# Patient Record
Sex: MALE | Race: BLACK OR AFRICAN AMERICAN | NOT HISPANIC OR LATINO | URBAN - METROPOLITAN AREA
[De-identification: names, ages, dates, MRNs, and addresses within clinical notes are randomized per-mention and may not be internally consistent; named-entity substitution may affect disease eponyms.]

---

## 2017-02-17 ENCOUNTER — OUTPATIENT (OUTPATIENT)
Dept: OUTPATIENT SERVICES | Facility: HOSPITAL | Age: 52
LOS: 1 days | End: 2017-02-17
Payer: MEDICAID

## 2017-02-17 ENCOUNTER — APPOINTMENT (OUTPATIENT)
Dept: MRI IMAGING | Facility: CLINIC | Age: 52
End: 2017-02-17

## 2017-02-17 DIAGNOSIS — Z00.8 ENCOUNTER FOR OTHER GENERAL EXAMINATION: ICD-10-CM

## 2017-02-17 PROCEDURE — A9585: CPT

## 2017-02-17 PROCEDURE — 72197 MRI PELVIS W/O & W/DYE: CPT

## 2017-08-11 ENCOUNTER — APPOINTMENT (OUTPATIENT)
Dept: INTERNAL MEDICINE | Facility: CLINIC | Age: 52
End: 2017-08-11
Payer: MEDICAID

## 2017-08-11 VITALS
OXYGEN SATURATION: 99 % | DIASTOLIC BLOOD PRESSURE: 72 MMHG | TEMPERATURE: 97.2 F | HEART RATE: 62 BPM | RESPIRATION RATE: 18 BRPM | HEIGHT: 68 IN | WEIGHT: 206 LBS | SYSTOLIC BLOOD PRESSURE: 104 MMHG | BODY MASS INDEX: 31.22 KG/M2

## 2017-08-11 DIAGNOSIS — K61.2 ANORECTAL ABSCESS: ICD-10-CM

## 2017-08-11 DIAGNOSIS — R94.31 ABNORMAL ELECTROCARDIOGRAM [ECG] [EKG]: ICD-10-CM

## 2017-08-11 PROCEDURE — 94060 EVALUATION OF WHEEZING: CPT

## 2017-08-11 PROCEDURE — 90471 IMMUNIZATION ADMIN: CPT

## 2017-08-11 PROCEDURE — 90715 TDAP VACCINE 7 YRS/> IM: CPT | Mod: GA

## 2017-08-11 PROCEDURE — 93000 ELECTROCARDIOGRAM COMPLETE: CPT

## 2017-08-11 PROCEDURE — 99205 OFFICE O/P NEW HI 60 MIN: CPT | Mod: 25

## 2017-09-18 ENCOUNTER — APPOINTMENT (OUTPATIENT)
Dept: DERMATOLOGY | Facility: CLINIC | Age: 52
End: 2017-09-18
Payer: MEDICAID

## 2017-09-18 VITALS — HEIGHT: 68 IN | BODY MASS INDEX: 31.83 KG/M2 | WEIGHT: 210 LBS

## 2017-09-18 DIAGNOSIS — Z86.79 PERSONAL HISTORY OF OTHER DISEASES OF THE CIRCULATORY SYSTEM: ICD-10-CM

## 2017-09-18 DIAGNOSIS — L71.9 ROSACEA, UNSPECIFIED: ICD-10-CM

## 2017-09-18 PROCEDURE — 99203 OFFICE O/P NEW LOW 30 MIN: CPT

## 2017-11-10 ENCOUNTER — APPOINTMENT (OUTPATIENT)
Dept: INTERNAL MEDICINE | Facility: CLINIC | Age: 52
End: 2017-11-10
Payer: MEDICAID

## 2017-11-10 VITALS
HEIGHT: 68 IN | TEMPERATURE: 98 F | WEIGHT: 209 LBS | BODY MASS INDEX: 31.67 KG/M2 | OXYGEN SATURATION: 96 % | RESPIRATION RATE: 16 BRPM | DIASTOLIC BLOOD PRESSURE: 80 MMHG | HEART RATE: 80 BPM | SYSTOLIC BLOOD PRESSURE: 126 MMHG

## 2017-11-10 DIAGNOSIS — Z23 ENCOUNTER FOR IMMUNIZATION: ICD-10-CM

## 2017-11-10 DIAGNOSIS — Z87.891 PERSONAL HISTORY OF NICOTINE DEPENDENCE: ICD-10-CM

## 2017-11-10 DIAGNOSIS — Z82.49 FAMILY HISTORY OF ISCHEMIC HEART DISEASE AND OTHER DISEASES OF THE CIRCULATORY SYSTEM: ICD-10-CM

## 2017-11-10 PROCEDURE — 99214 OFFICE O/P EST MOD 30 MIN: CPT | Mod: 25

## 2017-11-10 PROCEDURE — 90472 IMMUNIZATION ADMIN EACH ADD: CPT

## 2017-11-10 PROCEDURE — 90732 PPSV23 VACC 2 YRS+ SUBQ/IM: CPT | Mod: GA

## 2017-11-10 PROCEDURE — G0008: CPT

## 2017-11-10 PROCEDURE — 94010 BREATHING CAPACITY TEST: CPT

## 2017-11-10 PROCEDURE — 90686 IIV4 VACC NO PRSV 0.5 ML IM: CPT | Mod: GA

## 2017-11-10 PROCEDURE — 94729 DIFFUSING CAPACITY: CPT

## 2017-11-10 PROCEDURE — 94727 GAS DIL/WSHOT DETER LNG VOL: CPT

## 2017-11-10 RX ORDER — ASPIRIN 81 MG
TABLET, DELAYED RELEASE (ENTERIC COATED) ORAL
Refills: 0 | Status: ACTIVE | COMMUNITY

## 2017-11-10 RX ORDER — OXYCODONE 5 MG/1
5 TABLET ORAL
Qty: 20 | Refills: 0 | Status: DISCONTINUED | COMMUNITY
Start: 2017-10-31 | End: 2017-11-10

## 2017-11-10 RX ORDER — DOCUSATE SODIUM 100 MG/1
100 CAPSULE, LIQUID FILLED ORAL
Qty: 60 | Refills: 0 | Status: DISCONTINUED | COMMUNITY
Start: 2017-10-23 | End: 2017-11-10

## 2017-12-18 ENCOUNTER — APPOINTMENT (OUTPATIENT)
Dept: INTERNAL MEDICINE | Facility: CLINIC | Age: 52
End: 2017-12-18

## 2018-02-06 ENCOUNTER — EMERGENCY (EMERGENCY)
Facility: HOSPITAL | Age: 53
LOS: 0 days | Discharge: HOME | End: 2018-02-06
Attending: EMERGENCY MEDICINE | Admitting: EMERGENCY MEDICINE
Payer: MEDICAID

## 2018-02-06 VITALS — WEIGHT: 210.1 LBS | HEIGHT: 71 IN

## 2018-02-06 VITALS
DIASTOLIC BLOOD PRESSURE: 58 MMHG | RESPIRATION RATE: 18 BRPM | TEMPERATURE: 100 F | HEART RATE: 103 BPM | SYSTOLIC BLOOD PRESSURE: 102 MMHG | OXYGEN SATURATION: 100 %

## 2018-02-06 DIAGNOSIS — R07.9 CHEST PAIN, UNSPECIFIED: ICD-10-CM

## 2018-02-06 DIAGNOSIS — I10 ESSENTIAL (PRIMARY) HYPERTENSION: ICD-10-CM

## 2018-02-06 DIAGNOSIS — E78.5 HYPERLIPIDEMIA, UNSPECIFIED: ICD-10-CM

## 2018-02-06 DIAGNOSIS — I25.10 ATHEROSCLEROTIC HEART DISEASE OF NATIVE CORONARY ARTERY WITHOUT ANGINA PECTORIS: ICD-10-CM

## 2018-02-06 DIAGNOSIS — Z95.1 PRESENCE OF AORTOCORONARY BYPASS GRAFT: Chronic | ICD-10-CM

## 2018-02-06 DIAGNOSIS — Z95.1 PRESENCE OF AORTOCORONARY BYPASS GRAFT: ICD-10-CM

## 2018-02-06 LAB
ALBUMIN SERPL ELPH-MCNC: 4.5 G/DL — SIGNIFICANT CHANGE UP (ref 3.3–5)
ALP SERPL-CCNC: 61 U/L — SIGNIFICANT CHANGE UP (ref 40–120)
ALT FLD-CCNC: 40 U/L — SIGNIFICANT CHANGE UP (ref 12–78)
ANION GAP SERPL CALC-SCNC: 6 MMOL/L — SIGNIFICANT CHANGE UP (ref 5–17)
APPEARANCE UR: CLEAR — SIGNIFICANT CHANGE UP
APTT BLD: 34.2 SEC — SIGNIFICANT CHANGE UP (ref 27.5–37.4)
AST SERPL-CCNC: 40 U/L — HIGH (ref 15–37)
BACTERIA # UR AUTO: (no result)
BASOPHILS # BLD AUTO: 0.1 K/UL — SIGNIFICANT CHANGE UP (ref 0–0.2)
BASOPHILS NFR BLD AUTO: 2.3 % — HIGH (ref 0–2)
BILIRUB SERPL-MCNC: 0.7 MG/DL — SIGNIFICANT CHANGE UP (ref 0.2–1.2)
BILIRUB UR-MCNC: NEGATIVE — SIGNIFICANT CHANGE UP
BUN SERPL-MCNC: 11 MG/DL — SIGNIFICANT CHANGE UP (ref 7–23)
CALCIUM SERPL-MCNC: 9.1 MG/DL — SIGNIFICANT CHANGE UP (ref 8.5–10.1)
CHLORIDE SERPL-SCNC: 105 MMOL/L — SIGNIFICANT CHANGE UP (ref 96–108)
CK SERPL-CCNC: 455 U/L — HIGH (ref 26–308)
CO2 SERPL-SCNC: 28 MMOL/L — SIGNIFICANT CHANGE UP (ref 22–31)
COLOR SPEC: YELLOW — SIGNIFICANT CHANGE UP
COMMENT - URINE: SIGNIFICANT CHANGE UP
CREAT SERPL-MCNC: 1.02 MG/DL — SIGNIFICANT CHANGE UP (ref 0.5–1.3)
DIFF PNL FLD: NEGATIVE — SIGNIFICANT CHANGE UP
EOSINOPHIL # BLD AUTO: 0.1 K/UL — SIGNIFICANT CHANGE UP (ref 0–0.5)
EOSINOPHIL NFR BLD AUTO: 2 % — SIGNIFICANT CHANGE UP (ref 0–6)
EPI CELLS # UR: SIGNIFICANT CHANGE UP
ETHANOL SERPL-MCNC: <10 MG/DL — SIGNIFICANT CHANGE UP (ref 0–10)
GLUCOSE SERPL-MCNC: 94 MG/DL — SIGNIFICANT CHANGE UP (ref 70–99)
GLUCOSE UR QL: NEGATIVE MG/DL — SIGNIFICANT CHANGE UP
HCT VFR BLD CALC: 43.4 % — SIGNIFICANT CHANGE UP (ref 39–50)
HGB BLD-MCNC: 14.3 G/DL — SIGNIFICANT CHANGE UP (ref 13–17)
INR BLD: 1.05 RATIO — SIGNIFICANT CHANGE UP (ref 0.88–1.16)
KETONES UR-MCNC: NEGATIVE — SIGNIFICANT CHANGE UP
LEUKOCYTE ESTERASE UR-ACNC: NEGATIVE — SIGNIFICANT CHANGE UP
LYMPHOCYTES # BLD AUTO: 3 K/UL — SIGNIFICANT CHANGE UP (ref 1–3.3)
LYMPHOCYTES # BLD AUTO: 46.5 % — HIGH (ref 13–44)
MCHC RBC-ENTMCNC: 27.9 PG — SIGNIFICANT CHANGE UP (ref 27–34)
MCHC RBC-ENTMCNC: 33 GM/DL — SIGNIFICANT CHANGE UP (ref 32–36)
MCV RBC AUTO: 84.5 FL — SIGNIFICANT CHANGE UP (ref 80–100)
MONOCYTES # BLD AUTO: 0.3 K/UL — SIGNIFICANT CHANGE UP (ref 0–0.9)
MONOCYTES NFR BLD AUTO: 5.3 % — SIGNIFICANT CHANGE UP (ref 2–14)
NEUTROPHILS # BLD AUTO: 2.9 K/UL — SIGNIFICANT CHANGE UP (ref 1.8–7.4)
NEUTROPHILS NFR BLD AUTO: 44 % — SIGNIFICANT CHANGE UP (ref 43–77)
NITRITE UR-MCNC: NEGATIVE — SIGNIFICANT CHANGE UP
PH UR: 7 — SIGNIFICANT CHANGE UP (ref 5–8)
PLATELET # BLD AUTO: 242 K/UL — SIGNIFICANT CHANGE UP (ref 150–400)
POTASSIUM SERPL-MCNC: 3.6 MMOL/L — SIGNIFICANT CHANGE UP (ref 3.5–5.3)
POTASSIUM SERPL-SCNC: 3.6 MMOL/L — SIGNIFICANT CHANGE UP (ref 3.5–5.3)
PROT SERPL-MCNC: 8.6 GM/DL — HIGH (ref 6–8.3)
PROT UR-MCNC: 15 MG/DL
PROTHROM AB SERPL-ACNC: 11.3 SEC — SIGNIFICANT CHANGE UP (ref 9.8–12.7)
RBC # BLD: 5.14 M/UL — SIGNIFICANT CHANGE UP (ref 4.2–5.8)
RBC # FLD: 13.6 % — SIGNIFICANT CHANGE UP (ref 10.3–14.5)
RBC CASTS # UR COMP ASSIST: SIGNIFICANT CHANGE UP /HPF (ref 0–4)
SODIUM SERPL-SCNC: 139 MMOL/L — SIGNIFICANT CHANGE UP (ref 135–145)
SP GR SPEC: 1.01 — SIGNIFICANT CHANGE UP (ref 1.01–1.02)
TROPONIN I SERPL-MCNC: <0.015 NG/ML — SIGNIFICANT CHANGE UP (ref 0.01–0.04)
TROPONIN I SERPL-MCNC: <0.015 NG/ML — SIGNIFICANT CHANGE UP (ref 0.01–0.04)
UROBILINOGEN FLD QL: NEGATIVE MG/DL — SIGNIFICANT CHANGE UP
WBC # BLD: 6.5 K/UL — SIGNIFICANT CHANGE UP (ref 3.8–10.5)
WBC # FLD AUTO: 6.5 K/UL — SIGNIFICANT CHANGE UP (ref 3.8–10.5)
WBC UR QL: SIGNIFICANT CHANGE UP

## 2018-02-06 PROCEDURE — 71045 X-RAY EXAM CHEST 1 VIEW: CPT | Mod: 26

## 2018-02-06 PROCEDURE — 99285 EMERGENCY DEPT VISIT HI MDM: CPT

## 2018-02-06 PROCEDURE — 93010 ELECTROCARDIOGRAM REPORT: CPT

## 2018-02-06 RX ORDER — ASPIRIN/CALCIUM CARB/MAGNESIUM 324 MG
81 TABLET ORAL DAILY
Qty: 0 | Refills: 0 | Status: DISCONTINUED | OUTPATIENT
Start: 2018-02-06 | End: 2018-02-06

## 2018-02-06 RX ORDER — SIMVASTATIN 20 MG/1
40 TABLET, FILM COATED ORAL AT BEDTIME
Qty: 0 | Refills: 0 | Status: DISCONTINUED | OUTPATIENT
Start: 2018-02-06 | End: 2018-02-06

## 2018-02-06 RX ORDER — CARVEDILOL PHOSPHATE 80 MG/1
6.25 CAPSULE, EXTENDED RELEASE ORAL EVERY 12 HOURS
Qty: 0 | Refills: 0 | Status: DISCONTINUED | OUTPATIENT
Start: 2018-02-06 | End: 2018-02-06

## 2018-02-06 NOTE — ED PROVIDER NOTE - MEDICAL DECISION MAKING DETAILS
Pt with hx of CABG, HTN, HLD presenting with CP, described as tightness and new. Normal exam. Heart score of 4. Cardiac work up and admit.

## 2018-02-06 NOTE — ED PROVIDER NOTE - PROGRESS NOTE DETAILS
ED Maxx Reese: ED attending Dr. Bettencourt signed pt out to the hospitalist, hospitalist is aware of pt's drinking and will place CIWA protocol. AJM: Pt initially admnitted. hospitalist spoke with pts cardiologist dr lerma who notes he will see pt in Am. pt pain free. 2 trop neg. stable for dc home with cards f/u in Am. pt comofrtable with plan. will cancel admission

## 2018-02-06 NOTE — ED PROVIDER NOTE - OBJECTIVE STATEMENT
53 y/o male with PMHx of HTN, HLD, CAD and PSHx of CABG (performed at [facility]) presents to the ED c/o chest tightness, non-radiating, starting a few days ago. Denies similar sx in the past. Denies SOB, light-headedness, diaphoresis, or fainting. No weakness or numbness on one side of the body compared to the other. Currently taking Carvedilol but noncompliant, Simvastatin, ASA. Pt recently began drinking 3-4 beers and some wine daily. No drinking today. Pt confirms having drinking problems 4 years ago. Non-smoker, no drug use. No recent travel. 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 Cardiologist Dr. Abdalla. PMD Dr. Cullen.

## 2018-02-06 NOTE — ED STATDOCS - PROGRESS NOTE DETAILS
Anatoliy Alfaro DO (Attending): I, Anatoliy Alfaro DO, performed the initial face to face bedside interview with this patient regarding history of present illness and determined that the patient should be seen in the main ED.  The history, was documented by the scribe in my presence and I attest to the accuracy of the documentation.

## 2018-02-06 NOTE — ED PROVIDER NOTE - MUSCULOSKELETAL, MLM
Spine appears normal, range of motion is not limited, no muscle or joint tenderness. No chest wall tenderness, no rashes on the chest wall. No calf swelling or tenderness.

## 2018-02-06 NOTE — CONSULT NOTE ADULT - SUBJECTIVE AND OBJECTIVE BOX
PCP:  Subhash  Cardio:  Rm    CHIEF COMPLAINT:    chest pressure    HISTORY OF THE PRESENT ILLNESS:    52 M with Hx of HTN, Hyperlipidemia, CAD s/p 2V CABG in  at [facility] presents to the ED with few days of left sided chest pressure without any pain.  Patient reports that he works as an  and has to lift wheelchairs off the ramp.  He also reports that he has been under alot of financial stress lately and has been worried.  The chest pressure has occurred most of the day.  Not associated with chest pain, pleuritic pain, shortness of breath, fevers, chills, or radiation of the pressure to the left arm, left jaw, left shoulder, or back.  No associated dizziness, lightheadedness, diaphoresis, cough, abdominal pain, nausea or vomiting.  He admits to taking his Coreg only once a day (supposed to take twice day).  At this time in the ED, he has no pain and no pressure and reports feeling much better.  Denies any recent travel, pain in the legs, or shortness of breath at rest or exertion.  He reports having a stress test with Dr. Abdalla in the office about 8 months - 1 year ago.    In the ED, first set of cardiac enzymes negative and EKG shows no acute ischemic changes.  Noted to have low grade temp in the ED of 100.3    PAST MEDICAL HISTORY:  HTN  CAD  Hyperlipidemia    PAST SURGICAL HISTORY:  s/p 2V CABG  s/p rectal fistula repair/surgery    FAMILY HISTORY:   non-contributory to the patient's current presentation    SOCIAL HISTORY:  no smoking, no drugs, reports that he drinks occasionally and last drink was yesterday, however according to the ED record, he has been drinking recently 3-4 beers daily, he works as a     REVIEW OF SYSTEMS:   All 10 systems reviewed in detailed and found to be negative with the exception of what has already been described above    MEDICATIONS  (STANDING):  aspirin  chewable 81 milliGRAM(s) Oral daily  carvedilol 6.25 milliGRAM(s) Oral every 12 hours  simvastatin 40 milliGRAM(s) Oral at bedtime    MEDICATIONS  (PRN):    VITALS:  T(F): 100.3 (18 @ 16:40), Max: 100.3 (18 @ 16:40)  HR: 103 (18 @ 16:40) (74 - 103)  BP: 102/58 (18 @ 16:40) (102/58 - 158/107)  RR: 18 (18 @ 16:40) (18 - 18)  SpO2: 100% (18 @ 16:40) (99% - 100%)    PHYSICAL EXAM:    HEENT:  pupils equal and reactive, EOMI, no oropharyngeal lesions, erythema, exudates, oral thrush    NECK:   supple, no carotid bruits, no palpable lymph nodes, no thyromegaly    CV:  +S1, +S2, regular, no murmurs or rubs, mild reproducible chest pressure with palpation but without pain    RESP:   lungs clear to auscultation bilaterally, no wheezing, rales, rhonchi, good air entry bilaterally    BREAST:  not examined    GI:  abdomen soft, non-tender, non-distended, normal BS, no bruits, no abdominal masses, no palpable masses    RECTAL:  not examined    :  not examined    MSK:   normal muscle tone, no atrophy, no rigidity, no contractions, no increased chest pressure with movement of the left arm in all directions    EXT:   no clubbing, no cyanosis, no edema, no calf pain, swelling or erythema    VASCULAR:  pulses equal and symmetric in the upper and lower extremities    NEURO:  AAOX3, no focal neurological deficits, follows all commands, able to move extremities spontaneously    SKIN:  no ulcers, lesions or rashes      LABS:               14.3   6.5   )-----------( 242      ( 2018 14:49 )             43.4     02-06    139  |  105  |  11  ----------------------------<  94  3.6   |  28  |  1.02    Ca    9.1      2018 14:49    TPro  8.6<H>  /  Alb  4.5  /  TBili  0.7  /  DBili  x   /  AST  40<H>  /  ALT  40  /  AlkPhos  61  02-06    CARDIAC MARKERS ( 2018 14:49 )  <0.015 ng/mL / x     / 455 U/L / x     / x        LIVER FUNCTIONS - ( 2018 14:49 )  Alb: 4.5 g/dL / Pro: 8.6 gm/dL / ALK PHOS: 61 U/L / ALT: 40 U/L / AST: 40 U/L / GGT: x           PT/INR - ( 2018 14:49 )   PT: 11.3 sec;   INR: 1.05 ratio       PTT - ( 2018 14:49 )  PTT:34.2 sec  Urinalysis Basic - ( 2018 16:50 )    Color: Yellow / Appearance: Clear / S.010 / pH: x  Gluc: x / Ketone: Negative  / Bili: Negative / Urobili: Negative mg/dL   Blood: x / Protein: 15 mg/dL / Nitrite: Negative   Leuk Esterase: Negative / RBC: 0-2 /HPF / WBC 0-2   Sq Epi: x / Non Sq Epi: Occasional / Bacteria: Few    Blood, Urine: Negative ( @ 16:50)    EKG:  NSR at 72 bpm, LAD, no ST elevations, no ST depressions, TW inversions I, AvL, V2-V6 (no change compared to EKG from )    RADIOLOGY STUDIES:    CXR:  < from: Xray Chest 1 View AP/PA. (18 @ 14:46) >  Impression:    No acute disease    No significant interval change as compared to  2012    < end of copied text >      IMPRESSION:    ATYPICAL CHEST PRESSURE IN PATIENT WITH HX OF CAD AND S/P CABG.  SYMPTOMS ARE ATYPICAL FOR ACUTE CORONARY SYNDROME.  THERE IS NO OBJECTIVE EVIDENCE FOR CARDIAC ISCHEMIA AT THIS TIME AND PATIENT IS NOW ASYMPTOMATIC.  HIS EKG IS UNCHANGED AND CARDIAC ENZYMES X 1 IS NEGATIVE.  LOW SUSPICION FOR PERICARDITIS OR PULMONARY EMBOLISM.  I SUSPECT SYMPTOMS ARE RELATED TO STRESS AND POSSIBLY MUSCKULOSKELETAL.    HYPERTENSION    LOW GRADE TEMPERATURE .3 WITHOUT ANY CLINICAL EVIDENCE FOR INFECTION AT THIS TIME      RECOMMENDATIONS:  -  continue ASA, statin, Coreg  -  would obtain second set of cardiac enzymes, if negative, would d/c home with outpatient follow up with Dr. Abdalla tomorrow afternoon  -  if second set has a positive troponin, then would admit for continued JO-ANN  -  I spoke with Dr. Abdalla and if discharged, he will see the patient in the office tomorrow after 2:30pm.  I have explained this to the patient and he is aware as well as to ED MD, Dr. Jackson.    All above discussed with ED MD, Dr. Jackson and with Dr. Abdalla

## 2018-02-06 NOTE — ED STATDOCS - NS_ ATTENDINGSCRIBEDETAILS _ED_A_ED_FT
I, Anatoliy Alfaro DO, performed the initial face to face bedside interview with this patient regarding history of present illness and determined that the patient should be seen in the main ED.  The history, was documented by the scribe in my presence and I attest to the accuracy of the documentation.

## 2018-02-06 NOTE — ED STATDOCS - OBJECTIVE STATEMENT
53 yo male PMH HTN, HLD, s/p CABG 11/2012, on aspirin, presents c/o chest tightness and pain today.  Reports drinking three 40-oz beers daily for the past month.  Cardiologist Dr. Abdalla.  PCP Dr. Cullen.  Will send to main ED for further evaluation.

## 2018-02-06 NOTE — ED ADULT NURSE NOTE - NS ED NURSE LEVEL OF CONSCIOUSNESS AFFECT
Calm
Normal cranial shape; fontanelle(s) of normal shape, size and tension; scalp inspection and palpation free of abrasions, defects, masses, and swelling; hair pattern normal.

## 2018-02-12 ENCOUNTER — APPOINTMENT (OUTPATIENT)
Dept: INTERNAL MEDICINE | Facility: CLINIC | Age: 53
End: 2018-02-12

## 2018-02-12 PROBLEM — G47.33 OBSTRUCTIVE SLEEP APNEA: Status: ACTIVE | Noted: 2017-08-11

## 2018-02-12 PROBLEM — Z72.0 NICOTINE ABUSE: Status: ACTIVE | Noted: 2017-08-11

## 2018-02-12 PROBLEM — M25.50 POLYARTHRALGIA: Status: ACTIVE | Noted: 2018-02-12

## 2018-02-12 PROBLEM — Z23 NEED FOR VACCINATION AGAINST STREPTOCOCCUS PNEUMONIAE USING PNEUMOCOCCAL CONJUGATE VACCINE 7: Status: RESOLVED | Noted: 2017-11-10 | Resolved: 2018-02-12

## 2018-02-12 PROBLEM — R06.09 DYSPNEA ON EXERTION: Status: ACTIVE | Noted: 2017-08-11

## 2018-02-16 ENCOUNTER — APPOINTMENT (OUTPATIENT)
Dept: INTERNAL MEDICINE | Facility: CLINIC | Age: 53
End: 2018-02-16

## 2018-02-16 DIAGNOSIS — Z72.0 TOBACCO USE: ICD-10-CM

## 2018-02-16 DIAGNOSIS — M25.50 PAIN IN UNSPECIFIED JOINT: ICD-10-CM

## 2018-02-16 DIAGNOSIS — R06.09 OTHER FORMS OF DYSPNEA: ICD-10-CM

## 2018-02-16 DIAGNOSIS — G47.33 OBSTRUCTIVE SLEEP APNEA (ADULT) (PEDIATRIC): ICD-10-CM

## 2018-02-16 DIAGNOSIS — Z23 ENCOUNTER FOR IMMUNIZATION: ICD-10-CM

## 2018-03-12 ENCOUNTER — APPOINTMENT (OUTPATIENT)
Dept: DERMATOLOGY | Facility: CLINIC | Age: 53
End: 2018-03-12

## 2018-09-27 ENCOUNTER — EMERGENCY (EMERGENCY)
Facility: HOSPITAL | Age: 53
LOS: 0 days | Discharge: HOME | End: 2018-09-27
Attending: EMERGENCY MEDICINE | Admitting: EMERGENCY MEDICINE
Payer: SELF-PAY

## 2018-09-27 VITALS
OXYGEN SATURATION: 99 % | HEART RATE: 71 BPM | RESPIRATION RATE: 18 BRPM | DIASTOLIC BLOOD PRESSURE: 89 MMHG | TEMPERATURE: 98 F | SYSTOLIC BLOOD PRESSURE: 131 MMHG

## 2018-09-27 VITALS
WEIGHT: 210.1 LBS | HEIGHT: 68 IN | HEART RATE: 73 BPM | RESPIRATION RATE: 18 BRPM | TEMPERATURE: 98 F | DIASTOLIC BLOOD PRESSURE: 84 MMHG | OXYGEN SATURATION: 100 % | SYSTOLIC BLOOD PRESSURE: 142 MMHG

## 2018-09-27 DIAGNOSIS — E78.5 HYPERLIPIDEMIA, UNSPECIFIED: ICD-10-CM

## 2018-09-27 DIAGNOSIS — R06.00 DYSPNEA, UNSPECIFIED: ICD-10-CM

## 2018-09-27 DIAGNOSIS — F41.9 ANXIETY DISORDER, UNSPECIFIED: ICD-10-CM

## 2018-09-27 DIAGNOSIS — Z95.1 PRESENCE OF AORTOCORONARY BYPASS GRAFT: Chronic | ICD-10-CM

## 2018-09-27 DIAGNOSIS — R00.2 PALPITATIONS: ICD-10-CM

## 2018-09-27 DIAGNOSIS — I10 ESSENTIAL (PRIMARY) HYPERTENSION: ICD-10-CM

## 2018-09-27 DIAGNOSIS — I25.10 ATHEROSCLEROTIC HEART DISEASE OF NATIVE CORONARY ARTERY WITHOUT ANGINA PECTORIS: ICD-10-CM

## 2018-09-27 DIAGNOSIS — Z95.1 PRESENCE OF AORTOCORONARY BYPASS GRAFT: ICD-10-CM

## 2018-09-27 LAB
ALBUMIN SERPL ELPH-MCNC: 4 G/DL — SIGNIFICANT CHANGE UP (ref 3.3–5)
ALP SERPL-CCNC: 77 U/L — SIGNIFICANT CHANGE UP (ref 40–120)
ALT FLD-CCNC: 55 U/L — SIGNIFICANT CHANGE UP (ref 12–78)
ANION GAP SERPL CALC-SCNC: 13 MMOL/L — SIGNIFICANT CHANGE UP (ref 5–17)
AST SERPL-CCNC: 59 U/L — HIGH (ref 15–37)
BASOPHILS # BLD AUTO: 0.07 K/UL — SIGNIFICANT CHANGE UP (ref 0–0.2)
BASOPHILS NFR BLD AUTO: 1.3 % — SIGNIFICANT CHANGE UP (ref 0–2)
BILIRUB SERPL-MCNC: 0.4 MG/DL — SIGNIFICANT CHANGE UP (ref 0.2–1.2)
BUN SERPL-MCNC: 4 MG/DL — LOW (ref 7–23)
CALCIUM SERPL-MCNC: 8.6 MG/DL — SIGNIFICANT CHANGE UP (ref 8.5–10.1)
CHLORIDE SERPL-SCNC: 101 MMOL/L — SIGNIFICANT CHANGE UP (ref 96–108)
CO2 SERPL-SCNC: 25 MMOL/L — SIGNIFICANT CHANGE UP (ref 22–31)
CREAT SERPL-MCNC: 0.95 MG/DL — SIGNIFICANT CHANGE UP (ref 0.5–1.3)
EOSINOPHIL # BLD AUTO: 0.13 K/UL — SIGNIFICANT CHANGE UP (ref 0–0.5)
EOSINOPHIL NFR BLD AUTO: 2.3 % — SIGNIFICANT CHANGE UP (ref 0–6)
GLUCOSE SERPL-MCNC: 134 MG/DL — HIGH (ref 70–99)
HCT VFR BLD CALC: 43 % — SIGNIFICANT CHANGE UP (ref 39–50)
HGB BLD-MCNC: 14.4 G/DL — SIGNIFICANT CHANGE UP (ref 13–17)
IMM GRANULOCYTES NFR BLD AUTO: 0.4 % — SIGNIFICANT CHANGE UP (ref 0–1.5)
LYMPHOCYTES # BLD AUTO: 3.08 K/UL — SIGNIFICANT CHANGE UP (ref 1–3.3)
LYMPHOCYTES # BLD AUTO: 55.5 % — HIGH (ref 13–44)
MCHC RBC-ENTMCNC: 29.3 PG — SIGNIFICANT CHANGE UP (ref 27–34)
MCHC RBC-ENTMCNC: 33.5 GM/DL — SIGNIFICANT CHANGE UP (ref 32–36)
MCV RBC AUTO: 87.6 FL — SIGNIFICANT CHANGE UP (ref 80–100)
MONOCYTES # BLD AUTO: 0.59 K/UL — SIGNIFICANT CHANGE UP (ref 0–0.9)
MONOCYTES NFR BLD AUTO: 10.6 % — SIGNIFICANT CHANGE UP (ref 2–14)
NEUTROPHILS # BLD AUTO: 1.66 K/UL — LOW (ref 1.8–7.4)
NEUTROPHILS NFR BLD AUTO: 29.9 % — LOW (ref 43–77)
NRBC # BLD: 0 /100 WBCS — SIGNIFICANT CHANGE UP (ref 0–0)
PLATELET # BLD AUTO: 206 K/UL — SIGNIFICANT CHANGE UP (ref 150–400)
POTASSIUM SERPL-MCNC: 3.3 MMOL/L — LOW (ref 3.5–5.3)
POTASSIUM SERPL-SCNC: 3.3 MMOL/L — LOW (ref 3.5–5.3)
PROT SERPL-MCNC: 8.1 GM/DL — SIGNIFICANT CHANGE UP (ref 6–8.3)
RBC # BLD: 4.91 M/UL — SIGNIFICANT CHANGE UP (ref 4.2–5.8)
RBC # FLD: 14.3 % — SIGNIFICANT CHANGE UP (ref 10.3–14.5)
SODIUM SERPL-SCNC: 139 MMOL/L — SIGNIFICANT CHANGE UP (ref 135–145)
TROPONIN I SERPL-MCNC: <0.015 NG/ML — SIGNIFICANT CHANGE UP (ref 0.01–0.04)
TROPONIN I SERPL-MCNC: <0.015 NG/ML — SIGNIFICANT CHANGE UP (ref 0.01–0.04)
WBC # BLD: 5.55 K/UL — SIGNIFICANT CHANGE UP (ref 3.8–10.5)
WBC # FLD AUTO: 5.55 K/UL — SIGNIFICANT CHANGE UP (ref 3.8–10.5)

## 2018-09-27 PROCEDURE — 99053 MED SERV 10PM-8AM 24 HR FAC: CPT

## 2018-09-27 PROCEDURE — 99284 EMERGENCY DEPT VISIT MOD MDM: CPT | Mod: 25

## 2018-09-27 PROCEDURE — 71046 X-RAY EXAM CHEST 2 VIEWS: CPT | Mod: 26

## 2018-09-27 PROCEDURE — 93010 ELECTROCARDIOGRAM REPORT: CPT

## 2018-09-27 RX ORDER — CARVEDILOL PHOSPHATE 80 MG/1
3.12 CAPSULE, EXTENDED RELEASE ORAL ONCE
Qty: 0 | Refills: 0 | Status: COMPLETED | OUTPATIENT
Start: 2018-09-27 | End: 2018-09-27

## 2018-09-27 RX ORDER — SIMVASTATIN 20 MG/1
20 TABLET, FILM COATED ORAL ONCE
Qty: 0 | Refills: 0 | Status: COMPLETED | OUTPATIENT
Start: 2018-09-27 | End: 2018-09-27

## 2018-09-27 RX ORDER — ASPIRIN/CALCIUM CARB/MAGNESIUM 324 MG
1 TABLET ORAL
Qty: 15 | Refills: 0
Start: 2018-09-27 | End: 2018-10-11

## 2018-09-27 RX ORDER — ASPIRIN/CALCIUM CARB/MAGNESIUM 324 MG
325 TABLET ORAL ONCE
Qty: 0 | Refills: 0 | Status: COMPLETED | OUTPATIENT
Start: 2018-09-27 | End: 2018-09-27

## 2018-09-27 RX ORDER — CARVEDILOL PHOSPHATE 80 MG/1
1 CAPSULE, EXTENDED RELEASE ORAL
Qty: 30 | Refills: 0
Start: 2018-09-27 | End: 2018-10-11

## 2018-09-27 RX ORDER — SIMVASTATIN 20 MG/1
1 TABLET, FILM COATED ORAL
Qty: 15 | Refills: 0
Start: 2018-09-27 | End: 2018-10-11

## 2018-09-27 RX ORDER — CLONAZEPAM 1 MG
0.5 TABLET ORAL ONCE
Qty: 0 | Refills: 0 | Status: DISCONTINUED | OUTPATIENT
Start: 2018-09-27 | End: 2018-09-27

## 2018-09-27 RX ADMIN — CARVEDILOL PHOSPHATE 3.12 MILLIGRAM(S): 80 CAPSULE, EXTENDED RELEASE ORAL at 06:45

## 2018-09-27 RX ADMIN — SIMVASTATIN 20 MILLIGRAM(S): 20 TABLET, FILM COATED ORAL at 06:45

## 2018-09-27 RX ADMIN — Medication 0.5 MILLIGRAM(S): at 06:45

## 2018-09-27 RX ADMIN — Medication 325 MILLIGRAM(S): at 06:45

## 2018-09-27 NOTE — ED PROVIDER NOTE - MEDICAL DECISION MAKING DETAILS
pt presenting s/p sob and palpitations when feeling upset. now resolved. r/o acs, give home meds, anxiety meds. pt used to take benzos prn for anxiety.

## 2018-09-27 NOTE — ED ADULT NURSE NOTE - CHPI ED NUR SYMPTOMS NEG
no chills/no dizziness/no back pain/no diaphoresis/no fever/no nausea/no vomiting/no congestion/no chest pain/no syncope

## 2018-09-27 NOTE — ED ADULT NURSE NOTE - NSIMPLEMENTINTERV_GEN_ALL_ED
Implemented All Universal Safety Interventions:  [place] to call system. Call bell, personal items and telephone within reach. Instruct patient to call for assistance. Room bathroom lighting operational. Non-slip footwear when patient is off stretcher. Physically safe environment: no spills, clutter or unnecessary equipment. Stretcher in lowest position, wheels locked, appropriate side rails in place.

## 2018-09-27 NOTE — ED PROVIDER NOTE - PROGRESS NOTE DETAILS
AJM: pt feeling imporved. workup neg. home meds refilled. encouraged to follow up with [facility] center and cards in 1 week. All results discussed with the patient, and a copy of results has been provided. Patient is comfortable with dc plan for home. Opportunity for questions was provided and all questions have been adressed.

## 2018-09-27 NOTE — ED PROVIDER NOTE - OBJECTIVE STATEMENT
Patient is a 53 year old male with history of HTN, CABG in 2012 presenting s/p palpitations and sob. No actual chest pain despite triage note. Pt notes he was awake early this morning because is is worried and stressed about finances. As he became more worried, symptoms began. Currently asymptomatic. Tearful during exam. + MJ but no other drugs or smoking. + etoh use daily. No active intox. No fevers, cough, travel, LE pain or swelling. Pt stopped meds several weeks ago 2/2 insurance issues. No abd pain, focal weakness or numbness, AMS, changes in vision. No LH or syncope. Cards: Dr Abdalla Patient is a 53 year old male with history of HTN, CABG in 2012 presenting s/p palpitations and sob. No actual chest pain despite triage note. Pt notes he was awake early this morning because is is worried and stressed about finances. As he became more worried, symptoms began. Currently asymptomatic. Tearful during exam. Denies history of depression. No active SI/HI. + MJ but no other drugs or smoking. + etoh use daily. No active intox. No fevers, cough, travel, LE pain or swelling. Pt stopped meds several weeks ago 2/2 insurance issues. No abd pain, focal weakness or numbness, AMS, changes in vision. No LH or syncope. Cards: Dr Abdalla

## 2018-09-27 NOTE — ED ADULT NURSE NOTE - CAS EDN DISCHARGE ASSESSMENT
Alert and oriented to person, place and time/Awake/Symptoms improved/Patient baseline mental status/Dressing clean and dry

## 2018-09-27 NOTE — ED ADULT NURSE NOTE - OBJECTIVE STATEMENT
pt complaining of sob that started about 45 minutes ago. denies chest pain, dizziness. pt 100% RA denies sob right now. pt had open heart surgery in 2012. states he has not been taking his normal meds for about 2 weeks now ( Carvedilol, statin, aspirin)

## 2019-05-17 ENCOUNTER — EMERGENCY (EMERGENCY)
Facility: HOSPITAL | Age: 54
LOS: 0 days | Discharge: HOME | End: 2019-05-18
Attending: EMERGENCY MEDICINE | Admitting: EMERGENCY MEDICINE
Payer: MEDICAID

## 2019-05-17 VITALS
WEIGHT: 197.98 LBS | HEIGHT: 68 IN | SYSTOLIC BLOOD PRESSURE: 109 MMHG | HEART RATE: 98 BPM | TEMPERATURE: 98 F | OXYGEN SATURATION: 96 % | RESPIRATION RATE: 18 BRPM | DIASTOLIC BLOOD PRESSURE: 74 MMHG

## 2019-05-17 DIAGNOSIS — Z23 ENCOUNTER FOR IMMUNIZATION: ICD-10-CM
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
## 2019-05-17 DIAGNOSIS — E78.5 HYPERLIPIDEMIA, UNSPECIFIED: ICD-10-CM
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
## 2019-05-17 DIAGNOSIS — Z95.1 PRESENCE OF AORTOCORONARY BYPASS GRAFT: Chronic | ICD-10-CM

## 2019-05-17 DIAGNOSIS — S31.114A LACERATION WITHOUT FOREIGN BODY OF ABDOMINAL WALL, LEFT LOWER QUADRANT WITHOUT PENETRATION INTO PERITONEAL CAVITY, INITIAL ENCOUNTER: ICD-10-CM
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
## 2019-05-17 DIAGNOSIS — I10 ESSENTIAL (PRIMARY) HYPERTENSION: ICD-10-CM

## 2019-05-17 DIAGNOSIS — Z79.82 LONG TERM (CURRENT) USE OF ASPIRIN: ICD-10-CM

## 2019-05-17 DIAGNOSIS — I25.810 ATHEROSCLEROSIS OF CORONARY ARTERY BYPASS GRAFT(S) WITHOUT ANGINA PECTORIS: ICD-10-CM

## 2019-05-17 PROCEDURE — 99053 MED SERV 10PM-8AM 24 HR FAC: CPT

## 2019-05-17 PROCEDURE — 99284 EMERGENCY DEPT VISIT MOD MDM: CPT | Mod: 25

## 2019-05-17 PROCEDURE — 12032 INTMD RPR S/A/T/EXT 2.6-7.5: CPT
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
## 2019-05-18 VITALS
TEMPERATURE: 98 F | SYSTOLIC BLOOD PRESSURE: 120 MMHG | OXYGEN SATURATION: 97 % | RESPIRATION RATE: 18 BRPM | DIASTOLIC BLOOD PRESSURE: 71 MMHG | HEART RATE: 92 BPM

## 2019-05-18 RX ORDER — CEPHALEXIN 500 MG
1 CAPSULE ORAL
Qty: 15 | Refills: 0
Start: 2019-05-18 | End: 2019-05-22

## 2019-05-18 RX ORDER — TETANUS TOXOID, REDUCED DIPHTHERIA TOXOID AND ACELLULAR PERTUSSIS VACCINE, ADSORBED 5; 2.5; 8; 8; 2.5 [IU]/.5ML; [IU]/.5ML; UG/.5ML; UG/.5ML; UG/.5ML
0.5 SUSPENSION INTRAMUSCULAR ONCE
Refills: 0 | Status: COMPLETED | OUTPATIENT
Start: 2019-05-18 | End: 2019-05-18

## 2019-05-18 RX ADMIN — TETANUS TOXOID, REDUCED DIPHTHERIA TOXOID AND ACELLULAR PERTUSSIS VACCINE, ADSORBED 0.5 MILLILITER(S): 5; 2.5; 8; 8; 2.5 SUSPENSION INTRAMUSCULAR at 01:01

## 2019-05-18 NOTE — ED ADULT NURSE NOTE - NSIMPLEMENTINTERV_GEN_ALL_ED
Implemented All Universal Safety Interventions:  [place] to call system. Call bell, personal items and telephone within reach. Instruct patient to call for assistance. Room bathroom lighting operational. Non-slip footwear when patient is off stretcher. Physically safe environment: no spills, clutter or unnecessary equipment. Stretcher in lowest position, wheels locked, appropriate side rails in place.
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
## 2019-05-18 NOTE — ED PROVIDER NOTE - GASTROINTESTINAL, MLM
2cm linear full thickness laceration into Subq fat to LLQ. Otherwise soft NTTp. approx 2cm linear full thickness laceration into Subq fat to LLQ. Otherwise soft NTTp.  fascia intact

## 2019-05-18 NOTE — ED PROVIDER NOTE - NSFOLLOWUPINSTRUCTIONS_ED_ALL_ED_FT
ice to area x 24 hours; dressing to remain in place x 24 hours; then wound is to be cleaned with soap and water, dried well and rebandaged, after 48 hours patient can get wound wet in the shower, but sutures can never be drenched, they need to be completely dried after getting them wet; keep wound dry and clean, during daily activities try and be mindful that sutures are in place so that you do not reopen wound, wound should be checked in 2 days by your primary care doctor or return to the ED and suture removal should be done in 10-12 days by your regular doctor or you may return to ED for suture removal; if any significant redness, swelling, purulent drainage, or any severe increase in pain or if fever occurs, please return to ED immediately. if there are any other changes or worsening of symptoms patient is to return to ED
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
## 2019-05-18 NOTE — ED PROVIDER NOTE - PROGRESS NOTE DETAILS
Wound irrigated with copious NS, laceration repaired, will dc with prophylactic dose of keflex, recommend ice to area x 24 hours; dressing to remain in place x 24 hours; then wound is to be cleaned with soap and water, dried well and rebandaged, after 48 hours patient can get wound wet in the shower, but sutures can never be drenched, they need to be completely dried after getting them wet; keep wound dry and clean, during daily activities try and be mindful that sutures are in place so that you do not reopen wound, wound should be checked in 2 days by your primary care doctor or return to the clinic and suture removal should be done in 8-12 days by your regular doctor or you may return to ED for suture removal; if any significant redness, swelling, purulent drainage, or any severe increase in pain or if fever occurs, please return to ED immediately. if there are any other changes or worsening of symptoms patient is to return to ED -Ian Zuniga PA-C

## 2019-07-26 NOTE — ED ADULT TRIAGE NOTE - CHIEF COMPLAINT QUOTE
Chest pain that started yesterday . patient has a history of open heart surgery at [place] in Nov 2012 initiate hand expression routine/initiate skin to skin/Proper alignment of ear shoulder and hip demonstrated by patient, sandwich of breast to allow latch and encouraged observation of hunger cues for ad allyson feeds. Reassurance provided.

## 2019-08-24 ENCOUNTER — INPATIENT (INPATIENT)
Facility: HOSPITAL | Age: 54
LOS: 1 days | Discharge: LEFT BEFORE DISCHARGE | DRG: 770 | End: 2019-08-26
Attending: INTERNAL MEDICINE | Admitting: FAMILY MEDICINE
Payer: MEDICAID

## 2019-08-24 VITALS — HEIGHT: 66 IN | WEIGHT: 190.04 LBS

## 2019-08-24 DIAGNOSIS — F10.239 ALCOHOL DEPENDENCE WITH WITHDRAWAL, UNSPECIFIED: ICD-10-CM

## 2019-08-24 DIAGNOSIS — Z95.1 PRESENCE OF AORTOCORONARY BYPASS GRAFT: Chronic | ICD-10-CM

## 2019-08-24 LAB
ALBUMIN SERPL ELPH-MCNC: 4 G/DL — SIGNIFICANT CHANGE UP (ref 3.3–5)
ALBUMIN SERPL ELPH-MCNC: 4.8 G/DL — SIGNIFICANT CHANGE UP (ref 3.3–5)
ALP SERPL-CCNC: 53 U/L — SIGNIFICANT CHANGE UP (ref 40–120)
ALP SERPL-CCNC: 62 U/L — SIGNIFICANT CHANGE UP (ref 40–120)
ALT FLD-CCNC: 166 U/L — HIGH (ref 12–78)
ALT FLD-CCNC: 210 U/L — HIGH (ref 12–78)
ANION GAP SERPL CALC-SCNC: 13 MMOL/L — SIGNIFICANT CHANGE UP (ref 5–17)
ANION GAP SERPL CALC-SCNC: 8 MMOL/L — SIGNIFICANT CHANGE UP (ref 5–17)
APTT BLD: 30.9 SEC — SIGNIFICANT CHANGE UP (ref 27.5–36.3)
AST SERPL-CCNC: 196 U/L — HIGH (ref 15–37)
AST SERPL-CCNC: 281 U/L — HIGH (ref 15–37)
BILIRUB DIRECT SERPL-MCNC: 0.2 MG/DL — SIGNIFICANT CHANGE UP (ref 0–0.2)
BILIRUB INDIRECT FLD-MCNC: 0.6 MG/DL — SIGNIFICANT CHANGE UP (ref 0.2–1)
BILIRUB SERPL-MCNC: 0.7 MG/DL — SIGNIFICANT CHANGE UP (ref 0.2–1.2)
BILIRUB SERPL-MCNC: 0.8 MG/DL — SIGNIFICANT CHANGE UP (ref 0.2–1.2)
BUN SERPL-MCNC: 7 MG/DL — SIGNIFICANT CHANGE UP (ref 7–23)
BUN SERPL-MCNC: 7 MG/DL — SIGNIFICANT CHANGE UP (ref 7–23)
CALCIUM SERPL-MCNC: 8.2 MG/DL — LOW (ref 8.5–10.1)
CALCIUM SERPL-MCNC: 9.4 MG/DL — SIGNIFICANT CHANGE UP (ref 8.5–10.1)
CHLORIDE SERPL-SCNC: 101 MMOL/L — SIGNIFICANT CHANGE UP (ref 96–108)
CHLORIDE SERPL-SCNC: 103 MMOL/L — SIGNIFICANT CHANGE UP (ref 96–108)
CO2 SERPL-SCNC: 24 MMOL/L — SIGNIFICANT CHANGE UP (ref 22–31)
CO2 SERPL-SCNC: 28 MMOL/L — SIGNIFICANT CHANGE UP (ref 22–31)
CREAT SERPL-MCNC: 0.86 MG/DL — SIGNIFICANT CHANGE UP (ref 0.5–1.3)
CREAT SERPL-MCNC: 0.94 MG/DL — SIGNIFICANT CHANGE UP (ref 0.5–1.3)
ETHANOL SERPL-MCNC: 53 MG/DL — HIGH (ref 0–10)
GLUCOSE SERPL-MCNC: 93 MG/DL — SIGNIFICANT CHANGE UP (ref 70–99)
GLUCOSE SERPL-MCNC: 96 MG/DL — SIGNIFICANT CHANGE UP (ref 70–99)
HCT VFR BLD CALC: 42.9 % — SIGNIFICANT CHANGE UP (ref 39–50)
HGB BLD-MCNC: 14.9 G/DL — SIGNIFICANT CHANGE UP (ref 13–17)
INR BLD: 1.04 RATIO — SIGNIFICANT CHANGE UP (ref 0.88–1.16)
MAGNESIUM SERPL-MCNC: 1.9 MG/DL — SIGNIFICANT CHANGE UP (ref 1.6–2.6)
MAGNESIUM SERPL-MCNC: 2.2 MG/DL — SIGNIFICANT CHANGE UP (ref 1.6–2.6)
MCHC RBC-ENTMCNC: 30.6 PG — SIGNIFICANT CHANGE UP (ref 27–34)
MCHC RBC-ENTMCNC: 34.7 GM/DL — SIGNIFICANT CHANGE UP (ref 32–36)
MCV RBC AUTO: 88.1 FL — SIGNIFICANT CHANGE UP (ref 80–100)
NT-PROBNP SERPL-SCNC: 22 PG/ML — SIGNIFICANT CHANGE UP (ref 0–125)
PCP SPEC-MCNC: SIGNIFICANT CHANGE UP
PHOSPHATE SERPL-MCNC: 2.3 MG/DL — LOW (ref 2.5–4.5)
PLATELET # BLD AUTO: 176 K/UL — SIGNIFICANT CHANGE UP (ref 150–400)
POTASSIUM SERPL-MCNC: 3.4 MMOL/L — LOW (ref 3.5–5.3)
POTASSIUM SERPL-MCNC: 3.5 MMOL/L — SIGNIFICANT CHANGE UP (ref 3.5–5.3)
POTASSIUM SERPL-SCNC: 3.4 MMOL/L — LOW (ref 3.5–5.3)
POTASSIUM SERPL-SCNC: 3.5 MMOL/L — SIGNIFICANT CHANGE UP (ref 3.5–5.3)
PROT SERPL-MCNC: 7.4 GM/DL — SIGNIFICANT CHANGE UP (ref 6–8.3)
PROT SERPL-MCNC: 8.7 GM/DL — HIGH (ref 6–8.3)
PROTHROM AB SERPL-ACNC: 11.6 SEC — SIGNIFICANT CHANGE UP (ref 10–12.9)
RBC # BLD: 4.87 M/UL — SIGNIFICANT CHANGE UP (ref 4.2–5.8)
RBC # FLD: 14.3 % — SIGNIFICANT CHANGE UP (ref 10.3–14.5)
SODIUM SERPL-SCNC: 138 MMOL/L — SIGNIFICANT CHANGE UP (ref 135–145)
SODIUM SERPL-SCNC: 139 MMOL/L — SIGNIFICANT CHANGE UP (ref 135–145)
TROPONIN I SERPL-MCNC: <0.015 NG/ML — SIGNIFICANT CHANGE UP (ref 0.01–0.04)
WBC # BLD: 5.27 K/UL — SIGNIFICANT CHANGE UP (ref 3.8–10.5)
WBC # FLD AUTO: 5.27 K/UL — SIGNIFICANT CHANGE UP (ref 3.8–10.5)

## 2019-08-24 PROCEDURE — 84100 ASSAY OF PHOSPHORUS: CPT

## 2019-08-24 PROCEDURE — 36415 COLL VENOUS BLD VENIPUNCTURE: CPT

## 2019-08-24 PROCEDURE — 86803 HEPATITIS C AB TEST: CPT

## 2019-08-24 PROCEDURE — 80076 HEPATIC FUNCTION PANEL: CPT

## 2019-08-24 PROCEDURE — 83735 ASSAY OF MAGNESIUM: CPT

## 2019-08-24 PROCEDURE — 93306 TTE W/DOPPLER COMPLETE: CPT

## 2019-08-24 PROCEDURE — 84484 ASSAY OF TROPONIN QUANT: CPT

## 2019-08-24 PROCEDURE — 80048 BASIC METABOLIC PNL TOTAL CA: CPT

## 2019-08-24 PROCEDURE — 93971 EXTREMITY STUDY: CPT | Mod: RT

## 2019-08-24 PROCEDURE — 93010 ELECTROCARDIOGRAM REPORT: CPT

## 2019-08-24 PROCEDURE — 71045 X-RAY EXAM CHEST 1 VIEW: CPT | Mod: 26

## 2019-08-24 RX ORDER — SIMVASTATIN 20 MG/1
1 TABLET, FILM COATED ORAL
Qty: 0 | Refills: 0 | DISCHARGE

## 2019-08-24 RX ORDER — SODIUM CHLORIDE 9 MG/ML
1000 INJECTION INTRAMUSCULAR; INTRAVENOUS; SUBCUTANEOUS ONCE
Refills: 0 | Status: COMPLETED | OUTPATIENT
Start: 2019-08-24 | End: 2019-08-24

## 2019-08-24 RX ORDER — SIMVASTATIN 20 MG/1
40 TABLET, FILM COATED ORAL ONCE
Refills: 0 | Status: COMPLETED | OUTPATIENT
Start: 2019-08-24 | End: 2019-08-24

## 2019-08-24 RX ORDER — ASPIRIN/CALCIUM CARB/MAGNESIUM 324 MG
324 TABLET ORAL ONCE
Refills: 0 | Status: COMPLETED | OUTPATIENT
Start: 2019-08-24 | End: 2019-08-24

## 2019-08-24 RX ORDER — FOLIC ACID 0.8 MG
1 TABLET ORAL DAILY
Refills: 0 | Status: DISCONTINUED | OUTPATIENT
Start: 2019-08-24 | End: 2019-08-25

## 2019-08-24 RX ORDER — CARVEDILOL PHOSPHATE 80 MG/1
6.25 CAPSULE, EXTENDED RELEASE ORAL ONCE
Refills: 0 | Status: COMPLETED | OUTPATIENT
Start: 2019-08-24 | End: 2019-08-24

## 2019-08-24 RX ORDER — SODIUM CHLORIDE 9 MG/ML
1000 INJECTION, SOLUTION INTRAVENOUS
Refills: 0 | Status: DISCONTINUED | OUTPATIENT
Start: 2019-08-24 | End: 2019-08-25

## 2019-08-24 RX ORDER — ASPIRIN/CALCIUM CARB/MAGNESIUM 324 MG
81 TABLET ORAL DAILY
Refills: 0 | Status: DISCONTINUED | OUTPATIENT
Start: 2019-08-24 | End: 2019-08-26

## 2019-08-24 RX ORDER — THIAMINE MONONITRATE (VIT B1) 100 MG
100 TABLET ORAL DAILY
Refills: 0 | Status: DISCONTINUED | OUTPATIENT
Start: 2019-08-24 | End: 2019-08-25

## 2019-08-24 RX ORDER — ASPIRIN/CALCIUM CARB/MAGNESIUM 324 MG
1 TABLET ORAL
Qty: 0 | Refills: 0 | DISCHARGE

## 2019-08-24 RX ORDER — CARVEDILOL PHOSPHATE 80 MG/1
3.12 CAPSULE, EXTENDED RELEASE ORAL EVERY 12 HOURS
Refills: 0 | Status: DISCONTINUED | OUTPATIENT
Start: 2019-08-24 | End: 2019-08-26

## 2019-08-24 RX ORDER — HEPARIN SODIUM 5000 [USP'U]/ML
5000 INJECTION INTRAVENOUS; SUBCUTANEOUS EVERY 12 HOURS
Refills: 0 | Status: DISCONTINUED | OUTPATIENT
Start: 2019-08-24 | End: 2019-08-26

## 2019-08-24 RX ORDER — CARVEDILOL PHOSPHATE 80 MG/1
1 CAPSULE, EXTENDED RELEASE ORAL
Qty: 0 | Refills: 0 | DISCHARGE

## 2019-08-24 RX ORDER — SIMVASTATIN 20 MG/1
40 TABLET, FILM COATED ORAL AT BEDTIME
Refills: 0 | Status: DISCONTINUED | OUTPATIENT
Start: 2019-08-24 | End: 2019-08-26

## 2019-08-24 RX ADMIN — Medication 100 MILLIGRAM(S): at 16:37

## 2019-08-24 RX ADMIN — Medication 1 TABLET(S): at 16:36

## 2019-08-24 RX ADMIN — Medication 2 MILLIGRAM(S): at 22:10

## 2019-08-24 RX ADMIN — CARVEDILOL PHOSPHATE 3.12 MILLIGRAM(S): 80 CAPSULE, EXTENDED RELEASE ORAL at 18:24

## 2019-08-24 RX ADMIN — Medication 2 MILLIGRAM(S): at 12:18

## 2019-08-24 RX ADMIN — Medication 324 MILLIGRAM(S): at 11:01

## 2019-08-24 RX ADMIN — SODIUM CHLORIDE 100 MILLILITER(S): 9 INJECTION, SOLUTION INTRAVENOUS at 16:38

## 2019-08-24 RX ADMIN — Medication 2 MILLIGRAM(S): at 16:36

## 2019-08-24 RX ADMIN — CARVEDILOL PHOSPHATE 6.25 MILLIGRAM(S): 80 CAPSULE, EXTENDED RELEASE ORAL at 11:41

## 2019-08-24 RX ADMIN — Medication 81 MILLIGRAM(S): at 16:37

## 2019-08-24 RX ADMIN — SODIUM CHLORIDE 1000 MILLILITER(S): 9 INJECTION INTRAMUSCULAR; INTRAVENOUS; SUBCUTANEOUS at 12:18

## 2019-08-24 RX ADMIN — SIMVASTATIN 40 MILLIGRAM(S): 20 TABLET, FILM COATED ORAL at 22:09

## 2019-08-24 RX ADMIN — Medication 2 MILLIGRAM(S): at 11:02

## 2019-08-24 RX ADMIN — SIMVASTATIN 40 MILLIGRAM(S): 20 TABLET, FILM COATED ORAL at 12:47

## 2019-08-24 RX ADMIN — HEPARIN SODIUM 5000 UNIT(S): 5000 INJECTION INTRAVENOUS; SUBCUTANEOUS at 18:24

## 2019-08-24 RX ADMIN — Medication 1 MILLIGRAM(S): at 16:36

## 2019-08-24 NOTE — ED ADULT TRIAGE NOTE - CHIEF COMPLAINT QUOTE
pt aaox4, pt presents to er for chest pain started last night, right side numbness and tingling sensation for few days.  pt also states "i'm having withdrawal symptoms; feeling shaky, and anxious."  EKG done upon arrival.

## 2019-08-24 NOTE — ED ADULT NURSE REASSESSMENT NOTE - NS ED NURSE REASSESS COMMENT FT1
pt sts he is feeling less anxious. tremors are decreasing. pt denies chest pain or SOB. denies any other pain or complaints.

## 2019-08-24 NOTE — H&P ADULT - HISTORY OF PRESENT ILLNESS
HPI: The patient is a 53 yo male with  a long  Hx. of alcohol abuse, dependence, drug use, CAD, HTN, not compliant with his medications who presented in the ED with c/o Chest pain, and alcohol withdrawal. His last drink was last night and he smoked 3 marijuana cigars. He woke up today shaking , craving and had substernal CP, not radiating. He is also c/o of numbness in hands and in the R thigh.       PMHx: CAD HTN, ETOH abuse,     PSHx: CABG x 2 2012, anal fistula surgery 2 years ago.     Family Hx: Father  of brain tumor, mother  of MI at age 43, she also had DM.     Social Hx.: quit smoking cigarettes in , smokes 3 marijuana cigars a day, drinks one pint of liquor a day     ROS: as in HPI,   Eyes: no changes in vision    ENT/Mouth: no changes    Cardiovascular: has chest thightness, level 1    Respiratory: no SOB    Gastrointestinal: no diarrhea, no nausea, no vomiting    Genitourinary: no dysuria    Breast: no pain    Musculoskeletal: no pain    Integumentary: no itching    Neurological: No Headache, no tremor,    Psychiatric: no suicidal ideations    Endocrine: no excessive thirst,     Hematologic/Lymphatic: no swollen glands    Allergic/Immunologic: no itching      Physical Exam: Vital Signs Last 24 Hrs  T(C): 37 (24 Aug 2019 14:19), Max: 37 (24 Aug 2019 14:19)  T(F): 98.6 (24 Aug 2019 14:19), Max: 98.6 (24 Aug 2019 14:19)  HR: 74 (24 Aug 2019 14:19) (74 - 84)  BP: 149/93 (24 Aug 2019 14:19) (149/93 - 166/111)  BP(mean): --  RR: 18 (24 Aug 2019 14:19) (18 - 24)  SpO2: 96% (24 Aug 2019 14:19) (96% - 100%)        HEENT: PRRL EOMI    MOUTH/TEETH/GUMS: Clear    NECK: no JVD    LUNGS: Clear    HEART: S1,S2 RR    ABDOMEN: soft nontender    EXTREMITIES:  no pedal edema    MUSCULOSKELETAL: no joint swelling     NEURO: has involuntary hand tremor, ho focal weakness.     SKIN: no rash    : CVA negative,     Lab:                      14.9   5.27  )-----------( 176      ( 24 Aug 2019 10:20 )             42.9       08-    138  |  101  |  7   ----------------------------<  96  3.5   |  24  |  0.94    Ca    9.4      24 Aug 2019 10:20  Mg     2.2     -    TPro  8.7<H>  /  Alb  4.8  /  TBili  0.7  /  DBili  x   /  AST  281<H>  /  ALT  210<H>  /  AlkPhos  62  08-24  EKG no acute ST changes, troponin <0.015  Urine tox: positive for THC, phencyclidine  CXRay neg.

## 2019-08-24 NOTE — ED ADULT NURSE NOTE - NSIMPLEMENTINTERV_GEN_ALL_ED
Implemented All Fall Risk Interventions:  [place] to call system. Call bell, personal items and telephone within reach. Instruct patient to call for assistance. Room bathroom lighting operational. Non-slip footwear when patient is off stretcher. Physically safe environment: no spills, clutter or unnecessary equipment. Stretcher in lowest position, wheels locked, appropriate side rails in place. Provide visual cue, wrist band, yellow gown, etc. Monitor gait and stability. Monitor for mental status changes and reorient to person, place, and time. Review medications for side effects contributing to fall risk. Reinforce activity limits and safety measures with patient and family.

## 2019-08-24 NOTE — PATIENT PROFILE ADULT - STATED REASON FOR ADMISSION
Few things, my chest was tight and hurting and I have had a heart attack before, and I have been drinking alcohol for many nights.

## 2019-08-24 NOTE — ED ADULT NURSE NOTE - OBJECTIVE STATEMENT
pt came to ED for evaluation of chest pain, anxiety, and shaking. pt sts he feels he is in withdrawal from alcohol. pt usual drinks 3 or more beers per day. had two beers last night as reported by pt. pt has also not been taking his prescribed carvedilol because he cannot afford the medication.

## 2019-08-24 NOTE — PHARMACOTHERAPY INTERVENTION NOTE - COMMENTS
Medication history completed, verified with patient and family at bedside, [facility] pharmacy, and doctor first.

## 2019-08-24 NOTE — ED PROVIDER NOTE - OBJECTIVE STATEMENT
53 y/o male with PMHx of CAD, HTN, HLD presents to the ED c/o chest tightness x few days. +R sided generalized numbness. Pt reports that he is going through EtOH withdrawals. Last EtOH intake 9pm at last night. Admits to drinking 2 shots of liquor, 4-5 large cans of beer daily. Noncompliant with medications x few months as he is no longer insured due to unemployment. 55 y/o male with PMHx of CAD, HTN, HLD presents to the ED c/o chest tightness x few days. . Pt reports that he is going through EtOH withdrawals. Last EtOH intake 9pm at last night. Admits to drinking 2 shots of liquor, 4-5 large cans of beer daily. Noncompliant with medications x few months as he is no longer insured due to unemployment.

## 2019-08-24 NOTE — ED ADULT NURSE NOTE - CHPI ED NUR SYMPTOMS NEG
no decreased eating/drinking/no dizziness/no vomiting/no weakness/no fever/no tingling/no chills/no nausea

## 2019-08-24 NOTE — ED PROVIDER NOTE - CARE PLAN
Principal Discharge DX:	Alcohol dependence with withdrawal with complication  Secondary Diagnosis:	Chest pain

## 2019-08-25 LAB
ALBUMIN SERPL ELPH-MCNC: 4.3 G/DL — SIGNIFICANT CHANGE UP (ref 3.3–5)
ALP SERPL-CCNC: 61 U/L — SIGNIFICANT CHANGE UP (ref 40–120)
ALT FLD-CCNC: 170 U/L — HIGH (ref 12–78)
ANION GAP SERPL CALC-SCNC: 5 MMOL/L — SIGNIFICANT CHANGE UP (ref 5–17)
ANION GAP SERPL CALC-SCNC: 8 MMOL/L — SIGNIFICANT CHANGE UP (ref 5–17)
AST SERPL-CCNC: 170 U/L — HIGH (ref 15–37)
BILIRUB DIRECT SERPL-MCNC: 0.2 MG/DL — SIGNIFICANT CHANGE UP (ref 0–0.2)
BILIRUB INDIRECT FLD-MCNC: 0.8 MG/DL — SIGNIFICANT CHANGE UP (ref 0.2–1)
BILIRUB SERPL-MCNC: 1 MG/DL — SIGNIFICANT CHANGE UP (ref 0.2–1.2)
BUN SERPL-MCNC: 5 MG/DL — LOW (ref 7–23)
BUN SERPL-MCNC: 5 MG/DL — LOW (ref 7–23)
CALCIUM SERPL-MCNC: 8.4 MG/DL — LOW (ref 8.5–10.1)
CALCIUM SERPL-MCNC: 9.3 MG/DL — SIGNIFICANT CHANGE UP (ref 8.5–10.1)
CHLORIDE SERPL-SCNC: 101 MMOL/L — SIGNIFICANT CHANGE UP (ref 96–108)
CHLORIDE SERPL-SCNC: 104 MMOL/L — SIGNIFICANT CHANGE UP (ref 96–108)
CO2 SERPL-SCNC: 27 MMOL/L — SIGNIFICANT CHANGE UP (ref 22–31)
CO2 SERPL-SCNC: 29 MMOL/L — SIGNIFICANT CHANGE UP (ref 22–31)
CREAT SERPL-MCNC: 0.76 MG/DL — SIGNIFICANT CHANGE UP (ref 0.5–1.3)
CREAT SERPL-MCNC: 0.92 MG/DL — SIGNIFICANT CHANGE UP (ref 0.5–1.3)
GLUCOSE SERPL-MCNC: 110 MG/DL — HIGH (ref 70–99)
GLUCOSE SERPL-MCNC: 98 MG/DL — SIGNIFICANT CHANGE UP (ref 70–99)
HCV AB S/CO SERPL IA: 0.06 S/CO — SIGNIFICANT CHANGE UP (ref 0–0.99)
HCV AB SERPL-IMP: SIGNIFICANT CHANGE UP
MAGNESIUM SERPL-MCNC: 2 MG/DL — SIGNIFICANT CHANGE UP (ref 1.6–2.6)
MAGNESIUM SERPL-MCNC: 2.2 MG/DL — SIGNIFICANT CHANGE UP (ref 1.6–2.6)
PHOSPHATE SERPL-MCNC: 2.1 MG/DL — LOW (ref 2.5–4.5)
PHOSPHATE SERPL-MCNC: 2.7 MG/DL — SIGNIFICANT CHANGE UP (ref 2.5–4.5)
POTASSIUM SERPL-MCNC: 3.3 MMOL/L — LOW (ref 3.5–5.3)
POTASSIUM SERPL-MCNC: 4.1 MMOL/L — SIGNIFICANT CHANGE UP (ref 3.5–5.3)
POTASSIUM SERPL-SCNC: 3.3 MMOL/L — LOW (ref 3.5–5.3)
POTASSIUM SERPL-SCNC: 4.1 MMOL/L — SIGNIFICANT CHANGE UP (ref 3.5–5.3)
PROT SERPL-MCNC: 8.4 GM/DL — HIGH (ref 6–8.3)
SODIUM SERPL-SCNC: 136 MMOL/L — SIGNIFICANT CHANGE UP (ref 135–145)
SODIUM SERPL-SCNC: 138 MMOL/L — SIGNIFICANT CHANGE UP (ref 135–145)
TROPONIN I SERPL-MCNC: <0.015 NG/ML — SIGNIFICANT CHANGE UP (ref 0.01–0.04)

## 2019-08-25 RX ORDER — LISINOPRIL 2.5 MG/1
20 TABLET ORAL DAILY
Refills: 0 | Status: DISCONTINUED | OUTPATIENT
Start: 2019-08-25 | End: 2019-08-26

## 2019-08-25 RX ORDER — THIAMINE MONONITRATE (VIT B1) 100 MG
100 TABLET ORAL DAILY
Refills: 0 | Status: DISCONTINUED | OUTPATIENT
Start: 2019-08-25 | End: 2019-08-26

## 2019-08-25 RX ORDER — LANOLIN ALCOHOL/MO/W.PET/CERES
3 CREAM (GRAM) TOPICAL AT BEDTIME
Refills: 0 | Status: DISCONTINUED | OUTPATIENT
Start: 2019-08-25 | End: 2019-08-26

## 2019-08-25 RX ORDER — ONDANSETRON 8 MG/1
8 TABLET, FILM COATED ORAL
Refills: 0 | Status: DISCONTINUED | OUTPATIENT
Start: 2019-08-25 | End: 2019-08-26

## 2019-08-25 RX ORDER — POTASSIUM CHLORIDE 20 MEQ
40 PACKET (EA) ORAL ONCE
Refills: 0 | Status: COMPLETED | OUTPATIENT
Start: 2019-08-25 | End: 2019-08-25

## 2019-08-25 RX ORDER — FOLIC ACID 0.8 MG
1 TABLET ORAL DAILY
Refills: 0 | Status: DISCONTINUED | OUTPATIENT
Start: 2019-08-25 | End: 2019-08-26

## 2019-08-25 RX ADMIN — Medication 2 MILLIGRAM(S): at 20:52

## 2019-08-25 RX ADMIN — SIMVASTATIN 40 MILLIGRAM(S): 20 TABLET, FILM COATED ORAL at 20:55

## 2019-08-25 RX ADMIN — Medication 1 TABLET(S): at 11:03

## 2019-08-25 RX ADMIN — Medication 81 MILLIGRAM(S): at 11:03

## 2019-08-25 RX ADMIN — LISINOPRIL 20 MILLIGRAM(S): 2.5 TABLET ORAL at 20:52

## 2019-08-25 RX ADMIN — CARVEDILOL PHOSPHATE 3.12 MILLIGRAM(S): 80 CAPSULE, EXTENDED RELEASE ORAL at 17:47

## 2019-08-25 RX ADMIN — Medication 2 MILLIGRAM(S): at 23:04

## 2019-08-25 RX ADMIN — Medication 2 MILLIGRAM(S): at 16:27

## 2019-08-25 RX ADMIN — Medication 1 MILLIGRAM(S): at 11:03

## 2019-08-25 RX ADMIN — Medication 40 MILLIEQUIVALENT(S): at 10:55

## 2019-08-25 RX ADMIN — Medication 3 MILLIGRAM(S): at 22:53

## 2019-08-25 RX ADMIN — Medication 2 MILLIGRAM(S): at 06:13

## 2019-08-25 RX ADMIN — Medication 100 MILLIGRAM(S): at 11:03

## 2019-08-25 RX ADMIN — Medication 2 MILLIGRAM(S): at 16:28

## 2019-08-25 RX ADMIN — HEPARIN SODIUM 5000 UNIT(S): 5000 INJECTION INTRAVENOUS; SUBCUTANEOUS at 17:48

## 2019-08-25 RX ADMIN — CARVEDILOL PHOSPHATE 3.12 MILLIGRAM(S): 80 CAPSULE, EXTENDED RELEASE ORAL at 06:11

## 2019-08-25 RX ADMIN — SODIUM CHLORIDE 100 MILLILITER(S): 9 INJECTION, SOLUTION INTRAVENOUS at 10:55

## 2019-08-25 RX ADMIN — HEPARIN SODIUM 5000 UNIT(S): 5000 INJECTION INTRAVENOUS; SUBCUTANEOUS at 06:11

## 2019-08-25 NOTE — CONSULT NOTE ADULT - ASSESSMENT
Hx of CAD  Eccessive drinking  R/O by troponins  Started on withdrawl protocol  2D echo in AM  Will follow

## 2019-08-25 NOTE — PROGRESS NOTE ADULT - SUBJECTIVE AND OBJECTIVE BOX
CC: Chest pain, alcohol abuse History of Present Illness: 	  HPI: The patient is a 53 yo male with CAD s/p CABG, long  Hx. of alcohol abuse, dependence, drug use, HTN, not compliant with his medications who presented in the ED with c/o Chest pain, and alcohol withdrawal. His last drink was last night and he smoked 3 marijuana cigars. He woke up today shaking , craving and had substernal CP, not radiating. He is also c/o of numbness in hands and in the R thigh.     8/25: Pt lying in bed, slight discomfort. States has had chest pressure off and on.  Denies fever, chills, n, v, + diaphoresis.   Right thigh pain/discomfort.    REVIEW OF SYSTEMS: All other review of systems is negative unless indicated above.    Vital Signs Last 24 Hrs  T(C): 36.9 (25 Aug 2019 11:36), Max: 37 (24 Aug 2019 14:19)  T(F): 98.4 (25 Aug 2019 11:36), Max: 98.6 (24 Aug 2019 14:19)  HR: 68 (25 Aug 2019 11:36) (68 - 83)  BP: 164/97 (25 Aug 2019 11:36) (137/91 - 164/97)  BP(mean): --  RR: 18 (25 Aug 2019 11:36) (17 - 18)  SpO2: 99% (25 Aug 2019 11:36) (96% - 100%)    PHYSICAL EXAM:    Constitutional: NAD, awake and alert, well-developed  HEENT: PERR, EOMI, Normal Hearing, MMM  Neck: Soft and supple  Respiratory: Breath sounds are clear bilaterally, No wheezing, rales or rhonchi  Cardiovascular: S1 and S2, regular rate and rhythm, no Murmurs, gallops or rubs  Gastrointestinal: Bowel Sounds present, soft, nontender, nondistended, no guarding, no rebound  Extremities: No peripheral edema  Neurological: A/O x 3, no focal deficits in my limited exam  Skin: No rashes    MEDICATIONS  (STANDING):  aspirin  chewable 81 milliGRAM(s) Oral daily  carvedilol 3.125 milliGRAM(s) Oral every 12 hours  folic acid 1 milliGRAM(s) Oral daily  heparin  Injectable 5000 Unit(s) SubCutaneous every 12 hours  lisinopril 20 milliGRAM(s) Oral daily  LORazepam   Injectable 2 milliGRAM(s) IV Push every 4 hours  LORazepam   Injectable   IV Push   multivitamin 1 Tablet(s) Oral daily  ondansetron Injectable 8 milliGRAM(s) IV Push two times a day  simvastatin 40 milliGRAM(s) Oral at bedtime  thiamine 100 milliGRAM(s) Oral daily    MEDICATIONS  (PRN):  LORazepam   Injectable 2 milliGRAM(s) IV Push every 1 hour PRN CIWA-Ar score 8 or greater    CARDIAC MARKERS ( 25 Aug 2019 10:45 )  <0.015 ng/mL / x     / x     / x     / x      CARDIAC MARKERS ( 24 Aug 2019 10:20 )  <0.015 ng/mL / x     / x     / x     / x                                14.9   5.27  )-----------( 176      ( 24 Aug 2019 10:20 )             42.9     08-25    136  |  101  |  5<L>  ----------------------------<  110<H>  3.3<L>   |  27  |  0.76    Ca    8.4<L>      25 Aug 2019 07:20  Phos  2.7     08-25  Mg     2.0     08-25    TPro  7.4  /  Alb  4.0  /  TBili  0.8  /  DBili  0.2  /  AST  196<H>  /  ALT  166<H>  /  AlkPhos  53  08-24    CAPILLARY BLOOD GLUCOSE        LIVER FUNCTIONS - ( 24 Aug 2019 17:00 )  Alb: 4.0 g/dL / Pro: 7.4 gm/dL / ALK PHOS: 53 U/L / ALT: 166 U/L / AST: 196 U/L / GGT: x           PT/INR - ( 24 Aug 2019 10:20 )   PT: 11.6 sec;   INR: 1.04 ratio         PTT - ( 24 Aug 2019 10:20 )  PTT:30.9 sec        Assessment and Plan:  53 yo male with  a long  Hx. of alcohol abuse, dependence, drug use, CAD, HTN, not compliant with his medications who presented in the ED with c/o Chest pain, and alcohol withdrawal. His last drink was last night and he smoked 3 marijuana cigars. He woke up today shaking , craving and had substernal CP, not radiating. He is also c/o of numbness in hands and in the R thigh.     Chest pain/angina in setting of CAD s/p CABG and heavy alcohol abuse:  -monitor on tele  -chest xray no acute pathology  -EKG NSR  -standing CIWA  -coreg  -aspirin  -hold on statin until transaminitis improves  -start ACEI  -cardio eval appreciated  -probable thiamine deficiency --> Start supplementation    HTN:  -coreg  -ACEI    VTEP:   -Heparin CC: Chest pain, alcohol abuse History of Present Illness: 	  HPI: The patient is a 55 yo male with CAD s/p CABG, long  Hx. of alcohol abuse, dependence, drug use, HTN, not compliant with his medications who presented in the ED with c/o Chest pain, and alcohol withdrawal. His last drink was last night and he smoked 3 marijuana cigars. He woke up today shaking , craving and had substernal CP, not radiating. He is also c/o of numbness in hands and in the R thigh.     8/25: Pt lying in bed, slight discomfort. States has had chest pressure off and on.  Denies fever, chills, n, v, + diaphoresis.   Right thigh pain/discomfort.    REVIEW OF SYSTEMS: All other review of systems is negative unless indicated above.    Vital Signs Last 24 Hrs  T(C): 36.9 (25 Aug 2019 11:36), Max: 37 (24 Aug 2019 14:19)  T(F): 98.4 (25 Aug 2019 11:36), Max: 98.6 (24 Aug 2019 14:19)  HR: 68 (25 Aug 2019 11:36) (68 - 83)  BP: 164/97 (25 Aug 2019 11:36) (137/91 - 164/97)  BP(mean): --  RR: 18 (25 Aug 2019 11:36) (17 - 18)  SpO2: 99% (25 Aug 2019 11:36) (96% - 100%)    PHYSICAL EXAM:    Constitutional: NAD, awake and alert, well-developed  HEENT: PERR, EOMI, Normal Hearing, MMM  Neck: Soft and supple  Respiratory: Breath sounds are clear bilaterally, No wheezing, rales or rhonchi  Cardiovascular: S1 and S2, regular rate and rhythm, no Murmurs, gallops or rubs  Gastrointestinal: Bowel Sounds present, soft, nontender, nondistended, no guarding, no rebound  Extremities: No peripheral edema  Neurological: A/O x 3, no focal deficits in my limited exam  Skin: No rashes    MEDICATIONS  (STANDING):  aspirin  chewable 81 milliGRAM(s) Oral daily  carvedilol 3.125 milliGRAM(s) Oral every 12 hours  folic acid 1 milliGRAM(s) Oral daily  heparin  Injectable 5000 Unit(s) SubCutaneous every 12 hours  lisinopril 20 milliGRAM(s) Oral daily  LORazepam   Injectable 2 milliGRAM(s) IV Push every 4 hours  LORazepam   Injectable   IV Push   multivitamin 1 Tablet(s) Oral daily  ondansetron Injectable 8 milliGRAM(s) IV Push two times a day  simvastatin 40 milliGRAM(s) Oral at bedtime  thiamine 100 milliGRAM(s) Oral daily    MEDICATIONS  (PRN):  LORazepam   Injectable 2 milliGRAM(s) IV Push every 1 hour PRN CIWA-Ar score 8 or greater    CARDIAC MARKERS ( 25 Aug 2019 10:45 )  <0.015 ng/mL / x     / x     / x     / x      CARDIAC MARKERS ( 24 Aug 2019 10:20 )  <0.015 ng/mL / x     / x     / x     / x                                14.9   5.27  )-----------( 176      ( 24 Aug 2019 10:20 )             42.9     08-25    136  |  101  |  5<L>  ----------------------------<  110<H>  3.3<L>   |  27  |  0.76    Ca    8.4<L>      25 Aug 2019 07:20  Phos  2.7     08-25  Mg     2.0     08-25    TPro  7.4  /  Alb  4.0  /  TBili  0.8  /  DBili  0.2  /  AST  196<H>  /  ALT  166<H>  /  AlkPhos  53  08-24    CAPILLARY BLOOD GLUCOSE        LIVER FUNCTIONS - ( 24 Aug 2019 17:00 )  Alb: 4.0 g/dL / Pro: 7.4 gm/dL / ALK PHOS: 53 U/L / ALT: 166 U/L / AST: 196 U/L / GGT: x           PT/INR - ( 24 Aug 2019 10:20 )   PT: 11.6 sec;   INR: 1.04 ratio         PTT - ( 24 Aug 2019 10:20 )  PTT:30.9 sec        Assessment and Plan:  55 yo male with  a long  Hx. of alcohol abuse, dependence, drug use, CAD, HTN, not compliant with his medications who presented in the ED with c/o Chest pain, and alcohol withdrawal. His last drink was last night and he smoked 3 marijuana cigars. He woke up today shaking , craving and had substernal CP, not radiating. He is also c/o of numbness in hands and in the R thigh.     Chest pain/angina in setting of CAD s/p CABG and heavy alcohol abuse:  -monitor on tele  -chest xray no acute pathology  -EKG NSR  -standing CIWA for alcohol withdrawal   -coreg  -aspirin  -hold on statin until transaminitis improves  -start ACEI  -cardio eval appreciated  -probable thiamine deficiency --> Start supplementation    HTN:  -coreg  -ACEI    VTEP:   -Heparin

## 2019-08-25 NOTE — CONSULT NOTE ADULT - SUBJECTIVE AND OBJECTIVE BOX
Patient is a 54y old  Male who presents with a chief complaint of Alcohl withdrawal , Chest pain , CAD, (24 Aug 2019 15:12)      HPI:  HPI: The patient is a 53 yo male with  a long  Hx. of alcohol abuse, dependence, drug use, CAD, HTN, not compliant with his medications who presented in the ED with c/o Chest pain, and alcohol withdrawal. His last drink was last night and he smoked 3 marijuana cigars. He woke up today shaking , craving and had substernal CP, not radiating. He is also c/o of numbness in hands and in the R thigh.   No CP at present, R/O for AMI  wants to stop drinking      PMHx: CAD HTN, ETOH abuse,     PSHx: CABG x 2 2012, anal fistula surgery 2 years ago.     Family Hx: Father  of brain tumor, mother  of MI at age 43, she also had DM.     Social Hx.: quit smoking cigarettes in , smokes 3 marijuana cigars a day, drinks one pint of liquor a day     ROS: as in HPI,   Eyes: no changes in vision    ENT/Mouth: no changes    Cardiovascular: has chest thightness, level 1    Respiratory: no SOB    Gastrointestinal: no diarrhea, no nausea, no vomiting    Genitourinary: no dysuria    Breast: no pain    Musculoskeletal: no pain    Integumentary: no itching    Neurological: No Headache, no tremor,    Psychiatric: no suicidal ideations    Endocrine: no excessive thirst,     Hematologic/Lymphatic: no swollen glands    Allergic/Immunologic: no itching      Physical Exam: Vital Signs Last 24 Hrs  T(C): 37 (24 Aug 2019 14:19), Max: 37 (24 Aug 2019 14:19)  T(F): 98.6 (24 Aug 2019 14:19), Max: 98.6 (24 Aug 2019 14:19)  HR: 74 (24 Aug 2019 14:19) (74 - 84)  BP: 149/93 (24 Aug 2019 14:19) (149/93 - 166/111)  BP(mean): --  RR: 18 (24 Aug 2019 14:19) (18 - 24)  SpO2: 96% (24 Aug 2019 14:19) (96% - 100%)        HEENT: PRRL EOMI    MOUTH/TEETH/GUMS: Clear    NECK: no JVD    LUNGS: Clear    HEART: S1,S2 RR    ABDOMEN: soft nontender    EXTREMITIES:  no pedal edema    MUSCULOSKELETAL: no joint swelling     NEURO: has involuntary hand tremor, ho focal weakness.     SKIN: no rash    : CVA negative,     Lab:                      14.9   5.27  )-----------( 176      ( 24 Aug 2019 10:20 )             42.9       -    138  |  101  |  7   ----------------------------<  96  3.5   |  24  |  0.94    Ca    9.4      24 Aug 2019 10:20  Mg     2.2         TPro  8.7<H>  /  Alb  4.8  /  TBili  0.7  /  DBili  x   /  AST  281<H>  /  ALT  210<H>  /  AlkPhos  62    EKG no acute ST changes, troponin <0.015  Urine tox: positive for THC, phencyclidine  CXRay neg. (24 Aug 2019 15:12)      PAST MEDICAL & SURGICAL HISTORY:  CAD (coronary artery disease)  HLD (hyperlipidemia)  HTN (hypertension)  S/P CABG (coronary artery bypass graft)      HPI:                PREVIOUS DIAGNOSTIC TESTING:      ECHO  FINDINGS:    STRESS  FINDINGS:    CATHETERIZATION  FINDINGS:    MEDICATIONS  (STANDING):  aspirin  chewable 81 milliGRAM(s) Oral daily  carvedilol 3.125 milliGRAM(s) Oral every 12 hours  heparin  Injectable 5000 Unit(s) SubCutaneous every 12 hours  simvastatin 40 milliGRAM(s) Oral at bedtime    MEDICATIONS  (PRN):      FAMILY HISTORY:      SOCIAL HISTORY:    CIGARETTES:    ALCOHOL:          Vital Signs Last 24 Hrs  T(C): 36.9 (25 Aug 2019 11:36), Max: 37 (24 Aug 2019 14:19)  T(F): 98.4 (25 Aug 2019 11:36), Max: 98.6 (24 Aug 2019 14:19)  HR: 68 (25 Aug 2019 11:36) (68 - 83)  BP: 164/97 (25 Aug 2019 11:36) (137/91 - 164/97)  BP(mean): --  RR: 18 (25 Aug 2019 11:36) (17 - 18)  SpO2: 99% (25 Aug 2019 11:36) (96% - 100%)    PHYSICAL EXAM-    Constitutional: The patient appears to be normal, well developed, well nourished and alert and oriented to time, place and person. The patient does not appear acutely ill. The patient is alert.     Head: Head is normocephalic and atraumatic.      Neck: The patient's neck is supple without enlargement, has no palpable thyromegaly nor thyroid nodules and has no jugular venous distention. No audible carotid bruits. There are strong carotid pulses bilaterally. No JVD.     Cardiovascular: Regular rate and rhythm without S3, S4. No murmurs or rubs are appreciated.      Respiratory: The patient has no rales and no rhonchi. The patient has no wheezes.     Abdomen: Soft, nontender, nondistended with positive bowel sounds.      Extremity: No tenderness. There is no pitting edema, skin discoloration, clubbing and cyanosis.         INTERPRETATION OF TELEMETRY:    ECG:    I&O's Detail      LABS:                        14.9   5.27  )-----------( 176      ( 24 Aug 2019 10:20 )             42.9     08-25    136  |  101  |  5<L>  ----------------------------<  110<H>  3.3<L>   |  27  |  0.76    Ca    8.4<L>      25 Aug 2019 07:20  Phos  2.7     08-25  Mg     2.0     08-25    TPro  7.4  /  Alb  4.0  /  TBili  0.8  /  DBili  0.2  /  AST  196<H>  /  ALT  166<H>  /  AlkPhos  53  08-24    CARDIAC MARKERS ( 25 Aug 2019 10:45 )  <0.015 ng/mL / x     / x     / x     / x      CARDIAC MARKERS ( 24 Aug 2019 10:20 )  <0.015 ng/mL / x     / x     / x     / x          PT/INR - ( 24 Aug 2019 10:20 )   PT: 11.6 sec;   INR: 1.04 ratio         PTT - ( 24 Aug 2019 10:20 )  PTT:30.9 sec    I&O's Summary    BNP  RADIOLOGY & ADDITIONAL STUDIES:

## 2019-08-26 ENCOUNTER — INPATIENT (INPATIENT)
Facility: HOSPITAL | Age: 54
LOS: 4 days | Discharge: HOME | End: 2019-08-31
Attending: INTERNAL MEDICINE | Admitting: INTERNAL MEDICINE
Payer: MEDICAID

## 2019-08-26 VITALS
DIASTOLIC BLOOD PRESSURE: 95 MMHG | RESPIRATION RATE: 18 BRPM | SYSTOLIC BLOOD PRESSURE: 121 MMHG | TEMPERATURE: 98 F | HEART RATE: 71 BPM | OXYGEN SATURATION: 98 %

## 2019-08-26 VITALS
HEART RATE: 74 BPM | DIASTOLIC BLOOD PRESSURE: 86 MMHG | RESPIRATION RATE: 16 BRPM | SYSTOLIC BLOOD PRESSURE: 117 MMHG | HEIGHT: 70 IN | TEMPERATURE: 97 F | WEIGHT: 179.9 LBS

## 2019-08-26 DIAGNOSIS — Z95.1 PRESENCE OF AORTOCORONARY BYPASS GRAFT: Chronic | ICD-10-CM

## 2019-08-26 LAB
ANION GAP SERPL CALC-SCNC: 9 MMOL/L — SIGNIFICANT CHANGE UP (ref 5–17)
BUN SERPL-MCNC: 8 MG/DL — SIGNIFICANT CHANGE UP (ref 7–23)
CALCIUM SERPL-MCNC: 9.5 MG/DL — SIGNIFICANT CHANGE UP (ref 8.5–10.1)
CHLORIDE SERPL-SCNC: 104 MMOL/L — SIGNIFICANT CHANGE UP (ref 96–108)
CO2 SERPL-SCNC: 25 MMOL/L — SIGNIFICANT CHANGE UP (ref 22–31)
CREAT SERPL-MCNC: 0.75 MG/DL — SIGNIFICANT CHANGE UP (ref 0.5–1.3)
GLUCOSE SERPL-MCNC: 115 MG/DL — HIGH (ref 70–99)
MAGNESIUM SERPL-MCNC: 2.2 MG/DL — SIGNIFICANT CHANGE UP (ref 1.6–2.6)
PHOSPHATE SERPL-MCNC: 3.3 MG/DL — SIGNIFICANT CHANGE UP (ref 2.5–4.5)
POTASSIUM SERPL-MCNC: 3.5 MMOL/L — SIGNIFICANT CHANGE UP (ref 3.5–5.3)
POTASSIUM SERPL-SCNC: 3.5 MMOL/L — SIGNIFICANT CHANGE UP (ref 3.5–5.3)
SODIUM SERPL-SCNC: 138 MMOL/L — SIGNIFICANT CHANGE UP (ref 135–145)

## 2019-08-26 PROCEDURE — 93306 TTE W/DOPPLER COMPLETE: CPT | Mod: 26

## 2019-08-26 PROCEDURE — 70450 CT HEAD/BRAIN W/O DYE: CPT | Mod: 26

## 2019-08-26 PROCEDURE — 93971 EXTREMITY STUDY: CPT | Mod: 26,RT

## 2019-08-26 RX ADMIN — Medication 2 MILLIGRAM(S): at 02:25

## 2019-08-26 RX ADMIN — LISINOPRIL 20 MILLIGRAM(S): 2.5 TABLET ORAL at 04:42

## 2019-08-26 RX ADMIN — CARVEDILOL PHOSPHATE 3.12 MILLIGRAM(S): 80 CAPSULE, EXTENDED RELEASE ORAL at 04:43

## 2019-08-26 RX ADMIN — Medication 2 MILLIGRAM(S): at 01:05

## 2019-08-26 RX ADMIN — HEPARIN SODIUM 5000 UNIT(S): 5000 INJECTION INTRAVENOUS; SUBCUTANEOUS at 04:42

## 2019-08-26 RX ADMIN — ONDANSETRON 8 MILLIGRAM(S): 8 TABLET, FILM COATED ORAL at 04:42

## 2019-08-26 RX ADMIN — Medication 2 MILLIGRAM(S): at 04:41

## 2019-08-26 RX ADMIN — Medication 2 MILLIGRAM(S): at 09:33

## 2019-08-26 RX ADMIN — Medication 2 MILLIGRAM(S): at 00:09

## 2019-08-26 NOTE — ED PROVIDER NOTE - PROGRESS NOTE DETAILS
Scribe Alexa Bromberg for attending Dr. Rodriguez: Pt awake at this moment denies pain states drank alcohol heavily and did not deny doing illicit drugs. upon discahrge with family members, they wanted him to stay and he said he would stay after signing ama today for conitnued alcohol detox. YUSEF Rea DO

## 2019-08-26 NOTE — CDI QUERY NOTE - NSCDIOTHERTXTBX2_GEN_ALL_CORE_FT
Documentation on chart of Phosphorus level of  2.3 - 2.7 - 2.1 - 3.3.    Pt. on MVI    Please clarify a diagnosis based on the above clinical findings:  A) Hypophosphatemia  B) No indications of Hypophosphatemia  C) Unknown  D) Other ( Please specify condition)

## 2019-08-26 NOTE — ED ADULT NURSE NOTE - CHIEF COMPLAINT QUOTE
Patient left  AMA at 6pm. Bystander found patient lying on ground. EMS states patient was unresponsive initially then responsive to pain only after IV placed. On arrival to ED able to sternal rub patient. Patient arousable and talking with nurse. Agitated. States he does not know why he was lying on ground. B/L dilated pupils noted. All VS stable at triage. Patient denies any complaints. No distress noted.

## 2019-08-26 NOTE — CDI QUERY NOTE - NSCDIOTHERTXTBX_GEN_ALL_CORE_HH
Documentation on chart of Calcium levels:      9.4 - 8.2 - 8.4 - 9.3 - 9.5    Pt. on MVI    Please clarify a diagnosis:  A) Hypocalcemia  B) No indications of Hypocalcemia  C) Unknown  D) Other ( Please specify condition)

## 2019-08-26 NOTE — ED ADULT TRIAGE NOTE - CHIEF COMPLAINT QUOTE
Bystander found patient lying on ground. EMS states patient was unresponsive initially then responsive to pain only after IV placed. On arrival to ED able to sternal rub patient. Patient arousable and talking with nurse. Agitated. States he does not know why he was lying on ground. B/L dilated pupils noted. All VS stable at triage. Patient denies any complaints. No distress noted. Patient left  AMA at 6pm. Bystander found patient lying on ground. EMS states patient was unresponsive initially then responsive to pain only after IV placed. On arrival to ED able to sternal rub patient. Patient arousable and talking with nurse. Agitated. States he does not know why he was lying on ground. B/L dilated pupils noted. All VS stable at triage. Patient denies any complaints. No distress noted.

## 2019-08-26 NOTE — ED PROVIDER NOTE - SKIN, MLM
Skin normal color for race, warm, dry and intact. No evidence of rash. no obvious deformities or lacerations.

## 2019-08-26 NOTE — PROGRESS NOTE ADULT - SUBJECTIVE AND OBJECTIVE BOX
Patient is a 54y old  Male who presents with a chief complaint of Alcohl withdrawal , Chest pain , CAD.      HPI:  HPI: The patient is a 53 yo male with  a long  Hx. of alcohol abuse, dependence, drug use, CAD, HTN, not compliant with his medications who presented in the ED with c/o Chest pain, and alcohol withdrawal.  He woke up shaking , craving and had substernal CP, not radiating.   Cardiology team consulted for evaluation of the chest pain,   this am he c/o dull pain in mid chest without any radiation.  Pt overnight was taking his cardiac monitor off and wanted to go home. This am I found him dressed up sitting in a chair.      PMHx: CAD HTN, ETOH abuse,     PSHx: CABG x 2 2012, anal fistula surgery 2 years ago.     Family Hx: Father  of brain tumor, mother  of MI at age 43, she also had DM.     Social Hx.: quit smoking cigarettes in , smokes 3 marijuana cigars a day, drinks one pint of liquor a day       PAST MEDICAL & SURGICAL HISTORY:  CAD (coronary artery disease)  HLD (hyperlipidemia)  HTN (hypertension)  S/P CABG (coronary artery bypass graft)      MEDICATIONS  (STANDING):  aspirin  chewable 81 milliGRAM(s) Oral daily  carvedilol 3.125 milliGRAM(s) Oral every 12 hours  folic acid 1 milliGRAM(s) Oral daily  heparin  Injectable 5000 Unit(s) SubCutaneous every 12 hours  lisinopril 20 milliGRAM(s) Oral daily  LORazepam   Injectable 2 milliGRAM(s) IV Push every 4 hours  LORazepam   Injectable 1.5 milliGRAM(s) IV Push every 4 hours  LORazepam   Injectable   IV Push   multivitamin 1 Tablet(s) Oral daily  ondansetron Injectable 8 milliGRAM(s) IV Push two times a day  simvastatin 40 milliGRAM(s) Oral at bedtime  thiamine 100 milliGRAM(s) Oral daily    MEDICATIONS  (PRN):  LORazepam   Injectable 2 milliGRAM(s) IV Push every 1 hour PRN CIWA-Ar score 8 or greater  melatonin 3 milliGRAM(s) Oral at bedtime PRN Sleep    REVIEW OF SYSTEMS:  CONSTITUTIONAL:  No fever or chills.  HEENT:  Eyes:  No visual changes.     ENT:  No epistaxis.  No sinus pain.    RESPIRATORY:  No cough.  No wheeze.  No hemoptysis.  No shortness of breath.  CARDIOVASCULAR:  c/o chest pain  GASTROINTESTINAL:  No abdominal pain.  No nausea or vomiting.    GENITOURINARY:    No hematuria.    MUSCULOSKELETAL:  No musculoskeletal pain.  No joint swelling.  No arthritis.  NEUROLOGICAL:  No tingling or numbness or weakness.  PSYCHIATRIC:  No confusion    Vital Signs Last 24 Hrs  T(C): 36.7 (26 Aug 2019 04:30), Max: 36.9 (25 Aug 2019 11:36)  T(F): 98.1 (26 Aug 2019 04:30), Max: 98.5 (26 Aug 2019 00:00)  HR: 78 (26 Aug 2019 04:30) (68 - 99)  BP: 156/104 (26 Aug 2019 04:30) (131/90 - 164/97)  BP(mean): --  RR: 18 (26 Aug 2019 04:30) (18 - 20)  SpO2: 97% (26 Aug 2019 04:30) (96% - 100%)    PHYSICAL EXAM-    Constitutional: no acute distress     Head: Head is normocephalic and atraumatic.      Neck:  No JVD.     Cardiovascular: Regular rate and rhythm without S3, S4. No murmurs or rubs are appreciated.    Palpable tenderness in the L costochondral joints     Respiratory: Breathsounds are normal. No rales. No wheezing.    Abdomen: Soft, nontender, nondistended with positive bowel sounds.      Extremity: No tenderness. No  pitting edema     Neurologic: The patient is alert and oriented.      Skin: No rash, no obvious lesions noted.      Psychiatric: The patient appears to be emotionally stable.      INTERPRETATION OF TELEMETRY: off and on being monitored as pt was noncompliant. SR and isolated PVCs.     ECG:     I&O's Detail      LABS:                        14.9   5.27  )-----------( 176      ( 24 Aug 2019 10:20 )             42.9     08-26    138  |  104  |  8   ----------------------------<  115<H>  3.5   |  25  |  0.75    Ca    9.5      26 Aug 2019 07:27  Phos  3.3     08-  Mg     2.2     08-    TPro  8.4<H>  /  Alb  4.3  /  TBili  1.0  /  DBili  0.2  /  AST  170<H>  /  ALT  170<H>  /  AlkPhos  61  08-25    CARDIAC MARKERS ( 25 Aug 2019 13:43 )  <0.015 ng/mL / x     / x     / x     / x      CARDIAC MARKERS ( 25 Aug 2019 10:45 )  <0.015 ng/mL / x     / x     / x     / x      CARDIAC MARKERS ( 24 Aug 2019 10:20 )  <0.015 ng/mL / x     / x     / x     / x          PT/INR - ( 24 Aug 2019 10:20 )   PT: 11.6 sec;   INR: 1.04 ratio         PTT - ( 24 Aug 2019 10:20 )  PTT:30.9 sec    I&O's Summary    BNP  RADIOLOGY & ADDITIONAL STUDIES:  < from: Xray Chest 1 View AP/PA. (19 @ 11:08) >    EXAM:  XR CHEST 1 VIEW                            PROCEDURE DATE:  2019          INTERPRETATION:  History: Chest pain.    Single view of the chest was performed.    Comparison is made to 2018.    Findings:    Patient is status post sternotomy. The inferior most sternotomy wire is   discontinuous. The lungs are clear. The heart size is normal.    Impression:    Clear lungs, grossly unchanged.                RENE GILES M.D., ATTENDING RADIOLOGIST  This document has been electronically signed. Aug 24 2019 11:26AM    < end of copied text >

## 2019-08-26 NOTE — ED ADULT NURSE REASSESSMENT NOTE - NS ED NURSE REASSESS COMMENT FT1
pt received in bed sleeping but easily arousable with verbal stimulation. Pt now alert. Safety maintained

## 2019-08-26 NOTE — PROGRESS NOTE ADULT - REASON FOR ADMISSION
Alcohl withdrawal , Chest pain , CAD,
Alcohol withdrawal , Chest pain , CAD,
Alcohol withdrawal , Chest pain , CAD,

## 2019-08-26 NOTE — ED PROVIDER NOTE - CLINICAL SUMMARY MEDICAL DECISION MAKING FREE TEXT BOX
Plan: 53 yo male with hx of HLD, HTN, CAD [place]IBA [place] Pt admits to drug and alcohol use. Will have pt rest and reassess.

## 2019-08-26 NOTE — ED PROVIDER NOTE - OBJECTIVE STATEMENT
Patient tolerated PO without difficulty.     53 yo male with a PMH of htn, hld, cad, s/p cabg was BIBA after found un[place] outside the train station. Pt left AMA after jaspreet admitted for alcohol withdrawal. Pt admits to drinking and taking drugs today (but did not disclose what drugs he took). Pt denies any currnet complaints.

## 2019-08-26 NOTE — PROGRESS NOTE ADULT - SUBJECTIVE AND OBJECTIVE BOX
CC: Chest pain, alcohol abuse History of Present Illness: 	  HPI: The patient is a 53 yo male with CAD s/p CABG, long  Hx. of alcohol abuse, dependence, drug use, HTN, not compliant with his medications who presented in the ED with c/o Chest pain, and alcohol withdrawal. His last drink was last night and he smoked 3 marijuana cigars. He woke up today shaking , craving and had substernal CP, not radiating. He is also c/o of numbness in hands and in the R thigh.     8/25: Pt lying in bed, slight discomfort. States has had chest pressure off and on.  Denies fever, chills, n, v, + diaphoresis.   Right thigh pain/discomfort.  8/26: Pt feeling well, denies signs of withdrawal, on standing IV ativan.  Pt eager to leave AMA.    REVIEW OF SYSTEMS: All other review of systems is negative unless indicated above.    Vital Signs Last 24 Hrs  T(C): 36.5 (26 Aug 2019 11:04), Max: 36.9 (26 Aug 2019 00:00)  T(F): 97.7 (26 Aug 2019 11:04), Max: 98.5 (26 Aug 2019 00:00)  HR: 71 (26 Aug 2019 11:04) (71 - 99)  BP: 121/95 (26 Aug 2019 11:04) (121/95 - 156/104)  BP(mean): --  RR: 18 (26 Aug 2019 11:04) (18 - 20)  SpO2: 98% (26 Aug 2019 11:04) (97% - 100%)    PHYSICAL EXAM:    Constitutional: NAD, awake and alert, well-developed  HEENT: PERR, EOMI, Normal Hearing, MMM  Neck: Soft and supple  Respiratory: Breath sounds are clear bilaterally, No wheezing, rales or rhonchi  Cardiovascular: S1 and S2, regular rate and rhythm, no Murmurs, gallops or rubs  Gastrointestinal: Bowel Sounds present, soft, nontender, nondistended, no guarding, no rebound  Extremities: No peripheral edema  Neurological: A/O x 3, no focal deficits in my limited exam  Skin: No rashes    MEDICATIONS  (STANDING):  aspirin  chewable 81 milliGRAM(s) Oral daily  carvedilol 3.125 milliGRAM(s) Oral every 12 hours  folic acid 1 milliGRAM(s) Oral daily  heparin  Injectable 5000 Unit(s) SubCutaneous every 12 hours  lisinopril 20 milliGRAM(s) Oral daily  LORazepam   Injectable 1.5 milliGRAM(s) IV Push every 4 hours  LORazepam   Injectable   IV Push   multivitamin 1 Tablet(s) Oral daily  ondansetron Injectable 8 milliGRAM(s) IV Push two times a day  simvastatin 40 milliGRAM(s) Oral at bedtime  thiamine 100 milliGRAM(s) Oral daily    MEDICATIONS  (PRN):  LORazepam   Injectable 2 milliGRAM(s) IV Push every 1 hour PRN CIWA-Ar score 8 or greater  melatonin 3 milliGRAM(s) Oral at bedtime PRN Sleep    CARDIAC MARKERS ( 25 Aug 2019 13:43 )  <0.015 ng/mL / x     / x     / x     / x      CARDIAC MARKERS ( 25 Aug 2019 10:45 )  <0.015 ng/mL / x     / x     / x     / x            08-26    138  |  104  |  8   ----------------------------<  115<H>  3.5   |  25  |  0.75    Ca    9.5      26 Aug 2019 07:27  Phos  3.3     08-26  Mg     2.2     08-26    TPro  8.4<H>  /  Alb  4.3  /  TBili  1.0  /  DBili  0.2  /  AST  170<H>  /  ALT  170<H>  /  AlkPhos  61  08-25    CAPILLARY BLOOD GLUCOSE        LIVER FUNCTIONS - ( 25 Aug 2019 13:43 )  Alb: 4.3 g/dL / Pro: 8.4 gm/dL / ALK PHOS: 61 U/L / ALT: 170 U/L / AST: 170 U/L / GGT: x               Assessment and Plan:  53 yo male with  a long  Hx. of alcohol abuse, dependence, drug use, CAD, HTN, not compliant with his medications who presented in the ED with c/o Chest pain, and alcohol withdrawal. His last drink was last night and he smoked 3 marijuana cigars. He woke up today shaking , craving and had substernal CP, not radiating. He is also c/o of numbness in hands and in the R thigh.     Chest pain/angina in setting of CAD s/p CABG and heavy alcohol abuse:  -chest pain resolved  -chest xray no acute pathology  -EKG NSR  -standing CIWA for alcohol withdrawal   -coreg  -aspirin  -hold on statin until transaminitis improves  -started ACEI  -cardio eval appreciated  -probable thiamine deficiency --> Start supplementation    HTN:  -coreg  -ACEI    VTEP:   -Heparin     Dispo: Pt left AMA despite encouraging him to stay and complete detox. CC: Chest pain, alcohol abuse History of Present Illness: 	  HPI: The patient is a 55 yo male with CAD s/p CABG, long  Hx. of alcohol abuse, dependence, drug use, HTN, not compliant with his medications who presented in the ED with c/o Chest pain, and alcohol withdrawal. His last drink was last night and he smoked 3 marijuana cigars. He woke up today shaking , craving and had substernal CP, not radiating. He is also c/o of numbness in hands and in the R thigh.     8/25: Pt lying in bed, slight discomfort. States has had chest pressure off and on.  Denies fever, chills, n, v, + diaphoresis.   Right thigh pain/discomfort.  8/26: Pt feeling well, denies signs of withdrawal, on standing IV ativan.  Pt eager to leave AMA.    REVIEW OF SYSTEMS: All other review of systems is negative unless indicated above.    Vital Signs Last 24 Hrs  T(C): 36.5 (26 Aug 2019 11:04), Max: 36.9 (26 Aug 2019 00:00)  T(F): 97.7 (26 Aug 2019 11:04), Max: 98.5 (26 Aug 2019 00:00)  HR: 71 (26 Aug 2019 11:04) (71 - 99)  BP: 121/95 (26 Aug 2019 11:04) (121/95 - 156/104)  BP(mean): --  RR: 18 (26 Aug 2019 11:04) (18 - 20)  SpO2: 98% (26 Aug 2019 11:04) (97% - 100%)    PHYSICAL EXAM:    Constitutional: NAD, awake and alert, well-developed  HEENT: PERR, EOMI, Normal Hearing, MMM  Neck: Soft and supple  Respiratory: Breath sounds are clear bilaterally, No wheezing, rales or rhonchi  Cardiovascular: S1 and S2, regular rate and rhythm, no Murmurs, gallops or rubs  Gastrointestinal: Bowel Sounds present, soft, nontender, nondistended, no guarding, no rebound  Extremities: No peripheral edema  Neurological: A/O x 3, no focal deficits in my limited exam  Skin: No rashes    MEDICATIONS  (STANDING):  aspirin  chewable 81 milliGRAM(s) Oral daily  carvedilol 3.125 milliGRAM(s) Oral every 12 hours  folic acid 1 milliGRAM(s) Oral daily  heparin  Injectable 5000 Unit(s) SubCutaneous every 12 hours  lisinopril 20 milliGRAM(s) Oral daily  LORazepam   Injectable 1.5 milliGRAM(s) IV Push every 4 hours  LORazepam   Injectable   IV Push   multivitamin 1 Tablet(s) Oral daily  ondansetron Injectable 8 milliGRAM(s) IV Push two times a day  simvastatin 40 milliGRAM(s) Oral at bedtime  thiamine 100 milliGRAM(s) Oral daily    MEDICATIONS  (PRN):  LORazepam   Injectable 2 milliGRAM(s) IV Push every 1 hour PRN CIWA-Ar score 8 or greater  melatonin 3 milliGRAM(s) Oral at bedtime PRN Sleep    CARDIAC MARKERS ( 25 Aug 2019 13:43 )  <0.015 ng/mL / x     / x     / x     / x      CARDIAC MARKERS ( 25 Aug 2019 10:45 )  <0.015 ng/mL / x     / x     / x     / x            08-26    138  |  104  |  8   ----------------------------<  115<H>  3.5   |  25  |  0.75    Ca    9.5      26 Aug 2019 07:27  Phos  3.3     08-26  Mg     2.2     08-26    TPro  8.4<H>  /  Alb  4.3  /  TBili  1.0  /  DBili  0.2  /  AST  170<H>  /  ALT  170<H>  /  AlkPhos  61  08-25    CAPILLARY BLOOD GLUCOSE        LIVER FUNCTIONS - ( 25 Aug 2019 13:43 )  Alb: 4.3 g/dL / Pro: 8.4 gm/dL / ALK PHOS: 61 U/L / ALT: 170 U/L / AST: 170 U/L / GGT: x               Assessment and Plan:  55 yo male with  a long  Hx. of alcohol abuse, dependence, drug use, CAD, HTN, not compliant with his medications who presented in the ED with c/o Chest pain, and alcohol withdrawal. His last drink was last night and he smoked 3 marijuana cigars. He woke up today shaking , craving and had substernal CP, not radiating. He is also c/o of numbness in hands and in the R thigh.     Chest pain/angina in setting of CAD s/p CABG and heavy alcohol abuse:  -chest pain resolved  -chest xray no acute pathology  -EKG NSR  -standing CIWA for alcohol withdrawal   -coreg  -aspirin  -hold on statin until transaminitis improves  -started ACEI  -cardio eval appreciated  -probable thiamine deficiency --> Start supplementation    Pt on multivitamin for Hypophosphatemia, no indications for hypocalcemia.    HTN:  -coreg  -ACEI    VTEP:   -Heparin     Dispo: Pt left AMA despite encouraging him to stay and complete detox.

## 2019-08-26 NOTE — PROGRESS NOTE ADULT - ASSESSMENT
Chest pain- Chest pain is atypical in nature. Likely costochondritis.  Pain control. So far cardiac enzymes and EKG did not reveal any ischemia.   He had history of CAD and CABG- in light of his non reliable history with his alcoholism, recommend check 2 D echo.  IF chest pain changes in nature will get nuclear stress test.  f/u with cardiologist as outpt.     CAD s/p CABG- continue ASA, statin, beta blockers.    HTN- BP elevated this am before AM meds.  Recheck now after am meds and reasses.    Hyperlipidemia- continue statin.    Alcoholism- advised the risks of alcohol abuse at length and advised cessation.  he expressed full understanding of this.     Other medical issues- Management per primary team.   Thank you for allowing me to participate in the care of this patient. Please feel free to contact me with any questions.

## 2019-08-27 DIAGNOSIS — I25.10 ATHEROSCLEROTIC HEART DISEASE OF NATIVE CORONARY ARTERY WITHOUT ANGINA PECTORIS: ICD-10-CM

## 2019-08-27 DIAGNOSIS — Z71.89 OTHER SPECIFIED COUNSELING: ICD-10-CM

## 2019-08-27 DIAGNOSIS — Z29.9 ENCOUNTER FOR PROPHYLACTIC MEASURES, UNSPECIFIED: ICD-10-CM

## 2019-08-27 DIAGNOSIS — F10.239 ALCOHOL DEPENDENCE WITH WITHDRAWAL, UNSPECIFIED: ICD-10-CM

## 2019-08-27 DIAGNOSIS — G93.41 METABOLIC ENCEPHALOPATHY: ICD-10-CM

## 2019-08-27 DIAGNOSIS — R74.0 NONSPECIFIC ELEVATION OF LEVELS OF TRANSAMINASE AND LACTIC ACID DEHYDROGENASE [LDH]: ICD-10-CM

## 2019-08-27 DIAGNOSIS — F10.10 ALCOHOL ABUSE, UNCOMPLICATED: ICD-10-CM

## 2019-08-27 DIAGNOSIS — R07.89 OTHER CHEST PAIN: ICD-10-CM

## 2019-08-27 LAB
ADD ON TEST-SPECIMEN IN LAB: SIGNIFICANT CHANGE UP
ADD ON TEST-SPECIMEN IN LAB: SIGNIFICANT CHANGE UP
ALBUMIN SERPL ELPH-MCNC: 3.8 G/DL — SIGNIFICANT CHANGE UP (ref 3.3–5)
ALBUMIN SERPL ELPH-MCNC: 4.4 G/DL — SIGNIFICANT CHANGE UP (ref 3.3–5)
ALP SERPL-CCNC: 58 U/L — SIGNIFICANT CHANGE UP (ref 40–120)
ALP SERPL-CCNC: 65 U/L — SIGNIFICANT CHANGE UP (ref 40–120)
ALT FLD-CCNC: 209 U/L — HIGH (ref 12–78)
ALT FLD-CCNC: 228 U/L — HIGH (ref 12–78)
ANION GAP SERPL CALC-SCNC: 5 MMOL/L — SIGNIFICANT CHANGE UP (ref 5–17)
ANION GAP SERPL CALC-SCNC: 8 MMOL/L — SIGNIFICANT CHANGE UP (ref 5–17)
APTT BLD: 29.5 SEC — SIGNIFICANT CHANGE UP (ref 27.5–36.3)
AST SERPL-CCNC: 218 U/L — HIGH (ref 15–37)
AST SERPL-CCNC: 225 U/L — HIGH (ref 15–37)
BASOPHILS # BLD AUTO: 0.05 K/UL — SIGNIFICANT CHANGE UP (ref 0–0.2)
BASOPHILS NFR BLD AUTO: 1.1 % — SIGNIFICANT CHANGE UP (ref 0–2)
BILIRUB SERPL-MCNC: 0.5 MG/DL — SIGNIFICANT CHANGE UP (ref 0.2–1.2)
BILIRUB SERPL-MCNC: 0.5 MG/DL — SIGNIFICANT CHANGE UP (ref 0.2–1.2)
BUN SERPL-MCNC: 4 MG/DL — LOW (ref 7–23)
BUN SERPL-MCNC: 4 MG/DL — LOW (ref 7–23)
CALCIUM SERPL-MCNC: 8.7 MG/DL — SIGNIFICANT CHANGE UP (ref 8.5–10.1)
CALCIUM SERPL-MCNC: 9.2 MG/DL — SIGNIFICANT CHANGE UP (ref 8.5–10.1)
CHLORIDE SERPL-SCNC: 105 MMOL/L — SIGNIFICANT CHANGE UP (ref 96–108)
CHLORIDE SERPL-SCNC: 106 MMOL/L — SIGNIFICANT CHANGE UP (ref 96–108)
CO2 SERPL-SCNC: 24 MMOL/L — SIGNIFICANT CHANGE UP (ref 22–31)
CO2 SERPL-SCNC: 29 MMOL/L — SIGNIFICANT CHANGE UP (ref 22–31)
CREAT SERPL-MCNC: 0.81 MG/DL — SIGNIFICANT CHANGE UP (ref 0.5–1.3)
CREAT SERPL-MCNC: 0.89 MG/DL — SIGNIFICANT CHANGE UP (ref 0.5–1.3)
EOSINOPHIL # BLD AUTO: 0.11 K/UL — SIGNIFICANT CHANGE UP (ref 0–0.5)
EOSINOPHIL NFR BLD AUTO: 2.4 % — SIGNIFICANT CHANGE UP (ref 0–6)
ETHANOL SERPL-MCNC: 205 MG/DL — HIGH (ref 0–10)
GLUCOSE SERPL-MCNC: 103 MG/DL — HIGH (ref 70–99)
GLUCOSE SERPL-MCNC: 116 MG/DL — HIGH (ref 70–99)
HAV IGM SER-ACNC: SIGNIFICANT CHANGE UP
HBV CORE IGM SER-ACNC: SIGNIFICANT CHANGE UP
HBV SURFACE AG SER-ACNC: SIGNIFICANT CHANGE UP
HCT VFR BLD CALC: 43.6 % — SIGNIFICANT CHANGE UP (ref 39–50)
HCV AB S/CO SERPL IA: 0.1 S/CO — SIGNIFICANT CHANGE UP (ref 0–0.99)
HCV AB SERPL-IMP: SIGNIFICANT CHANGE UP
HGB BLD-MCNC: 14.5 G/DL — SIGNIFICANT CHANGE UP (ref 13–17)
IMM GRANULOCYTES NFR BLD AUTO: 0.2 % — SIGNIFICANT CHANGE UP (ref 0–1.5)
INR BLD: 1.05 RATIO — SIGNIFICANT CHANGE UP (ref 0.88–1.16)
LYMPHOCYTES # BLD AUTO: 2.32 K/UL — SIGNIFICANT CHANGE UP (ref 1–3.3)
LYMPHOCYTES # BLD AUTO: 49.8 % — HIGH (ref 13–44)
MAGNESIUM SERPL-MCNC: 2 MG/DL — SIGNIFICANT CHANGE UP (ref 1.6–2.6)
MCHC RBC-ENTMCNC: 29.9 PG — SIGNIFICANT CHANGE UP (ref 27–34)
MCHC RBC-ENTMCNC: 33.3 GM/DL — SIGNIFICANT CHANGE UP (ref 32–36)
MCV RBC AUTO: 89.9 FL — SIGNIFICANT CHANGE UP (ref 80–100)
MONOCYTES # BLD AUTO: 0.63 K/UL — SIGNIFICANT CHANGE UP (ref 0–0.9)
MONOCYTES NFR BLD AUTO: 13.5 % — SIGNIFICANT CHANGE UP (ref 2–14)
NEUTROPHILS # BLD AUTO: 1.54 K/UL — LOW (ref 1.8–7.4)
NEUTROPHILS NFR BLD AUTO: 33 % — LOW (ref 43–77)
PCP SPEC-MCNC: SIGNIFICANT CHANGE UP
PHOSPHATE SERPL-MCNC: 3.5 MG/DL — SIGNIFICANT CHANGE UP (ref 2.5–4.5)
PLATELET # BLD AUTO: 153 K/UL — SIGNIFICANT CHANGE UP (ref 150–400)
POTASSIUM SERPL-MCNC: 3.4 MMOL/L — LOW (ref 3.5–5.3)
POTASSIUM SERPL-MCNC: 3.6 MMOL/L — SIGNIFICANT CHANGE UP (ref 3.5–5.3)
POTASSIUM SERPL-SCNC: 3.4 MMOL/L — LOW (ref 3.5–5.3)
POTASSIUM SERPL-SCNC: 3.6 MMOL/L — SIGNIFICANT CHANGE UP (ref 3.5–5.3)
PROT SERPL-MCNC: 7.4 GM/DL — SIGNIFICANT CHANGE UP (ref 6–8.3)
PROT SERPL-MCNC: 8.6 GM/DL — HIGH (ref 6–8.3)
PROTHROM AB SERPL-ACNC: 11.7 SEC — SIGNIFICANT CHANGE UP (ref 10–12.9)
RBC # BLD: 4.85 M/UL — SIGNIFICANT CHANGE UP (ref 4.2–5.8)
RBC # FLD: 13.9 % — SIGNIFICANT CHANGE UP (ref 10.3–14.5)
SODIUM SERPL-SCNC: 138 MMOL/L — SIGNIFICANT CHANGE UP (ref 135–145)
SODIUM SERPL-SCNC: 139 MMOL/L — SIGNIFICANT CHANGE UP (ref 135–145)
TROPONIN I SERPL-MCNC: <0.015 NG/ML — SIGNIFICANT CHANGE UP (ref 0.01–0.04)
WBC # BLD: 4.66 K/UL — SIGNIFICANT CHANGE UP (ref 3.8–10.5)
WBC # FLD AUTO: 4.66 K/UL — SIGNIFICANT CHANGE UP (ref 3.8–10.5)

## 2019-08-27 PROCEDURE — 85730 THROMBOPLASTIN TIME PARTIAL: CPT

## 2019-08-27 PROCEDURE — 80048 BASIC METABOLIC PNL TOTAL CA: CPT

## 2019-08-27 PROCEDURE — 76705 ECHO EXAM OF ABDOMEN: CPT

## 2019-08-27 PROCEDURE — 84100 ASSAY OF PHOSPHORUS: CPT

## 2019-08-27 PROCEDURE — 83735 ASSAY OF MAGNESIUM: CPT

## 2019-08-27 PROCEDURE — 71045 X-RAY EXAM CHEST 1 VIEW: CPT | Mod: 26

## 2019-08-27 PROCEDURE — 80076 HEPATIC FUNCTION PANEL: CPT

## 2019-08-27 PROCEDURE — 71045 X-RAY EXAM CHEST 1 VIEW: CPT

## 2019-08-27 PROCEDURE — 93010 ELECTROCARDIOGRAM REPORT: CPT

## 2019-08-27 PROCEDURE — 36415 COLL VENOUS BLD VENIPUNCTURE: CPT

## 2019-08-27 PROCEDURE — 80074 ACUTE HEPATITIS PANEL: CPT

## 2019-08-27 PROCEDURE — 76705 ECHO EXAM OF ABDOMEN: CPT | Mod: 26

## 2019-08-27 PROCEDURE — 80053 COMPREHEN METABOLIC PANEL: CPT

## 2019-08-27 PROCEDURE — 93017 CV STRESS TEST TRACING ONLY: CPT

## 2019-08-27 PROCEDURE — 84443 ASSAY THYROID STIM HORMONE: CPT

## 2019-08-27 PROCEDURE — 93016 CV STRESS TEST SUPVJ ONLY: CPT

## 2019-08-27 PROCEDURE — 93018 CV STRESS TEST I&R ONLY: CPT

## 2019-08-27 PROCEDURE — 84484 ASSAY OF TROPONIN QUANT: CPT

## 2019-08-27 PROCEDURE — A9500: CPT

## 2019-08-27 PROCEDURE — 85610 PROTHROMBIN TIME: CPT

## 2019-08-27 PROCEDURE — 78452 HT MUSCLE IMAGE SPECT MULT: CPT

## 2019-08-27 RX ORDER — POTASSIUM CHLORIDE 20 MEQ
40 PACKET (EA) ORAL ONCE
Refills: 0 | Status: COMPLETED | OUTPATIENT
Start: 2019-08-27 | End: 2019-08-27

## 2019-08-27 RX ORDER — CARVEDILOL PHOSPHATE 80 MG/1
3.12 CAPSULE, EXTENDED RELEASE ORAL EVERY 12 HOURS
Refills: 0 | Status: DISCONTINUED | OUTPATIENT
Start: 2019-08-27 | End: 2019-08-29

## 2019-08-27 RX ORDER — SODIUM CHLORIDE 9 MG/ML
1000 INJECTION INTRAMUSCULAR; INTRAVENOUS; SUBCUTANEOUS
Refills: 0 | Status: DISCONTINUED | OUTPATIENT
Start: 2019-08-27 | End: 2019-08-27

## 2019-08-27 RX ORDER — SODIUM CHLORIDE 9 MG/ML
1000 INJECTION, SOLUTION INTRAVENOUS
Refills: 0 | Status: DISCONTINUED | OUTPATIENT
Start: 2019-08-27 | End: 2019-08-27

## 2019-08-27 RX ORDER — ASPIRIN/CALCIUM CARB/MAGNESIUM 324 MG
81 TABLET ORAL DAILY
Refills: 0 | Status: DISCONTINUED | OUTPATIENT
Start: 2019-08-27 | End: 2019-08-31

## 2019-08-27 RX ORDER — FOLIC ACID 0.8 MG
1 TABLET ORAL DAILY
Refills: 0 | Status: DISCONTINUED | OUTPATIENT
Start: 2019-08-27 | End: 2019-08-31

## 2019-08-27 RX ORDER — IBUPROFEN 200 MG
200 TABLET ORAL EVERY 6 HOURS
Refills: 0 | Status: DISCONTINUED | OUTPATIENT
Start: 2019-08-27 | End: 2019-08-29

## 2019-08-27 RX ORDER — THIAMINE MONONITRATE (VIT B1) 100 MG
100 TABLET ORAL DAILY
Refills: 0 | Status: DISCONTINUED | OUTPATIENT
Start: 2019-08-27 | End: 2019-08-29

## 2019-08-27 RX ORDER — IBUPROFEN 200 MG
600 TABLET ORAL ONCE
Refills: 0 | Status: COMPLETED | OUTPATIENT
Start: 2019-08-27 | End: 2019-08-27

## 2019-08-27 RX ORDER — SIMVASTATIN 20 MG/1
40 TABLET, FILM COATED ORAL AT BEDTIME
Refills: 0 | Status: DISCONTINUED | OUTPATIENT
Start: 2019-08-27 | End: 2019-08-31

## 2019-08-27 RX ORDER — SODIUM CHLORIDE 9 MG/ML
1000 INJECTION INTRAMUSCULAR; INTRAVENOUS; SUBCUTANEOUS ONCE
Refills: 0 | Status: COMPLETED | OUTPATIENT
Start: 2019-08-27 | End: 2019-08-27

## 2019-08-27 RX ADMIN — Medication 2 MILLIGRAM(S): at 17:06

## 2019-08-27 RX ADMIN — Medication 2 MILLIGRAM(S): at 08:26

## 2019-08-27 RX ADMIN — Medication 2 MILLIGRAM(S): at 12:54

## 2019-08-27 RX ADMIN — Medication 2 MILLIGRAM(S): at 18:41

## 2019-08-27 RX ADMIN — SODIUM CHLORIDE 1000 MILLILITER(S): 9 INJECTION INTRAMUSCULAR; INTRAVENOUS; SUBCUTANEOUS at 03:54

## 2019-08-27 RX ADMIN — Medication 40 MILLIEQUIVALENT(S): at 17:03

## 2019-08-27 RX ADMIN — Medication 2 MILLIGRAM(S): at 21:07

## 2019-08-27 RX ADMIN — Medication 600 MILLIGRAM(S): at 04:57

## 2019-08-27 RX ADMIN — Medication 100 MILLIGRAM(S): at 17:05

## 2019-08-27 RX ADMIN — SODIUM CHLORIDE 125 MILLILITER(S): 9 INJECTION INTRAMUSCULAR; INTRAVENOUS; SUBCUTANEOUS at 07:13

## 2019-08-27 RX ADMIN — CARVEDILOL PHOSPHATE 3.12 MILLIGRAM(S): 80 CAPSULE, EXTENDED RELEASE ORAL at 18:41

## 2019-08-27 RX ADMIN — Medication 1 TABLET(S): at 12:53

## 2019-08-27 RX ADMIN — Medication 1 MILLIGRAM(S): at 12:53

## 2019-08-27 RX ADMIN — SODIUM CHLORIDE 1000 MILLILITER(S): 9 INJECTION INTRAMUSCULAR; INTRAVENOUS; SUBCUTANEOUS at 02:54

## 2019-08-27 RX ADMIN — SIMVASTATIN 40 MILLIGRAM(S): 20 TABLET, FILM COATED ORAL at 21:07

## 2019-08-27 RX ADMIN — Medication 81 MILLIGRAM(S): at 12:52

## 2019-08-27 NOTE — PATIENT PROFILE ADULT - NSPROMEDSPATCH_GEN_A_NUR
Gen: NAD, AOx3  Head: NCAT  HEENT: EOMI, oral mucosa moist, normal conjunctiva, neck supple  Lung: no respiratory distress  CV:  Normal perfusion  Abd: soft, mild ttp near incision, no rebound/guarding  MSK: No edema, no visible deformities, +engorged breasts b/l with diffuse tenderness no erythema/warmth/induration/fluctuance  Neuro: No focal neurologic deficits  Skin: No rash   Psych: normal affect     -Slowey DO none

## 2019-08-27 NOTE — CONSULT NOTE ADULT - SUBJECTIVE AND OBJECTIVE BOX
Patient is a 54y old  Male who presents with a chief complaint of syncope.     HPI:  53 yo M with a PMH alcohol abuse, HTN, HLD, and CAD s/p CABG who presents after being found unresponsive and intoxicated outside. He was admitted to the hospital from 8/24-26 for alcohol abuse and withdrawal and had a standing taper but left AMA on the day of admission. Later that day, he was found unresponsive outside. The patient states he had "at least a couple beers" after he left the hospital.     He does not remember any thing other than drinking one beer per pt and his girl friend at bed side.    He still c/o vague substernal chest pain that was constant.     PAST MEDICAL & SURGICAL HISTORY:  Alcohol abuse  CAD (coronary artery disease)  HLD (hyperlipidemia)  HTN (hypertension)  S/P CABG (coronary artery bypass graft)      MEDICATIONS  (STANDING):  aspirin  chewable 81 milliGRAM(s) Oral daily  carvedilol 3.125 milliGRAM(s) Oral every 12 hours  folic acid 1 milliGRAM(s) Oral daily  LORazepam     Tablet 2 milliGRAM(s) Oral every 4 hours  LORazepam     Tablet   Oral   multivitamin 1 Tablet(s) Oral daily  simvastatin 40 milliGRAM(s) Oral at bedtime  thiamine Injectable 100 milliGRAM(s) IV Push daily    MEDICATIONS  (PRN):  ibuprofen  Tablet. 200 milliGRAM(s) Oral every 6 hours PRN Mild Pain (1 - 3), Moderate Pain (4 - 6)      FAMILY HISTORY:  FH: heart disease  FH: diabetes mellitus      SOCIAL HISTORY:  alcohol abuse     REVIEW OF SYSTEMS:  CONSTITUTIONAL:     No fever or chills.    RESPIRATORY:  No cough.  No wheeze.  No hemoptysis.  No shortness of breath.  CARDIOVASCULAR:  c/o chest pains. and c/o syncope GASTROINTESTINAL:  No abdominal pain.  No nausea or vomiting.    GENITOURINARY:    No hematuria.    MUSCULOSKELETAL:  No musculoskeletal pain.  No joint swelling.  No arthritis.  NEUROLOGICAL:  No tingling or numbness or weakness.  PSYCHIATRIC:  No confusion          Vital Signs Last 24 Hrs  T(C): 36.6 (27 Aug 2019 05:15), Max: 36.7 (26 Aug 2019 23:57)  T(F): 97.9 (27 Aug 2019 05:15), Max: 98 (26 Aug 2019 23:57)  HR: 76 (27 Aug 2019 05:15) (68 - 80)  BP: 114/82 (27 Aug 2019 05:15) (114/82 - 128/80)  BP(mean): --  RR: 16 (27 Aug 2019 05:15) (15 - 18)  SpO2: 98% (27 Aug 2019 05:15) (97% - 100%)    PHYSICAL EXAM-    Constitutional: no acute distress     Head: Head is normocephalic and atraumatic.      Neck:  No JVD.     Cardiovascular: Regular rate and rhythm without S3, S4. No murmurs or rubs are appreciated.      Respiratory: Breathsounds are normal. No rales. No wheezing.    Abdomen: Soft, nontender, nondistended with positive bowel sounds.      Extremity: No tenderness. No  pitting edema     Neurologic: The patient is alert and oriented.      Skin: No rash, no obvious lesions noted.      Psychiatric: The patient appears to be emotionally stable.      INTERPRETATION OF TELEMETRY: not on     ECG: sinus rythm, L axis, biphasic t wave in V3-5    I&O's Detail      LABS:                        14.5   4.66  )-----------( 153      ( 27 Aug 2019 02:41 )             43.6     08-27    139  |  105  |  4<L>  ----------------------------<  103<H>  3.6   |  29  |  0.89    Ca    9.2      27 Aug 2019 02:41  Phos  5.0     08-27  Mg     2.4     08-27    TPro  8.6<H>  /  Alb  4.4  /  TBili  0.5  /  DBili  x   /  AST  225<H>  /  ALT  228<H>  /  AlkPhos  65  08-27    CARDIAC MARKERS ( 27 Aug 2019 02:41 )  <0.015 ng/mL / x     / x     / x     / x      CARDIAC MARKERS ( 25 Aug 2019 13:43 )  <0.015 ng/mL / x     / x     / x     / x      CARDIAC MARKERS ( 25 Aug 2019 10:45 )  <0.015 ng/mL / x     / x     / x     / x              I&O's Summary    BNP  RADIOLOGY & ADDITIONAL STUDIES:  < from: Xray Chest 1 View AP/PA. (08.24.19 @ 11:08) >    EXAM:  XR CHEST 1 VIEW                            PROCEDURE DATE:  08/24/2019          INTERPRETATION:  History: Chest pain.    Single view of the chest was performed.    Comparison is made to September 27, 2018.    Findings:    Patient is status post sternotomy. The inferior most sternotomy wire is   discontinuous. The lungs are clear. The heart size is normal.    Impression:    Clear lungs, grossly unchanged.                RENE GILES M.D., ATTENDING RADIOLOGIST  This document has been electronically signed. Aug 24 2019 11:26AM    < end of copied text >

## 2019-08-27 NOTE — ED ADULT NURSE REASSESSMENT NOTE - NS ED NURSE REASSESS COMMENT FT1
pt alert and oriented x4, able to ambulate without distress 50 feet in ED. Dr Fitzpatrick made aware

## 2019-08-27 NOTE — ED ADULT NURSE REASSESSMENT NOTE - NS ED NURSE REASSESS COMMENT FT1
pt c/o headache, Dr. Fitzpatrick made aware, Med ordered, see MAR. No Nausea/vomiting noted, No Tremors noted, pt skin pink and warm, No hallucinations present at this time. Pt pending transport to floor

## 2019-08-27 NOTE — H&P ADULT - NSHPLABSRESULTS_GEN_ALL_CORE
Labs personally reviewed and interpreted. Notable for no leukocytosis (WBC 4.66), anemia (Hb 14.5), or significant thrombocytopenia (153). Electrolytes wnl including Na (139), K (3.6), calcium (9.2), and magnesium (2.4). BUN/creatinine 4/0.89 (normal). Utox positive for THC. Alcohol level elevated at 205. Troponins negative x1. Albumin normal at 4.4, total protein slightly elevated at 8.6.    CXR personally reviewed and interpreted. Notable for clear lungs. No obvious cardiomegaly, effusions, pneumothorax, interstitial markings, or focal consolidations.  CT head personally reviewed and interpreted. Notable for no acute intracranial hemorrhage, mass effect, or shift of the midline structures.    EKG personally reviewed. Normal sinus rhythm, normal axis. Rate 70, , QTc 436. Q waves in V1-2 and TWis in V3-6 and leads I and aVL, all unchanged from previous.

## 2019-08-27 NOTE — CONSULT NOTE ADULT - ASSESSMENT
Syncope- his alcohol level was noted to be elevated.  Advise tele monitoring given his low EF and him at risk for ventricular arrythmias.  Check orthostatics.  EP consult requested.    Cardiomyopathy- ischemic vs nonischemic.  Advised alcohol cessation to pt and the risks extensively.  Will start him on low dose lisinopril and change coreg to toprol from tomorrow for GDMT for HFrEF.  Will decide about ischemic heart disease workup as inpt or outpt. WIll discuss this with Dr Abdalla his outpt cardiologist.    CAD s/p CABG-continue ASA, statin and beta  blocker.     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  Advised cessation to pt at length.    Hyperlipidemia- continue statin    Other medical issues- Management per primary team.   Thank you for allowing me to participate in the care of this patient. Please feel free to contact me with any questions.

## 2019-08-27 NOTE — H&P ADULT - PROBLEM SELECTOR PLAN 3
- Suspect due to alcohol induced liver injury  - Patient's AST/ALT ratio is not 2:1, which makes alcoholic hepatitis less likely, although the AST and ALT are about 3-4 times the upper limit of normal  - Check coagulation studies to determine Maddrey's discriminant function  - Check hepatitis panel, RUQ US, repeat LFTs

## 2019-08-27 NOTE — H&P ADULT - PROBLEM SELECTOR PLAN 6
- DVT PPX: IMPROVE score of 0, low risk, no pharmacologic PPX needed. SCDs  - Diet: DASH  - Dispo: pending benzo taper and/or passing of patient through the alcohol withdrawal period

## 2019-08-27 NOTE — H&P ADULT - NSHPPHYSICALEXAM_GEN_ALL_CORE
Vital Signs Last 24 Hrs  T(C): 36.6 (27 Aug 2019 05:15), Max: 36.7 (26 Aug 2019 23:57)  T(F): 97.9 (27 Aug 2019 05:15), Max: 98 (26 Aug 2019 23:57)  HR: 76 (27 Aug 2019 05:15) (68 - 80)  BP: 114/82 (27 Aug 2019 05:15) (114/82 - 128/80)  BP(mean): --  RR: 16 (27 Aug 2019 05:15) (15 - 18)  SpO2: 98% (27 Aug 2019 05:15) (97% - 100%)    GENERAL: No acute distress, slightly slow to respond to questions  HEENT: PERRL, EOMI, MMM, no oropharyngeal lesions. Positive horizontal nystagmus  NECK: supple, no stiffness, no JVD, no thyromegaly  PULM: respirations non-labored, clear to auscultation bilaterally, no rales, rhonchi, or wheezes  CV: regular rate and rhythm, no murmurs, gallops, or rubs  GI: abdomen soft, nontender, nondistended, no masses felt, hyperactive bowel sounds  MSK: strength 5/5 bilateral upper/lower extremities. No joint swelling, erythema, or warmth.  LYMPH: no anterior cervical, posterior cervical, supraclavicular, or inguinal lymphadenopathy  NEURO: A&O to person only, minimal tremor, decreased sensation of right anterior thigh. No asterixis.  SKIN: no rashes, lesions, or edema

## 2019-08-27 NOTE — H&P ADULT - PROBLEM SELECTOR PLAN 4
- Patient's chest pain is reproducible, atypical, constant, and non-pleuritic  - Troponins negative x1, with no new EKG changes, will repeat troponin x1 more  - Most likely musculoskeletal  - Ibuprofen PRN

## 2019-08-27 NOTE — ED ADULT NURSE REASSESSMENT NOTE - NS ED NURSE REASSESS COMMENT FT1
pt with multiple attempts to call sister for ride, pt sister unable to pickup pt at this time. Dr Fitzpatrick made aware

## 2019-08-27 NOTE — H&P ADULT - HISTORY OF PRESENT ILLNESS
55 yo M with a PMH alcohol abuse, HTN, HLD, and CAD s/p CABG who presents after being found unresponsive and intoxicated outside. He was admitted to the hospital from 8/24-26 for alcohol abuse and withdrawal and had a standing taper but left AMA on the day of admission. Later that day, he was found unresponsive outside. The patient states he had "at least a couple beers" after he left the hospital, the last he thinks around 5PM yesterday but is not sure. He states he has had "withdrawal shakes" in the past from alcohol but has never been diagnosed with delirium tremens, hospitalizations, seizures, or intubations.  He denies other illicit drug use other than marijuana, last of which was a few days ago.  He says his chest has been hurting since he arrived in the hospital, a sharp pain, midsternal, constant, mild, non-radiating, and not affected by breathing. He denies palpitations.  He also notes some numbness for the past 3-4 weeks in his right thigh and his right thumb. He denies weakness in his extremities or dizziness.  ROS also + for fevers and chills today.    In the ED, he was given Ibuprofen 1L x1, and NS 1L x1.

## 2019-08-27 NOTE — H&P ADULT - NSHPSOCIALHISTORY_GEN_ALL_CORE
Daily alcohol user, usually 2-3 drinks per day.  No cigarette smoking history.  Smokes marijuana on occasion.

## 2019-08-27 NOTE — H&P ADULT - NSICDXPASTMEDICALHX_GEN_ALL_CORE_FT
PAST MEDICAL HISTORY:  Alcohol abuse     CAD (coronary artery disease)     HLD (hyperlipidemia)     HTN (hypertension)

## 2019-08-27 NOTE — H&P ADULT - PROBLEM SELECTOR PLAN 1
- Patient has metabolic encephalopathy due to alcohol intoxication and was found outside unresponsive  - Alcohol level is 205  - patient's mental status is improving since ED arrival, due to effects of alcohol wearing off  - Patient not oriented but is alert and does not need to be NPO  - Alcohol withdrawal treatment as noted below - Patient has acute metabolic encephalopathy due to alcohol intoxication and was found outside unresponsive  - Alcohol level is 205  - patient's mental status is improving since ED arrival, due to effects of alcohol wearing off  - Patient not oriented but is alert and does not need to be NPO  - Alcohol withdrawal treatment as noted below

## 2019-08-27 NOTE — H&P ADULT - PROBLEM SELECTOR PLAN 2
- Patient's alcohol intoxication symptoms are starting to wear off and signs of withdrawal are beginning to appear, including tremors and nystagmus  - Patient's daily alcohol use and behavior place him at higher risk for DTs. Will start a CIWA taper with Ativan PO  - Banana bag  - Patient is not yet oriented but is alert and does not need to be NPO  - Thiamine IV for Wernicke's prevention  - Fall risk precautions, seizure precautions  - SBIRT, social works consults. Patient is interested in detox and in quitting

## 2019-08-27 NOTE — H&P ADULT - ASSESSMENT
53 yo M with a PMH alcohol abuse, HTN, HLD, and CAD s/p CABG who presents after being found unresponsive and intoxicated outside, admitted for alcohol intoxication and subsequent withdrawal.
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
## 2019-08-28 DIAGNOSIS — R07.9 CHEST PAIN, UNSPECIFIED: ICD-10-CM

## 2019-08-28 DIAGNOSIS — Z95.1 PRESENCE OF AORTOCORONARY BYPASS GRAFT: ICD-10-CM

## 2019-08-28 DIAGNOSIS — E51.9 THIAMINE DEFICIENCY, UNSPECIFIED: ICD-10-CM

## 2019-08-28 DIAGNOSIS — I20.9 ANGINA PECTORIS, UNSPECIFIED: ICD-10-CM

## 2019-08-28 DIAGNOSIS — Z91.19 PATIENT'S NONCOMPLIANCE WITH OTHER MEDICAL TREATMENT AND REGIMEN: ICD-10-CM

## 2019-08-28 DIAGNOSIS — I10 ESSENTIAL (PRIMARY) HYPERTENSION: ICD-10-CM

## 2019-08-28 DIAGNOSIS — F10.239 ALCOHOL DEPENDENCE WITH WITHDRAWAL, UNSPECIFIED: ICD-10-CM

## 2019-08-28 DIAGNOSIS — I25.10 ATHEROSCLEROTIC HEART DISEASE OF NATIVE CORONARY ARTERY WITHOUT ANGINA PECTORIS: ICD-10-CM

## 2019-08-28 DIAGNOSIS — F12.99 CANNABIS USE, UNSPECIFIED WITH UNSPECIFIED CANNABIS-INDUCED DISORDER: ICD-10-CM

## 2019-08-28 DIAGNOSIS — M94.0 CHONDROCOSTAL JUNCTION SYNDROME [TIETZE]: ICD-10-CM

## 2019-08-28 DIAGNOSIS — E83.39 OTHER DISORDERS OF PHOSPHORUS METABOLISM: ICD-10-CM

## 2019-08-28 DIAGNOSIS — Z79.82 LONG TERM (CURRENT) USE OF ASPIRIN: ICD-10-CM

## 2019-08-28 LAB
ANION GAP SERPL CALC-SCNC: 7 MMOL/L — SIGNIFICANT CHANGE UP (ref 5–17)
BUN SERPL-MCNC: 7 MG/DL — SIGNIFICANT CHANGE UP (ref 7–23)
CALCIUM SERPL-MCNC: 9.4 MG/DL — SIGNIFICANT CHANGE UP (ref 8.5–10.1)
CHLORIDE SERPL-SCNC: 103 MMOL/L — SIGNIFICANT CHANGE UP (ref 96–108)
CO2 SERPL-SCNC: 27 MMOL/L — SIGNIFICANT CHANGE UP (ref 22–31)
CREAT SERPL-MCNC: 0.87 MG/DL — SIGNIFICANT CHANGE UP (ref 0.5–1.3)
GLUCOSE SERPL-MCNC: 92 MG/DL — SIGNIFICANT CHANGE UP (ref 70–99)
MAGNESIUM SERPL-MCNC: 2.1 MG/DL — SIGNIFICANT CHANGE UP (ref 1.6–2.6)
POTASSIUM SERPL-MCNC: 4 MMOL/L — SIGNIFICANT CHANGE UP (ref 3.5–5.3)
POTASSIUM SERPL-SCNC: 4 MMOL/L — SIGNIFICANT CHANGE UP (ref 3.5–5.3)
SODIUM SERPL-SCNC: 137 MMOL/L — SIGNIFICANT CHANGE UP (ref 135–145)

## 2019-08-28 RX ORDER — LISINOPRIL 2.5 MG/1
2.5 TABLET ORAL DAILY
Refills: 0 | Status: DISCONTINUED | OUTPATIENT
Start: 2019-08-28 | End: 2019-08-31

## 2019-08-28 RX ADMIN — Medication 100 MILLIGRAM(S): at 13:24

## 2019-08-28 RX ADMIN — Medication 1.5 MILLIGRAM(S): at 18:19

## 2019-08-28 RX ADMIN — Medication 1 TABLET(S): at 13:24

## 2019-08-28 RX ADMIN — Medication 1 MILLIGRAM(S): at 13:24

## 2019-08-28 RX ADMIN — Medication 1.5 MILLIGRAM(S): at 10:34

## 2019-08-28 RX ADMIN — CARVEDILOL PHOSPHATE 3.12 MILLIGRAM(S): 80 CAPSULE, EXTENDED RELEASE ORAL at 06:07

## 2019-08-28 RX ADMIN — CARVEDILOL PHOSPHATE 3.12 MILLIGRAM(S): 80 CAPSULE, EXTENDED RELEASE ORAL at 18:19

## 2019-08-28 RX ADMIN — Medication 1.5 MILLIGRAM(S): at 06:07

## 2019-08-28 RX ADMIN — Medication 2 MILLIGRAM(S): at 02:06

## 2019-08-28 RX ADMIN — Medication 81 MILLIGRAM(S): at 13:23

## 2019-08-28 RX ADMIN — Medication 1.5 MILLIGRAM(S): at 21:06

## 2019-08-28 RX ADMIN — Medication 1.5 MILLIGRAM(S): at 14:55

## 2019-08-28 RX ADMIN — SIMVASTATIN 40 MILLIGRAM(S): 20 TABLET, FILM COATED ORAL at 21:06

## 2019-08-28 NOTE — PROGRESS NOTE ADULT - ASSESSMENT
53 yo M with a PMH alcohol abuse, HTN, HLD, and CAD s/p CABG who presents after being found unresponsive and intoxicated outside, admitted for alcohol intoxication and subsequent withdrawal.    Problem/Plan - 1:  ·  Problem: Acute metabolic encephalopathy. Plan: - Patient has acute metabolic encephalopathy due to alcohol intoxication and was found outside unresponsive  - Alcohol level is 205  - patient's mental status is improving since ED arrival, due to effects of alcohol wearing off  - Patient not oriented but is alert and does not need to be NPO  - Alcohol withdrawal treatment as noted below.    Problem/Plan - 2:  ·  Problem: Alcohol withdrawal.  Plan: - Patient's alcohol intoxication symptoms are starting to wear off and signs of withdrawal are beginning to appear, including tremors and nystagmus  - Patient's daily alcohol use and behavior place him at higher risk for DTs. Will start a CIWA taper with Ativan PO  - Banana bag  - Patient is not yet oriented but is alert and does not need to be NPO  - Thiamine IV for Wernicke's prevention  - Fall risk precautions, seizure precautions  - SBIRT, social works consults. Patient is interested in detox and in quitting.     Problem/Plan - 3:  ·  Problem: Transaminitis.  Plan: - Suspect due to alcohol induced liver injury  - Patient's AST/ALT ratio is not 2:1, which makes alcoholic hepatitis less likely, although the AST and ALT are about 3-4 times the upper limit of normal  - Check coagulation studies to determine Maddrey's discriminant function  - Check hepatitis panel, RUQ US, repeat LFTs.     Problem/Plan - 4:  ·  Problem: Chest pain, non-cardiac.  Plan: - Patient's chest pain is reproducible, atypical, constant, and non-pleuritic  - Troponins negative x1, with no new EKG changes, will repeat troponin x1 more  - Most likely musculoskeletal  - Ibuprofen PRN.     Problem/Plan - 5:  ·  Problem: CAD (coronary artery disease).  Plan: - C/w ASA 81 mg QD, statin (Simvastatin 40 mg QD), and beta blocker (Coreg 3.125 mg BID).     Problem/Plan - 6:  Problem: Prophylactic measure. Plan: - DVT PPX: IMPROVE score of 0, low risk, no pharmacologic PPX needed. SCDs  - Diet: DASH  - Dispo: pending benzo taper and/or passing of patient through the alcohol withdrawal period.    Problem/Plan - 7:  ·  Problem: Advanced care planning/counseling discussion.  Plan: - Patient will be full code. 53 yo M with a PMH alcohol abuse, HTN, HLD, and CAD s/p CABG  admitted for:       1. Acute toxic  metabolic encephalopathy  due to alcohol intoxication and was found outside unresponsive  - Alcohol level is 205 on admission  - Utox: THC   - patient's mental status is improving         2. Alcohol  abuse now with withdrawal.   - C/w CIWA protocol   - S/p  Banana bag  - Thiamine IV for Wernicke's prevention  - Fall risk precautions, seizure precautions  - social works consult      3. Transaminitis.  Suspect due to alcohol induced liver injury  - RUQ sono with fatty liver   - hepatitis panel neg   - Monitor       4. Chest pain, non-cardiac.    - Patient's chest pain is reproducible, atypical, constant, and non-pleuritic  - Troponins negative x2  with no new EKG changes   - Most likely musculoskeletal  - Ibuprofen PRN.       5. CAD (coronary artery disease).    S/p CABG.  Cardiomyopathy  - Echo repeat EF 35%, stable    - C/w ASA 81 mg QD, statin (Simvastatin 40 mg QD), and beta blocker (Coreg 3.125 mg BID).    - start low dose lisinopril  - CArdio eval appreciated in vs outPt ischemic eval     6. DVT PPX: SCDs

## 2019-08-28 NOTE — SBIRT NOTE ADULT - NSSBIRTALCPASSREFTXDET_GEN_A_CORE
pt was at [facility] from 1993-95, [facility] ~2010-14.  He admits to feeling depressed (denied suicidal ideation) and was tearful during interview.  He is open to attending outpatient tx; he declined inpatient tx.  However, because pt is homeless/living situation in transient, he will make his own appointment based upon his housing placement.  Folder of facilities/contact numbers provided.

## 2019-08-28 NOTE — SBIRT NOTE ADULT - NSSBIRTDRGACTION/INTER_GEN_A_CORE
Passive referral to treatment
How Severe Are Your Spot(S)?: mild
What Is The Reason For Today's Visit?: Full Body Skin Examination with No Concerns
What Is The Reason For Today's Visit? (Being Monitored For X): concerning skin lesions on an annual basis

## 2019-08-28 NOTE — PROGRESS NOTE ADULT - SUBJECTIVE AND OBJECTIVE BOX
CC: alcohol withdrawal (27 Aug 2019 08:23)    HPI:  55 yo M with a PMH alcohol abuse, HTN, HLD, and CAD s/p CABG who presents after being found unresponsive and intoxicated outside. He was admitted to the hospital from 8/24-26 for alcohol abuse and withdrawal and had a standing taper but left AMA on the day of admission. Later that day, he was found unresponsive outside. The patient states he had "at least a couple beers" after he left the hospital, the last he thinks around 5PM yesterday but is not sure. He states he has had "withdrawal shakes" in the past from alcohol but has never been diagnosed with delirium tremens, hospitalizations, seizures, or intubations.  He denies other illicit drug use other than marijuana, last of which was a few days ago.  He says his chest has been hurting since he arrived in the hospital, a sharp pain, midsternal, constant, mild, non-radiating, and not affected by breathing. He denies palpitations.  He also notes some numbness for the past 3-4 weeks in his right thigh and his right thumb. He denies weakness in his extremities or dizziness.  ROS also + for fevers and chills today.    In the ED, he was given Ibuprofen 1L x1, and NS 1L x1. (27 Aug 2019 07:36)    INTERVAL HPI/OVERNIGHT EVENTS:    Vital Signs Last 24 Hrs  T(C): 36.8 (28 Aug 2019 10:40), Max: 36.8 (27 Aug 2019 17:48)  T(F): 98.2 (28 Aug 2019 10:40), Max: 98.2 (27 Aug 2019 17:48)  HR: 71 (28 Aug 2019 10:40) (58 - 73)  BP: 141/91 (28 Aug 2019 10:40) (135/85 - 154/94)  RR: 18 (28 Aug 2019 10:40) (18 - 18)  SpO2: 97% (28 Aug 2019 10:40) (97% - 97%)  I&O's Detail    28 Aug 2019 07:01  -  28 Aug 2019 13:39  --------------------------------------------------------  IN:  Total IN: 0 mL    OUT:    Voided: 350 mL  Total OUT: 350 mL    Total NET: -350 mL        REVIEW OF SYSTEMS:    CONSTITUTIONAL: No weakness, fevers or chills  EYES/ENT: No visual changes;  No vertigo or throat pain   NECK: No pain or stiffness  RESPIRATORY: No cough, wheezing, hemoptysis; No shortness of breath  CARDIOVASCULAR: No chest pain or palpitations  GASTROINTESTINAL: No abdominal or epigastric pain. No nausea, vomiting, or hematemesis; No diarrhea or constipation. No melena or hematochezia.  GENITOURINARY: No dysuria, frequency or hematuria  NEUROLOGICAL: No numbness or weakness  SKIN: No itching, burning, rashes, or lesions   All other review of systems is negative unless indicated above.  PHYSICAL EXAM:    General: Well developed; well nourished; in no acute distress  Eyes: PERRLA, EOMI; conjunctiva and sclera clear  Head: Normocephalic; atraumatic  ENMT: No nasal discharge; airway clear  Neck: Supple; non tender; no masses  Respiratory: No wheezes, rales or rhonchi  Cardiovascular: Regular rate and rhythm. S1 and S2 Normal; No murmurs, gallops or rubs  Gastrointestinal: Soft non-tender non-distended; Normal bowel sounds  Genitourinary: No costovertebral angle tenderness  Extremities: Normal range of motion, No clubbing, cyanosis or edema  Vascular: Peripheral pulses palpable 2+ bilaterally  Neurological: Alert and oriented x4  Skin: Warm and dry. No acute rash  Lymph Nodes: No acute cervical adenopathy  Musculoskeletal: Normal gait, tone, without deformities  Psychiatric: Cooperative and appropriate  CARDIAC MARKERS ( 27 Aug 2019 08:20 )  <0.015 ng/mL / x     / x     / x     / x      CARDIAC MARKERS ( 27 Aug 2019 02:41 )  <0.015 ng/mL / x     / x     / x     / x                                14.5   4.66  )-----------( 153      ( 27 Aug 2019 02:41 )             43.6     27 Aug 2019 08:20    138    |  106    |  4      ----------------------------<  116    3.4     |  24     |  0.81     Ca    8.7        27 Aug 2019 08:20  Phos  3.5       27 Aug 2019 08:20  Mg     2.0       27 Aug 2019 08:20    TPro  7.4    /  Alb  3.8    /  TBili  0.5    /  DBili  x      /  AST  218    /  ALT  209    /  AlkPhos  58     27 Aug 2019 08:20    PT/INR - ( 27 Aug 2019 08:20 )   PT: 11.7 sec;   INR: 1.05 ratio         PTT - ( 27 Aug 2019 08:20 )  PTT:29.5 sec  CAPILLARY BLOOD GLUCOSE        LIVER FUNCTIONS - ( 27 Aug 2019 08:20 )  Alb: 3.8 g/dL / Pro: 7.4 gm/dL / ALK PHOS: 58 U/L / ALT: 209 U/L / AST: 218 U/L / GGT: x               MEDICATIONS  (STANDING):  aspirin  chewable 81 milliGRAM(s) Oral daily  carvedilol 3.125 milliGRAM(s) Oral every 12 hours  folic acid 1 milliGRAM(s) Oral daily  LORazepam     Tablet 1.5 milliGRAM(s) Oral every 4 hours  LORazepam     Tablet   Oral   multivitamin 1 Tablet(s) Oral daily  simvastatin 40 milliGRAM(s) Oral at bedtime  thiamine Injectable 100 milliGRAM(s) IV Push daily    MEDICATIONS  (PRN):  ibuprofen  Tablet. 200 milliGRAM(s) Oral every 6 hours PRN Mild Pain (1 - 3), Moderate Pain (4 - 6)      RADIOLOGY & ADDITIONAL TESTS: CC: alcohol withdrawal (27 Aug 2019 08:23)    HPI:  53 yo M with a PMH alcohol abuse, HTN, HLD, and CAD s/p CABG who presents after being found unresponsive and intoxicated outside. He was admitted to the hospital from 8/24-26 for alcohol abuse and withdrawal and had a standing taper but left AMA on the day of admission. Later that day, he was found unresponsive outside. The patient states he had "at least a couple beers" after he left the hospital, the last he thinks around 5PM yesterday but is not sure. He states he has had "withdrawal shakes" in the past from alcohol but has never been diagnosed with delirium tremens, hospitalizations, seizures, or intubations.  He denies other illicit drug use other than marijuana, last of which was a few days ago.  He says his chest has been hurting since he arrived in the hospital, a sharp pain, midsternal, constant, mild, non-radiating, and not affected by breathing. He denies palpitations.  He also notes some numbness for the past 3-4 weeks in his right thigh and his right thumb. He denies weakness in his extremities or dizziness.  ROS also + for fevers and chills today.    In the ED, he was given Ibuprofen 1L x1, and NS 1L x1. (27 Aug 2019 07:36)    INTERVAL HPI/ OVERNIGHT EVENTS: Chart reviewed, Pt was seen and examined, reports + anxiety and tremor, no HA, no diarrhea, no hallucinations. Feels weak.  Pt admits to feel depressed, but denies SI, agrees with psych eval     Vital Signs Last 24 Hrs  T(C): 36.8 (28 Aug 2019 10:40), Max: 36.8 (27 Aug 2019 17:48)  T(F): 98.2 (28 Aug 2019 10:40), Max: 98.2 (27 Aug 2019 17:48)  HR: 71 (28 Aug 2019 10:40) (58 - 73)  BP: 141/91 (28 Aug 2019 10:40) (135/85 - 154/94)  RR: 18 (28 Aug 2019 10:40) (18 - 18)  SpO2: 97% (28 Aug 2019 10:40) (97% - 97%)          REVIEW OF SYSTEMS:  All other review of systems is negative unless indicated above.      PHYSICAL EXAM:  General: Well developed; well nourished; in no acute distress  Eyes: PERRLA, EOMI; conjunctiva and sclera clear  ENMT: No nasal discharge; airway clear  Neck: Supple; non tender  Respiratory: No wheezes, rales or rhonchi  Cardiovascular: Regular rate and rhythm. S1 and S2 Normal; No murmurs  Gastrointestinal: Soft non-tender non-distended; Normal bowel sounds  Genitourinary: No costovertebral angle tenderness  Extremities: Normal range of motion, No edema  Vascular: Peripheral pulses palpable 2+ bilaterally  Neurological: Alert and oriented x4, non focal + b/l tremors   Skin: Warm . No acute rash  Musculoskeletal: Normal muscle  tone, without deformities  Psychiatric: Cooperative, anxious and tearful     LABS \:   CARDIAC MARKERS ( 27 Aug 2019 08:20 )  <0.015 ng/mL / x     / x     / x     / x      CARDIAC MARKERS ( 27 Aug 2019 02:41 )  <0.015 ng/mL / x     / x     / x     / x                                14.5   4.66  )-----------( 153      ( 27 Aug 2019 02:41 )             43.6     27 Aug 2019 08:20    138    |  106    |  4      ----------------------------<  116    3.4     |  24     |  0.81     Ca    8.7        27 Aug 2019 08:20  Phos  3.5       27 Aug 2019 08:20  Mg     2.0       27 Aug 2019 08:20    TPro  7.4    /  Alb  3.8    /  TBili  0.5    /  DBili  x      /  AST  218    /  ALT  209    /  AlkPhos  58     27 Aug 2019 08:20    PT/INR - ( 27 Aug 2019 08:20 )   PT: 11.7 sec;   INR: 1.05 ratio         PTT - ( 27 Aug 2019 08:20 )  PTT:29.5 sec  CAPILLARY BLOOD GLUCOSE        LIVER FUNCTIONS - ( 27 Aug 2019 08:20 )  Alb: 3.8 g/dL / Pro: 7.4 gm/dL / ALK PHOS: 58 U/L / ALT: 209 U/L / AST: 218 U/L / GGT: x               MEDICATIONS  (STANDING):  aspirin  chewable 81 milliGRAM(s) Oral daily  carvedilol 3.125 milliGRAM(s) Oral every 12 hours  folic acid 1 milliGRAM(s) Oral daily  LORazepam     Tablet 1.5 milliGRAM(s) Oral every 4 hours  LORazepam     Tablet   Oral   multivitamin 1 Tablet(s) Oral daily  simvastatin 40 milliGRAM(s) Oral at bedtime  thiamine Injectable 100 milliGRAM(s) IV Push daily    MEDICATIONS  (PRN):  ibuprofen  Tablet. 200 milliGRAM(s) Oral every 6 hours PRN Mild Pain (1 - 3), Moderate Pain (4 - 6)      RADIOLOGY & ADDITIONAL TESTS:

## 2019-08-29 DIAGNOSIS — F10.10 ALCOHOL ABUSE, UNCOMPLICATED: ICD-10-CM

## 2019-08-29 DIAGNOSIS — F19.94 OTHER PSYCHOACTIVE SUBSTANCE USE, UNSPECIFIED WITH PSYCHOACTIVE SUBSTANCE-INDUCED MOOD DISORDER: ICD-10-CM

## 2019-08-29 PROCEDURE — 90792 PSYCH DIAG EVAL W/MED SRVCS: CPT

## 2019-08-29 RX ORDER — ACETAMINOPHEN 500 MG
650 TABLET ORAL ONCE
Refills: 0 | Status: COMPLETED | OUTPATIENT
Start: 2019-08-29 | End: 2019-08-29

## 2019-08-29 RX ORDER — THIAMINE MONONITRATE (VIT B1) 100 MG
100 TABLET ORAL ONCE
Refills: 0 | Status: COMPLETED | OUTPATIENT
Start: 2019-08-29 | End: 2019-08-29

## 2019-08-29 RX ORDER — ESCITALOPRAM OXALATE 10 MG/1
5 TABLET, FILM COATED ORAL DAILY
Refills: 0 | Status: DISCONTINUED | OUTPATIENT
Start: 2019-08-29 | End: 2019-08-31

## 2019-08-29 RX ORDER — CARVEDILOL PHOSPHATE 80 MG/1
6.25 CAPSULE, EXTENDED RELEASE ORAL EVERY 12 HOURS
Refills: 0 | Status: DISCONTINUED | OUTPATIENT
Start: 2019-08-29 | End: 2019-08-31

## 2019-08-29 RX ADMIN — Medication 1 MILLIGRAM(S): at 09:26

## 2019-08-29 RX ADMIN — Medication 650 MILLIGRAM(S): at 16:27

## 2019-08-29 RX ADMIN — Medication 1.5 MILLIGRAM(S): at 01:08

## 2019-08-29 RX ADMIN — Medication 1 MILLIGRAM(S): at 06:01

## 2019-08-29 RX ADMIN — Medication 650 MILLIGRAM(S): at 17:20

## 2019-08-29 RX ADMIN — Medication 101 MILLIGRAM(S): at 15:42

## 2019-08-29 RX ADMIN — CARVEDILOL PHOSPHATE 6.25 MILLIGRAM(S): 80 CAPSULE, EXTENDED RELEASE ORAL at 09:25

## 2019-08-29 RX ADMIN — Medication 1 MILLIGRAM(S): at 18:52

## 2019-08-29 RX ADMIN — Medication 1 MILLIGRAM(S): at 15:41

## 2019-08-29 RX ADMIN — Medication 1 MILLIGRAM(S): at 21:40

## 2019-08-29 RX ADMIN — CARVEDILOL PHOSPHATE 3.12 MILLIGRAM(S): 80 CAPSULE, EXTENDED RELEASE ORAL at 06:01

## 2019-08-29 RX ADMIN — SIMVASTATIN 40 MILLIGRAM(S): 20 TABLET, FILM COATED ORAL at 21:40

## 2019-08-29 RX ADMIN — CARVEDILOL PHOSPHATE 6.25 MILLIGRAM(S): 80 CAPSULE, EXTENDED RELEASE ORAL at 18:52

## 2019-08-29 RX ADMIN — Medication 81 MILLIGRAM(S): at 12:59

## 2019-08-29 RX ADMIN — Medication 1 MILLIGRAM(S): at 12:59

## 2019-08-29 RX ADMIN — LISINOPRIL 2.5 MILLIGRAM(S): 2.5 TABLET ORAL at 06:01

## 2019-08-29 RX ADMIN — Medication 1 TABLET(S): at 12:59

## 2019-08-29 NOTE — BEHAVIORAL HEALTH ASSESSMENT NOTE - NSBHCONSULTFOLLOWAFTERCARE_PSY_A_CORE FT
PT is not interested to see psychiatric in community but has interest in outpatient rehab.   CSW to make referral.

## 2019-08-29 NOTE — PROGRESS NOTE ADULT - SUBJECTIVE AND OBJECTIVE BOX
CC: alcohol withdrawal (27 Aug 2019 08:23)    HPI:  53 yo M with a PMH alcohol abuse, HTN, HLD, and CAD s/p CABG who presents after being found unresponsive and intoxicated outside. He was admitted to the hospital from 8/24-26 for alcohol abuse and withdrawal and had a standing taper but left AMA on the day of admission. Later that day, he was found unresponsive outside. The patient states he had "at least a couple beers" after he left the hospital, the last he thinks around 5PM yesterday but is not sure. He states he has had "withdrawal shakes" in the past from alcohol but has never been diagnosed with delirium tremens, hospitalizations, seizures, or intubations.  He denies other illicit drug use other than marijuana, last of which was a few days ago.  He says his chest has been hurting since he arrived in the hospital, a sharp pain, midsternal, constant, mild, non-radiating, and not affected by breathing. He denies palpitations.  He also notes some numbness for the past 3-4 weeks in his right thigh and his right thumb. He denies weakness in his extremities or dizziness.  ROS also + for fevers and chills today.    In the ED, he was given Ibuprofen 1L x1, and NS 1L x1. (27 Aug 2019 07:36)    INTERVAL HPI/ OVERNIGHT EVENTS: Chart reviewed, Pt was seen and examined, reports + anxiety and tremor, no HA, no diarrhea, no hallucinations. Feels weak.  Pt admits to feel depressed, but denies SI, agrees with psych eval     Vital Signs Last 24 Hrs  T(C): 36.4 (29 Aug 2019 05:43), Max: 36.7 (28 Aug 2019 18:25)  T(F): 97.6 (29 Aug 2019 05:43), Max: 98.1 (28 Aug 2019 18:25)  HR: 73 (29 Aug 2019 05:43) (72 - 75)  BP: 132/87 (29 Aug 2019 05:43) (132/87 - 146/97)  RR: 17 (29 Aug 2019 05:43) (17 - 18)  SpO2: 98% (29 Aug 2019 05:43) (96% - 98%)      REVIEW OF SYSTEMS:  All other review of systems is negative unless indicated above.      PHYSICAL EXAM:  General: Well developed; well nourished; in no acute distress  Eyes: PERRLA, EOMI; conjunctiva and sclera clear  ENMT: No nasal discharge; airway clear  Neck: Supple; non tender  Respiratory: No wheezes, rales or rhonchi  Cardiovascular: Regular rate and rhythm. S1 and S2 Normal; No murmurs  Gastrointestinal: Soft non-tender non-distended; Normal bowel sounds  Genitourinary: No costovertebral angle tenderness  Extremities: Normal range of motion, No edema  Vascular: Peripheral pulses palpable 2+ bilaterally  Neurological: Alert and oriented x4, non focal + b/l tremors   Skin: Warm . No acute rash  Musculoskeletal: Normal muscle  tone, without deformities  Psychiatric: Cooperative, anxious and tearful     LABS \:   CARDIAC MARKERS ( 27 Aug 2019 08:20 )  <0.015 ng/mL / x     / x     / x     / x      CARDIAC MARKERS ( 27 Aug 2019 02:41 )  <0.015 ng/mL / x     / x     / x     / x                                14.5   4.66  )-----------( 153      ( 27 Aug 2019 02:41 )             43.6     27 Aug 2019 08:20    138    |  106    |  4      ----------------------------<  116    3.4     |  24     |  0.81     Ca    8.7        27 Aug 2019 08:20  Phos  3.5       27 Aug 2019 08:20  Mg     2.0       27 Aug 2019 08:20    TPro  7.4    /  Alb  3.8    /  TBili  0.5    /  DBili  x      /  AST  218    /  ALT  209    /  AlkPhos  58     27 Aug 2019 08:20    PT/INR - ( 27 Aug 2019 08:20 )   PT: 11.7 sec;   INR: 1.05 ratio         PTT - ( 27 Aug 2019 08:20 )  PTT:29.5 sec  CAPILLARY BLOOD GLUCOSE        LIVER FUNCTIONS - ( 27 Aug 2019 08:20 )  Alb: 3.8 g/dL / Pro: 7.4 gm/dL / ALK PHOS: 58 U/L / ALT: 209 U/L / AST: 218 U/L / GGT: x               MEDICATIONS  (STANDING):  aspirin  chewable 81 milliGRAM(s) Oral daily  carvedilol 3.125 milliGRAM(s) Oral every 12 hours  folic acid 1 milliGRAM(s) Oral daily  LORazepam     Tablet 1.5 milliGRAM(s) Oral every 4 hours  LORazepam     Tablet   Oral   multivitamin 1 Tablet(s) Oral daily  simvastatin 40 milliGRAM(s) Oral at bedtime  thiamine Injectable 100 milliGRAM(s) IV Push daily    MEDICATIONS  (PRN):  ibuprofen  Tablet. 200 milliGRAM(s) Oral every 6 hours PRN Mild Pain (1 - 3), Moderate Pain (4 - 6)      RADIOLOGY & ADDITIONAL TESTS: CC: alcohol withdrawal (27 Aug 2019 08:23)    HPI:  55 yo M with a PMH alcohol abuse, HTN, HLD, and CAD s/p CABG who presents after being found unresponsive and intoxicated outside. He was admitted to the hospital from 8/24-26 for alcohol abuse and withdrawal and had a standing taper but left AMA on the day of admission. Later that day, he was found unresponsive outside. The patient states he had "at least a couple beers" after he left the hospital, the last he thinks around 5PM yesterday but is not sure. He states he has had "withdrawal shakes" in the past from alcohol but has never been diagnosed with delirium tremens, hospitalizations, seizures, or intubations.  He denies other illicit drug use other than marijuana, last of which was a few days ago.  He says his chest has been hurting since he arrived in the hospital, a sharp pain, midsternal, constant, mild, non-radiating, and not affected by breathing. He denies palpitations.  He also notes some numbness for the past 3-4 weeks in his right thigh and his right thumb. He denies weakness in his extremities or dizziness.  ROS also + for fevers and chills today.    In the ED, he was given Ibuprofen 1L x1, and NS 1L x1. (27 Aug 2019 07:36)    INTERVAL HPI/ OVERNIGHT EVENTS: , Pt was seen and examined, less anxious,  tremors better, some HA. Planned for stress test in am.     Vital Signs Last 24 Hrs  T(C): 36.4 (29 Aug 2019 05:43), Max: 36.7 (28 Aug 2019 18:25)  T(F): 97.6 (29 Aug 2019 05:43), Max: 98.1 (28 Aug 2019 18:25)  HR: 73 (29 Aug 2019 05:43) (72 - 75)  BP: 132/87 (29 Aug 2019 05:43) (132/87 - 146/97)  RR: 17 (29 Aug 2019 05:43) (17 - 18)  SpO2: 98% (29 Aug 2019 05:43) (96% - 98%)      REVIEW OF SYSTEMS:  All other review of systems is negative unless indicated above.      PHYSICAL EXAM:  General: Well developed; well nourished; in no acute distress  Eyes: PERRLA, EOMI; conjunctiva and sclera clear  ENMT: No nasal discharge; airway clear  Neck: Supple; non tender  Respiratory: No wheezes, rales or rhonchi  Cardiovascular: Regular rate and rhythm. S1 and S2 Normal; No murmurs  Gastrointestinal: Soft non-tender non-distended; Normal bowel sounds  Genitourinary: No costovertebral angle tenderness  Extremities: Normal range of motion, No edema  Vascular: Peripheral pulses palpable 2+ bilaterally  Neurological: Alert and oriented x4, non focal + b/l tremors   Skin: Warm . No acute rash  Musculoskeletal: Normal muscle  tone, without deformities  Psychiatric: Cooperative, anxious and tearful     LABS \:     CARDIAC MARKERS ( 27 Aug 2019 08:20 )  <0.015 ng/mL / x     / x     / x     / x      CARDIAC MARKERS ( 27 Aug 2019 02:41 )  <0.015 ng/mL / x     / x     / x     / x                                14.5   4.66  )-----------( 153      ( 27 Aug 2019 02:41 )             43.6     27 Aug 2019 08:20    138    |  106    |  4      ----------------------------<  116    3.4     |  24     |  0.81     Ca    8.7        27 Aug 2019 08:20  Phos  3.5       27 Aug 2019 08:20  Mg     2.0       27 Aug 2019 08:20    TPro  7.4    /  Alb  3.8    /  TBili  0.5    /  DBili  x      /  AST  218    /  ALT  209    /  AlkPhos  58     27 Aug 2019 08:20    PT/INR - ( 27 Aug 2019 08:20 )   PT: 11.7 sec;   INR: 1.05 ratio         PTT - ( 27 Aug 2019 08:20 )  PTT:29.5 sec  CAPILLARY BLOOD GLUCOSE        LIVER FUNCTIONS - ( 27 Aug 2019 08:20 )  Alb: 3.8 g/dL / Pro: 7.4 gm/dL / ALK PHOS: 58 U/L / ALT: 209 U/L / AST: 218 U/L / GGT: x               MEDICATIONS  (STANDING):  aspirin  chewable 81 milliGRAM(s) Oral daily  carvedilol 3.125 milliGRAM(s) Oral every 12 hours  folic acid 1 milliGRAM(s) Oral daily  LORazepam     Tablet 1.5 milliGRAM(s) Oral every 4 hours  LORazepam     Tablet   Oral   multivitamin 1 Tablet(s) Oral daily  simvastatin 40 milliGRAM(s) Oral at bedtime  thiamine Injectable 100 milliGRAM(s) IV Push daily    MEDICATIONS  (PRN):  ibuprofen  Tablet. 200 milliGRAM(s) Oral every 6 hours PRN Mild Pain (1 - 3), Moderate Pain (4 - 6)      RADIOLOGY & ADDITIONAL TESTS:

## 2019-08-29 NOTE — BEHAVIORAL HEALTH ASSESSMENT NOTE - HPI (INCLUDE ILLNESS QUALITY, SEVERITY, DURATION, TIMING, CONTEXT, MODIFYING FACTORS, ASSOCIATED SIGNS AND SYMPTOMS)
Pt is a 54 YOAAM with hx of EtOH abuse, last rehab in 1994 - maintained sobriety till 2000, when he relapsed and is drinking after that. He reports 4 large beers during the week and 2 kelsie drinks .   Denies depressed mood, sleep is interrupted and appetite is lower than usual.   He reports periods  of anxiety and tremor if not drinking.   Denies SI/HI/AH/VH/PI.   never psychiatrically hospitalised and never saw psychiatrist.

## 2019-08-29 NOTE — BEHAVIORAL HEALTH ASSESSMENT NOTE - NSBHCHARTREVIEWLAB_PSY_A_CORE FT
08-28    137  |  103  |  7   ----------------------------<  92  4.0   |  27  |  0.87    Ca    9.4      28 Aug 2019 13:40  Mg     2.1     08-28

## 2019-08-29 NOTE — PROGRESS NOTE ADULT - ASSESSMENT
1. Syncope- his alcohol level was noted to be elevated.  Advise tele monitoring given his low EF and him at risk for ventricular arrythmias.  no arrythmias so far.  No evidence of orthostasis.   EP consult requested.    2. Cardiomyopathy- ischemic vs nonischemic.   I discussed with Dr Abdalla at length his primary cardiologist who stated that this could be combination of alcohol cardiomyopathy and ischemic.  His LVEF was about the same in the past per Dr Abdalla.  Advised alcohol cessation to pt and the risks extensively.  Continue lisinopril.  WIll increase coreg for GDMT for HFrEF.  Will order stress test for tomorrow in light of his recent chest pain     CAD s/p CABG-continue ASA, statin and beta  blocker.     ALcoholism- can lead to alcohol cardiomyopathy and its worsening   Advised cessation to pt at length.    Hyperlipidemia- continue statin    Other medical issues- Management per primary team.   Thank you for allowing me to participate in the care of this patient. Please feel free to contact me with any questions.

## 2019-08-29 NOTE — PROGRESS NOTE ADULT - ASSESSMENT
53 yo M with a PMH alcohol abuse, HTN, HLD, and CAD s/p CABG  admitted for:       1. Acute toxic  metabolic encephalopathy  due to alcohol intoxication and was found outside unresponsive  - Alcohol level is 205 on admission  - Utox: THC   - patient's mental status is improving         2. Alcohol  abuse now with withdrawal.   - C/w CIWA protocol   - S/p  Banana bag  - Thiamine IV for Wernicke's prevention  - Fall risk precautions, seizure precautions  - social works consult      3. Transaminitis.  Suspect due to alcohol induced liver injury  - RUQ sono with fatty liver   - hepatitis panel neg   - Monitor       4. Chest pain, non-cardiac.    - Patient's chest pain is reproducible, atypical, constant, and non-pleuritic  - Troponins negative x2  with no new EKG changes   - Most likely musculoskeletal  - Ibuprofen PRN.       5. CAD (coronary artery disease).    S/p CABG.  Cardiomyopathy  - Echo repeat EF 35%, stable    - C/w ASA 81 mg QD, statin (Simvastatin 40 mg QD), and beta blocker (Coreg 3.125 mg BID).    - start low dose lisinopril  - CArdio eval appreciated in vs outPt ischemic eval     6. DVT PPX: SCDs 55 yo M with a PMH alcohol abuse, HTN, HLD, and CAD s/p CABG  admitted for:       1. Acute toxic  metabolic encephalopathy  due to alcohol intoxication and was found outside unresponsive  - Alcohol level is 205 on admission  - Utox: THC   - patient's mental status is improved         2. Alcohol  abuse now with withdrawal.   - C/w [facility] protocol   - S/p  Banana bag  - Thiamine IV for Wernicke's prevention  - Fall risk precautions, seizure precautions  - social works consult for outPt rehab referral       3. Transaminitis.  Suspect due to alcohol induced liver injury  - RUQ sono with fatty liver   - hepatitis panel neg   - repeat LFts       4. Chest pain.    - Patient's chest pain is reproducible, atypical, constant, and non-pleuritic  - Troponins negative x2  with no new EKG changes    - pain meds PRN  - D/w DR Mcnulty, high risk for ischemia, non compliant with meds and unreliable history will plan for stress test       5. CAD (coronary artery disease).    S/p CABG.  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 DVT PPX: SCDs    Dispo: NPO after midnight for stress test in am

## 2019-08-29 NOTE — BEHAVIORAL HEALTH ASSESSMENT NOTE - SUMMARY
Pt is a 54 YOAAM with hx of EtOH abuse, last rehab in 1994 - maintained sobriety till 2000, when he relapsed and is drinking after that. He reports 4 large beers during the week and 2 kelsie drinks .   Denies depressed mood, sleep is interrupted and appetite is lower than usual.   He reports periods  of anxiety and tremor if not drinking.   Denies SI/HI/AH/VH/PI.   never psychiatrically hospitalised and never saw psychiatrist.  No imminent risk to harm self and others and no need for hospitalization

## 2019-08-29 NOTE — BEHAVIORAL HEALTH ASSESSMENT NOTE - RISK ASSESSMENT
LOw to moderate. PT has no psych hcx on hx of SA, hospitalization,. PT si drinking EtOH , no plan or intent to harm himself. Future oriented.

## 2019-08-29 NOTE — PROGRESS NOTE ADULT - SUBJECTIVE AND OBJECTIVE BOX
Patient is a 54y old  Male who presents with a chief complaint of syncope.     HPI:  53 yo M with a PMH alcohol abuse, HTN, HLD, and CAD s/p CABG who presents after being found unresponsive and intoxicated outside. He was admitted to the hospital from 8/24-26 for alcohol abuse and withdrawal and had a standing taper but left AMA on the day of admission. Later that day, he was found unresponsive outside. The patient states he had "at least a couple beers" after he left the hospital.     He does not remember any thing other than drinking one beer per pt and his girl friend at bed side.    He still c/o vague substernal chest pain that was constant.     8/29- pt seen and examined by me today. He denies any symptoms this am.     PAST MEDICAL & SURGICAL HISTORY:  Alcohol abuse  CAD (coronary artery disease)  HLD (hyperlipidemia)  HTN (hypertension)  S/P CABG (coronary artery bypass graft)      MEDICATIONS  (STANDING):  aspirin  chewable 81 milliGRAM(s) Oral daily  carvedilol 3.125 milliGRAM(s) Oral every 12 hours  folic acid 1 milliGRAM(s) Oral daily  LORazepam     Tablet 2 milliGRAM(s) Oral every 4 hours  LORazepam     Tablet   Oral   multivitamin 1 Tablet(s) Oral daily  simvastatin 40 milliGRAM(s) Oral at bedtime  thiamine Injectable 100 milliGRAM(s) IV Push daily    MEDICATIONS  (PRN):  ibuprofen  Tablet. 200 milliGRAM(s) Oral every 6 hours PRN Mild Pain (1 - 3), Moderate Pain (4 - 6)      FAMILY HISTORY:  FH: heart disease  FH: diabetes mellitus      SOCIAL HISTORY:  alcohol abuse     REVIEW OF SYSTEMS:  CONSTITUTIONAL:     No fever or chills.    RESPIRATORY:  No cough.  No wheeze.  No hemoptysis.  No shortness of breath.  CARDIOVASCULAR:  no chest pains.  GASTROINTESTINAL:  No abdominal pain.  No nausea or vomiting.    GENITOURINARY:    No hematuria.    MUSCULOSKELETAL:  No musculoskeletal pain.  No joint swelling.  No arthritis.  NEUROLOGICAL:  No tingling or numbness or weakness.  PSYCHIATRIC:  No confusion        ICU Vital Signs Last 24 Hrs  T(C): 36.4 (29 Aug 2019 05:43), Max: 36.8 (28 Aug 2019 10:40)  T(F): 97.6 (29 Aug 2019 05:43), Max: 98.2 (28 Aug 2019 10:40)  HR: 73 (29 Aug 2019 05:43) (71 - 75)  BP: 132/87 (29 Aug 2019 05:43) (132/87 - 146/97)  BP(mean): --  ABP: --  ABP(mean): --  RR: 17 (29 Aug 2019 05:43) (17 - 18)  SpO2: 98% (29 Aug 2019 05:43) (96% - 98%)      PHYSICAL EXAM-    Constitutional: no acute distress     Head: Head is normocephalic and atraumatic.      Neck:  No JVD.     Cardiovascular: Regular rate and rhythm without S3, S4. No murmurs or rubs are appreciated.      Respiratory: Breathsounds are normal. No rales. No wheezing.    Abdomen: Soft, nontender, nondistended with positive bowel sounds.      Extremity: No tenderness. No  pitting edema     Neurologic: The patient is alert and oriented.      Skin: No rash, no obvious lesions noted.      Psychiatric: The patient appears to be emotionally stable.      INTERPRETATION OF TELEMETRY: not on     ECG: sinus rythm, L axis, biphasic t wave in V3-5    I&O's Detail      08-28    137  |  103  |  7   ----------------------------<  92  4.0   |  27  |  0.87    Ca    9.4      28 Aug 2019 13:40  Phos  3.5     08-27  Mg     2.1     08-28    TPro  7.4  /  Alb  3.8  /  TBili  0.5  /  DBili  x   /  AST  218<H>  /  ALT  209<H>  /  AlkPhos  58  08-27    CARDIAC MARKERS ( 27 Aug 2019 08:20 )  <0.015 ng/mL / x     / x     / x     / x          LIVER FUNCTIONS - ( 27 Aug 2019 08:20 )  Alb: 3.8 g/dL / Pro: 7.4 gm/dL / ALK PHOS: 58 U/L / ALT: 209 U/L / AST: 218 U/L / GGT: x           PT/INR - ( 27 Aug 2019 08:20 )   PT: 11.7 sec;   INR: 1.05 ratio         PTT - ( 27 Aug 2019 08:20 )  PTT:29.5 sec            I&O's Summary    BNP  RADIOLOGY & ADDITIONAL STUDIES:  < from: Xray Chest 1 View AP/PA. (08.24.19 @ 11:08) >    EXAM:  XR CHEST 1 VIEW                            PROCEDURE DATE:  08/24/2019          INTERPRETATION:  History: Chest pain.    Single view of the chest was performed.    Comparison is made to September 27, 2018.    Findings:    Patient is status post sternotomy. The inferior most sternotomy wire is   discontinuous. The lungs are clear. The heart size is normal.    Impression:    Clear lungs, grossly unchanged.                RENE GILES M.D., ATTENDING RADIOLOGIST  This document has been electronically signed. Aug 24 2019 11:26AM    < end of copied text >  < from: Transthoracic Echocardiogram (08.26.19 @ 11:52) >   Summary     The mitral valve was well visualized. There is thickening of anterior   mitral valve leaflet tip. The leaflet opening is normal. Trace mitral   regurgitation is present. EA reversal of the mitral inflow consistent   with   reduced compliance of the left ventricle   The aortic valve leaflets are well seen with normal valve morphology;   preserved leaflet excursion noted. No aortic valve insufficiency noted.   The tricuspid valve leaflets are well seen and appear thin and pliable   with preserved leaflets excursion. No tricuspid regurgitation noted.   Pulmonic valve not well seen.   The left atrium is mildly dilated.   Left ventricle systolic function appears moderately impaired; segmental   wall motion abnormalities noted. Estimated Ejection Fraction is 35%.   Normal appearing right atrium.   Normal appearing right ventricle structure and function.   All visualized extra cardiac structures appears to be normal.   No evidence of pericardial effusion.     Signature     ----------------------------------------------------------------   Electronically signed by Lissett Lopez MD(Interpreting   physician) on 08/26/2019 05:27 PM    < end of copied text >

## 2019-08-29 NOTE — BEHAVIORAL HEALTH ASSESSMENT NOTE - NSBHCHARTREVIEWVS_PSY_A_CORE FT
Vital Signs Last 24 Hrs  T(C): 36.4 (29 Aug 2019 05:43), Max: 36.7 (28 Aug 2019 18:25)  T(F): 97.6 (29 Aug 2019 05:43), Max: 98.1 (28 Aug 2019 18:25)  HR: 73 (29 Aug 2019 05:43) (72 - 75)  BP: 132/87 (29 Aug 2019 05:43) (132/87 - 146/97)  BP(mean): --  RR: 17 (29 Aug 2019 05:43) (17 - 18)  SpO2: 98% (29 Aug 2019 05:43) (96% - 98%)

## 2019-08-30 LAB
ALBUMIN SERPL ELPH-MCNC: 3.8 G/DL — SIGNIFICANT CHANGE UP (ref 3.3–5)
ALP SERPL-CCNC: 53 U/L — SIGNIFICANT CHANGE UP (ref 40–120)
ALT FLD-CCNC: 139 U/L — HIGH (ref 12–78)
AST SERPL-CCNC: 79 U/L — HIGH (ref 15–37)
BILIRUB DIRECT SERPL-MCNC: 0.2 MG/DL — SIGNIFICANT CHANGE UP (ref 0–0.2)
BILIRUB INDIRECT FLD-MCNC: 0.4 MG/DL — SIGNIFICANT CHANGE UP (ref 0.2–1)
BILIRUB SERPL-MCNC: 0.6 MG/DL — SIGNIFICANT CHANGE UP (ref 0.2–1.2)
PROT SERPL-MCNC: 7.3 GM/DL — SIGNIFICANT CHANGE UP (ref 6–8.3)
TSH SERPL-MCNC: 3.13 UU/ML — SIGNIFICANT CHANGE UP (ref 0.34–4.82)

## 2019-08-30 PROCEDURE — 78452 HT MUSCLE IMAGE SPECT MULT: CPT | Mod: 26

## 2019-08-30 PROCEDURE — 99232 SBSQ HOSP IP/OBS MODERATE 35: CPT

## 2019-08-30 RX ADMIN — Medication 1 MILLIGRAM(S): at 01:17

## 2019-08-30 RX ADMIN — Medication 0.5 MILLIGRAM(S): at 11:44

## 2019-08-30 RX ADMIN — SIMVASTATIN 40 MILLIGRAM(S): 20 TABLET, FILM COATED ORAL at 21:51

## 2019-08-30 RX ADMIN — Medication 0.5 MILLIGRAM(S): at 13:29

## 2019-08-30 RX ADMIN — CARVEDILOL PHOSPHATE 6.25 MILLIGRAM(S): 80 CAPSULE, EXTENDED RELEASE ORAL at 05:10

## 2019-08-30 RX ADMIN — Medication 1 MILLIGRAM(S): at 16:30

## 2019-08-30 RX ADMIN — Medication 0.5 MILLIGRAM(S): at 18:02

## 2019-08-30 RX ADMIN — LISINOPRIL 2.5 MILLIGRAM(S): 2.5 TABLET ORAL at 05:10

## 2019-08-30 RX ADMIN — Medication 1 TABLET(S): at 11:44

## 2019-08-30 RX ADMIN — Medication 0.5 MILLIGRAM(S): at 21:51

## 2019-08-30 RX ADMIN — Medication 1 MILLIGRAM(S): at 11:44

## 2019-08-30 RX ADMIN — Medication 81 MILLIGRAM(S): at 11:43

## 2019-08-30 RX ADMIN — ESCITALOPRAM OXALATE 5 MILLIGRAM(S): 10 TABLET, FILM COATED ORAL at 11:43

## 2019-08-30 RX ADMIN — CARVEDILOL PHOSPHATE 6.25 MILLIGRAM(S): 80 CAPSULE, EXTENDED RELEASE ORAL at 18:02

## 2019-08-30 RX ADMIN — Medication 0.5 MILLIGRAM(S): at 05:10

## 2019-08-30 NOTE — PROGRESS NOTE ADULT - SUBJECTIVE AND OBJECTIVE BOX
CC: alcohol withdrawal (27 Aug 2019 08:23)    HPI:  53 yo M with a PMH alcohol abuse, HTN, HLD, and CAD s/p CABG who presents after being found unresponsive and intoxicated outside. He was admitted to the hospital from 8/24-26 for alcohol abuse and withdrawal and had a standing taper but left AMA on the day of admission. Later that day, he was found unresponsive outside. The patient states he had "at least a couple beers" after he left the hospital, the last he thinks around 5PM yesterday but is not sure. He states he has had "withdrawal shakes" in the past from alcohol but has never been diagnosed with delirium tremens, hospitalizations, seizures, or intubations.  He denies other illicit drug use other than marijuana, last of which was a few days ago.  He says his chest has been hurting since he arrived in the hospital, a sharp pain, midsternal, constant, mild, non-radiating, and not affected by breathing. He denies palpitations.  He also notes some numbness for the past 3-4 weeks in his right thigh and his right thumb. He denies weakness in his extremities or dizziness.  ROS also + for fevers and chills today.    In the ED, he was given Ibuprofen 1L x1, and NS 1L x1. (27 Aug 2019 07:36)    INTERVAL HPI/ OVERNIGHT EVENTS: , Pt was seen and examined, less tremulous, but looks anxious. As per Rn was stating that wants to leave.  S/p stress test. results discussed.      Vital Signs Last 24 Hrs  T(C): 36.8 (30 Aug 2019 21:29), Max: 36.9 (30 Aug 2019 05:06)  T(F): 98.3 (30 Aug 2019 21:29), Max: 98.5 (30 Aug 2019 05:06)  HR: 68 (30 Aug 2019 21:29) (67 - 83)  BP: 141/92 (30 Aug 2019 21:29) (115/77 - 158/96)  RR: 17 (30 Aug 2019 21:29) (17 - 18)  SpO2: 100% (30 Aug 2019 21:29) (99% - 100%)    REVIEW OF SYSTEMS:  All other review of systems is negative unless indicated above.      PHYSICAL EXAM:  General: Well developed; well nourished; in no acute distress  Eyes: PERRLA, EOMI; conjunctiva and sclera clear  ENMT: No nasal discharge; airway clear  Neck: Supple; non tender  Respiratory: No wheezes, rales or rhonchi  Cardiovascular: Regular rate and rhythm. S1 and S2 Normal; No murmurs  Gastrointestinal: Soft non-tender non-distended; Normal bowel sounds  Genitourinary: No costovertebral angle tenderness  Extremities: Normal range of motion, No edema  Vascular: Peripheral pulses palpable 2+ bilaterally  Neurological: Alert and oriented x4, non focal + b/l tremors improved   Skin: Warm . No acute rash  Musculoskeletal: Normal muscle  tone, without deformities  Psychiatric: Cooperative, anxious     LABS     TPro  7.3  /  Alb  3.8  /  TBili  0.6  /  DBili  0.2  /  AST  79<H>  /  ALT  139<H>  /  AlkPhos  53  08-30    LIVER FUNCTIONS - ( 30 Aug 2019 06:56 )  Alb: 3.8 g/dL / Pro: 7.3 gm/dL / ALK PHOS: 53 U/L / ALT: 139 U/L / AST: 79 U/L / GGT: x             CARDIAC MARKERS ( 27 Aug 2019 08:20 )  <0.015 ng/mL / x     / x     / x     / x      CARDIAC MARKERS ( 27 Aug 2019 02:41 )  <0.015 ng/mL / x     / x     / x     / x                                14.5   4.66  )-----------( 153      ( 27 Aug 2019 02:41 )             43.6     27 Aug 2019 08:20    138    |  106    |  4      ----------------------------<  116    3.4     |  24     |  0.81     Ca    8.7        27 Aug 2019 08:20  Phos  3.5       27 Aug 2019 08:20  Mg     2.0       27 Aug 2019 08:20    TPro  7.4    /  Alb  3.8    /  TBili  0.5    /  DBili  x      /  AST  218    /  ALT  209    /  AlkPhos  58     27 Aug 2019 08:20    PT/INR - ( 27 Aug 2019 08:20 )   PT: 11.7 sec;   INR: 1.05 ratio         PTT - ( 27 Aug 2019 08:20 )  PTT:29.5 sec  CAPILLARY BLOOD GLUCOSE        LIVER FUNCTIONS - ( 27 Aug 2019 08:20 )  Alb: 3.8 g/dL / Pro: 7.4 gm/dL / ALK PHOS: 58 U/L / ALT: 209 U/L / AST: 218 U/L / GGT: x               MEDICATIONS  (STANDING):  aspirin  chewable 81 milliGRAM(s) Oral daily  carvedilol 3.125 milliGRAM(s) Oral every 12 hours  folic acid 1 milliGRAM(s) Oral daily  LORazepam     Tablet 1.5 milliGRAM(s) Oral every 4 hours  LORazepam     Tablet   Oral   multivitamin 1 Tablet(s) Oral daily  simvastatin 40 milliGRAM(s) Oral at bedtime  thiamine Injectable 100 milliGRAM(s) IV Push daily    MEDICATIONS  (PRN):  ibuprofen  Tablet. 200 milliGRAM(s) Oral every 6 hours PRN Mild Pain (1 - 3), Moderate Pain (4 - 6)      RADIOLOGY & ADDITIONAL TESTS:    EXAM:  NM NUCLEAR STRESS MULTI                        PROCEDURE DATE:  08/30/2019   IMPRESSION: Abnormal SPECT Myocardial Perfusion Imaging.    Fixed perfusion defects in the apical wall and anteroseptal wall with   severely impaired contractility and absent wall thickening compatible   with zones of infarction, not significantly changed compared to the   previous study.    No scan evidenceof reversible perfusion defects.    Reduced left ventricular ejection fraction of 44% (Normal: 50% or   greater).

## 2019-08-30 NOTE — PROGRESS NOTE ADULT - SUBJECTIVE AND OBJECTIVE BOX
Patient is a 54y old  Male who presents with a chief complaint of syncope.     HPI:  53 yo M with a PMH alcohol abuse, HTN, HLD, and CAD s/p CABG who presents after being found unresponsive and intoxicated outside. He was admitted to the hospital from 8/24-26 for alcohol abuse and withdrawal and had a standing taper but left AMA on the day of admission. Later that day, he was found unresponsive outside. The patient states he had "at least a couple beers" after he left the hospital.     He does not remember any thing other than drinking one beer per pt and his girl friend at bed side.    He still c/o vague substernal chest pain that was constant.     8/29- pt seen and examined by me today. He denies any symptoms this am.     8/30- Pt seen and examined by me today. Pt denies any symptoms this am.    PAST MEDICAL & SURGICAL HISTORY:  Alcohol abuse  CAD (coronary artery disease)  HLD (hyperlipidemia)  HTN (hypertension)  S/P CABG (coronary artery bypass graft)      MEDICATIONS  (STANDING):  aspirin  chewable 81 milliGRAM(s) Oral daily  carvedilol 3.125 milliGRAM(s) Oral every 12 hours  folic acid 1 milliGRAM(s) Oral daily  LORazepam     Tablet 2 milliGRAM(s) Oral every 4 hours  LORazepam     Tablet   Oral   multivitamin 1 Tablet(s) Oral daily  simvastatin 40 milliGRAM(s) Oral at bedtime  thiamine Injectable 100 milliGRAM(s) IV Push daily    MEDICATIONS  (PRN):  ibuprofen  Tablet. 200 milliGRAM(s) Oral every 6 hours PRN Mild Pain (1 - 3), Moderate Pain (4 - 6)      FAMILY HISTORY:  FH: heart disease  FH: diabetes mellitus      SOCIAL HISTORY:  alcohol abuse     REVIEW OF SYSTEMS:  CONSTITUTIONAL:     No fever or chills.    RESPIRATORY:  No cough.  No wheeze.  No hemoptysis.  No shortness of breath.  CARDIOVASCULAR:  no chest pains.  GASTROINTESTINAL:  No abdominal pain.  No nausea or vomiting.    GENITOURINARY:    No hematuria.    MUSCULOSKELETAL:  No musculoskeletal pain.  No joint swelling.  No arthritis.  NEUROLOGICAL:  No tingling or numbness or weakness.  PSYCHIATRIC:  No confusion  ICU Vital Signs Last 24 Hrs  T(C): 36.9 (30 Aug 2019 05:06), Max: 36.9 (30 Aug 2019 05:06)  T(F): 98.5 (30 Aug 2019 05:06), Max: 98.5 (30 Aug 2019 05:06)  HR: 83 (30 Aug 2019 05:06) (69 - 86)  BP: 128/94 (30 Aug 2019 05:06) (115/77 - 155/99)  BP(mean): --  ABP: --  ABP(mean): --  RR: 17 (30 Aug 2019 05:06) (17 - 18)  SpO2: 100% (30 Aug 2019 05:06) (95% - 100%)        PHYSICAL EXAM-    Constitutional: no acute distress     Head: Head is normocephalic and atraumatic.      Neck:  No JVD.     Cardiovascular: Regular rate and rhythm without S3, S4. No murmurs or rubs are appreciated.      Respiratory: Breathsounds are normal. No rales. No wheezing.    Abdomen: Soft, nontender, nondistended with positive bowel sounds.      Extremity: No tenderness. No  pitting edema     Neurologic: The patient is alert and oriented.      Skin: No rash, no obvious lesions noted.      Psychiatric: The patient appears to be emotionally stable.      INTERPRETATION OF TELEMETRY: not on     ECG: sinus rythm, L axis, biphasic t wave in V3-5    I&O's Detail        08-28    137  |  103  |  7   ----------------------------<  92  4.0   |  27  |  0.87    Ca    9.4      28 Aug 2019 13:40  Mg     2.1     08-28    TPro  7.3  /  Alb  3.8  /  TBili  0.6  /  DBili  0.2  /  AST  79<H>  /  ALT  139<H>  /  AlkPhos  53  08-30        LIVER FUNCTIONS - ( 30 Aug 2019 06:56 )  Alb: 3.8 g/dL / Pro: 7.3 gm/dL / ALK PHOS: 53 U/L / ALT: 139 U/L / AST: 79 U/L / GGT: x                 I&O's Summary    BNP  RADIOLOGY & ADDITIONAL STUDIES:  < from: Xray Chest 1 View AP/PA. (08.24.19 @ 11:08) >    EXAM:  XR CHEST 1 VIEW                            PROCEDURE DATE:  08/24/2019          INTERPRETATION:  History: Chest pain.    Single view of the chest was performed.    Comparison is made to September 27, 2018.    Findings:    Patient is status post sternotomy. The inferior most sternotomy wire is   discontinuous. The lungs are clear. The heart size is normal.    Impression:    Clear lungs, grossly unchanged.                RENE GILES M.D., ATTENDING RADIOLOGIST  This document has been electronically signed. Aug 24 2019 11:26AM    < end of copied text >  < from: Transthoracic Echocardiogram (08.26.19 @ 11:52) >   Summary     The mitral valve was well visualized. There is thickening of anterior   mitral valve leaflet tip. The leaflet opening is normal. Trace mitral   regurgitation is present. EA reversal of the mitral inflow consistent   with   reduced compliance of the left ventricle   The aortic valve leaflets are well seen with normal valve morphology;   preserved leaflet excursion noted. No aortic valve insufficiency noted.   The tricuspid valve leaflets are well seen and appear thin and pliable   with preserved leaflets excursion. No tricuspid regurgitation noted.   Pulmonic valve not well seen.   The left atrium is mildly dilated.   Left ventricle systolic function appears moderately impaired; segmental   wall motion abnormalities noted. Estimated Ejection Fraction is 35%.   Normal appearing right atrium.   Normal appearing right ventricle structure and function.   All visualized extra cardiac structures appears to be normal.   No evidence of pericardial effusion.     Signature     ----------------------------------------------------------------   Electronically signed by Lissett Lopez MD(Interpreting   physician) on 08/26/2019 05:27 PM    < end of copied text >

## 2019-08-30 NOTE — PROGRESS NOTE BEHAVIORAL HEALTH - SUMMARY
Pt is a 54 YOAAM with hx of EtOH abuse, last rehab in 1994 - maintained sobriety till 2000, when he relapsed and is drinking after that. He reports 4 large beers during the week and 2 kelsie drinks .   Denies depressed mood, sleep is interrupted and appetite is lower than usual.   He reports periods  of anxiety and tremor if not drinking.   Denies SI/HI/AH/VH/PI.   Never psychiatrically hospitalised and never saw psychiatrist.  No imminent risk to harm self and others and no need for hospitalization.  PT wants to attend AA meetings after D/C.

## 2019-08-30 NOTE — PROGRESS NOTE BEHAVIORAL HEALTH - NSBHCHARTREVIEWLAB_PSY_A_CORE FT
TPro  7.3  /  Alb  3.8  /  TBili  0.6  /  DBili  0.2  /  AST  79<H>  /  ALT  139<H>  /  AlkPhos  53  08-30

## 2019-08-30 NOTE — PROGRESS NOTE ADULT - ASSESSMENT
1. Syncope-  no arrythmias so far.  No evidence of orthostasis.   EP consult requested.    2. Cardiomyopathy- ischemic vs nonischemic.   I discussed with Dr Abdalla at length his primary cardiologist who stated that this could be combination of alcohol cardiomyopathy and ischemic.  His LVEF was about the same in the past per Dr Abdalla.  Advised alcohol cessation to pt and the risks extensively.  Continue lisinopril.  WIll increase coreg for GDMT for HFrEF.  stress test today.       CAD s/p CABG-continue ASA, statin and beta  blocker.     ALcoholism- can lead to alcohol cardiomyopathy and its worsening   Advised cessation to pt at length.    Hyperlipidemia- continue statin    Other medical issues- Management per primary team.   Thank you for allowing me to participate in the care of this patient. Please feel free to contact me with any questions.

## 2019-08-30 NOTE — PROGRESS NOTE BEHAVIORAL HEALTH - NSBHFUPINTERVALHXFT_PSY_A_CORE
Pt states that he feels better. Denies depressed mood or anxiety, denies insomnia or appetite problems Denies any type of SI/HI/Ah/VH/PI.   Plan is to transfer ro rehab.

## 2019-08-30 NOTE — PROGRESS NOTE BEHAVIORAL HEALTH - NSBHCHARTREVIEWVS_PSY_A_CORE FT
Vital Signs Last 24 Hrs  T(C): 36.9 (30 Aug 2019 13:03), Max: 36.9 (30 Aug 2019 05:06)  T(F): 98.5 (30 Aug 2019 13:03), Max: 98.5 (30 Aug 2019 05:06)  HR: 67 (30 Aug 2019 13:03) (67 - 86)  BP: 158/96 (30 Aug 2019 13:03) (115/77 - 158/96)  BP(mean): --  RR: 17 (30 Aug 2019 13:03) (17 - 18)  SpO2: 100% (30 Aug 2019 13:03) (98% - 100%)

## 2019-08-30 NOTE — PROGRESS NOTE ADULT - ASSESSMENT
53 yo M with a PMH alcohol abuse, HTN, HLD, and CAD s/p CABG  admitted for:       1. Acute toxic  metabolic encephalopathy  due to alcohol intoxication and was found outside unresponsive  - Alcohol level is 205 on admission  - Utox: THC   - patient's mental status is improved   - s/p tele; no arrhythmias         2. Alcohol  abuse now with withdrawal.   - C/w [facility] protocol   - S/p  Banana bag  - Thiamine IV for Wernicke's prevention  - Fall risk precautions, seizure precautions  - social works consult for outPt rehab referral       3. Transaminitis.  Suspect due to alcohol induced liver injury  - RUQ sono with fatty liver   - hepatitis panel neg   - repeat LFts with improvement      4. Chest pain.    - Patient's chest pain is reproducible, atypical, constant, and non-pleuritic  - Troponins negative x2  with no new EKG changes    - pain meds PRN  S/p Stress test, no reversible ischemia, + for non reversble defect, likely old from known MI   - D/w DR Mcnulty    5. CAD (coronary artery disease).    S/p CABG.  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 DVT PPX: SCDs    Dispo: C/w [facility] protocol, plan for d/c in am

## 2019-08-31 ENCOUNTER — TRANSCRIPTION ENCOUNTER (OUTPATIENT)
Age: 54
End: 2019-08-31

## 2019-08-31 VITALS
HEART RATE: 65 BPM | OXYGEN SATURATION: 100 % | RESPIRATION RATE: 16 BRPM | DIASTOLIC BLOOD PRESSURE: 82 MMHG | SYSTOLIC BLOOD PRESSURE: 140 MMHG | TEMPERATURE: 98 F

## 2019-08-31 RX ORDER — CARVEDILOL PHOSPHATE 80 MG/1
0.5 CAPSULE, EXTENDED RELEASE ORAL
Qty: 14 | Refills: 0
Start: 2019-08-31

## 2019-08-31 RX ORDER — FOLIC ACID 0.8 MG
1 TABLET ORAL
Qty: 0 | Refills: 0 | DISCHARGE
Start: 2019-08-31

## 2019-08-31 RX ORDER — SIMVASTATIN 20 MG/1
1 TABLET, FILM COATED ORAL
Qty: 7 | Refills: 0
Start: 2019-08-31 | End: 2019-09-06

## 2019-08-31 RX ORDER — ESCITALOPRAM OXALATE 10 MG/1
1 TABLET, FILM COATED ORAL
Qty: 7 | Refills: 0
Start: 2019-08-31 | End: 2019-09-06

## 2019-08-31 RX ORDER — LISINOPRIL 2.5 MG/1
0.5 TABLET ORAL
Qty: 7 | Refills: 0
Start: 2019-08-31

## 2019-08-31 RX ADMIN — CARVEDILOL PHOSPHATE 6.25 MILLIGRAM(S): 80 CAPSULE, EXTENDED RELEASE ORAL at 06:40

## 2019-08-31 RX ADMIN — Medication 1 TABLET(S): at 11:00

## 2019-08-31 RX ADMIN — Medication 1 MILLIGRAM(S): at 11:00

## 2019-08-31 RX ADMIN — LISINOPRIL 2.5 MILLIGRAM(S): 2.5 TABLET ORAL at 06:40

## 2019-08-31 RX ADMIN — Medication 0.5 MILLIGRAM(S): at 02:10

## 2019-08-31 RX ADMIN — Medication 81 MILLIGRAM(S): at 11:00

## 2019-08-31 NOTE — DISCHARGE NOTE PROVIDER - PROVIDER TOKENS
PROVIDER:[TOKEN:[[id number]],FOLLOWUP:[1 week]],PROVIDER:[TOKEN:[[id number]],FOLLOWUP:[1 week]]

## 2019-08-31 NOTE — DISCHARGE NOTE PROVIDER - CARE PROVIDERS DIRECT ADDRESSES
[email or web address]

## 2019-08-31 NOTE — DISCHARGE NOTE NURSING/CASE MANAGEMENT/SOCIAL WORK - PATIENT PORTAL LINK FT
You can access the FollowMyHealth Patient Portal offered by [facility] by registering at the following website: [email or web address]. By joining [employer]’s FollowMyHealth portal, you will also be able to view your health information using other applications (apps) compatible with our system.

## 2019-08-31 NOTE — DISCHARGE NOTE PROVIDER - NSDCCPCAREPLAN_GEN_ALL_CORE_FT
PRINCIPAL DISCHARGE DIAGNOSIS  Diagnosis: Alcohol abuse  Assessment and Plan of Treatment: alcohol abstinance   follow with outPt rehab and [facility]  F/u with PCP      SECONDARY DISCHARGE DIAGNOSES  Diagnosis: Cardiomyopathy  Assessment and Plan of Treatment: c/w meds  F/o with Dr Abdalla

## 2019-08-31 NOTE — DISCHARGE NOTE PROVIDER - CARE PROVIDER_API CALL
Suzette Meeks)  Family Medicine  [address] [place] [address]  Phone: [phone]  Fax: [phone]  Follow Up Time: 1 week    Chey Abdalla)  Cardiovascular Disease; Interventional Cardiology  [address] [place] [address]  Phone: [phone]  Fax: [phone]  Follow Up Time: 1 week

## 2019-08-31 NOTE — DISCHARGE NOTE PROVIDER - HOSPITAL COURSE
55 yo M with a PMH alcohol abuse, HTN, HLD, and CAD s/p CABG who presents after being found unresponsive and intoxicated outside. He was admitted to the hospital from 8/24-26 for alcohol abuse and withdrawal and had a standing taper but left AMA on the day of admission. Later that day, he was found unresponsive outside. The patient stated he had "at least a couple beers" after he left the hospital, the last he thinks around 5PM yesterday but is not sure. He states he has had "withdrawal shakes" in the past from alcohol but has never been diagnosed with delirium tremens, hospitalizations, seizures, or intubations.    He denied  other illicit drug use other than marijuana, last of which was a few days ago.    Pt reported that his chest has been hurting since he arrived in the hospital, a sharp pain, midsternal, constant, mild, non-radiating, and not affected by breathing. No  palpitations.    He also notes some numbness for the past 3-4 weeks in his right thigh and his right thumb which later resolved. He denies weakness in his extremities or dizziness.        In the ED: VS stable, labs + elevated LFTs, trops neg, he was given Ibuprofen 1L x1, and NS 1L x1.  Pt was admitted to telemetry, started On CIWA  protocol and cardiac meds, was non complaint outPt with meds or f/u. Pts withdrawal symptoms improved.  Was followed by cardio, No arrhythmias on telemetry. SR. ECHO was repeated and EF at 35%, Stress  test  no reversible ischemia,  but LV dysFx and fixed perfusion defect , likely due to old infarct. Estimated EF by stress test 44%. D/w Dr Mcnulty,  ok for d/c, but will need to further f/u outPt with Dr Abdalla.  Pt also was evaluated by psych and was started on Lexapro.     Today Pt was seen and examined, reports feeling much better, no anxiety, no tremors, no CP. Alcohol cessation, diet and compliance with meds  and  f/u.             Vital Signs Last 24 Hrs    T(C): 36.7 (31 Aug 2019 06:39), Max: 36.9 (30 Aug 2019 13:03)    T(F): 98 (31 Aug 2019 06:39), Max: 98.5 (30 Aug 2019 13:03)    HR: 80 (31 Aug 2019 06:39) (67 - 80)     BP: 128/89 (31 Aug 2019 06:39) (128/89 - 158/96)    RR: 19 (31 Aug 2019 06:39) (17 - 19)    SpO2: 100% (31 Aug 2019 06:39) (100% - 100%)        PHYSICAL EXAM:    General: Well developed; well nourished; in no acute distress    Eyes: PERRLA, EOMI; conjunctiva and sclera clear    ENMT: No nasal discharge; airway clear    Neck: Supple; non tender    Respiratory: No wheezes, rales or rhonchi    Cardiovascular: Regular rate and rhythm. S1 and S2 Normal; No murmurs    Gastrointestinal: Soft non-tender non-distended; Normal bowel sounds    Genitourinary: No costovertebral angle tenderness    Extremities: Normal range of motion, No edema    Vascular: Peripheral pulses palpable 2+ bilaterally    Neurological: Alert and oriented x4, non focal, no tremors     Skin: Warm . No acute rash    Musculoskeletal: Normal muscle  tone, without deformities    Psychiatric: Cooperative, anxious         55 yo M with a PMH alcohol abuse, HTN, HLD, and CAD s/p CABG  admitted for:             1. Acute toxic  metabolic encephalopathy  due to alcohol intoxication and was found outside unresponsive    - Alcohol level is 205 on admission    - Utox: THC     - patient's mental status is improved     - s/p tele; no arrhythmias     - Not orthostatic                 2. Alcohol  abuse now with withdrawal, improved     - S/p  CIWA protocol, not scoring     - S/p  Banana bag    -  S/p Thiamine IV for Wernicke's prevention, c/w po supplementation     -  evaluated by social work referred to outPt rehab and [facility], also applied for Medicaid             3. Transaminitis.  Suspect due to alcohol induced liver injury    - RUQ sono with fatty liver     - hepatitis panel neg     - repeat LFts with improvement    - alcohol cessation         4. Chest pain, resolved     - Patient's chest pain  was reproducible, atypical, constant, and non-pleuritic    - doubt ACS     - Troponins negative x2  with no new EKG changes      - pain meds PRN    - S/p Stress test, no reversible ischemia, + for non reversble defect, likely old from known MI      - D/w DR Mcnulty        5. CAD (coronary artery disease).    S/p CABG.  Cardiomyopathy, likely ischemic and alcoholic     - Echo repeat EF 35%, stable      - C/w ASA 81 mg QD, statin (Simvastatin 40 mg QD),    -C/w Coreg     - started  low dose lisinopril    - D/w Dr Mcnulty, will need to further f/u with DR Abdalla              6. DVT PPX: SCDs        Dispo:  stable for d/c home today. D/w SW and CM, Pt has no meds coverage yet. Will arrange  week supply om meds until insurance starts     Pt to f/u with PCP and DR Abdalla     Fax d/c summary to PCP     Total time 46 min

## 2019-09-01 ENCOUNTER — OUTPATIENT (OUTPATIENT)
Dept: OUTPATIENT SERVICES | Facility: HOSPITAL | Age: 54
LOS: 1 days | End: 2019-09-01

## 2019-09-01 DIAGNOSIS — Z95.1 PRESENCE OF AORTOCORONARY BYPASS GRAFT: Chronic | ICD-10-CM

## 2019-09-04 DIAGNOSIS — R07.89 OTHER CHEST PAIN: ICD-10-CM

## 2019-09-04 DIAGNOSIS — Z95.1 PRESENCE OF AORTOCORONARY BYPASS GRAFT: ICD-10-CM

## 2019-09-04 DIAGNOSIS — E51.9 THIAMINE DEFICIENCY, UNSPECIFIED: ICD-10-CM

## 2019-09-04 DIAGNOSIS — I42.6 ALCOHOLIC CARDIOMYOPATHY: ICD-10-CM

## 2019-09-04 DIAGNOSIS — I11.0 HYPERTENSIVE HEART DISEASE WITH HEART FAILURE: ICD-10-CM

## 2019-09-04 DIAGNOSIS — T51.0X1A TOXIC EFFECT OF ETHANOL, ACCIDENTAL (UNINTENTIONAL), INITIAL ENCOUNTER: ICD-10-CM
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
## 2019-09-04 DIAGNOSIS — F10.239 ALCOHOL DEPENDENCE WITH WITHDRAWAL, UNSPECIFIED: ICD-10-CM

## 2019-09-04 DIAGNOSIS — I25.2 OLD MYOCARDIAL INFARCTION: ICD-10-CM

## 2019-09-04 DIAGNOSIS — K70.0 ALCOHOLIC FATTY LIVER: ICD-10-CM

## 2019-09-04 DIAGNOSIS — I25.10 ATHEROSCLEROTIC HEART DISEASE OF NATIVE CORONARY ARTERY WITHOUT ANGINA PECTORIS: ICD-10-CM

## 2019-09-04 DIAGNOSIS — E78.5 HYPERLIPIDEMIA, UNSPECIFIED: ICD-10-CM

## 2019-09-04 DIAGNOSIS — G92 TOXIC ENCEPHALOPATHY: ICD-10-CM

## 2019-09-04 DIAGNOSIS — R55 SYNCOPE AND COLLAPSE: ICD-10-CM

## 2019-09-04 DIAGNOSIS — I50.20 UNSPECIFIED SYSTOLIC (CONGESTIVE) HEART FAILURE: ICD-10-CM

## 2019-09-04 DIAGNOSIS — R74.0 NONSPECIFIC ELEVATION OF LEVELS OF TRANSAMINASE AND LACTIC ACID DEHYDROGENASE [LDH]: ICD-10-CM

## 2019-09-04 DIAGNOSIS — I25.5 ISCHEMIC CARDIOMYOPATHY: ICD-10-CM

## 2019-09-04 DIAGNOSIS — F10.229 ALCOHOL DEPENDENCE WITH INTOXICATION, UNSPECIFIED: ICD-10-CM

## 2019-09-09 DIAGNOSIS — Z71.89 OTHER SPECIFIED COUNSELING: ICD-10-CM

## 2019-09-09 PROBLEM — F10.10 ALCOHOL ABUSE, UNCOMPLICATED: Chronic | Status: ACTIVE | Noted: 2019-08-27

## 2019-10-01 ENCOUNTER — OUTPATIENT (OUTPATIENT)
Dept: OUTPATIENT SERVICES | Facility: HOSPITAL | Age: 54
LOS: 1 days | End: 2019-10-01

## 2019-10-01 DIAGNOSIS — Z95.1 PRESENCE OF AORTOCORONARY BYPASS GRAFT: Chronic | ICD-10-CM

## 2019-10-22 DIAGNOSIS — Z71.89 OTHER SPECIFIED COUNSELING: ICD-10-CM

## 2019-10-28 ENCOUNTER — APPOINTMENT (OUTPATIENT)
Dept: INTERNAL MEDICINE | Facility: CLINIC | Age: 54
End: 2019-10-28
Payer: MEDICAID

## 2019-10-28 VITALS
HEART RATE: 62 BPM | DIASTOLIC BLOOD PRESSURE: 70 MMHG | TEMPERATURE: 98.2 F | SYSTOLIC BLOOD PRESSURE: 120 MMHG | OXYGEN SATURATION: 97 % | HEIGHT: 68 IN | BODY MASS INDEX: 29.25 KG/M2 | RESPIRATION RATE: 16 BRPM | WEIGHT: 193 LBS

## 2019-10-28 DIAGNOSIS — Z23 ENCOUNTER FOR IMMUNIZATION: ICD-10-CM

## 2019-10-28 DIAGNOSIS — R74.0 NONSPECIFIC ELEVATION OF LEVELS OF TRANSAMINASE AND LACTIC ACID DEHYDROGENASE [LDH]: ICD-10-CM

## 2019-10-28 DIAGNOSIS — I73.9 PERIPHERAL VASCULAR DISEASE, UNSPECIFIED: ICD-10-CM

## 2019-10-28 PROCEDURE — 99214 OFFICE O/P EST MOD 30 MIN: CPT | Mod: 25

## 2019-10-28 PROCEDURE — G0008: CPT

## 2019-10-28 PROCEDURE — 90686 IIV4 VACC NO PRSV 0.5 ML IM: CPT

## 2019-10-28 RX ORDER — IBUPROFEN 600 MG/1
600 TABLET, FILM COATED ORAL
Qty: 60 | Refills: 0 | Status: DISCONTINUED | COMMUNITY
Start: 2017-10-31 | End: 2019-10-28

## 2019-10-28 RX ORDER — METRONIDAZOLE 7.5 MG/G
0.75 CREAM TOPICAL
Qty: 1 | Refills: 3 | Status: DISCONTINUED | COMMUNITY
Start: 2017-09-18 | End: 2019-10-28

## 2019-10-28 NOTE — ASSESSMENT
[FreeTextEntry1] : \par Flu vax today\par \par Do FBW\par \par Trial flexeril 10 mg HS.  \par \par Nuero evaluation \par \par Close FU .  \par \par FU 1 month CPE - review BW.  \par \par FU  6 months with PFT.  \par \par Continued abstinence of ETOH

## 2019-10-28 NOTE — PHYSICAL EXAM
[No Edema] : there was no peripheral edema [No Extremity Clubbing/Cyanosis] : no extremity clubbing/cyanosis [Soft] : abdomen soft [Non Tender] : non-tender [Non-distended] : non-distended [No HSM] : no HSM [Normal Bowel Sounds] : normal bowel sounds [Normal] : affect was normal and insight and judgment were intact [de-identified] : midline chest well healed Sx scar  [de-identified] : + R pedal pulses [de-identified] : Healed sx scar LLQ [de-identified] : tenderness over R lumbar paravertebrals

## 2019-10-28 NOTE — HISTORY OF PRESENT ILLNESS
[FreeTextEntry8] : 55 y/o BM   PMH HLD/CAD/ s/p CABG/CM  presents with R Le numbness.   Last seen >2 years ago by  as a new patient.   Developed numbness ,burning  and stinking sensation over R anterior thigh.  Started several week ago.  He was stuck with an exacto knife in upper thigh/groin prior to onset of numbness.  This however  healed well without requiring sutures. \par \par  PMH CAD -   Follows with , last visit few days ago.  He is  Scheduled for Peripheral angiogram LLE in 2 weeks to evaluate LLE pain.   did not feel RLE symptoms related to PAD.  \par \par Hospitalized in August for ETOH  abuse/encephalopathy/ CM/elevated transaminitis.  Echo EF 35%.    Stress testing showed fixed perfusion defect, likely old MI.  EF estimated by ST 44%.      He has not had any alcohol since 8/26/19.    \par

## 2019-12-02 ENCOUNTER — APPOINTMENT (OUTPATIENT)
Dept: INTERNAL MEDICINE | Facility: CLINIC | Age: 54
End: 2019-12-02

## 2020-01-02 ENCOUNTER — EMERGENCY (EMERGENCY)
Facility: HOSPITAL | Age: 55
LOS: 0 days | Discharge: HOME | End: 2020-01-02
Attending: EMERGENCY MEDICINE
Payer: MEDICAID

## 2020-01-02 VITALS
OXYGEN SATURATION: 96 % | SYSTOLIC BLOOD PRESSURE: 119 MMHG | RESPIRATION RATE: 18 BRPM | DIASTOLIC BLOOD PRESSURE: 83 MMHG | HEART RATE: 76 BPM | TEMPERATURE: 99 F

## 2020-01-02 VITALS — WEIGHT: 250 LBS

## 2020-01-02 DIAGNOSIS — Z95.1 PRESENCE OF AORTOCORONARY BYPASS GRAFT: ICD-10-CM

## 2020-01-02 DIAGNOSIS — Z95.1 PRESENCE OF AORTOCORONARY BYPASS GRAFT: Chronic | ICD-10-CM

## 2020-01-02 DIAGNOSIS — M19.022 PRIMARY OSTEOARTHRITIS, LEFT ELBOW: ICD-10-CM

## 2020-01-02 DIAGNOSIS — M25.522 PAIN IN LEFT ELBOW: ICD-10-CM

## 2020-01-02 DIAGNOSIS — Z79.82 LONG TERM (CURRENT) USE OF ASPIRIN: ICD-10-CM

## 2020-01-02 DIAGNOSIS — I25.10 ATHEROSCLEROTIC HEART DISEASE OF NATIVE CORONARY ARTERY WITHOUT ANGINA PECTORIS: ICD-10-CM

## 2020-01-02 DIAGNOSIS — I10 ESSENTIAL (PRIMARY) HYPERTENSION: ICD-10-CM

## 2020-01-02 DIAGNOSIS — E78.5 HYPERLIPIDEMIA, UNSPECIFIED: ICD-10-CM

## 2020-01-02 DIAGNOSIS — M77.9 ENTHESOPATHY, UNSPECIFIED: ICD-10-CM

## 2020-01-02 PROCEDURE — 73080 X-RAY EXAM OF ELBOW: CPT | Mod: LT

## 2020-01-02 PROCEDURE — 99283 EMERGENCY DEPT VISIT LOW MDM: CPT | Mod: 25

## 2020-01-02 PROCEDURE — 99283 EMERGENCY DEPT VISIT LOW MDM: CPT

## 2020-01-02 PROCEDURE — 73080 X-RAY EXAM OF ELBOW: CPT | Mod: 26,LT

## 2020-01-02 RX ORDER — IBUPROFEN 200 MG
1 TABLET ORAL
Qty: 28 | Refills: 0
Start: 2020-01-02 | End: 2020-01-08

## 2020-01-02 RX ORDER — IBUPROFEN 200 MG
600 TABLET ORAL ONCE
Refills: 0 | Status: COMPLETED | OUTPATIENT
Start: 2020-01-02 | End: 2020-01-02

## 2020-01-02 RX ADMIN — Medication 600 MILLIGRAM(S): at 20:24

## 2020-01-02 NOTE — ED STATDOCS - CLINICAL SUMMARY MEDICAL DECISION MAKING FREE TEXT BOX
pt with acute elbow pain. will XR, r/o fx, likely bursitis. will recommend NSAIDs also pt c/o new cramping sensation to LLE. will recommend quinine and magnesium supplements.

## 2020-01-02 NOTE — ED STATDOCS - OBJECTIVE STATEMENT
55 y/o male with PMHx of HTN, HLD, CAD, s/p double bypass presents to the ED c/o LUE pain, difficulty extending arm secondary to pain. Pt also with cramping to LLE. No known injury, trauma. Denies numbness, tingling, weakness to LE, fever.

## 2020-01-02 NOTE — ED STATDOCS - CARE PLAN
Principal Discharge DX:	Left elbow pain  Secondary Diagnosis:	Elbow tendonitis  Secondary Diagnosis:	Elbow arthritis

## 2020-01-02 NOTE — ED ADULT NURSE NOTE - CHPI ED NUR SYMPTOMS NEG
no nausea/no tingling/no vomiting/no dizziness/no chills/no fever/no weakness/no decreased eating/drinking

## 2020-01-02 NOTE — ED ADULT NURSE NOTE - NSIMPLEMENTINTERV_GEN_ALL_ED
Implemented All Universal Safety Interventions:  [place] to call system. Call bell, personal items and telephone within reach. Instruct patient to call for assistance. Room bathroom lighting operational. Non-slip footwear when patient is off stretcher. Physically safe environment: no spills, clutter or unnecessary equipment. Stretcher in lowest position, wheels locked, appropriate side rails in place.

## 2020-01-02 NOTE — ED STATDOCS - PATIENT PORTAL LINK FT
You can access the FollowMyHealth Patient Portal offered by [facility] by registering at the following website: [email or web address]. By joining [employer]’s FollowMyHealth portal, you will also be able to view your health information using other applications (apps) compatible with our system.

## 2020-01-02 NOTE — ED ADULT NURSE NOTE - OBJECTIVE STATEMENT
pt came to ED for evaluation of LLE and LUE pain. denies any trauma. denies any other pain or complaints.

## 2020-01-02 NOTE — ED STATDOCS - CARE PROVIDER_API CALL
Glenn Clemente (DO)  Orthopaedic Surgery  [address] [place] [address]  Phone: [phone]  Fax: [phone]  Follow Up Time:

## 2020-01-02 NOTE — ED STATDOCS - PROGRESS NOTE DETAILS
MARY Madden:   Patient has been seen, evaluated and orders have been written by the attending in intake. Patient is stable.  I will follow up the results of orders written and I will continue to evaluate/observe the patient.   Zena Madden PA-C Left UE with swelling to elbow.  Neg erythema, open wound.  Tender left elbow at medial aspect.  Decreased AROM left elbow with extension and supination.  Xrays reviewed with Dr. sprague.  Arthritic joint  visualized.  Will dc home. REcommend trying Rest, ICE. Nsaids.  Refer to Ortho.  Zena Madden PA-C

## 2020-01-02 NOTE — ED ADULT TRIAGE NOTE - CHIEF COMPLAINT QUOTE
pt c/o pulsating sensation in left leg and inability to fully stretch out right arm for the aps5t 3 days, pt states the pulsating sensation sometiems is in other parts of body as well. pt denies fall trauma fever, cogntivie or motor impairment,. pt denies  new onset of symptoms today pt states the left leg is painful at times in an area where he previously had surgery

## 2020-02-29 ENCOUNTER — INPATIENT (INPATIENT)
Facility: HOSPITAL | Age: 55
LOS: 3 days | Discharge: HOME | DRG: 198 | End: 2020-03-04
Attending: INTERNAL MEDICINE | Admitting: INTERNAL MEDICINE
Payer: MEDICAID

## 2020-02-29 VITALS
OXYGEN SATURATION: 93 % | HEART RATE: 81 BPM | RESPIRATION RATE: 18 BRPM | HEIGHT: 68 IN | WEIGHT: 190.04 LBS | SYSTOLIC BLOOD PRESSURE: 134 MMHG | DIASTOLIC BLOOD PRESSURE: 87 MMHG | TEMPERATURE: 98 F

## 2020-02-29 DIAGNOSIS — Z95.1 PRESENCE OF AORTOCORONARY BYPASS GRAFT: Chronic | ICD-10-CM

## 2020-02-29 LAB
APTT BLD: 33.8 SEC — SIGNIFICANT CHANGE UP (ref 27.5–36.3)
BASOPHILS # BLD AUTO: 0.1 K/UL — SIGNIFICANT CHANGE UP (ref 0–0.2)
BASOPHILS NFR BLD AUTO: 2.1 % — HIGH (ref 0–2)
EOSINOPHIL # BLD AUTO: 0.07 K/UL — SIGNIFICANT CHANGE UP (ref 0–0.5)
EOSINOPHIL NFR BLD AUTO: 1.4 % — SIGNIFICANT CHANGE UP (ref 0–6)
HCT VFR BLD CALC: 39.5 % — SIGNIFICANT CHANGE UP (ref 39–50)
HGB BLD-MCNC: 13.1 G/DL — SIGNIFICANT CHANGE UP (ref 13–17)
IMM GRANULOCYTES NFR BLD AUTO: 0 % — SIGNIFICANT CHANGE UP (ref 0–1.5)
INR BLD: 1.01 RATIO — SIGNIFICANT CHANGE UP (ref 0.88–1.16)
LYMPHOCYTES # BLD AUTO: 3.05 K/UL — SIGNIFICANT CHANGE UP (ref 1–3.3)
LYMPHOCYTES # BLD AUTO: 62.8 % — HIGH (ref 13–44)
MCHC RBC-ENTMCNC: 29.3 PG — SIGNIFICANT CHANGE UP (ref 27–34)
MCHC RBC-ENTMCNC: 33.2 GM/DL — SIGNIFICANT CHANGE UP (ref 32–36)
MCV RBC AUTO: 88.4 FL — SIGNIFICANT CHANGE UP (ref 80–100)
MONOCYTES # BLD AUTO: 0.55 K/UL — SIGNIFICANT CHANGE UP (ref 0–0.9)
MONOCYTES NFR BLD AUTO: 11.3 % — SIGNIFICANT CHANGE UP (ref 2–14)
NEUTROPHILS # BLD AUTO: 1.09 K/UL — LOW (ref 1.8–7.4)
NEUTROPHILS NFR BLD AUTO: 22.4 % — LOW (ref 43–77)
PLATELET # BLD AUTO: 216 K/UL — SIGNIFICANT CHANGE UP (ref 150–400)
PROTHROM AB SERPL-ACNC: 11.2 SEC — SIGNIFICANT CHANGE UP (ref 10–12.9)
RBC # BLD: 4.47 M/UL — SIGNIFICANT CHANGE UP (ref 4.2–5.8)
RBC # FLD: 16.1 % — HIGH (ref 10.3–14.5)
WBC # BLD: 4.86 K/UL — SIGNIFICANT CHANGE UP (ref 3.8–10.5)
WBC # FLD AUTO: 4.86 K/UL — SIGNIFICANT CHANGE UP (ref 3.8–10.5)

## 2020-02-29 PROCEDURE — 73080 X-RAY EXAM OF ELBOW: CPT | Mod: 26,LT

## 2020-02-29 PROCEDURE — 93010 ELECTROCARDIOGRAM REPORT: CPT

## 2020-02-29 PROCEDURE — 71045 X-RAY EXAM CHEST 1 VIEW: CPT | Mod: 26

## 2020-02-29 RX ORDER — ASPIRIN/CALCIUM CARB/MAGNESIUM 324 MG
325 TABLET ORAL ONCE
Refills: 0 | Status: COMPLETED | OUTPATIENT
Start: 2020-02-29 | End: 2020-02-29

## 2020-02-29 RX ORDER — ACETAMINOPHEN 500 MG
650 TABLET ORAL ONCE
Refills: 0 | Status: COMPLETED | OUTPATIENT
Start: 2020-02-29 | End: 2020-02-29

## 2020-02-29 RX ADMIN — Medication 650 MILLIGRAM(S): at 23:58

## 2020-02-29 RX ADMIN — Medication 325 MILLIGRAM(S): at 23:58

## 2020-02-29 NOTE — ED PROVIDER NOTE - OBJECTIVE STATEMENT
53 yo male with h/o CAD (s/p double CABG in 2012; prescribed simvastatin and carvedilol but noncompliant with both) c/o chest pain.  Pt c/o sharp substernal chest pain on and off since yesterday.   No chest pain at present time. No sob.  Also c/o left arm pain for months, feels pain primarily in the elbow, fell onto the elbow 1-2 months ago.  no fever/cough.  Quit smoking in 2012, smokes marijuana daily, drinks at least 8 large beers and some liquor daily for years.   Does not follow with PMD.

## 2020-02-29 NOTE — ED ADULT NURSE NOTE - OBJECTIVE STATEMENT
Pt presents to ER c/o CP and left elbow pain. CP began yesterday and has been intermittent. Pt reports left elbow pain began 1.5 months ago when he fell onto it. Denies SOB. AOB. AO x 3 oriented to baseline, normal breathing pattern with no difficulty.

## 2020-02-29 NOTE — ED ADULT TRIAGE NOTE - CHIEF COMPLAINT QUOTE
Pt comes in for midline chest pain that started yesterday. Pt walked to ambulance location. Complaining of sob, pt talking in complete sentences and in no distress. Alcohol on breath. Cardiac hx Dr. Wright

## 2020-03-01 ENCOUNTER — OUTPATIENT (OUTPATIENT)
Dept: OUTPATIENT SERVICES | Facility: HOSPITAL | Age: 55
LOS: 1 days | End: 2020-03-01
Payer: MEDICAID

## 2020-03-01 DIAGNOSIS — R07.9 CHEST PAIN, UNSPECIFIED: ICD-10-CM

## 2020-03-01 LAB
ALBUMIN SERPL ELPH-MCNC: 3.9 G/DL — SIGNIFICANT CHANGE UP (ref 3.3–5)
ALP SERPL-CCNC: 48 U/L — SIGNIFICANT CHANGE UP (ref 40–120)
ALT FLD-CCNC: 70 U/L — SIGNIFICANT CHANGE UP (ref 12–78)
ANION GAP SERPL CALC-SCNC: 6 MMOL/L — SIGNIFICANT CHANGE UP (ref 5–17)
ANION GAP SERPL CALC-SCNC: 9 MMOL/L — SIGNIFICANT CHANGE UP (ref 5–17)
AST SERPL-CCNC: 83 U/L — HIGH (ref 15–37)
BILIRUB SERPL-MCNC: 0.3 MG/DL — SIGNIFICANT CHANGE UP (ref 0.2–1.2)
BUN SERPL-MCNC: 7 MG/DL — SIGNIFICANT CHANGE UP (ref 7–23)
BUN SERPL-MCNC: 7 MG/DL — SIGNIFICANT CHANGE UP (ref 7–23)
CALCIUM SERPL-MCNC: 8 MG/DL — LOW (ref 8.5–10.1)
CALCIUM SERPL-MCNC: 8.4 MG/DL — LOW (ref 8.5–10.1)
CHLORIDE SERPL-SCNC: 106 MMOL/L — SIGNIFICANT CHANGE UP (ref 96–108)
CHLORIDE SERPL-SCNC: 108 MMOL/L — SIGNIFICANT CHANGE UP (ref 96–108)
CO2 SERPL-SCNC: 25 MMOL/L — SIGNIFICANT CHANGE UP (ref 22–31)
CO2 SERPL-SCNC: 25 MMOL/L — SIGNIFICANT CHANGE UP (ref 22–31)
CREAT SERPL-MCNC: 0.79 MG/DL — SIGNIFICANT CHANGE UP (ref 0.5–1.3)
CREAT SERPL-MCNC: 0.84 MG/DL — SIGNIFICANT CHANGE UP (ref 0.5–1.3)
ETHANOL SERPL-MCNC: 292 MG/DL — HIGH (ref 0–10)
GLUCOSE SERPL-MCNC: 100 MG/DL — HIGH (ref 70–99)
GLUCOSE SERPL-MCNC: 204 MG/DL — HIGH (ref 70–99)
LIDOCAIN IGE QN: 140 U/L — SIGNIFICANT CHANGE UP (ref 73–393)
MAGNESIUM SERPL-MCNC: 2.2 MG/DL — SIGNIFICANT CHANGE UP (ref 1.6–2.6)
NT-PROBNP SERPL-SCNC: 12 PG/ML — SIGNIFICANT CHANGE UP (ref 0–125)
POTASSIUM SERPL-MCNC: 3.4 MMOL/L — LOW (ref 3.5–5.3)
POTASSIUM SERPL-MCNC: 3.9 MMOL/L — SIGNIFICANT CHANGE UP (ref 3.5–5.3)
POTASSIUM SERPL-SCNC: 3.4 MMOL/L — LOW (ref 3.5–5.3)
POTASSIUM SERPL-SCNC: 3.9 MMOL/L — SIGNIFICANT CHANGE UP (ref 3.5–5.3)
PROT SERPL-MCNC: 7.8 GM/DL — SIGNIFICANT CHANGE UP (ref 6–8.3)
SODIUM SERPL-SCNC: 139 MMOL/L — SIGNIFICANT CHANGE UP (ref 135–145)
SODIUM SERPL-SCNC: 140 MMOL/L — SIGNIFICANT CHANGE UP (ref 135–145)
TROPONIN I SERPL-MCNC: <0.015 NG/ML — SIGNIFICANT CHANGE UP (ref 0.01–0.04)
TSH SERPL-MCNC: 3.71 UU/ML — SIGNIFICANT CHANGE UP (ref 0.34–4.82)

## 2020-03-01 PROCEDURE — 99222 1ST HOSP IP/OBS MODERATE 55: CPT

## 2020-03-01 PROCEDURE — 85027 COMPLETE CBC AUTOMATED: CPT

## 2020-03-01 PROCEDURE — 83735 ASSAY OF MAGNESIUM: CPT

## 2020-03-01 PROCEDURE — 72100 X-RAY EXAM L-S SPINE 2/3 VWS: CPT

## 2020-03-01 PROCEDURE — 72131 CT LUMBAR SPINE W/O DYE: CPT

## 2020-03-01 PROCEDURE — 12345: CPT | Mod: NC

## 2020-03-01 PROCEDURE — 84484 ASSAY OF TROPONIN QUANT: CPT

## 2020-03-01 PROCEDURE — 80048 BASIC METABOLIC PNL TOTAL CA: CPT

## 2020-03-01 PROCEDURE — 36415 COLL VENOUS BLD VENIPUNCTURE: CPT

## 2020-03-01 RX ORDER — IBUPROFEN 200 MG
400 TABLET ORAL EVERY 8 HOURS
Refills: 0 | Status: COMPLETED | OUTPATIENT
Start: 2020-03-01 | End: 2020-03-02

## 2020-03-01 RX ORDER — FOLIC ACID 0.8 MG
1 TABLET ORAL DAILY
Refills: 0 | Status: DISCONTINUED | OUTPATIENT
Start: 2020-03-01 | End: 2020-03-04

## 2020-03-01 RX ORDER — ASPIRIN/CALCIUM CARB/MAGNESIUM 324 MG
81 TABLET ORAL DAILY
Refills: 0 | Status: DISCONTINUED | OUTPATIENT
Start: 2020-03-01 | End: 2020-03-04

## 2020-03-01 RX ORDER — ESCITALOPRAM OXALATE 10 MG/1
5 TABLET, FILM COATED ORAL DAILY
Refills: 0 | Status: DISCONTINUED | OUTPATIENT
Start: 2020-03-01 | End: 2020-03-04

## 2020-03-01 RX ORDER — CARVEDILOL PHOSPHATE 80 MG/1
6.25 CAPSULE, EXTENDED RELEASE ORAL EVERY 12 HOURS
Refills: 0 | Status: DISCONTINUED | OUTPATIENT
Start: 2020-03-01 | End: 2020-03-04

## 2020-03-01 RX ORDER — SIMVASTATIN 20 MG/1
40 TABLET, FILM COATED ORAL AT BEDTIME
Refills: 0 | Status: DISCONTINUED | OUTPATIENT
Start: 2020-03-01 | End: 2020-03-04

## 2020-03-01 RX ORDER — LANOLIN ALCOHOL/MO/W.PET/CERES
5 CREAM (GRAM) TOPICAL AT BEDTIME
Refills: 0 | Status: COMPLETED | OUTPATIENT
Start: 2020-03-01 | End: 2020-03-01

## 2020-03-01 RX ORDER — FAMOTIDINE 10 MG/ML
20 INJECTION INTRAVENOUS
Refills: 0 | Status: DISCONTINUED | OUTPATIENT
Start: 2020-03-01 | End: 2020-03-04

## 2020-03-01 RX ORDER — THIAMINE MONONITRATE (VIT B1) 100 MG
100 TABLET ORAL DAILY
Refills: 0 | Status: DISCONTINUED | OUTPATIENT
Start: 2020-03-01 | End: 2020-03-04

## 2020-03-01 RX ORDER — ACETAMINOPHEN 500 MG
650 TABLET ORAL ONCE
Refills: 0 | Status: COMPLETED | OUTPATIENT
Start: 2020-03-01 | End: 2020-03-01

## 2020-03-01 RX ORDER — LISINOPRIL 2.5 MG/1
2.5 TABLET ORAL DAILY
Refills: 0 | Status: DISCONTINUED | OUTPATIENT
Start: 2020-03-01 | End: 2020-03-04

## 2020-03-01 RX ORDER — POTASSIUM CHLORIDE 20 MEQ
40 PACKET (EA) ORAL ONCE
Refills: 0 | Status: COMPLETED | OUTPATIENT
Start: 2020-03-01 | End: 2020-03-01

## 2020-03-01 RX ADMIN — Medication 40 MILLIEQUIVALENT(S): at 00:51

## 2020-03-01 RX ADMIN — Medication 650 MILLIGRAM(S): at 15:08

## 2020-03-01 RX ADMIN — ESCITALOPRAM OXALATE 5 MILLIGRAM(S): 10 TABLET, FILM COATED ORAL at 11:12

## 2020-03-01 RX ADMIN — Medication 50 MILLIGRAM(S): at 02:42

## 2020-03-01 RX ADMIN — Medication 2 MILLIGRAM(S): at 05:45

## 2020-03-01 RX ADMIN — Medication 81 MILLIGRAM(S): at 11:13

## 2020-03-01 RX ADMIN — Medication 2 MILLIGRAM(S): at 22:14

## 2020-03-01 RX ADMIN — Medication 400 MILLIGRAM(S): at 23:00

## 2020-03-01 RX ADMIN — Medication 2 MILLIGRAM(S): at 11:12

## 2020-03-01 RX ADMIN — CARVEDILOL PHOSPHATE 6.25 MILLIGRAM(S): 80 CAPSULE, EXTENDED RELEASE ORAL at 05:43

## 2020-03-01 RX ADMIN — Medication 100 MILLIGRAM(S): at 05:43

## 2020-03-01 RX ADMIN — Medication 2 MILLIGRAM(S): at 15:08

## 2020-03-01 RX ADMIN — Medication 400 MILLIGRAM(S): at 22:14

## 2020-03-01 RX ADMIN — FAMOTIDINE 20 MILLIGRAM(S): 10 INJECTION INTRAVENOUS at 22:14

## 2020-03-01 RX ADMIN — SIMVASTATIN 40 MILLIGRAM(S): 20 TABLET, FILM COATED ORAL at 22:14

## 2020-03-01 RX ADMIN — Medication 2 MILLIGRAM(S): at 18:37

## 2020-03-01 RX ADMIN — Medication 1 MILLIGRAM(S): at 11:12

## 2020-03-01 RX ADMIN — LISINOPRIL 2.5 MILLIGRAM(S): 2.5 TABLET ORAL at 05:44

## 2020-03-01 RX ADMIN — CARVEDILOL PHOSPHATE 6.25 MILLIGRAM(S): 80 CAPSULE, EXTENDED RELEASE ORAL at 18:37

## 2020-03-01 NOTE — H&P ADULT - NEUROLOGICAL DETAILS
responds to verbal commands/responds to pain/deep reflexes intact/normal strength/alert and oriented x 3/sensation intact/cranial nerves intact

## 2020-03-01 NOTE — CONSULT NOTE ADULT - SUBJECTIVE AND OBJECTIVE BOX
Patient is a 54y old  Male who presents with a chief complaint of complain of chest pain (01 Mar 2020 03:46)      HPI:  55 yo Male with h/o CAD (s/p double CABG in 2012; prescribed simvastatin and carvedilol but noncompliant with both) presented with complain of chest pain.  Patient complain of sharp substernal chest pain on and off since yesterday. No chest pain at present time. No sob.  Also complain of left arm pain for months, feels pain primarily in the elbow, fell onto the elbow 1-2 months ago. He has  no fever/cough.  He Quit smoking in 2012, smokes marijuana daily, drinks at least 8 large beers and some liquor daily for years. He is not confuse at this moment.  Tele negative over night  R/O MI  No symptoms at present      PAST MEDICAL & SURGICAL HISTORY:  Alcohol abuse  CAD (coronary artery disease)  HLD (hyperlipidemia)  HTN (hypertension)  S/P CABG (coronary artery bypass graft)      HPI:                PREVIOUS DIAGNOSTIC TESTING:      ECHO  FINDINGS:    STRESS  FINDINGS:    CATHETERIZATION  FINDINGS:    MEDICATIONS  (STANDING):  aspirin enteric coated 81 milliGRAM(s) Oral daily  carvedilol 6.25 milliGRAM(s) Oral every 12 hours  escitalopram 5 milliGRAM(s) Oral daily  folic acid 1 milliGRAM(s) Oral daily  lisinopril 2.5 milliGRAM(s) Oral daily  LORazepam     Tablet 2 milliGRAM(s) Oral every 4 hours  LORazepam     Tablet   Oral   simvastatin 40 milliGRAM(s) Oral at bedtime  thiamine 100 milliGRAM(s) Oral daily    MEDICATIONS  (PRN):  LORazepam     Tablet 2 milliGRAM(s) Oral every 2 hours PRN Symptom-triggered 2 point increase in CIWA-Ar      FAMILY HISTORY:  FH: heart disease  FH: diabetes mellitus      SOCIAL HISTORY:    CIGARETTES:    ALCOHOL:          Vital Signs Last 24 Hrs  T(C): 36.4 (01 Mar 2020 05:09), Max: 36.9 (29 Feb 2020 22:47)  T(F): 97.5 (01 Mar 2020 05:09), Max: 98.4 (29 Feb 2020 22:47)  HR: 78 (01 Mar 2020 05:09) (78 - 85)  BP: 106/73 (01 Mar 2020 05:09) (106/73 - 134/87)  BP(mean): --  RR: 16 (01 Mar 2020 05:09) (16 - 18)  SpO2: 96% (01 Mar 2020 05:09) (93% - 98%)    PHYSICAL EXAM-    Constitutional: The patient appears to be normal, well developed, well nourished and alert and oriented to time, place and person. The patient does not appear acutely ill. The patient is alert.     Head: Head is normocephalic and atraumatic.      Neck: The patient's neck is supple without enlargement, has no palpable thyromegaly nor thyroid nodules and has no jugular venous distention. No audible carotid bruits. There are strong carotid pulses bilaterally. No JVD.     Cardiovascular: Regular rate and rhythm without S3, S4. No murmurs or rubs are appreciated.      Respiratory: . The patient has no rales and no rhonchi. The patient has no wheezes.     Abdomen: Soft, nontender, nondistended with positive bowel sounds.      Extremity: No tenderness. There is no pitting edema, skin discoloration, clubbing and cyanosis.           INTERPRETATION OF TELEMETRY:    ECG:    I&O's Detail      LABS:                        13.1   4.86  )-----------( 216      ( 29 Feb 2020 23:17 )             39.5     02-29    140  |  106  |  7   ----------------------------<  204<H>  3.4<L>   |  25  |  0.84    Ca    8.0<L>      29 Feb 2020 23:17  Mg     2.2     02-29    TPro  7.8  /  Alb  3.9  /  TBili  0.3  /  DBili  x   /  AST  83<H>  /  ALT  70  /  AlkPhos  48  02-29    CARDIAC MARKERS ( 01 Mar 2020 02:43 )  <0.015 ng/mL / x     / x     / x     / x      CARDIAC MARKERS ( 29 Feb 2020 23:17 )  <0.015 ng/mL / x     / x     / x     / x          PT/INR - ( 29 Feb 2020 23:17 )   PT: 11.2 sec;   INR: 1.01 ratio         PTT - ( 29 Feb 2020 23:17 )  PTT:33.8 sec    I&O's Summary    BNPSerum Pro-Brain Natriuretic Peptide: 12 pg/mL (02-29 @ 23:17)    RADIOLOGY & ADDITIONAL STUDIES:

## 2020-03-01 NOTE — CONSULT NOTE ADULT - ASSESSMENT
AA male with CAD and CABG in the past  ETOH abuse with periods of soberness  Doubt pain is cardiac  R/O by enzymes  Stable from cardiac point of view  Need to consider ETOH withdrawal while here or as outpatient

## 2020-03-01 NOTE — PROGRESS NOTE ADULT - SUBJECTIVE AND OBJECTIVE BOX
55 yo Male with h/o CAD (s/p double CABG in 2012; prescribed simvastatin and carvedilol but noncompliant with both) presented with complain of chest pain.  Patient complain of sharp substernal chest pain on and off since yesterday. No chest pain at present time. No sob.  Also complain of left arm pain for months, feels pain primarily in the elbow, fell onto the elbow 1-2 months ago. He has  no fever/cough.  He Quit smoking in 2012, smokes marijuana daily, drinks at least 8 large beers and some liquor daily for years.     SUB - no cp palps sob; minimal tremulousness, on CIWA protocol  fell 2 weeks ago no LOC and hurt his left elbow, cont to have achy pain    PHYSICAL EXAM:  GENERAL: NAD, able to lie flat in bed  HEAD:  Atraumatic, Normocephalic  EYES: EOMI, PERRLA, normal sclera  ENT: Moist mucous membranes  NECK: Supple, No JVD, no nuchal rigidity  CHEST/LUNG: Clear to auscultation bilaterally; No rales, rhonchi, wheezing, or rubs. Unlabored respirations  HEART: Regular rate and rhythm; No murmurs, rubs, or gallops  ABDOMEN: Bowel sounds present; Soft, Nontender, Nondistended. No hepatomegaly  EXTREMITIES:  no pitting bilaterally  NERVOUS SYSTEM:  Alert & Oriented X3, speech clear. No focal motor or sensory deficits, mild tremulousness  MSK: FROM all 4 extremities, full and equal strength    LABS: All Labs Reviewed:                        13.1   4.86  )-----------( 216      ( 29 Feb 2020 23:17 )             39.5     03-01    139  |  108  |  7   ----------------------------<  100<H>  3.9   |  25  |  0.79     PTT - ( 29 Feb 2020 23:17 )  PTT:33.8 sec  CARDIAC MARKERS ( 01 Mar 2020 07:17 )  <0.015 ng/mL / x     / x     / x     / x      CARDIAC MARKERS ( 01 Mar 2020 02:43 )  <0.015 ng/mL / x     / x     / x     / x      CARDIAC MARKERS ( 29 Feb 2020 23:17 )  <0.015 ng/mL / x     / x     / x     / x          RADIOLOGY/EKG:  tele rev by me shows sinus   < from: Xray Elbow AP + Lateral, Left (02.29.20 @ 23:52) >  IMPRESSION: No fracture, dislocation or effusion. Mild degenerative changes are noted.          aspirin enteric coated 81 milliGRAM(s) Oral daily  carvedilol 6.25 milliGRAM(s) Oral every 12 hours  escitalopram 5 milliGRAM(s) Oral daily  folic acid 1 milliGRAM(s) Oral daily  ibuprofen  Tablet. 400 milliGRAM(s) Oral every 8 hours  lisinopril 2.5 milliGRAM(s) Oral daily  LORazepam     Tablet 2 milliGRAM(s) Oral every 4 hours  LORazepam     Tablet   Oral   LORazepam     Tablet 2 milliGRAM(s) Oral every 2 hours PRN  simvastatin 40 milliGRAM(s) Oral at bedtime  thiamine 100 milliGRAM(s) Oral daily

## 2020-03-01 NOTE — H&P ADULT - HISTORY OF PRESENT ILLNESS
55 yo Male with h/o CAD (s/p double CABG in 2012; prescribed simvastatin and carvedilol but noncompliant with both) presented with complain of chest pain.  Patient complain of sharp substernal chest pain on and off since yesterday. No chest pain at present time. No sob.  Also complain of left arm pain for months, feels pain primarily in the elbow, fell onto the elbow 1-2 months ago. He has  no fever/cough.  He Quit smoking in 2012, smokes marijuana daily, drinks at least 8 large beers and some liquor daily for years. He is not confuse at this moment.

## 2020-03-01 NOTE — H&P ADULT - NSHPPHYSICALEXAM_GEN_ALL_CORE
Vital Signs Last 24 Hrs  T(C): 36.7 (01 Mar 2020 02:40), Max: 36.9 (29 Feb 2020 22:47)  T(F): 98 (01 Mar 2020 02:40), Max: 98.4 (29 Feb 2020 22:47)  HR: 84 (01 Mar 2020 02:40) (81 - 85)  BP: 110/72 (01 Mar 2020 02:40) (110/72 - 134/87)  RR: 16 (01 Mar 2020 02:40) (16 - 18)  SpO2: 96% (01 Mar 2020 02:40) (93% - 98%)

## 2020-03-01 NOTE — PROGRESS NOTE ADULT - ASSESSMENT
1.  Chest pain  -follow troponin - NTD  -follow in telemetry unit- no malignant arrrhythmias so far  -follow cardiology consult  - seen by Dr Abdalla  will add pepcid    2.  h/o CAD and s/p CABG x 2  -continue statin, aspirin, BB      3.  Alcohol dependence  Alcohol Abuse  MJ abuse   -follow with [facility] protocol for alcohol withdrawal  -on PO Ativan taper   -on Thiamin and folic acid    4.  SCD for DVT ppx     5.  Elbow pain - no fracture, use motrin today    discussed with RN

## 2020-03-01 NOTE — H&P ADULT - ASSESSMENT
53 yo Male presented with chest pain.    A/P:    1.  Chest pain  -r/o ACS  ?Unstable angina  -follow troponin  -follow in telemetry unit  -follow cardiology consult     2.  h/o CAD and CABG  -continue statin, aspirin    3.  Alcohol dependence  Alcohol Abuse  -follow with [facility] protocol for alcohol withdrawl  -on PO Ativan taper   -on Thiamin and folic acid    4.  SCD for DVT ppx

## 2020-03-01 NOTE — H&P ADULT - NEGATIVE GENERAL GENITOURINARY SYMPTOMS
no hematuria/no renal colic Patient and/or family announced that they are leaving. They were advised to stay, advised to return if worse.

## 2020-03-02 LAB
ANION GAP SERPL CALC-SCNC: 4 MMOL/L — LOW (ref 5–17)
BUN SERPL-MCNC: 8 MG/DL — SIGNIFICANT CHANGE UP (ref 7–23)
CALCIUM SERPL-MCNC: 8.7 MG/DL — SIGNIFICANT CHANGE UP (ref 8.5–10.1)
CHLORIDE SERPL-SCNC: 105 MMOL/L — SIGNIFICANT CHANGE UP (ref 96–108)
CO2 SERPL-SCNC: 28 MMOL/L — SIGNIFICANT CHANGE UP (ref 22–31)
CREAT SERPL-MCNC: 0.85 MG/DL — SIGNIFICANT CHANGE UP (ref 0.5–1.3)
GLUCOSE SERPL-MCNC: 106 MG/DL — HIGH (ref 70–99)
MAGNESIUM SERPL-MCNC: 2.3 MG/DL — SIGNIFICANT CHANGE UP (ref 1.6–2.6)
POTASSIUM SERPL-MCNC: 3.9 MMOL/L — SIGNIFICANT CHANGE UP (ref 3.5–5.3)
POTASSIUM SERPL-SCNC: 3.9 MMOL/L — SIGNIFICANT CHANGE UP (ref 3.5–5.3)
SODIUM SERPL-SCNC: 137 MMOL/L — SIGNIFICANT CHANGE UP (ref 135–145)

## 2020-03-02 PROCEDURE — 99232 SBSQ HOSP IP/OBS MODERATE 35: CPT

## 2020-03-02 RX ORDER — LANOLIN ALCOHOL/MO/W.PET/CERES
5 CREAM (GRAM) TOPICAL AT BEDTIME
Refills: 0 | Status: COMPLETED | OUTPATIENT
Start: 2020-03-02 | End: 2020-03-02

## 2020-03-02 RX ORDER — ACETAMINOPHEN 500 MG
1000 TABLET ORAL ONCE
Refills: 0 | Status: COMPLETED | OUTPATIENT
Start: 2020-03-02 | End: 2020-03-02

## 2020-03-02 RX ADMIN — Medication 100 MILLIGRAM(S): at 11:48

## 2020-03-02 RX ADMIN — Medication 400 MILLIGRAM(S): at 20:25

## 2020-03-02 RX ADMIN — ESCITALOPRAM OXALATE 5 MILLIGRAM(S): 10 TABLET, FILM COATED ORAL at 11:48

## 2020-03-02 RX ADMIN — Medication 1.5 MILLIGRAM(S): at 10:20

## 2020-03-02 RX ADMIN — Medication 1.5 MILLIGRAM(S): at 22:06

## 2020-03-02 RX ADMIN — Medication 400 MILLIGRAM(S): at 06:30

## 2020-03-02 RX ADMIN — CARVEDILOL PHOSPHATE 6.25 MILLIGRAM(S): 80 CAPSULE, EXTENDED RELEASE ORAL at 05:40

## 2020-03-02 RX ADMIN — Medication 1.5 MILLIGRAM(S): at 14:27

## 2020-03-02 RX ADMIN — Medication 1.5 MILLIGRAM(S): at 05:39

## 2020-03-02 RX ADMIN — Medication 1.5 MILLIGRAM(S): at 02:14

## 2020-03-02 RX ADMIN — Medication 400 MILLIGRAM(S): at 05:39

## 2020-03-02 RX ADMIN — LISINOPRIL 2.5 MILLIGRAM(S): 2.5 TABLET ORAL at 05:40

## 2020-03-02 RX ADMIN — Medication 400 MILLIGRAM(S): at 18:41

## 2020-03-02 RX ADMIN — Medication 1.5 MILLIGRAM(S): at 18:41

## 2020-03-02 RX ADMIN — CARVEDILOL PHOSPHATE 6.25 MILLIGRAM(S): 80 CAPSULE, EXTENDED RELEASE ORAL at 18:40

## 2020-03-02 RX ADMIN — Medication 400 MILLIGRAM(S): at 14:27

## 2020-03-02 RX ADMIN — Medication 5 MILLIGRAM(S): at 00:05

## 2020-03-02 RX ADMIN — Medication 5 MILLIGRAM(S): at 22:36

## 2020-03-02 RX ADMIN — FAMOTIDINE 20 MILLIGRAM(S): 10 INJECTION INTRAVENOUS at 18:40

## 2020-03-02 RX ADMIN — Medication 81 MILLIGRAM(S): at 14:27

## 2020-03-02 RX ADMIN — SIMVASTATIN 40 MILLIGRAM(S): 20 TABLET, FILM COATED ORAL at 22:07

## 2020-03-02 RX ADMIN — Medication 1 MILLIGRAM(S): at 11:48

## 2020-03-02 RX ADMIN — FAMOTIDINE 20 MILLIGRAM(S): 10 INJECTION INTRAVENOUS at 05:40

## 2020-03-02 NOTE — PROGRESS NOTE ADULT - SUBJECTIVE AND OBJECTIVE BOX
Patient is a 54y old  Male who presents with a chief complaint of complain of chest pain (01 Mar 2020 03:46)      HPI:  55 yo Male with h/o CAD (s/p double CABG in 2012; prescribed simvastatin and carvedilol but noncompliant with both) presented with complain of chest pain.  Patient complain of sharp substernal chest pain on and off since yesterday. No chest pain at present time. No sob.  Also complain of left arm pain for months, feels pain primarily in the elbow, fell onto the elbow 1-2 months ago. He has  no fever/cough.  He Quit smoking in 2012, smokes marijuana daily, drinks at least 8 large beers and some liquor daily for years. He is not confuse at this moment.    3/2- pt states that he did not have any more chest pain since presentation.  he states he ambulated to the restroom and did not have any CP.     PAST MEDICAL & SURGICAL HISTORY:  Alcohol abuse  CAD (coronary artery disease)  HLD (hyperlipidemia)  HTN (hypertension)  S/P CABG (coronary artery bypass graft)        MEDICATIONS  (STANDING):  aspirin enteric coated 81 milliGRAM(s) Oral daily  carvedilol 6.25 milliGRAM(s) Oral every 12 hours  escitalopram 5 milliGRAM(s) Oral daily  folic acid 1 milliGRAM(s) Oral daily  lisinopril 2.5 milliGRAM(s) Oral daily  LORazepam     Tablet 2 milliGRAM(s) Oral every 4 hours  LORazepam     Tablet   Oral   simvastatin 40 milliGRAM(s) Oral at bedtime  thiamine 100 milliGRAM(s) Oral daily    MEDICATIONS  (PRN):  LORazepam     Tablet 2 milliGRAM(s) Oral every 2 hours PRN Symptom-triggered 2 point increase in CIWA-Ar      FAMILY HISTORY:  FH: heart disease  FH: diabetes mellitus      SOCIAL HISTORY:    CIGARETTES:    ALCOHOL:  alcohol abuse      Vital Signs Last 24 Hrs  T(C): 36.2 (02 Mar 2020 05:28), Max: 36.8 (01 Mar 2020 13:00)  T(F): 97.2 (02 Mar 2020 05:28), Max: 98.2 (01 Mar 2020 13:00)  HR: 60 (02 Mar 2020 07:01) (60 - 72)  BP: 133/96 (02 Mar 2020 05:28) (133/92 - 143/90)  BP(mean): --  RR: 16 (02 Mar 2020 05:28) (16 - 18)  SpO2: 98% (02 Mar 2020 05:28) (96% - 98%)    PHYSICAL EXAM-    Constitutional: no acute distress     Head: Head is normocephalic and atraumatic.      Neck: . No JVD.     Cardiovascular: Regular rate and rhythm without S3, S4. No murmurs or rubs are appreciated.      Respiratory: . The patient has no rales and no rhonchi. The patient has no wheezes.     Abdomen: Soft, nontender, nondistended with positive bowel sounds.      Extremity: No tenderness. There is no pitting edema, skin discoloration, clubbing and cyanosis.           INTERPRETATION OF TELEMETRY: SR     ECG: Sinus rythm, poor R wave progression.     I&O's Detail      LABS:                        13.1   4.86  )-----------( 216      ( 29 Feb 2020 23:17 )             39.5     02-29    140  |  106  |  7   ----------------------------<  204<H>  3.4<L>   |  25  |  0.84    Ca    8.0<L>      29 Feb 2020 23:17  Mg     2.2     02-29    TPro  7.8  /  Alb  3.9  /  TBili  0.3  /  DBili  x   /  AST  83<H>  /  ALT  70  /  AlkPhos  48  02-29    CARDIAC MARKERS ( 01 Mar 2020 02:43 )  <0.015 ng/mL / x     / x     / x     / x      CARDIAC MARKERS ( 29 Feb 2020 23:17 )  <0.015 ng/mL / x     / x     / x     / x          PT/INR - ( 29 Feb 2020 23:17 )   PT: 11.2 sec;   INR: 1.01 ratio         PTT - ( 29 Feb 2020 23:17 )  PTT:33.8 sec    I&O's Summary    BNPSerum Pro-Brain Natriuretic Peptide: 12 pg/mL (02-29 @ 23:17)    RADIOLOGY & ADDITIONAL STUDIES:  < from: Xray Chest 1 View-PORTABLE IMMEDIATE (02.29.20 @ 23:51) >  EXAM:  XR CHEST PORTABLE IMMED 1V                            PROCEDURE DATE:  02/29/2020          INTERPRETATION:  History: Chest pain    Chest:  one view.      Comparison: 08/27/2019    AP radiograph of the chest demonstrates no evidence of infiltrate, pleural effusion or vascular congestion. No atelectasis is seen. The cardiac silhouette is normal in size. Osseous structures are intact.    Impression: No active pulmonary disease.                KYREE JACKSON M.D., ATTENDING RADIOLOGIST  Thisdocument has been electronically signed. Mar  1 2020  8:27AM        < end of copied text >

## 2020-03-02 NOTE — PROGRESS NOTE ADULT - ASSESSMENT
1.  Chest pain  -follow troponin - NTD  -follow in telemetry unit- no malignant arrrhythmias so far  -follow cardiology consult  - seen by Dr Abdalla  will add pepcid    2.  h/o CAD and s/p CABG x 2  -continue statin, aspirin, BB      3.  Alcohol dependence  Alcohol Abuse, last use 2/29  MJ abuse   -follow with [facility] protocol for alcohol withdrawal  -on PO Ativan taper   -on Thiamin and folic acid    4.  SCD for DVT ppx     5.  Elbow pain - no fracture, use motrin/ofirmev today    discussed with RN 1.  Chest pain  -follow troponin - NTD  -follow in telemetry unit- no malignant arrrhythmias so far  -discussed with Dr Balderrama - stable on tele, will dc tele   will add pepcid    2.  h/o CAD and s/p CABG x 2  Chronic compensated HFREF- euvolemic, NYHA class II  Last known LVEF 35%.   -continue statin, aspirin, BB      3.  Alcohol dependence  Alcohol Abuse, last use 2/29  MJ abuse   -Pt counseled re cessation, rec AA/detox  -follow with [facility] protocol for alcohol withdrawal  -on PO Ativan taper   -on Thiamin and folic acid    4.  SCD for DVT ppx     5.  Elbow pain - no fracture, use motrin/ofirmev today    discussed with RN

## 2020-03-02 NOTE — PROGRESS NOTE ADULT - SUBJECTIVE AND OBJECTIVE BOX
55 yo Male with h/o CAD (s/p double CABG in 2012; prescribed simvastatin and carvedilol but noncompliant with both) presented with complain of chest pain.  Patient complain of sharp substernal chest pain on and off since yesterday. No chest pain at present time. No sob.  Also complain of left arm pain for months, feels pain primarily in the elbow, fell onto the elbow 1-2 months ago. He has  no fever/cough.  He Quit smoking in 2012, smokes marijuana daily, drinks at least 8 large beers and some liquor daily for years.     3/1 - no cp palps sob; minimal tremulousness, on CIWA protocol  fell 2 weeks ago no LOC and hurt his left elbow, cont to have achy pain    3/2 - still tremulous, last drink sat night     PHYSICAL EXAM:  GENERAL: NAD, able to lie flat in bed  HEAD:  Atraumatic, Normocephalic  EYES: EOMI, PERRLA, normal sclera  ENT: Moist mucous membranes  NECK: Supple, No JVD, no nuchal rigidity  CHEST/LUNG: Clear to auscultation bilaterally; No rales, rhonchi, wheezing, or rubs. Unlabored respirations  HEART: Regular rate and rhythm; No murmurs, rubs, or gallops  ABDOMEN: Bowel sounds present; Soft, Nontender, Nondistended. No hepatomegaly  EXTREMITIES:  no pitting bilaterally  NERVOUS SYSTEM:  Alert & Oriented X3, speech clear. No focal motor or sensory deficits, mild tremulousness persists  MSK: FROM all 4 extremities, full and equal strength    LABS: All Labs Reviewed:  RADIOLOGY/EKG:  tele rev by me shows sinus   < from: Xray Elbow AP + Lateral, Left (02.29.20 @ 23:52) >  IMPRESSION: No fracture, dislocation or effusion. Mild degenerative changes are noted    03-02    137  |  105  |  8   ----------------------------<  106<H>  3.9   |  28  |  0.85    Ca    8.7      02 Mar 2020 07:16  Mg     2.3     03-02    TPro  7.8  /  Alb  3.9  /  TBili  0.3  /  DBili  x   /  AST  83<H>  /  ALT  70  /  AlkPhos  48  02-29    acetaminophen  IVPB .. 1000 milliGRAM(s) IV Intermittent once  aspirin enteric coated 81 milliGRAM(s) Oral daily  carvedilol 6.25 milliGRAM(s) Oral every 12 hours  escitalopram 5 milliGRAM(s) Oral daily  famotidine    Tablet 20 milliGRAM(s) Oral two times a day  folic acid 1 milliGRAM(s) Oral daily  lisinopril 2.5 milliGRAM(s) Oral daily  LORazepam     Tablet 1.5 milliGRAM(s) Oral every 4 hours  LORazepam     Tablet   Oral   LORazepam     Tablet 2 milliGRAM(s) Oral every 2 hours PRN  simvastatin 40 milliGRAM(s) Oral at bedtime  thiamine 100 milliGRAM(s) Oral daily 55 yo Male with h/o CAD (s/p double CABG in 2012; prescribed simvastatin and carvedilol but noncompliant with both) presented with complain of chest pain.  Patient complain of sharp substernal chest pain on and off since yesterday. No chest pain at present time. No sob.  Also complain of left arm pain for months, feels pain primarily in the elbow, fell onto the elbow 1-2 months ago. He has  no fever/cough.  He Quit smoking in 2012, smokes marijuana daily, drinks at least 8 large beers and some liquor daily for years.     3/1 - no cp palps sob; minimal tremulousness, on CIWA protocol  fell 2 weeks ago no LOC and hurt his left elbow, cont to have achy pain    3/2 - still tremulous, last drink sat night     PHYSICAL EXAM:  GENERAL: NAD, able to lie flat in bed  HEAD:  Atraumatic, Normocephalic  EYES: EOMI, PERRLA, normal sclera  ENT: Moist mucous membranes  NECK: Supple, No JVD, no nuchal rigidity  CHEST/LUNG: Clear to auscultation bilaterally; No rales, rhonchi, wheezing, or rubs. Unlabored respirations  HEART: Regular rate and rhythm; No murmurs, rubs, or gallops  ABDOMEN: Bowel sounds present; Soft, Nontender, Nondistended. No hepatomegaly  EXTREMITIES:  no pitting bilaterally  NERVOUS SYSTEM:  Alert & Oriented X3, speech clear. No focal motor or sensory deficits, mild tremulousness persists  MSK: FROM all 4 extremities, full and equal strength    LABS: All Labs Reviewed:  RADIOLOGY/EKG:  tele rev by me shows sinus, normal rates  < from: Xray Elbow AP + Lateral, Left (02.29.20 @ 23:52) >  IMPRESSION: No fracture, dislocation or effusion. Mild degenerative changes are noted    03-02    137  |  105  |  8   ----------------------------<  106<H>  3.9   |  28  |  0.85    Ca    8.7      02 Mar 2020 07:16  Mg     2.3     03-02    TPro  7.8  /  Alb  3.9  /  TBili  0.3  /  DBili  x   /  AST  83<H>  /  ALT  70  /  AlkPhos  48  02-29    acetaminophen  IVPB .. 1000 milliGRAM(s) IV Intermittent once  aspirin enteric coated 81 milliGRAM(s) Oral daily  carvedilol 6.25 milliGRAM(s) Oral every 12 hours  escitalopram 5 milliGRAM(s) Oral daily  famotidine    Tablet 20 milliGRAM(s) Oral two times a day  folic acid 1 milliGRAM(s) Oral daily  lisinopril 2.5 milliGRAM(s) Oral daily  LORazepam     Tablet 1.5 milliGRAM(s) Oral every 4 hours  LORazepam     Tablet   Oral   LORazepam     Tablet 2 milliGRAM(s) Oral every 2 hours PRN  simvastatin 40 milliGRAM(s) Oral at bedtime  thiamine 100 milliGRAM(s) Oral daily

## 2020-03-02 NOTE — PROGRESS NOTE ADULT - ASSESSMENT
Chest pain- Chest pain is atypical in nature. So far cardiac enzymes and EKG did not reveal any ischemia.   Outpt ischemic heart disease evaluation recommended.  f/u with cardiologist as outpt.     Alcohol abuse- tremulous this am.  Monitor for DT.   he signed out AMA in past.  Advised alcohol cessation at length and risks of abuse.     HTN- continue home meds.    CAD s/ pCABG- on ASA, simvastatin. coreg and lisnopril  continue these meds, Bp optimal.    Chronic compensated HFREF- euvolemic, NYHA class II  Continue coreg and lisinopril at current dose.  Last known LVEF 35%.     Other medical issues- Management per primary team.   Thank you for allowing me to participate in the care of this patient. Please feel free to contact me with any questions.

## 2020-03-03 LAB
ANION GAP SERPL CALC-SCNC: 5 MMOL/L — SIGNIFICANT CHANGE UP (ref 5–17)
BUN SERPL-MCNC: 9 MG/DL — SIGNIFICANT CHANGE UP (ref 7–23)
CALCIUM SERPL-MCNC: 9.1 MG/DL — SIGNIFICANT CHANGE UP (ref 8.5–10.1)
CHLORIDE SERPL-SCNC: 107 MMOL/L — SIGNIFICANT CHANGE UP (ref 96–108)
CO2 SERPL-SCNC: 26 MMOL/L — SIGNIFICANT CHANGE UP (ref 22–31)
CREAT SERPL-MCNC: 0.86 MG/DL — SIGNIFICANT CHANGE UP (ref 0.5–1.3)
GLUCOSE SERPL-MCNC: 109 MG/DL — HIGH (ref 70–99)
HCT VFR BLD CALC: 42.3 % — SIGNIFICANT CHANGE UP (ref 39–50)
HGB BLD-MCNC: 14.5 G/DL — SIGNIFICANT CHANGE UP (ref 13–17)
MAGNESIUM SERPL-MCNC: 2.4 MG/DL — SIGNIFICANT CHANGE UP (ref 1.6–2.6)
MCHC RBC-ENTMCNC: 30.1 PG — SIGNIFICANT CHANGE UP (ref 27–34)
MCHC RBC-ENTMCNC: 34.3 GM/DL — SIGNIFICANT CHANGE UP (ref 32–36)
MCV RBC AUTO: 87.8 FL — SIGNIFICANT CHANGE UP (ref 80–100)
PLATELET # BLD AUTO: 205 K/UL — SIGNIFICANT CHANGE UP (ref 150–400)
POTASSIUM SERPL-MCNC: 4 MMOL/L — SIGNIFICANT CHANGE UP (ref 3.5–5.3)
POTASSIUM SERPL-SCNC: 4 MMOL/L — SIGNIFICANT CHANGE UP (ref 3.5–5.3)
RBC # BLD: 4.82 M/UL — SIGNIFICANT CHANGE UP (ref 4.2–5.8)
RBC # FLD: 15 % — HIGH (ref 10.3–14.5)
SODIUM SERPL-SCNC: 138 MMOL/L — SIGNIFICANT CHANGE UP (ref 135–145)
WBC # BLD: 4.69 K/UL — SIGNIFICANT CHANGE UP (ref 3.8–10.5)
WBC # FLD AUTO: 4.69 K/UL — SIGNIFICANT CHANGE UP (ref 3.8–10.5)

## 2020-03-03 PROCEDURE — 99232 SBSQ HOSP IP/OBS MODERATE 35: CPT

## 2020-03-03 RX ORDER — LANOLIN ALCOHOL/MO/W.PET/CERES
5 CREAM (GRAM) TOPICAL AT BEDTIME
Refills: 0 | Status: DISCONTINUED | OUTPATIENT
Start: 2020-03-03 | End: 2020-03-04

## 2020-03-03 RX ORDER — LIDOCAINE 4 G/100G
1 CREAM TOPICAL DAILY
Refills: 0 | Status: DISCONTINUED | OUTPATIENT
Start: 2020-03-03 | End: 2020-03-04

## 2020-03-03 RX ORDER — IBUPROFEN 200 MG
400 TABLET ORAL EVERY 12 HOURS
Refills: 0 | Status: COMPLETED | OUTPATIENT
Start: 2020-03-03 | End: 2020-03-04

## 2020-03-03 RX ADMIN — Medication 1 MILLIGRAM(S): at 13:58

## 2020-03-03 RX ADMIN — CARVEDILOL PHOSPHATE 6.25 MILLIGRAM(S): 80 CAPSULE, EXTENDED RELEASE ORAL at 06:27

## 2020-03-03 RX ADMIN — Medication 1 MILLIGRAM(S): at 02:40

## 2020-03-03 RX ADMIN — Medication 81 MILLIGRAM(S): at 10:35

## 2020-03-03 RX ADMIN — FAMOTIDINE 20 MILLIGRAM(S): 10 INJECTION INTRAVENOUS at 17:09

## 2020-03-03 RX ADMIN — Medication 1 MILLIGRAM(S): at 10:34

## 2020-03-03 RX ADMIN — ESCITALOPRAM OXALATE 5 MILLIGRAM(S): 10 TABLET, FILM COATED ORAL at 10:34

## 2020-03-03 RX ADMIN — Medication 5 MILLIGRAM(S): at 22:20

## 2020-03-03 RX ADMIN — FAMOTIDINE 20 MILLIGRAM(S): 10 INJECTION INTRAVENOUS at 06:27

## 2020-03-03 RX ADMIN — LISINOPRIL 2.5 MILLIGRAM(S): 2.5 TABLET ORAL at 06:27

## 2020-03-03 RX ADMIN — LIDOCAINE 1 PATCH: 4 CREAM TOPICAL at 15:21

## 2020-03-03 RX ADMIN — Medication 100 MILLIGRAM(S): at 10:34

## 2020-03-03 RX ADMIN — LIDOCAINE 1 PATCH: 4 CREAM TOPICAL at 17:08

## 2020-03-03 RX ADMIN — CARVEDILOL PHOSPHATE 6.25 MILLIGRAM(S): 80 CAPSULE, EXTENDED RELEASE ORAL at 17:09

## 2020-03-03 RX ADMIN — Medication 400 MILLIGRAM(S): at 08:58

## 2020-03-03 RX ADMIN — Medication 1 MILLIGRAM(S): at 17:41

## 2020-03-03 RX ADMIN — Medication 1 MILLIGRAM(S): at 22:20

## 2020-03-03 RX ADMIN — SIMVASTATIN 40 MILLIGRAM(S): 20 TABLET, FILM COATED ORAL at 22:54

## 2020-03-03 RX ADMIN — Medication 400 MILLIGRAM(S): at 09:35

## 2020-03-03 RX ADMIN — Medication 400 MILLIGRAM(S): at 18:22

## 2020-03-03 RX ADMIN — Medication 1 MILLIGRAM(S): at 06:27

## 2020-03-03 RX ADMIN — Medication 400 MILLIGRAM(S): at 17:41

## 2020-03-03 NOTE — PROGRESS NOTE ADULT - SUBJECTIVE AND OBJECTIVE BOX
53 yo Male with h/o CAD (s/p double CABG in 2012; prescribed simvastatin and carvedilol but noncompliant with both) presented with complain of chest pain.  Patient complain of sharp substernal chest pain on and off since yesterday. No chest pain at present time. No sob.  Also complain of left arm pain for months, feels pain primarily in the elbow, fell onto the elbow 1-2 months ago. He has  no fever/cough.  He Quit smoking in 2012, smokes marijuana daily, drinks at least 8 large beers and some liquor daily for years.     3/1 - no cp palps sob; minimal tremulousness, on CIWA protocol  fell 2 weeks ago no LOC and hurt his left elbow, cont to have achy pain    3/2 - still tremulous, last drink sat night   3/3 - tremulousness better, left elbow pain better    PHYSICAL EXAM:  GENERAL: NAD, able to lie flat in bed  HEAD:  Atraumatic, Normocephalic  EYES: EOMI, PERRLA, normal sclera  ENT: Moist mucous membranes  NECK: Supple, No JVD, no nuchal rigidity  CHEST/LUNG: Clear to auscultation bilaterally; No rales, rhonchi, wheezing, or rubs. Unlabored respirations  HEART: Regular rate and rhythm; No murmurs, rubs, or gallops  ABDOMEN: Bowel sounds present; Soft, Nontender, Nondistended. No hepatomegaly  EXTREMITIES:  no pitting bilaterally  NERVOUS SYSTEM:  Alert & Oriented X3, speech clear. No focal motor or sensory deficits, mild tremulousness persists  MSK: FROM all 4 extremities, full and equal strength    LABS: All Labs Reviewed:  RADIOLOGY/EKG:  tele rev by me shows sinus, normal rates  < from: Xray Elbow AP + Lateral, Left (02.29.20 @ 23:52) >  IMPRESSION: No fracture, dislocation or effusion. Mild degenerative changes are noted    LABS: All Labs Reviewed:                        14.5   4.69  )-----------( 205      ( 03 Mar 2020 09:02 )             42.3     03-03    138  |  107  |  9   ----------------------------<  109<H>  4.0   |  26  |  0.86    Ca    9.1      03 Mar 2020 09:02  Mg     2.4     03-03      aspirin enteric coated 81 milliGRAM(s) Oral daily  carvedilol 6.25 milliGRAM(s) Oral every 12 hours  escitalopram 5 milliGRAM(s) Oral daily  famotidine    Tablet 20 milliGRAM(s) Oral two times a day  folic acid 1 milliGRAM(s) Oral daily  lidocaine   Patch 1 Patch Transdermal daily  lisinopril 2.5 milliGRAM(s) Oral daily  LORazepam     Tablet   Oral   LORazepam     Tablet 2 milliGRAM(s) Oral every 2 hours PRN  LORazepam     Tablet 1 milliGRAM(s) Oral every 4 hours  simvastatin 40 milliGRAM(s) Oral at bedtime  thiamine 100 milliGRAM(s) Oral daily

## 2020-03-03 NOTE — PROGRESS NOTE ADULT - ASSESSMENT
1.  Chest pain  -follow troponin - NTD  -follow in telemetry unit- no malignant arrrhythmias so far  -discussed with Dr Balderrama - stable on tele, will dc tele   will add pepcid    2.  h/o CAD and s/p CABG x 2  Chronic compensated HFREF- euvolemic, NYHA class II  Last known LVEF 35%.   -continue statin, aspirin, BB, ACEi      3.  Alcohol dependence  Alcohol Abuse, last use 2/29  MJ abuse   -Pt counseled re cessation, rec AA/detox  -follow with [facility] protocol for alcohol withdrawal  -on PO Ativan taper   -on Thiamin and folic acid    4.  SCD for DVT ppx     5.  Elbow pain - no fracture, use motrin/ofirmev today    discussed with RN   [facility] planning - home tomorrow  Pt was counseled re etoh use, plans to join AA, SW to assist

## 2020-03-04 ENCOUNTER — TRANSCRIPTION ENCOUNTER (OUTPATIENT)
Age: 55
End: 2020-03-04

## 2020-03-04 VITALS
SYSTOLIC BLOOD PRESSURE: 150 MMHG | RESPIRATION RATE: 17 BRPM | TEMPERATURE: 98 F | OXYGEN SATURATION: 98 % | DIASTOLIC BLOOD PRESSURE: 87 MMHG | HEART RATE: 63 BPM

## 2020-03-04 PROCEDURE — 72100 X-RAY EXAM L-S SPINE 2/3 VWS: CPT | Mod: 26

## 2020-03-04 PROCEDURE — 99239 HOSP IP/OBS DSCHRG MGMT >30: CPT

## 2020-03-04 PROCEDURE — 72131 CT LUMBAR SPINE W/O DYE: CPT | Mod: 26

## 2020-03-04 RX ORDER — IBUPROFEN 200 MG
400 TABLET ORAL EVERY 12 HOURS
Refills: 0 | Status: DISCONTINUED | OUTPATIENT
Start: 2020-03-04 | End: 2020-03-04

## 2020-03-04 RX ORDER — FAMOTIDINE 10 MG/ML
1 INJECTION INTRAVENOUS
Qty: 14 | Refills: 0
Start: 2020-03-04 | End: 2020-03-17

## 2020-03-04 RX ORDER — LIDOCAINE 4 G/100G
1 CREAM TOPICAL
Qty: 10 | Refills: 0
Start: 2020-03-04 | End: 2020-03-13

## 2020-03-04 RX ADMIN — LISINOPRIL 2.5 MILLIGRAM(S): 2.5 TABLET ORAL at 06:10

## 2020-03-04 RX ADMIN — Medication 1 MILLIGRAM(S): at 11:55

## 2020-03-04 RX ADMIN — Medication 0.5 MILLIGRAM(S): at 17:26

## 2020-03-04 RX ADMIN — FAMOTIDINE 20 MILLIGRAM(S): 10 INJECTION INTRAVENOUS at 17:26

## 2020-03-04 RX ADMIN — Medication 100 MILLIGRAM(S): at 11:56

## 2020-03-04 RX ADMIN — Medication 400 MILLIGRAM(S): at 14:40

## 2020-03-04 RX ADMIN — Medication 40 MILLIGRAM(S): at 11:56

## 2020-03-04 RX ADMIN — CARVEDILOL PHOSPHATE 6.25 MILLIGRAM(S): 80 CAPSULE, EXTENDED RELEASE ORAL at 06:10

## 2020-03-04 RX ADMIN — Medication 400 MILLIGRAM(S): at 14:12

## 2020-03-04 RX ADMIN — LIDOCAINE 1 PATCH: 4 CREAM TOPICAL at 17:25

## 2020-03-04 RX ADMIN — Medication 0.5 MILLIGRAM(S): at 02:21

## 2020-03-04 RX ADMIN — Medication 0.5 MILLIGRAM(S): at 06:10

## 2020-03-04 RX ADMIN — ESCITALOPRAM OXALATE 5 MILLIGRAM(S): 10 TABLET, FILM COATED ORAL at 11:56

## 2020-03-04 RX ADMIN — Medication 81 MILLIGRAM(S): at 11:55

## 2020-03-04 RX ADMIN — Medication 0.5 MILLIGRAM(S): at 14:11

## 2020-03-04 RX ADMIN — CARVEDILOL PHOSPHATE 6.25 MILLIGRAM(S): 80 CAPSULE, EXTENDED RELEASE ORAL at 17:26

## 2020-03-04 RX ADMIN — Medication 0.5 MILLIGRAM(S): at 11:55

## 2020-03-04 RX ADMIN — LIDOCAINE 1 PATCH: 4 CREAM TOPICAL at 11:56

## 2020-03-04 RX ADMIN — FAMOTIDINE 20 MILLIGRAM(S): 10 INJECTION INTRAVENOUS at 06:11

## 2020-03-04 NOTE — PROVIDER CONTACT NOTE (OTHER) - ACTION/TREATMENT ORDERED:
Motrin 400 mg PO q12h PRN for mild pain
Lidocaine topical daily, first dose now. MD to place XR order.

## 2020-03-04 NOTE — DISCHARGE NOTE PROVIDER - HOSPITAL COURSE
CC: 55 yo Male with h/o CAD (s/p double CABG in 2012; prescribed simvastatin and carvedilol but noncompliant with both) presented with complain of chest pain.  Patient complain of sharp substernal chest pain on and off since yesterday. No chest pain at present time. No sob.  Also complain of left arm pain for months, feels pain primarily in the elbow, fell onto the elbow 1-2 months ago. He has  no fever/cough.  He Quit smoking in 2012, smokes marijuana daily, drinks at least 8 large beers and some liquor daily for years.     HOSPITAL COURSE: Pt was admitted to tele  given h/o CAD s/p CABG. cardiac enzymes are negative and tele was negative for malignant arrhythmias, and he was continued on ASA, simvastatin. coreg and lisnopril.     Pt has Chronic compensated HFREF- euvolemic, NYHA class II, last known LVEF 35%. Stable on tele, CP thought to be atypical in the setting of etoh abuse, could be acute gastritis and pt was also on CIWA protocol.             3/1 - no cp palps sob; minimal tremulousness, on CIWA protocol    fell 2 weeks ago no LOC and hurt his left elbow, cont to have achy pain        3/2 - still tremulous, last drink sat night     3/3 - tremulousness better, left elbow pain better    PHYSICAL EXAM:    GENERAL: NAD, able to lie flat in bed    HEAD:  Atraumatic, Normocephalic    EYES: EOMI, PERRLA, normal sclera    ENT: Moist mucous membranes    NECK: Supple, No JVD, no nuchal rigidity    CHEST/LUNG: Clear to auscultation bilaterally; No rales, rhonchi, wheezing, or rubs. Unlabored respirations    HEART: Regular rate and rhythm; No murmurs, rubs, or gallops    ABDOMEN: Bowel sounds present; Soft, Nontender, Nondistended. No hepatomegaly    EXTREMITIES:  no pitting bilaterally    NERVOUS SYSTEM:  Alert & Oriented X3, speech clear. No focal motor or sensory deficits, mild tremulousness persists    MSK: FROM all 4 extremities, full and equal strength        LABS: All Labs Reviewed:    RADIOLOGY/EKG:    tele rev by me shows sinus, normal rates    < from: Xray Elbow AP + Lateral, Left (02.29.20 @ 23:52) >    IMPRESSION: No fracture, dislocation or effusion. Mild degenerative changes are noted    LABS: All Labs Reviewed:                        14.5     4.69  )-----------( 205      ( 03 Mar 2020 09:02 )               42.3     < from: CT Lumbar Spine No Cont (03.04.20 @ 11:30) >    No acute fracture. There is multilevel disc bulging from T12/L1 through L5/S1. MRI is a more sensitive evaluation for degenerative disc disease                        A/P    Chest pain    -follow troponin - NTD    -follow in telemetry unit- no malignant arrhythmias so far    -discussed with Dr Balderrama - stable on tele, will dc tele     will add pepcid        2.    h/o CAD and s/p CABG x 2    Chronic compensated HFREF- euvolemic, NYHA class II    Last known LVEF 35%.     -continue statin, aspirin, BB, ACEi            3.    Alcohol dependence    Alcohol Abuse, last use 2/29    MJ abuse     -Pt counseled re cessation, rec AA/detox    -follow with [facility] protocol for alcohol withdrawal    -on PO Ativan taper     -on Thiamin and folic acid    4.    SCD for DVT ppx         5.    Elbow pain - no fracture, use motrin/ofirmev today        discussed with RN     [facility] planning - home tomorrow    Pt was counseled re etoh use, plans to join AA, SW to assist CC: 55 yo Male with h/o CAD (s/p double CABG in 2012; prescribed simvastatin and carvedilol but noncompliant with both) presented with complain of chest pain.  Patient complain of sharp substernal chest pain on and off since yesterday. No chest pain at present time. No sob.  Also complain of left arm pain for months, feels pain primarily in the elbow, fell onto the elbow 1-2 months ago. He has  no fever/cough.  He Quit smoking in 2012, smokes marijuana daily, drinks at least 8 large beers and some liquor daily for years.         HOSPITAL COURSE: Pt was admitted to tele  given h/o CAD s/p CABG. cardiac enzymes are negative and tele was negative for malignant arrhythmias, and he was continued on ASA, simvastatin. coreg and lisnopril.     Pt has Chronic compensated HFREF- euvolemic, NYHA class II, last known LVEF 35%. Stable on tele, CP thought to be atypical in the setting of etoh abuse, could be mild acute gastritis and pt was also on CIWA protocol.     Elbow, no fracture, pain better with motrin. This morning pt felt a sharp low back pain and a CT spine was done, and  multilevel disc bulging from T12/L1 through L5/S1 was noted without acute fracture. Pt feels better with prednisone and took a shower and is ambulating and feeling better. Will dc home and he will follow-up with his PMD/Dr Cullen.     Details below.        SUB:    3/1 - no cp palps sob; minimal tremulousness, on CIWA protocol    fell 2 weeks ago no LOC and hurt his left elbow, cont to have achy pain    3/2 - still tremulous, last drink sat night     3/3 - tremulousness better, left elbow pain better    3/4 - felt a sharp pain low back, achy, SLRT is negative, ambualting with some pain better with prednisone    PHYSICAL EXAM:    GENERAL: NAD, able to lie flat in bed    HEAD:  Atraumatic, Normocephalic    EYES: EOMI, PERRLA, normal sclera    ENT: Moist mucous membranes    NECK: Supple, No JVD, no nuchal rigidity    CHEST/LUNG: Clear to auscultation bilaterally; No rales, rhonchi, wheezing, or rubs. Unlabored respirations    HEART: Regular rate and rhythm; No murmurs, rubs, or gallops    ABDOMEN: Bowel sounds present; Soft, Nontender, Nondistended. No hepatomegaly    EXTREMITIES:  no pitting bilaterally    NERVOUS SYSTEM:  Alert & Oriented X3, speech clear. No focal motor or sensory deficits, mild tremulousness persists    MSK: FROM all 4 extremities, full and equal strength, SLRT negative    RADIOLOGY/EKG:    tele rev by me shows sinus, normal rates    < from: Xray Elbow AP + Lateral, Left (02.29.20 @ 23:52) >    IMPRESSION: No fracture, dislocation or effusion. Mild degenerative changes are noted    LABS: All Labs Reviewed:                        14.5     4.69  )-----------( 205      ( 03 Mar 2020 09:02 )               42.3     < from: CT Lumbar Spine No Cont (03.04.20 @ 11:30) >    No acute fracture. There is multilevel disc bulging from T12/L1 through L5/S1. MRI is a more sensitive evaluation for degenerative disc disease        A/P    Chest pain    -follow troponin - NTD    -follow in telemetry unit- no malignant arrhythmias so far    -discussed with Dr Balderrama - stable on tele, will dc tele     will add pepcid for probable mild acute etoh-induced gastritis    h/o CAD and s/p CABG x 2    Chronic compensated HFREF- euvolemic, NYHA class II    Last known LVEF 35%.     -continue statin, aspirin, BB, ACEi        Alcohol dependence    Alcohol Abuse, last use 2/29    MJ abuse     -Pt counseled re cessation, rec AA/detox    -follow with [facility] protocol for alcohol withdrawal    -on PO Ativan taper     -on Thiamin and folic acid        Elbow pain - no fracture, use motrin/ofirmev today    Acute back pain - CT spine was done, and  multilevel disc bulging from T12/L1 through L5/S1 was noted without acute fracture. Pt feels better with prednisone and took a shower and is ambulating and feeling better        discussed with RN, pt counseled    DC planning - home today    time for discharge - 55 mins

## 2020-03-04 NOTE — DISCHARGE NOTE NURSING/CASE MANAGEMENT/SOCIAL WORK - PATIENT PORTAL LINK FT
You can access the FollowMyHealth Patient Portal offered by [facility] by registering at the following website: [email or web address]. By joining [facility]’s FollowMyHealth portal, you will also be able to view your health information using other applications (apps) compatible with our system.

## 2020-03-04 NOTE — DISCHARGE NOTE PROVIDER - CARE PROVIDER_API CALL
Yunier Cullen)  Internal Medicine; Pulmonary Disease  [address] [place] [address]  Phone: [phone]  Fax: [phone]  Follow Up Time: 1 week    Chey Abdalla)  Cardiovascular Disease; Interventional Cardiology  [address] [place] [address]  Phone: [phone]  Fax: [phone]  Follow Up Time:

## 2020-03-04 NOTE — DISCHARGE NOTE PROVIDER - CARE PROVIDERS DIRECT ADDRESSES
[email or web address]

## 2020-03-04 NOTE — PROVIDER CONTACT NOTE (OTHER) - SITUATION
Faxed DC papers to PCP
DR. AB GARCIA, SPOKE WITH SHAUN FROM MD'S OFFICE. SHE WILL INFORM PCP, PATIENT IS ADMITTED TO . PLEASE FAX DISCHARGE PROVIDER NOTE AND SUMMARY -[phone]
Pt. c/o back pain that started acutely when he bent over to  a towel. Pointing to the lumbar spine. States he doesn't remember if he fell on his back when he fell two weeks ago.
Pt. c/o pain to lower back and left elbow
consult called to answering service

## 2020-03-04 NOTE — DISCHARGE NOTE PROVIDER - NSDCMRMEDTOKEN_GEN_ALL_CORE_FT
aspirin 81 mg oral tablet: 1 tab(s) orally once a day   carvedilol 12.5 mg oral tablet: 0.5 tab(s) orally 2 times a day   escitalopram 5 mg oral tablet: 1 tab(s) orally once a day  folic acid 1 mg oral tablet: 1 tab(s) orally once a day  lisinopril 5 mg oral tablet: 0.5 tab(s) orally once a day   simvastatin 40 mg oral tablet: 1 tab(s) orally once a day (at bedtime)   thiamine 100 mg oral tablet: 1 tab(s) orally once a day aspirin 81 mg oral tablet: 1 tab(s) orally once a day   carvedilol 12.5 mg oral tablet: 0.5 tab(s) orally 2 times a day   escitalopram 5 mg oral tablet: 1 tab(s) orally once a day  famotidine 20 mg oral tablet: 1 tab(s) orally once a day   folic acid 1 mg oral tablet: 1 tab(s) orally once a day  lidocaine 5% topical film: Apply topically to affected area once a day   lisinopril 5 mg oral tablet: 0.5 tab(s) orally once a day   predniSONE 20 mg oral tablet: 2 tab(s) orally daily x 2 days  1 tab(s) orally daily x 2 days  half tablet orally daily for 2 days  simvastatin 40 mg oral tablet: 1 tab(s) orally once a day (at bedtime)   thiamine 100 mg oral tablet: 1 tab(s) orally once a day

## 2020-03-05 ENCOUNTER — INPATIENT (INPATIENT)
Facility: HOSPITAL | Age: 55
LOS: 2 days | Discharge: HOME | DRG: 351 | End: 2020-03-08
Attending: HOSPITALIST | Admitting: INTERNAL MEDICINE
Payer: MEDICAID

## 2020-03-05 VITALS — HEIGHT: 68 IN | WEIGHT: 190.04 LBS

## 2020-03-05 DIAGNOSIS — Z95.1 PRESENCE OF AORTOCORONARY BYPASS GRAFT: Chronic | ICD-10-CM

## 2020-03-05 LAB
ALBUMIN SERPL ELPH-MCNC: 4 G/DL — SIGNIFICANT CHANGE UP (ref 3.3–5)
ALP SERPL-CCNC: 48 U/L — SIGNIFICANT CHANGE UP (ref 40–120)
ALT FLD-CCNC: 74 U/L — SIGNIFICANT CHANGE UP (ref 12–78)
ANION GAP SERPL CALC-SCNC: 9 MMOL/L — SIGNIFICANT CHANGE UP (ref 5–17)
AST SERPL-CCNC: 55 U/L — HIGH (ref 15–37)
BASOPHILS # BLD AUTO: 0.07 K/UL — SIGNIFICANT CHANGE UP (ref 0–0.2)
BASOPHILS NFR BLD AUTO: 0.8 % — SIGNIFICANT CHANGE UP (ref 0–2)
BILIRUB SERPL-MCNC: 0.3 MG/DL — SIGNIFICANT CHANGE UP (ref 0.2–1.2)
BUN SERPL-MCNC: 14 MG/DL — SIGNIFICANT CHANGE UP (ref 7–23)
CALCIUM SERPL-MCNC: 8.3 MG/DL — LOW (ref 8.5–10.1)
CHLORIDE SERPL-SCNC: 102 MMOL/L — SIGNIFICANT CHANGE UP (ref 96–108)
CO2 SERPL-SCNC: 22 MMOL/L — SIGNIFICANT CHANGE UP (ref 22–31)
CREAT SERPL-MCNC: 1.18 MG/DL — SIGNIFICANT CHANGE UP (ref 0.5–1.3)
EOSINOPHIL # BLD AUTO: 0.09 K/UL — SIGNIFICANT CHANGE UP (ref 0–0.5)
EOSINOPHIL NFR BLD AUTO: 1 % — SIGNIFICANT CHANGE UP (ref 0–6)
ETHANOL SERPL-MCNC: 293 MG/DL — HIGH (ref 0–10)
GLUCOSE SERPL-MCNC: 119 MG/DL — HIGH (ref 70–99)
HCT VFR BLD CALC: 38.5 % — LOW (ref 39–50)
HGB BLD-MCNC: 12.9 G/DL — LOW (ref 13–17)
IMM GRANULOCYTES NFR BLD AUTO: 0.2 % — SIGNIFICANT CHANGE UP (ref 0–1.5)
LYMPHOCYTES # BLD AUTO: 4.51 K/UL — HIGH (ref 1–3.3)
LYMPHOCYTES # BLD AUTO: 49 % — HIGH (ref 13–44)
MAGNESIUM SERPL-MCNC: 2.3 MG/DL — SIGNIFICANT CHANGE UP (ref 1.6–2.6)
MCHC RBC-ENTMCNC: 29.6 PG — SIGNIFICANT CHANGE UP (ref 27–34)
MCHC RBC-ENTMCNC: 33.5 GM/DL — SIGNIFICANT CHANGE UP (ref 32–36)
MCV RBC AUTO: 88.3 FL — SIGNIFICANT CHANGE UP (ref 80–100)
MONOCYTES # BLD AUTO: 0.7 K/UL — SIGNIFICANT CHANGE UP (ref 0–0.9)
MONOCYTES NFR BLD AUTO: 7.6 % — SIGNIFICANT CHANGE UP (ref 2–14)
NEUTROPHILS # BLD AUTO: 3.81 K/UL — SIGNIFICANT CHANGE UP (ref 1.8–7.4)
NEUTROPHILS NFR BLD AUTO: 41.4 % — LOW (ref 43–77)
NT-PROBNP SERPL-SCNC: 60 PG/ML — SIGNIFICANT CHANGE UP (ref 0–125)
PCP SPEC-MCNC: SIGNIFICANT CHANGE UP
PLATELET # BLD AUTO: 191 K/UL — SIGNIFICANT CHANGE UP (ref 150–400)
POTASSIUM SERPL-MCNC: 2.9 MMOL/L — CRITICAL LOW (ref 3.5–5.3)
POTASSIUM SERPL-SCNC: 2.9 MMOL/L — CRITICAL LOW (ref 3.5–5.3)
PROT SERPL-MCNC: 7.9 GM/DL — SIGNIFICANT CHANGE UP (ref 6–8.3)
RBC # BLD: 4.36 M/UL — SIGNIFICANT CHANGE UP (ref 4.2–5.8)
RBC # FLD: 14.8 % — HIGH (ref 10.3–14.5)
SODIUM SERPL-SCNC: 133 MMOL/L — LOW (ref 135–145)
TROPONIN I SERPL-MCNC: <0.015 NG/ML — SIGNIFICANT CHANGE UP (ref 0.01–0.04)
WBC # BLD: 9.2 K/UL — SIGNIFICANT CHANGE UP (ref 3.8–10.5)
WBC # FLD AUTO: 9.2 K/UL — SIGNIFICANT CHANGE UP (ref 3.8–10.5)

## 2020-03-05 PROCEDURE — 85730 THROMBOPLASTIN TIME PARTIAL: CPT

## 2020-03-05 PROCEDURE — 71275 CT ANGIOGRAPHY CHEST: CPT

## 2020-03-05 PROCEDURE — 80061 LIPID PANEL: CPT

## 2020-03-05 PROCEDURE — 93010 ELECTROCARDIOGRAM REPORT: CPT

## 2020-03-05 PROCEDURE — 84484 ASSAY OF TROPONIN QUANT: CPT

## 2020-03-05 PROCEDURE — 36415 COLL VENOUS BLD VENIPUNCTURE: CPT

## 2020-03-05 PROCEDURE — 83735 ASSAY OF MAGNESIUM: CPT

## 2020-03-05 PROCEDURE — 93970 EXTREMITY STUDY: CPT

## 2020-03-05 PROCEDURE — 85027 COMPLETE CBC AUTOMATED: CPT

## 2020-03-05 PROCEDURE — 71046 X-RAY EXAM CHEST 2 VIEWS: CPT | Mod: 26

## 2020-03-05 PROCEDURE — 80048 BASIC METABOLIC PNL TOTAL CA: CPT

## 2020-03-05 PROCEDURE — 93005 ELECTROCARDIOGRAM TRACING: CPT

## 2020-03-05 PROCEDURE — 74174 CTA ABD&PLVS W/CONTRAST: CPT | Mod: 26

## 2020-03-05 PROCEDURE — 71275 CT ANGIOGRAPHY CHEST: CPT | Mod: 26

## 2020-03-05 RX ORDER — POTASSIUM CHLORIDE 20 MEQ
40 PACKET (EA) ORAL ONCE
Refills: 0 | Status: COMPLETED | OUTPATIENT
Start: 2020-03-05 | End: 2020-03-05

## 2020-03-05 RX ORDER — THIAMINE MONONITRATE (VIT B1) 100 MG
1 TABLET ORAL
Qty: 0 | Refills: 0 | DISCHARGE

## 2020-03-05 RX ORDER — POTASSIUM CHLORIDE 20 MEQ
10 PACKET (EA) ORAL ONCE
Refills: 0 | Status: COMPLETED | OUTPATIENT
Start: 2020-03-05 | End: 2020-03-05

## 2020-03-05 RX ADMIN — Medication 100 MILLIEQUIVALENT(S): at 22:29

## 2020-03-05 RX ADMIN — Medication 40 MILLIEQUIVALENT(S): at 22:30

## 2020-03-05 NOTE — ED PROVIDER NOTE - PROGRESS NOTE DETAILS
Maxx Zamora for attending Dr. Franco: CTA verbal report, left lower lobe pulmonary artery small PE, no aortic dissection, negative additional abdominal pathology, written report to follow.

## 2020-03-05 NOTE — ED PROVIDER NOTE - GASTROINTESTINAL, MLM
05/03/18 0945   Clinical Encounter Type   Visited With Patient and family together  (spouse and family)   Routine Visit (Responded to the consult)   Continue Visiting (Empowered to call  as needed)   Patient's Supportive Strategies/Resources Rose Abdomen soft, non-tender Abdomen soft, non-tender, BS+

## 2020-03-05 NOTE — ED ADULT NURSE NOTE - OBJECTIVE STATEMENT
Pt on stretcher, alert and oriented x 3.  Pt c/o chest pressure.  Denies SOB.  Pt on cardiac monitor, NSR.  SpO2 96%.  Pt respirations even and unlabored.  Abdomen soft, non tender.  Skin warm, dry, and appropriate color for age and race.  Pt denies any other complaints at this time.  Pt is weepy, says he tried not to drink alcohol this week.  Pt sts "I have a lot of troubles"

## 2020-03-05 NOTE — ED ADULT NURSE NOTE - NSIMPLEMENTINTERV_GEN_ALL_ED
Implemented All Fall Risk Interventions:  [place] to call system. Call bell, personal items and telephone within reach. Instruct patient to call for assistance. Room bathroom lighting operational. Non-slip footwear when patient is off stretcher. Physically safe environment: no spills, clutter or unnecessary equipment. Stretcher in lowest position, wheels locked, appropriate side rails in place. Provide visual cue, wrist band, yellow gown, etc. Monitor gait and stability. Monitor for mental status changes and reorient to person, place, and time. Review medications for side effects contributing to fall risk. Reinforce activity limits and safety measures with patient and family.

## 2020-03-05 NOTE — ED PROVIDER NOTE - OBJECTIVE STATEMENT
55 yo AA M, PMH of CAD, s/p CABG, EtOH abuse, s/p inpt eval this past week for cp & D/C'ed yesterday (Dx atypical cp), ambulatory to ED c/o'ing 2 days L-sided pleuritic chest tightness, mild - moderate severity.  Associated L arm pain, diaphoresis, CONRAD, N.  No F/C, LOC.  + EtOH intake this afternoon.

## 2020-03-05 NOTE — ED ADULT TRIAGE NOTE - CHIEF COMPLAINT QUOTE
Patient experiencing chest pain. Pt states he was discharged yesterday from hospital. Discharge paperwork states diagnosis of alcohol withdrawal.

## 2020-03-05 NOTE — ED PROVIDER NOTE - CLINICAL SUMMARY MEDICAL DECISION MAKING FREE TEXT BOX
53 yo AA M, PMH of CAD, s/p CABG, EtOH abuse, s/p inpt eval this past week for cp & D/C'ed yesterday (Dx atypical cp), ambulatory to ED c/o'ing 2 days L-sided pleuritic chest tightness, mild - moderate severity.  EKG with new TWI lat lds.  Plan:  EKG, labs incl. serial troponin.  CTA chest/Abd/pelvis.  IVF, monitor, observe, reassess.

## 2020-03-05 NOTE — ED PROVIDER NOTE - CONSTITUTIONAL, MLM
normal... Well appearing, awake, alert, oriented to person, place, time/situation and in no apparent distress. AA M, awake, alert, oriented to person, place, time/situation and in no apparent distress.

## 2020-03-05 NOTE — ED PROVIDER NOTE - CRITICAL CARE INDICATION, MLM
patient was critically ill... Patient was critically ill with a high probability of imminent or life threatening deterioration.  Pt with acute PE requiring anticoagulation.

## 2020-03-05 NOTE — ED PROVIDER NOTE - MUSCULOSKELETAL, MLM
Spine appears normal, range of motion is not limited, no muscle or joint tenderness Spine appears normal, range of motion is not limited, no muscle or joint tenderness.  RINCON x 4, no focal extremity swelling.

## 2020-03-05 NOTE — ED STATDOCS - PROGRESS NOTE DETAILS
Maxx Barclay for attending Dr. Bettencourt: 55 y/o male with a PMHx of CAD s/p CABG, EtOH abuse, HLD, HTN, presents to the ED c/o CP. Pt was d/c yesterday from  after being admitted for CP. Pt has negative workup and was believed atypical CP in setting of EtOH abuse and could be gastritis. Pt reports he had a syncopal episode today then had CP. EtOH use today (1 beer). No other complaints at this time. Pt with new T wave inversions lateral leads.  Will send pt to main ED for further evaluation.

## 2020-03-06 ENCOUNTER — TRANSCRIPTION ENCOUNTER (OUTPATIENT)
Age: 55
End: 2020-03-06

## 2020-03-06 DIAGNOSIS — I26.99 OTHER PULMONARY EMBOLISM WITHOUT ACUTE COR PULMONALE: ICD-10-CM

## 2020-03-06 LAB
ANION GAP SERPL CALC-SCNC: 6 MMOL/L — SIGNIFICANT CHANGE UP (ref 5–17)
APTT BLD: 158.8 SEC — CRITICAL HIGH (ref 27.5–36.3)
APTT BLD: 29.4 SEC — SIGNIFICANT CHANGE UP (ref 27.5–36.3)
APTT BLD: > 200 SEC (ref 27.5–36.3)
BUN SERPL-MCNC: 9 MG/DL — SIGNIFICANT CHANGE UP (ref 7–23)
CALCIUM SERPL-MCNC: 8 MG/DL — LOW (ref 8.5–10.1)
CHLORIDE SERPL-SCNC: 112 MMOL/L — HIGH (ref 96–108)
CO2 SERPL-SCNC: 24 MMOL/L — SIGNIFICANT CHANGE UP (ref 22–31)
CREAT SERPL-MCNC: 0.78 MG/DL — SIGNIFICANT CHANGE UP (ref 0.5–1.3)
GLUCOSE SERPL-MCNC: 89 MG/DL — SIGNIFICANT CHANGE UP (ref 70–99)
HCT VFR BLD CALC: 37.4 % — LOW (ref 39–50)
HGB BLD-MCNC: 12 G/DL — LOW (ref 13–17)
INR BLD: 1.02 RATIO — SIGNIFICANT CHANGE UP (ref 0.88–1.16)
MAGNESIUM SERPL-MCNC: 2.2 MG/DL — SIGNIFICANT CHANGE UP (ref 1.6–2.6)
MCHC RBC-ENTMCNC: 28.6 PG — SIGNIFICANT CHANGE UP (ref 27–34)
MCHC RBC-ENTMCNC: 32.1 GM/DL — SIGNIFICANT CHANGE UP (ref 32–36)
MCV RBC AUTO: 89.3 FL — SIGNIFICANT CHANGE UP (ref 80–100)
PLATELET # BLD AUTO: 179 K/UL — SIGNIFICANT CHANGE UP (ref 150–400)
POTASSIUM SERPL-MCNC: 3.7 MMOL/L — SIGNIFICANT CHANGE UP (ref 3.5–5.3)
POTASSIUM SERPL-SCNC: 3.7 MMOL/L — SIGNIFICANT CHANGE UP (ref 3.5–5.3)
PROTHROM AB SERPL-ACNC: 11.3 SEC — SIGNIFICANT CHANGE UP (ref 10–12.9)
RBC # BLD: 4.19 M/UL — LOW (ref 4.2–5.8)
RBC # FLD: 15.2 % — HIGH (ref 10.3–14.5)
SODIUM SERPL-SCNC: 142 MMOL/L — SIGNIFICANT CHANGE UP (ref 135–145)
TROPONIN I SERPL-MCNC: <0.015 NG/ML — SIGNIFICANT CHANGE UP (ref 0.01–0.04)
WBC # BLD: 7.5 K/UL — SIGNIFICANT CHANGE UP (ref 3.8–10.5)
WBC # FLD AUTO: 7.5 K/UL — SIGNIFICANT CHANGE UP (ref 3.8–10.5)

## 2020-03-06 PROCEDURE — 12345: CPT | Mod: NC

## 2020-03-06 PROCEDURE — 99223 1ST HOSP IP/OBS HIGH 75: CPT

## 2020-03-06 PROCEDURE — 93010 ELECTROCARDIOGRAM REPORT: CPT

## 2020-03-06 RX ORDER — FAMOTIDINE 10 MG/ML
20 INJECTION INTRAVENOUS ONCE
Refills: 0 | Status: DISCONTINUED | OUTPATIENT
Start: 2020-03-06 | End: 2020-03-08

## 2020-03-06 RX ORDER — LISINOPRIL 2.5 MG/1
2.5 TABLET ORAL DAILY
Refills: 0 | Status: DISCONTINUED | OUTPATIENT
Start: 2020-03-06 | End: 2020-03-08

## 2020-03-06 RX ORDER — FOLIC ACID 0.8 MG
1 TABLET ORAL DAILY
Refills: 0 | Status: DISCONTINUED | OUTPATIENT
Start: 2020-03-06 | End: 2020-03-08

## 2020-03-06 RX ORDER — SIMVASTATIN 20 MG/1
40 TABLET, FILM COATED ORAL AT BEDTIME
Refills: 0 | Status: DISCONTINUED | OUTPATIENT
Start: 2020-03-06 | End: 2020-03-08

## 2020-03-06 RX ORDER — HEPARIN SODIUM 5000 [USP'U]/ML
6500 INJECTION INTRAVENOUS; SUBCUTANEOUS ONCE
Refills: 0 | Status: COMPLETED | OUTPATIENT
Start: 2020-03-06 | End: 2020-03-06

## 2020-03-06 RX ORDER — CARVEDILOL PHOSPHATE 80 MG/1
6.25 CAPSULE, EXTENDED RELEASE ORAL EVERY 12 HOURS
Refills: 0 | Status: DISCONTINUED | OUTPATIENT
Start: 2020-03-06 | End: 2020-03-08

## 2020-03-06 RX ORDER — THIAMINE MONONITRATE (VIT B1) 100 MG
100 TABLET ORAL DAILY
Refills: 0 | Status: DISCONTINUED | OUTPATIENT
Start: 2020-03-06 | End: 2020-03-08

## 2020-03-06 RX ORDER — MORPHINE SULFATE 50 MG/1
2 CAPSULE, EXTENDED RELEASE ORAL ONCE
Refills: 0 | Status: DISCONTINUED | OUTPATIENT
Start: 2020-03-06 | End: 2020-03-06

## 2020-03-06 RX ORDER — ONDANSETRON 8 MG/1
4 TABLET, FILM COATED ORAL ONCE
Refills: 0 | Status: DISCONTINUED | OUTPATIENT
Start: 2020-03-06 | End: 2020-03-08

## 2020-03-06 RX ORDER — APIXABAN 2.5 MG/1
2 TABLET, FILM COATED ORAL
Qty: 70 | Refills: 0
Start: 2020-03-06 | End: 2020-03-12

## 2020-03-06 RX ORDER — FAMOTIDINE 10 MG/ML
20 INJECTION INTRAVENOUS
Refills: 0 | Status: DISCONTINUED | OUTPATIENT
Start: 2020-03-06 | End: 2020-03-08

## 2020-03-06 RX ORDER — ESCITALOPRAM OXALATE 10 MG/1
5 TABLET, FILM COATED ORAL DAILY
Refills: 0 | Status: DISCONTINUED | OUTPATIENT
Start: 2020-03-06 | End: 2020-03-08

## 2020-03-06 RX ORDER — HEPARIN SODIUM 5000 [USP'U]/ML
3000 INJECTION INTRAVENOUS; SUBCUTANEOUS EVERY 6 HOURS
Refills: 0 | Status: DISCONTINUED | OUTPATIENT
Start: 2020-03-06 | End: 2020-03-07

## 2020-03-06 RX ORDER — HEPARIN SODIUM 5000 [USP'U]/ML
INJECTION INTRAVENOUS; SUBCUTANEOUS
Qty: 25000 | Refills: 0 | Status: DISCONTINUED | OUTPATIENT
Start: 2020-03-06 | End: 2020-03-07

## 2020-03-06 RX ORDER — ASPIRIN/CALCIUM CARB/MAGNESIUM 324 MG
81 TABLET ORAL DAILY
Refills: 0 | Status: DISCONTINUED | OUTPATIENT
Start: 2020-03-06 | End: 2020-03-08

## 2020-03-06 RX ORDER — SIMVASTATIN 20 MG/1
1 TABLET, FILM COATED ORAL
Qty: 0 | Refills: 0 | DISCHARGE

## 2020-03-06 RX ORDER — HEPARIN SODIUM 5000 [USP'U]/ML
6500 INJECTION INTRAVENOUS; SUBCUTANEOUS EVERY 6 HOURS
Refills: 0 | Status: DISCONTINUED | OUTPATIENT
Start: 2020-03-06 | End: 2020-03-07

## 2020-03-06 RX ADMIN — Medication 1 MILLIGRAM(S): at 18:57

## 2020-03-06 RX ADMIN — Medication 100 MILLIGRAM(S): at 05:52

## 2020-03-06 RX ADMIN — FAMOTIDINE 20 MILLIGRAM(S): 10 INJECTION INTRAVENOUS at 18:56

## 2020-03-06 RX ADMIN — MORPHINE SULFATE 2 MILLIGRAM(S): 50 CAPSULE, EXTENDED RELEASE ORAL at 05:17

## 2020-03-06 RX ADMIN — ESCITALOPRAM OXALATE 5 MILLIGRAM(S): 10 TABLET, FILM COATED ORAL at 21:25

## 2020-03-06 RX ADMIN — HEPARIN SODIUM 0 UNIT(S)/HR: 5000 INJECTION INTRAVENOUS; SUBCUTANEOUS at 08:55

## 2020-03-06 RX ADMIN — HEPARIN SODIUM 1200 UNIT(S)/HR: 5000 INJECTION INTRAVENOUS; SUBCUTANEOUS at 10:26

## 2020-03-06 RX ADMIN — HEPARIN SODIUM 900 UNIT(S)/HR: 5000 INJECTION INTRAVENOUS; SUBCUTANEOUS at 20:01

## 2020-03-06 RX ADMIN — Medication 10 MILLIEQUIVALENT(S): at 01:02

## 2020-03-06 RX ADMIN — CARVEDILOL PHOSPHATE 6.25 MILLIGRAM(S): 80 CAPSULE, EXTENDED RELEASE ORAL at 05:52

## 2020-03-06 RX ADMIN — HEPARIN SODIUM 6500 UNIT(S): 5000 INJECTION INTRAVENOUS; SUBCUTANEOUS at 01:54

## 2020-03-06 RX ADMIN — SIMVASTATIN 40 MILLIGRAM(S): 20 TABLET, FILM COATED ORAL at 21:03

## 2020-03-06 RX ADMIN — Medication 81 MILLIGRAM(S): at 18:56

## 2020-03-06 RX ADMIN — HEPARIN SODIUM 0 UNIT(S)/HR: 5000 INJECTION INTRAVENOUS; SUBCUTANEOUS at 18:55

## 2020-03-06 RX ADMIN — CARVEDILOL PHOSPHATE 6.25 MILLIGRAM(S): 80 CAPSULE, EXTENDED RELEASE ORAL at 18:56

## 2020-03-06 RX ADMIN — LISINOPRIL 2.5 MILLIGRAM(S): 2.5 TABLET ORAL at 05:51

## 2020-03-06 RX ADMIN — FAMOTIDINE 20 MILLIGRAM(S): 10 INJECTION INTRAVENOUS at 05:52

## 2020-03-06 RX ADMIN — MORPHINE SULFATE 2 MILLIGRAM(S): 50 CAPSULE, EXTENDED RELEASE ORAL at 04:47

## 2020-03-06 RX ADMIN — HEPARIN SODIUM 1500 UNIT(S)/HR: 5000 INJECTION INTRAVENOUS; SUBCUTANEOUS at 01:54

## 2020-03-06 NOTE — DISCHARGE NOTE PROVIDER - CARE PROVIDER_API CALL
Chey Abdalla (MD)  Cardiovascular Disease; Interventional Cardiology  [address] [place] [address]  Phone: [phone]  Fax: [phone]  Follow Up Time:

## 2020-03-06 NOTE — DISCHARGE NOTE PROVIDER - NSDCMRMEDTOKEN_GEN_ALL_CORE_FT
aspirin 81 mg oral tablet: 1 tab(s) orally once a day   carvedilol 6.25 mg oral tablet: 1 tab(s) orally 2 times a day  Eliquis 5 mg oral tablet: 2 tab(s) orally 2 times a day MDD:take two tabletss bid for 7 days, then taper to one tablet bid maintenance dose  escitalopram 5 mg oral tablet: 1 tab(s) orally once a day  famotidine 20 mg oral tablet: 1 tab(s) orally 2 times a day  folic acid 1 mg oral tablet: 1 tab(s) orally once a day  lisinopril 2.5 mg oral tablet: 1 tab(s) orally once a day  simvastatin 40 mg oral tablet: 1 tab(s) orally once a day (at bedtime)  thiamine 100 mg oral tablet: 1 tab(s) orally once a day aspirin 81 mg oral tablet: 1 tab(s) orally once a day   carvedilol 6.25 mg oral tablet: 1 tab(s) orally 2 times a day  escitalopram 5 mg oral tablet: 1 tab(s) orally once a day  famotidine 20 mg oral tablet: 1 tab(s) orally 2 times a day  folic acid 1 mg oral tablet: 1 tab(s) orally once a day  lisinopril 2.5 mg oral tablet: 1 tab(s) orally once a day  simvastatin 40 mg oral tablet: 1 tab(s) orally once a day (at bedtime)  thiamine 100 mg oral tablet: 1 tab(s) orally once a day

## 2020-03-06 NOTE — DISCHARGE NOTE NURSING/CASE MANAGEMENT/SOCIAL WORK - PATIENT PORTAL LINK FT
You can access the FollowMyHealth Patient Portal offered by [facility] by registering at the following website: [email or web address]. By joining [email or web address]’s FollowMyHealth portal, you will also be able to view your health information using other applications (apps) compatible with our system.

## 2020-03-06 NOTE — DISCHARGE NOTE NURSING/CASE MANAGEMENT/SOCIAL WORK - NSDCFUADDAPPT_GEN_ALL_CORE_FT
APPOINTMENT: Monday 3/9/2020 @ 8:45a with Ruth Chicas-[place] [address]  [phone]

## 2020-03-06 NOTE — PROGRESS NOTE ADULT - ASSESSMENT
55 yo Male presented with chest tightness.    Suspected Acute Pulmonary Emboli on imaging  -started on heparin drip  -follow Pulmonary consult  - discussed with retail pharmacy and has insurance coverage for eliquis  Await Pulm Cx    Alcohol dependence  -follow with CIWA protocol  -Ativan PRN  -on thiamine and folic acid  Appt has been set to address pt's etoh dependece issues with detox/rehab for Monday at 845am by BARRY/Gerald    CAD s/p CABG  Chronic Systolic CHF  -stable  -will continue Aspirin, statin, BB, ACEi    Hypokalemia  -getting potassium replacement  -will follow level    DVT/PE Px/Rx: On heparin drip  on fall and seizure precautions     discussed with RN

## 2020-03-06 NOTE — DISCHARGE NOTE PROVIDER - HOSPITAL COURSE
Assessment and Plan:     54y male w/        1. chest pain    resolved , likely musculoskeletal    - No PE on imaging    - hx CAD/CABG continue home meds    - Pulm and Cardio f/u appreciated- continue home meds and outpt f/u        2. etoh dependence    - not scoring, no signs withdrawal    outpt f/u rehab/detox    - etoh cessation discussed        3. dvt px         stable for d/c home. Time 42min.

## 2020-03-06 NOTE — SBIRT NOTE ADULT - NSSBIRTDRGPOSREINDET_GEN_A_CORE
Positive reinforcement provided of importance of taking drugs for prescribed medical reasons only. Pt verbalizes understanding

## 2020-03-06 NOTE — H&P ADULT - NEUROLOGICAL DETAILS
alert and oriented x 3/sensation intact/cranial nerves intact/responds to pain/responds to verbal commands/normal strength/deep reflexes intact

## 2020-03-06 NOTE — H&P ADULT - NSHPPHYSICALEXAM_GEN_ALL_CORE
Vital Signs Last 24 Hrs  T(C): 36.6 (06 Mar 2020 02:09), Max: 36.6 (05 Mar 2020 19:29)  T(F): 97.9 (06 Mar 2020 02:09), Max: 97.9 (06 Mar 2020 02:09)  HR: 71 (06 Mar 2020 02:09) (71 - 88)  BP: 144/76 (06 Mar 2020 02:09) (97/71 - 144/76)  BP(mean): 80 (05 Mar 2020 19:29) (80 - 80)  RR: 20 (06 Mar 2020 02:09) (20 - 20)  SpO2: 97% (06 Mar 2020 02:09) (97% - 97%)

## 2020-03-06 NOTE — DISCHARGE NOTE PROVIDER - NSDCCPCAREPLAN_GEN_ALL_CORE_FT
PRINCIPAL DISCHARGE DIAGNOSIS  Diagnosis: Chest pain  Assessment and Plan of Treatment: You were seen by your Cardiologist- no acute cardiac issues or lung issues.  Follow up in office.   NO alcohol use.  Follow up for rehab/detox. PRINCIPAL DISCHARGE DIAGNOSIS  Diagnosis: Chest pain  Assessment and Plan of Treatment: You were seen by your Cardiologist- no acute cardiac issues or lung issues.  No clot in lungs or legs therefore no anticoagulation needed. Follow up in office.   NO alcohol use.  Follow up for rehab/detox.

## 2020-03-06 NOTE — H&P ADULT - ASSESSMENT
55 yo Male presented with chest tightness.    A/P:    1.  Pulmonary Emboli  -started on heparin drip  -follow Pulmonary consult    2.  Alcohol dependence  -follow with CIWA protocol  -Ativan PRN  -on thiamine and folic acid    3.  CAD s/p CABG  Chronic Systolic CHF  -stable  -will continue Aspirin, statin, BB, ACEi    4.  Hypokalemia  -getting potassium replacement  -will follow level    5.  DVT ppx: On heparin drip  on fall and seizure precautions

## 2020-03-06 NOTE — PROGRESS NOTE ADULT - SUBJECTIVE AND OBJECTIVE BOX
53 yo Male, PMH of CAD, s/p CABG, EtOH abuse, after inpatient evaluation this past week for chest pain & discharged yesterday (Dx atypical cp), presented to the ED for L-sided pleuritic chest tightness for 1 day, mild - moderate severity.  Associated Left arm pain, diaphoresis, Dyspnea on exertion. He had no fall, dizziness, no loss of consciousness. He also had + EtOH intake in the afternoon. No recent travel. No recent surgery.    3/6 - states cp is in a spot - where the ekg lead sticker. no palps or sob, interested in AA   EKG done and no acute st/tw changes and trop neg. cont hep gtt while we see if eliquis is covered by his insurance    PHYSICAL EXAM:  GENERAL: NAD, able to lie flat in bed  HEAD:  Atraumatic, Normocephalic  EYES: EOMI, PERRLA, normal sclera  ENT: Moist mucous membranes  NECK: Supple, No JVD, no nuchal rigidity  CHEST/LUNG: Clear to auscultation bilaterally; No rales, rhonchi, wheezing, or rubs. Unlabored respirations  HEART: Regular rate and rhythm; No murmurs, rubs, or gallops  ABDOMEN: Bowel sounds present; Soft, Nontender, Nondistended. No hepatomegaly  EXTREMITIES:  no pitting bilaterally  NERVOUS SYSTEM:  Alert & Oriented X3, speech clear. No focal motor or sensory deficits  MSK: FROM all 4 extremities, full and equal strength  SKIN: No rashes or lesions    LABS: All Labs Reviewed:                        12.0   7.50  )-----------( 179      ( 06 Mar 2020 07:37 )             37.4     03-06    142  |  112<H>  |  9   ----------------------------<  89  3.7   |  24  |  0.78    Ca    8.0<L>      06 Mar 2020 06:18  Mg     2.2     03-06    TPro  7.9  /  Alb  4.0  /  TBili  0.3  /  DBili  x   /  AST  55<H>  /  ALT  74  /  AlkPhos  48  03-05    PT/INR - ( 05 Mar 2020 23:48 )   PT: 11.3 sec;   INR: 1.02 ratio        RADIOLOGY/EKG:  EKG rev by me - no acute st/tw changes     aspirin enteric coated 81 milliGRAM(s) Oral daily  carvedilol 6.25 milliGRAM(s) Oral every 12 hours  escitalopram 5 milliGRAM(s) Oral daily  famotidine    Tablet 20 milliGRAM(s) Oral two times a day  famotidine Injectable 20 milliGRAM(s) IV Push once  folic acid 1 milliGRAM(s) Oral daily  heparin  Infusion.  Unit(s)/Hr IV Continuous <Continuous>  heparin  Injectable 6500 Unit(s) IV Push every 6 hours PRN  heparin  Injectable 3000 Unit(s) IV Push every 6 hours PRN  lisinopril 2.5 milliGRAM(s) Oral daily  LORazepam     Tablet 2 milliGRAM(s) Oral every 2 hours PRN  ondansetron Injectable 4 milliGRAM(s) IV Push once PRN  simvastatin 40 milliGRAM(s) Oral at bedtime  thiamine 100 milliGRAM(s) Oral daily

## 2020-03-06 NOTE — SBIRT NOTE ADULT - NSSBIRTALCACTIVEREFTXDET_GEN_A_CORE
Pt acknowledges long standing ETOH use/abuse. Pt has been sober x 3yrs 1990's & x 5yrs starting in 2012 following CABG. Pt was dc'd from  on 3/4 with referral to [facility] outpt Alcohol rehab in [place]-appt Monday 3/9 8:45a w/Intake Counselor, Ruth Chicas. Pt consumed ETOH on 3/5 prior to readmission on 3/5 for PE. Pt continues to resist inpt ETOH Rehab & states he will keep appt at [facility] on 3/9 if he is dc'd prior. Tc w/Leandro & msg left for Ruth Chicas @ [facility] alerting to pt's readmission. Leandro confirms  they will keep pt's appt in case he is dc'd over wkend. Provided [facility] Folder

## 2020-03-06 NOTE — H&P ADULT - HISTORY OF PRESENT ILLNESS
53 yo Male, PMH of CAD, s/p CABG, EtOH abuse, after inpatient evaluation this past week for chest pain & discharged yesterday (Dx atypical cp), presented to the ED for L-sided pleuritic chest tightness for 1 day, mild - moderate severity.  Associated Left arm pain, diaphoresis, Dyspnea on exertion. He had no fall, dizziness, no loss of consciousness. He also had + EtOH intake in the afternoon. No recent travel. No recent surgery.

## 2020-03-07 DIAGNOSIS — I26.99 OTHER PULMONARY EMBOLISM WITHOUT ACUTE COR PULMONALE: ICD-10-CM

## 2020-03-07 DIAGNOSIS — I25.10 ATHEROSCLEROTIC HEART DISEASE OF NATIVE CORONARY ARTERY WITHOUT ANGINA PECTORIS: ICD-10-CM

## 2020-03-07 DIAGNOSIS — R94.31 ABNORMAL ELECTROCARDIOGRAM [ECG] [EKG]: ICD-10-CM

## 2020-03-07 DIAGNOSIS — E78.5 HYPERLIPIDEMIA, UNSPECIFIED: ICD-10-CM

## 2020-03-07 DIAGNOSIS — F10.10 ALCOHOL ABUSE, UNCOMPLICATED: ICD-10-CM

## 2020-03-07 DIAGNOSIS — I10 ESSENTIAL (PRIMARY) HYPERTENSION: ICD-10-CM

## 2020-03-07 LAB
ANION GAP SERPL CALC-SCNC: 6 MMOL/L — SIGNIFICANT CHANGE UP (ref 5–17)
APTT BLD: 90.8 SEC — HIGH (ref 27.5–36.3)
APTT BLD: 93.6 SEC — HIGH (ref 27.5–36.3)
BUN SERPL-MCNC: 8 MG/DL — SIGNIFICANT CHANGE UP (ref 7–23)
CALCIUM SERPL-MCNC: 9 MG/DL — SIGNIFICANT CHANGE UP (ref 8.5–10.1)
CHLORIDE SERPL-SCNC: 104 MMOL/L — SIGNIFICANT CHANGE UP (ref 96–108)
CHOLEST SERPL-MCNC: 226 MG/DL — HIGH (ref 10–199)
CO2 SERPL-SCNC: 26 MMOL/L — SIGNIFICANT CHANGE UP (ref 22–31)
CREAT SERPL-MCNC: 0.72 MG/DL — SIGNIFICANT CHANGE UP (ref 0.5–1.3)
GLUCOSE SERPL-MCNC: 96 MG/DL — SIGNIFICANT CHANGE UP (ref 70–99)
HCT VFR BLD CALC: 41.3 % — SIGNIFICANT CHANGE UP (ref 39–50)
HDLC SERPL-MCNC: 80 MG/DL — SIGNIFICANT CHANGE UP
HGB BLD-MCNC: 13.4 G/DL — SIGNIFICANT CHANGE UP (ref 13–17)
LIPID PNL WITH DIRECT LDL SERPL: 124 MG/DL — HIGH
MAGNESIUM SERPL-MCNC: 2.4 MG/DL — SIGNIFICANT CHANGE UP (ref 1.6–2.6)
MCHC RBC-ENTMCNC: 28.8 PG — SIGNIFICANT CHANGE UP (ref 27–34)
MCHC RBC-ENTMCNC: 32.4 GM/DL — SIGNIFICANT CHANGE UP (ref 32–36)
MCV RBC AUTO: 88.6 FL — SIGNIFICANT CHANGE UP (ref 80–100)
PLATELET # BLD AUTO: 191 K/UL — SIGNIFICANT CHANGE UP (ref 150–400)
POTASSIUM SERPL-MCNC: 3.5 MMOL/L — SIGNIFICANT CHANGE UP (ref 3.5–5.3)
POTASSIUM SERPL-SCNC: 3.5 MMOL/L — SIGNIFICANT CHANGE UP (ref 3.5–5.3)
RBC # BLD: 4.66 M/UL — SIGNIFICANT CHANGE UP (ref 4.2–5.8)
RBC # FLD: 14.9 % — HIGH (ref 10.3–14.5)
SODIUM SERPL-SCNC: 136 MMOL/L — SIGNIFICANT CHANGE UP (ref 135–145)
TOTAL CHOLESTEROL/HDL RATIO MEASUREMENT: 2.8 RATIO — LOW (ref 3.4–9.6)
TRIGL SERPL-MCNC: 106 MG/DL — SIGNIFICANT CHANGE UP (ref 10–149)
WBC # BLD: 4.81 K/UL — SIGNIFICANT CHANGE UP (ref 3.8–10.5)
WBC # FLD AUTO: 4.81 K/UL — SIGNIFICANT CHANGE UP (ref 3.8–10.5)

## 2020-03-07 PROCEDURE — 93970 EXTREMITY STUDY: CPT | Mod: 26

## 2020-03-07 PROCEDURE — 71275 CT ANGIOGRAPHY CHEST: CPT | Mod: 26

## 2020-03-07 PROCEDURE — 99233 SBSQ HOSP IP/OBS HIGH 50: CPT

## 2020-03-07 PROCEDURE — 99223 1ST HOSP IP/OBS HIGH 75: CPT

## 2020-03-07 RX ORDER — HEPARIN SODIUM 5000 [USP'U]/ML
5000 INJECTION INTRAVENOUS; SUBCUTANEOUS EVERY 8 HOURS
Refills: 0 | Status: DISCONTINUED | OUTPATIENT
Start: 2020-03-07 | End: 2020-03-08

## 2020-03-07 RX ORDER — LIDOCAINE 4 G/100G
1 CREAM TOPICAL ONCE
Refills: 0 | Status: COMPLETED | OUTPATIENT
Start: 2020-03-07 | End: 2020-03-07

## 2020-03-07 RX ADMIN — HEPARIN SODIUM 900 UNIT(S)/HR: 5000 INJECTION INTRAVENOUS; SUBCUTANEOUS at 10:09

## 2020-03-07 RX ADMIN — CARVEDILOL PHOSPHATE 6.25 MILLIGRAM(S): 80 CAPSULE, EXTENDED RELEASE ORAL at 06:30

## 2020-03-07 RX ADMIN — FAMOTIDINE 20 MILLIGRAM(S): 10 INJECTION INTRAVENOUS at 06:30

## 2020-03-07 RX ADMIN — HEPARIN SODIUM 900 UNIT(S)/HR: 5000 INJECTION INTRAVENOUS; SUBCUTANEOUS at 03:07

## 2020-03-07 RX ADMIN — Medication 1 MILLIGRAM(S): at 15:56

## 2020-03-07 RX ADMIN — Medication 100 MILLIGRAM(S): at 15:56

## 2020-03-07 RX ADMIN — FAMOTIDINE 20 MILLIGRAM(S): 10 INJECTION INTRAVENOUS at 17:30

## 2020-03-07 RX ADMIN — SIMVASTATIN 40 MILLIGRAM(S): 20 TABLET, FILM COATED ORAL at 21:04

## 2020-03-07 RX ADMIN — CARVEDILOL PHOSPHATE 6.25 MILLIGRAM(S): 80 CAPSULE, EXTENDED RELEASE ORAL at 17:30

## 2020-03-07 RX ADMIN — Medication 81 MILLIGRAM(S): at 15:56

## 2020-03-07 RX ADMIN — LIDOCAINE 1 PATCH: 4 CREAM TOPICAL at 21:03

## 2020-03-07 RX ADMIN — LISINOPRIL 2.5 MILLIGRAM(S): 2.5 TABLET ORAL at 06:30

## 2020-03-07 RX ADMIN — ESCITALOPRAM OXALATE 5 MILLIGRAM(S): 10 TABLET, FILM COATED ORAL at 15:56

## 2020-03-07 NOTE — CONSULT NOTE ADULT - ASSESSMENT
Continue anticoagulation.     CT angio chest reviewed with CT radiology. No definite PE seen. Recommend repeat CT PE protocol chest study, US dopplers of LE's.     Alcohol abuse. Observe for withdrawal.     Recommend cardiology consult.

## 2020-03-07 NOTE — PROGRESS NOTE ADULT - ASSESSMENT
55 yo Male presented with chest tightness.    Suspected Acute Pulmonary Emboli on imaging  -started on heparin drip  -follow Pulmonary consult  - discussed with retail pharmacy and has insurance coverage for eliquis  Await Pulm Cx - discussed with Dr Jain - he spoke to radiology think the first CT unlikley to be a PE;   repeated CT chest and USG LE and neg for PE and DVT respectively - so stop anticoagulation.  Pulm recs Card Cx   Atypical cp with tenderness to palpation midsternal area - so far trops negative, EKG mild TW changes V456    Alcohol dependence  -follow with CIWA protocol  -Ativan PRN  -on thiamine and folic acid  Appt has been set to address pt's etoh dependence issues with detox/rehab for Monday - if patient stable for discharge and not in withdrawal can f/u for that appt    CAD s/p CABG  Chronic Systolic CHF  -stable  -will continue Aspirin, statin, BB, ACEi    DVT Px: Stop hep drip and start hep sc  on fall and seizure precautions     discussed with RN and Pulm  DC plan:  2 to 3 days , Card follow-up

## 2020-03-07 NOTE — PROGRESS NOTE ADULT - SUBJECTIVE AND OBJECTIVE BOX
55 yo Male, PMH of CAD, s/p CABG, EtOH abuse, after inpatient evaluation this past week for chest pain & discharged yesterday (Dx atypical cp), presented to the ED for L-sided pleuritic chest tightness for 1 day, mild - moderate severity.  Associated Left arm pain, diaphoresis, Dyspnea on exertion. He had no fall, dizziness, no loss of consciousness. He also had + EtOH intake in the afternoon. No recent travel. No recent surgery.    3/6 - states cp is in a spot - where the ekg lead sticker. no palps or sob, interested in AA   EKG done and no acute st/tw changes and trop neg. cont hep gtt while we see if eliquis is covered by his insurance    3/6 - reports chest tenderness at sternal/CABG site, no costochondral tenderness, no sob palps    PHYSICAL EXAM:  GENERAL: NAD, able to lie flat in bed  HEAD:  Atraumatic, Normocephalic  EYES: EOMI, PERRLA, normal sclera  ENT: Moist mucous membranes  NECK: Supple, No JVD, no nuchal rigidity  CHEST/LUNG: Clear to auscultation bilaterally; No rales, rhonchi, wheezing, or rubs. Unlabored respirations  HEART: Regular rate and rhythm; No murmurs, rubs, or gallops  ABDOMEN: Bowel sounds present; Soft, Nontender, Nondistended. No hepatomegaly  EXTREMITIES:  no pitting bilaterally  NERVOUS SYSTEM:  Alert & Oriented X3, speech clear. No focal motor or sensory deficits  MSK: FROM all 4 extremities, full and equal strength  SKIN: No rashes or lesions      RADIOLOGY/EKG:  EKG rev by me - no acute st/tw changes     LABS: All Labs Reviewed:                        13.4   4.81  )-----------( 191      ( 07 Mar 2020 09:13 )             41.3     03-07    136  |  104  |  8   ----------------------------<  96  3.5   |  26  |  0.72    Ca    9.0      07 Mar 2020 09:13  Mg     2.4     03-07      PT/INR - ( 05 Mar 2020 23:48 )   PT: 11.3 sec;   INR: 1.02 ratio         PTT - ( 07 Mar 2020 09:13 )  PTT:93.6 sec  CARDIAC MARKERS ( 06 Mar 2020 10:19 )  <0.015 ng/mL / x     / x     / x     / x      CARDIAC MARKERS ( 06 Mar 2020 02:29 )  <0.015 ng/mL / x     / x     / x     / x      CARDIAC MARKERS ( 05 Mar 2020 23:48 )  <0.015 ng/mL / x     / x     / x     / x        RADIOLOGY/EKG:  rpt ct chest neg for PE< USG LE neg for DVT  EKG rev by me - WICHO V456 noted in prev EKG also       aspirin enteric coated 81 milliGRAM(s) Oral daily  carvedilol 6.25 milliGRAM(s) Oral every 12 hours  escitalopram 5 milliGRAM(s) Oral daily  famotidine    Tablet 20 milliGRAM(s) Oral two times a day  famotidine Injectable 20 milliGRAM(s) IV Push once  folic acid 1 milliGRAM(s) Oral daily  lisinopril 2.5 milliGRAM(s) Oral daily  LORazepam     Tablet 2 milliGRAM(s) Oral every 2 hours PRN  ondansetron Injectable 4 milliGRAM(s) IV Push once PRN  simvastatin 40 milliGRAM(s) Oral at bedtime  thiamine 100 milliGRAM(s) Oral daily

## 2020-03-07 NOTE — CONSULT NOTE ADULT - SUBJECTIVE AND OBJECTIVE BOX
HPI:  53 yo Male, PMH of CAD, s/p CABG, EtOH abuse, after inpatient evaluation this past week for chest pain & discharged yesterday (Dx atypical cp), presented to the ED for L-sided pleuritic chest tightness for 1 day, mild - moderate severity.  Associated Left arm pain, diaphoresis, Dyspnea on exertion. He had no fall, dizziness, no loss of consciousness. He also had + EtOH intake in the afternoon. No recent travel. No recent surgery. (06 Mar 2020 02:49)    3/7: in bed, not SOB, O2 sat 99% on RA. L chest discomfort area of chest sticker with deep breath. no cough, sputum, or hemoptysis.       PAST MEDICAL & SURGICAL HISTORY:  Alcohol abuse  CAD (coronary artery disease)  HLD (hyperlipidemia)  HTN (hypertension)  S/P CABG (coronary artery bypass graft)      MEDICATIONS  (STANDING):  aspirin enteric coated 81 milliGRAM(s) Oral daily  carvedilol 6.25 milliGRAM(s) Oral every 12 hours  escitalopram 5 milliGRAM(s) Oral daily  famotidine    Tablet 20 milliGRAM(s) Oral two times a day  famotidine Injectable 20 milliGRAM(s) IV Push once  folic acid 1 milliGRAM(s) Oral daily  heparin  Infusion.  Unit(s)/Hr (15 mL/Hr) IV Continuous <Continuous>  lisinopril 2.5 milliGRAM(s) Oral daily  simvastatin 40 milliGRAM(s) Oral at bedtime  thiamine 100 milliGRAM(s) Oral daily    MEDICATIONS  (PRN):  heparin  Injectable 6500 Unit(s) IV Push every 6 hours PRN For aPTT less than 40  heparin  Injectable 3000 Unit(s) IV Push every 6 hours PRN For aPTT between 40 - 57  LORazepam     Tablet 2 milliGRAM(s) Oral every 2 hours PRN Symptom-triggered 2 point increase in CIWA-Ar  ondansetron Injectable 4 milliGRAM(s) IV Push once PRN Nausea and/or Vomiting      Allergies    No Known Allergies    Intolerances        SOCIAL HISTORY: Denies tobacco, etoh abuse or illicit drug use    FAMILY HISTORY:  FH: heart disease  FH: diabetes mellitus      Vital Signs Last 24 Hrs  T(C): 35.6 (07 Mar 2020 10:57), Max: 36.7 (06 Mar 2020 23:27)  T(F): 96.1 (07 Mar 2020 10:57), Max: 98.1 (06 Mar 2020 23:27)  HR: 53 (07 Mar 2020 04:00) (52 - 68)  BP: 138/91 (07 Mar 2020 04:00) (134/89 - 143/86)  BP(mean): 102 (07 Mar 2020 04:00) (99 - 103)  RR: 14 (07 Mar 2020 04:00) (12 - 20)  SpO2: 98% (07 Mar 2020 04:00) (98% - 99%)    REVIEW OF SYSTEMS:    CONSTITUTIONAL:  As per HPI.  HEENT:  Eyes:  No diplopia or blurred vision. ENT:  No earache, sore throat or runny nose.  CARDIOVASCULAR:  No pressure, squeezing, tightness, heaviness or aching about the chest, neck, axilla or epigastrium.  RESPIRATORY:  No cough, shortness of breath, PND or orthopnea.  GASTROINTESTINAL:  No nausea, vomiting or diarrhea.  GENITOURINARY:  No dysuria, frequency or urgency.  MUSCULOSKELETAL:  As per HPI.  SKIN:  No change in skin, hair or nails.  NEUROLOGIC:  No paresthesias, fasciculations, seizures or weakness.  PSYCHIATRIC:  No disorder of thought or mood.  ENDOCRINE:  No heat or cold intolerance, polyuria or polydipsia.  HEMATOLOGICAL:  No easy bruising or bleedings:  .     PHYSICAL EXAMINATION:    GENERAL APPEARANCE:  Pt. is not currently dyspneic, in no distress. Pt. is alert, oriented, and pleasant.  HEENT:  Pupils are normal and react normally. No icterus. Mucous membranes well colored.  NECK:  Supple. No lymphadenopathy. Jugular venous pressure not elevated. Carotids equal.   HEART:   The cardiac impulse has a normal quality. Regular. Normal S1 and S2. There are no murmurs, rubs or gallops noted  CHEST:  Chest is clear to auscultation. Normal respiratory effort.  ABDOMEN:  Soft and nontender.   EXTREMITIES:  There is no cyanosis, clubbing or edema.   SKIN:  No rash or significant lesions are noted.  Neuro: Alert, awake, and O x 3.      LABS:                        13.4   4.81  )-----------( 191      ( 07 Mar 2020 09:13 )             41.3     03-07    136  |  104  |  8   ----------------------------<  96  3.5   |  26  |  0.72    Ca    9.0      07 Mar 2020 09:13  Mg     2.4     03-07    TPro  7.9  /  Alb  4.0  /  TBili  0.3  /  DBili  x   /  AST  55<H>  /  ALT  74  /  AlkPhos  48  03-05    LIVER FUNCTIONS - ( 05 Mar 2020 19:55 )  Alb: 4.0 g/dL / Pro: 7.9 gm/dL / ALK PHOS: 48 U/L / ALT: 74 U/L / AST: 55 U/L / GGT: x           PT/INR - ( 05 Mar 2020 23:48 )   PT: 11.3 sec;   INR: 1.02 ratio         PTT - ( 07 Mar 2020 09:13 )  PTT:93.6 sec  CARDIAC MARKERS ( 06 Mar 2020 10:19 )  <0.015 ng/mL / x     / x     / x     / x      CARDIAC MARKERS ( 06 Mar 2020 02:29 )  <0.015 ng/mL / x     / x     / x     / x      CARDIAC MARKERS ( 05 Mar 2020 23:48 )  <0.015 ng/mL / x     / x     / x     / x      CARDIAC MARKERS ( 05 Mar 2020 19:55 )  <0.015 ng/mL / x     / x     / x     / x                RADIOLOGY & ADDITIONAL STUDIES:       EXAM:  CT ANGIO ABD PELVIS (W)AW IC                          EXAM:  CTA CHEST DISSECTION (W)AW IC                            PROCEDURE DATE:  03/05/2020          INTERPRETATION:  CT ANGIOGRAPHY OF THE CHEST, ABDOMEN AND PELVIS    INDICATION: Chest pain. Syncope. Evaluate for dissection.    TECHNIQUE: Noncontrast CT of the chest was performed, followed by CT angiography performed of the chest, abdomen and pelvis after administration of intravenous contrast. Postprocessed MIP and 3-D reformatted images were created and reviewed.     90 mL of Omnipaque 350 contrast material was injected IV.    COMPARISON: CT Abdomen and pelvis 12/9/2016.    FINDINGS:  Vessels: No intramural hematoma demonstrated on the noncontrast study. Atherosclerotic disease of the aorta and branches. Moderate plaque identified within the infrarenal abdominal aorta. No intimal flap is seen to suggest aortic dissection. Evaluation of the aortic root is limited due to cardiac pulsation.     Celiac axis and SMA are patent with suggestion of common origin. JEFF is patent. Distal SMA and JEFF branches are not well assessed by CT technique.Bilateral renal arteries are patent. Stable ulcerative plaque or focal chronic dissection involving left common iliac artery. Bilateral iliac arteries are otherwise normal in caliber.    There are small filling defects identified within the left lower lobe pulmonary artery as best seen on images 209-215 of series 4, difficult to exclude small pulmonary emboli secondary to respiratory motion artifact.    Thorax:  Airways: Tracheobronchial tree is patent.  Lungs: No pneumonia, pulmonary edema, or dominant masses.  Mediastinum and lymph nodes: No mass or hemorrhage. No worrisome mediastinal adenopathy. No worrisome hilar or axillary adenopathy.  Heart: Mild cardiomegaly without pericardial effusion. Postoperative changes of CABG.   Soft tissues: Median sternotomy. Bilateral gynecomastia.    Liver: Steatosis.  Biliary: No dilatation. No calcified gallstones within partially contracted gallbladder.  Spleen: No suspicious lesions.  Pancreas: No inflammatory changes or ductal dilatation.  Adrenals: Normal.  Kidneys: No hydronephrosis.    Peritoneum/retroperitoneum and mesentery: No free air. No organized fluid collection. No adenopathy.  GI tract: No dilatation or wall thickening though detailed evaluation of the rectoanal verge is difficult to evaluate secondary to inadequate distention. Normal appendix.  Pelvic organs/Bladder: Prostate appears within normal limits. Mild bladder wall thickening, difficult to evaluate secondary to inadequate distention.    Abdominal wall: Reidentified several perianal setons with associated trace phlegmonous changes. No discrete drainable fluid collection identified. A small umbilical hernia containing nonobstructive bowel loops noted.    Bones and soft tissues: Mild multilevel spondylotic changes are seen. Vague curvilinear defect identified within the right femoral head, possibility of avascular necrosis considered in the differential.    IMPRESSION:    Negative for aortic aneurysm or dissection. Atherosclerotic disease of the aorta and branches.     Stable ulcerative plaque or focal chronic dissection involving left common iliac artery.     Small filling defects identified within the left lower lobe pulmonary artery as described, difficult to exclude small pulmonary emboli secondary to respiratory motion artifact.    Mild bladder wall thickening, difficult to evaluate secondary to inadequate distention. Correlate with urinalysis to exclude superimposed infection.     Additional findings as mentioned above.    These results were discussed via telephone at 3/5/2020 10:43 PM by Dr. Perry of radiology with Dr. Franco, institution read-back verification policy was followed.

## 2020-03-08 VITALS — TEMPERATURE: 97 F

## 2020-03-08 LAB
APTT BLD: 33.1 SEC — SIGNIFICANT CHANGE UP (ref 27.5–36.3)
HCT VFR BLD CALC: 38.7 % — LOW (ref 39–50)
HGB BLD-MCNC: 13 G/DL — SIGNIFICANT CHANGE UP (ref 13–17)
MCHC RBC-ENTMCNC: 29.6 PG — SIGNIFICANT CHANGE UP (ref 27–34)
MCHC RBC-ENTMCNC: 33.6 GM/DL — SIGNIFICANT CHANGE UP (ref 32–36)
MCV RBC AUTO: 88.2 FL — SIGNIFICANT CHANGE UP (ref 80–100)
PLATELET # BLD AUTO: 179 K/UL — SIGNIFICANT CHANGE UP (ref 150–400)
RBC # BLD: 4.39 M/UL — SIGNIFICANT CHANGE UP (ref 4.2–5.8)
RBC # FLD: 14.6 % — HIGH (ref 10.3–14.5)
WBC # BLD: 5.49 K/UL — SIGNIFICANT CHANGE UP (ref 3.8–10.5)
WBC # FLD AUTO: 5.49 K/UL — SIGNIFICANT CHANGE UP (ref 3.8–10.5)

## 2020-03-08 PROCEDURE — 99233 SBSQ HOSP IP/OBS HIGH 50: CPT

## 2020-03-08 RX ORDER — FAMOTIDINE 10 MG/ML
1 INJECTION INTRAVENOUS
Qty: 0 | Refills: 0 | DISCHARGE

## 2020-03-08 RX ORDER — SIMVASTATIN 20 MG/1
1 TABLET, FILM COATED ORAL
Qty: 30 | Refills: 0
Start: 2020-03-08 | End: 2020-04-06

## 2020-03-08 RX ORDER — ESCITALOPRAM OXALATE 10 MG/1
1 TABLET, FILM COATED ORAL
Qty: 30 | Refills: 0
Start: 2020-03-08 | End: 2020-04-06

## 2020-03-08 RX ORDER — LISINOPRIL 2.5 MG/1
1 TABLET ORAL
Qty: 0 | Refills: 0 | DISCHARGE

## 2020-03-08 RX ORDER — FOLIC ACID 0.8 MG
1 TABLET ORAL
Qty: 0 | Refills: 0 | DISCHARGE

## 2020-03-08 RX ORDER — LISINOPRIL 2.5 MG/1
1 TABLET ORAL
Qty: 30 | Refills: 0
Start: 2020-03-08 | End: 2020-04-06

## 2020-03-08 RX ORDER — THIAMINE MONONITRATE (VIT B1) 100 MG
1 TABLET ORAL
Qty: 0 | Refills: 0 | DISCHARGE

## 2020-03-08 RX ORDER — SIMVASTATIN 20 MG/1
1 TABLET, FILM COATED ORAL
Qty: 0 | Refills: 0 | DISCHARGE

## 2020-03-08 RX ORDER — FAMOTIDINE 10 MG/ML
1 INJECTION INTRAVENOUS
Qty: 60 | Refills: 0
Start: 2020-03-08 | End: 2020-04-06

## 2020-03-08 RX ORDER — THIAMINE MONONITRATE (VIT B1) 100 MG
1 TABLET ORAL
Qty: 30 | Refills: 0
Start: 2020-03-08 | End: 2020-04-06

## 2020-03-08 RX ORDER — ESCITALOPRAM OXALATE 10 MG/1
1 TABLET, FILM COATED ORAL
Qty: 0 | Refills: 0 | DISCHARGE

## 2020-03-08 RX ORDER — FOLIC ACID 0.8 MG
1 TABLET ORAL
Qty: 30 | Refills: 0
Start: 2020-03-08 | End: 2020-04-06

## 2020-03-08 RX ADMIN — ESCITALOPRAM OXALATE 5 MILLIGRAM(S): 10 TABLET, FILM COATED ORAL at 11:33

## 2020-03-08 RX ADMIN — Medication 81 MILLIGRAM(S): at 11:34

## 2020-03-08 RX ADMIN — LIDOCAINE 1 PATCH: 4 CREAM TOPICAL at 08:00

## 2020-03-08 RX ADMIN — LISINOPRIL 2.5 MILLIGRAM(S): 2.5 TABLET ORAL at 06:25

## 2020-03-08 RX ADMIN — CARVEDILOL PHOSPHATE 6.25 MILLIGRAM(S): 80 CAPSULE, EXTENDED RELEASE ORAL at 06:25

## 2020-03-08 RX ADMIN — Medication 100 MILLIGRAM(S): at 11:33

## 2020-03-08 RX ADMIN — FAMOTIDINE 20 MILLIGRAM(S): 10 INJECTION INTRAVENOUS at 06:25

## 2020-03-08 RX ADMIN — LIDOCAINE 1 PATCH: 4 CREAM TOPICAL at 09:19

## 2020-03-08 RX ADMIN — HEPARIN SODIUM 5000 UNIT(S): 5000 INJECTION INTRAVENOUS; SUBCUTANEOUS at 06:28

## 2020-03-08 RX ADMIN — Medication 1 MILLIGRAM(S): at 11:33

## 2020-03-08 NOTE — PROGRESS NOTE ADULT - SUBJECTIVE AND OBJECTIVE BOX
HPI:  55 yo Male, PMH of CAD, s/p CABG, EtOH abuse, after inpatient evaluation this past week for chest pain & discharged yesterday (Dx atypical cp), presented to the ED for L-sided pleuritic chest tightness for 1 day, mild - moderate severity.  Associated Left arm pain, diaphoresis, Dyspnea on exertion. He had no fall, dizziness, no loss of consciousness. He also had + EtOH intake in the afternoon. No recent travel. No recent surgery. (06 Mar 2020 02:49)    3/7: in bed, not SOB, O2 sat 99% on RA. L chest discomfort area of chest sticker with deep breath. no cough, sputum, or hemoptysis.   3/8: awake, alert, no . O2 sat 100% on RA.       PAST MEDICAL & SURGICAL HISTORY:  Alcohol abuse  CAD (coronary artery disease)  HLD (hyperlipidemia)  HTN (hypertension)  S/P CABG (coronary artery bypass graft)      MEDICATIONS  (STANDING):  aspirin enteric coated 81 milliGRAM(s) Oral daily  carvedilol 6.25 milliGRAM(s) Oral every 12 hours  escitalopram 5 milliGRAM(s) Oral daily  famotidine    Tablet 20 milliGRAM(s) Oral two times a day  famotidine Injectable 20 milliGRAM(s) IV Push once  folic acid 1 milliGRAM(s) Oral daily  heparin  Infusion.  Unit(s)/Hr (15 mL/Hr) IV Continuous <Continuous>  lisinopril 2.5 milliGRAM(s) Oral daily  simvastatin 40 milliGRAM(s) Oral at bedtime  thiamine 100 milliGRAM(s) Oral daily    MEDICATIONS  (PRN):  heparin  Injectable 6500 Unit(s) IV Push every 6 hours PRN For aPTT less than 40  heparin  Injectable 3000 Unit(s) IV Push every 6 hours PRN For aPTT between 40 - 57  LORazepam     Tablet 2 milliGRAM(s) Oral every 2 hours PRN Symptom-triggered 2 point increase in CIWA-Ar  ondansetron Injectable 4 milliGRAM(s) IV Push once PRN Nausea and/or Vomiting      Allergies    No Known Allergies    Intolerances        SOCIAL HISTORY: Denies tobacco, etoh abuse or illicit drug use    FAMILY HISTORY:  FH: heart disease  FH: diabetes mellitus      Vital Signs Last 24 Hrs  T(C): 35.6 (07 Mar 2020 10:57), Max: 36.7 (06 Mar 2020 23:27)  T(F): 96.1 (07 Mar 2020 10:57), Max: 98.1 (06 Mar 2020 23:27)  HR: 53 (07 Mar 2020 04:00) (52 - 68)  BP: 138/91 (07 Mar 2020 04:00) (134/89 - 143/86)  BP(mean): 102 (07 Mar 2020 04:00) (99 - 103)  RR: 14 (07 Mar 2020 04:00) (12 - 20)  SpO2: 98% (07 Mar 2020 04:00) (98% - 99%)    REVIEW OF SYSTEMS:    CONSTITUTIONAL:  As per HPI.  HEENT:  Eyes:  No diplopia or blurred vision. ENT:  No earache, sore throat or runny nose.  CARDIOVASCULAR:  No pressure, squeezing, tightness, heaviness or aching about the chest, neck, axilla or epigastrium.  RESPIRATORY:  No cough, shortness of breath, PND or orthopnea.  GASTROINTESTINAL:  No nausea, vomiting or diarrhea.  GENITOURINARY:  No dysuria, frequency or urgency.  MUSCULOSKELETAL:  As per HPI.  SKIN:  No change in skin, hair or nails.  NEUROLOGIC:  No paresthesias, fasciculations, seizures or weakness.  PSYCHIATRIC:  No disorder of thought or mood.  ENDOCRINE:  No heat or cold intolerance, polyuria or polydipsia.  HEMATOLOGICAL:  No easy bruising or bleedings:  .     PHYSICAL EXAMINATION:    GENERAL APPEARANCE:  Pt. is not currently dyspneic, in no distress. Pt. is alert, oriented, and pleasant.  HEENT:  Pupils are normal and react normally. No icterus. Mucous membranes well colored.  NECK:  Supple. No lymphadenopathy. Jugular venous pressure not elevated. Carotids equal.   HEART:   The cardiac impulse has a normal quality. Regular. Normal S1 and S2. There are no murmurs, rubs or gallops noted  CHEST:  Chest is clear to auscultation. Normal respiratory effort.  ABDOMEN:  Soft and nontender.   EXTREMITIES:  There is no cyanosis, clubbing or edema.   SKIN:  No rash or significant lesions are noted.  Neuro: Alert, awake, and O x 3.      LABS:                        13.4   4.81  )-----------( 191      ( 07 Mar 2020 09:13 )             41.3     03-07    136  |  104  |  8   ----------------------------<  96  3.5   |  26  |  0.72    Ca    9.0      07 Mar 2020 09:13  Mg     2.4     03-07    TPro  7.9  /  Alb  4.0  /  TBili  0.3  /  DBili  x   /  AST  55<H>  /  ALT  74  /  AlkPhos  48  03-05    LIVER FUNCTIONS - ( 05 Mar 2020 19:55 )  Alb: 4.0 g/dL / Pro: 7.9 gm/dL / ALK PHOS: 48 U/L / ALT: 74 U/L / AST: 55 U/L / GGT: x           PT/INR - ( 05 Mar 2020 23:48 )   PT: 11.3 sec;   INR: 1.02 ratio         PTT - ( 07 Mar 2020 09:13 )  PTT:93.6 sec  CARDIAC MARKERS ( 06 Mar 2020 10:19 )  <0.015 ng/mL / x     / x     / x     / x      CARDIAC MARKERS ( 06 Mar 2020 02:29 )  <0.015 ng/mL / x     / x     / x     / x      CARDIAC MARKERS ( 05 Mar 2020 23:48 )  <0.015 ng/mL / x     / x     / x     / x      CARDIAC MARKERS ( 05 Mar 2020 19:55 )  <0.015 ng/mL / x     / x     / x     / x                RADIOLOGY & ADDITIONAL STUDIES:       EXAM:  CT ANGIO ABD PELVIS (W)AW IC                          EXAM:  CTA CHEST DISSECTION (W)AW IC                            PROCEDURE DATE:  2020          INTERPRETATION:  CT ANGIOGRAPHY OF THE CHEST, ABDOMEN AND PELVIS    INDICATION: Chest pain. Syncope. Evaluate for dissection.    TECHNIQUE: Noncontrast CT of the chest was performed, followed by CT angiography performed of the chest, abdomen and pelvis after administration of intravenous contrast. Postprocessed MIP and 3-D reformatted images were created and reviewed.     90 mL of Omnipaque 350 contrast material was injected IV.    COMPARISON: CT Abdomen and pelvis 2016.    FINDINGS:  Vessels: No intramural hematoma demonstrated on the noncontrast study. Atherosclerotic disease of the aorta and branches. Moderate plaque identified within the infrarenal abdominal aorta. No intimal flap is seen to suggest aortic dissection. Evaluation of the aortic root is limited due to cardiac pulsation.     Celiac axis and SMA are patent with suggestion of common origin. JEFF is patent. Distal SMA and JEFF branches are not well assessed by CT technique.Bilateral renal arteries are patent. Stable ulcerative plaque or focal chronic dissection involving left common iliac artery. Bilateral iliac arteries are otherwise normal in caliber.    There are small filling defects identified within the left lower lobe pulmonary artery as best seen on images 209-215 of series 4, difficult to exclude small pulmonary emboli secondary to respiratory motion artifact.    Thorax:  Airways: Tracheobronchial tree is patent.  Lungs: No pneumonia, pulmonary edema, or dominant masses.  Mediastinum and lymph nodes: No mass or hemorrhage. No worrisome mediastinal adenopathy. No worrisome hilar or axillary adenopathy.  Heart: Mild cardiomegaly without pericardial effusion. Postoperative changes of CABG.   Soft tissues: Median sternotomy. Bilateral gynecomastia.    Liver: Steatosis.  Biliary: No dilatation. No calcified gallstones within partially contracted gallbladder.  Spleen: No suspicious lesions.  Pancreas: No inflammatory changes or ductal dilatation.  Adrenals: Normal.  Kidneys: No hydronephrosis.    Peritoneum/retroperitoneum and mesentery: No free air. No organized fluid collection. No adenopathy.  GI tract: No dilatation or wall thickening though detailed evaluation of the rectoanal verge is difficult to evaluate secondary to inadequate distention. Normal appendix.  Pelvic organs/Bladder: Prostate appears within normal limits. Mild bladder wall thickening, difficult to evaluate secondary to inadequate distention.    Abdominal wall: Reidentified several perianal setons with associated trace phlegmonous changes. No discrete drainable fluid collection identified. A small umbilical hernia containing nonobstructive bowel loops noted.    Bones and soft tissues: Mild multilevel spondylotic changes are seen. Vague curvilinear defect identified within the right femoral head, possibility of avascular necrosis considered in the differential.    IMPRESSION:    Negative for aortic aneurysm or dissection. Atherosclerotic disease of the aorta and branches.     Stable ulcerative plaque or focal chronic dissection involving left common iliac artery.     Small filling defects identified within the left lower lobe pulmonary artery as described, difficult to exclude small pulmonary emboli secondary to respiratory motion artifact.    Mild bladder wall thickening, difficult to evaluate secondary to inadequate distention. Correlate with urinalysis to exclude superimposed infection.     Additional findings as mentioned above.    These results were discussed via telephone at 3/5/2020 10:43 PM by Dr. Perry of radiology with Dr. Franco, institution read-back verification policy was followed.

## 2020-03-08 NOTE — PROGRESS NOTE ADULT - SUBJECTIVE AND OBJECTIVE BOX
cc: cp  hpi: 54y male w/ pmh cad s/p cabg and etoh abuse who was just discharged on 3/6 returned w/ left chest pain.  No chest pain this morning.  Cleared by Cardio and PUlm for d/c.  Pt agrees to follow up outpt for etoh rehab.  C/o right low back pain improved w/ lidoderm patch.   No pain down legs and pain improves when ambulating.      ros- as per hpi, other 10 point ros negative    Vital Signs Last 24 Hrs  T(C): 36.1 (08 Mar 2020 12:20), Max: 36.8 (07 Mar 2020 19:59)  T(F): 97 (08 Mar 2020 12:20), Max: 98.3 (07 Mar 2020 19:59)  HR: 65 (08 Mar 2020 08:00) (57 - 73)  BP: 146/88 (08 Mar 2020 08:00) (137/95 - 149/93)  BP(mean): 98 (08 Mar 2020 08:00) (95 - 106)  RR: --  SpO2: 100% (07 Mar 2020 17:00) (100% - 100%)      PHYSICAL EXAM:  General: NAD, comfortable  Neuro: AAOx3, no focal deficits  HEENT: NCAT, EOMI  Neck: Soft and supple  Respiratory: CTA b/l, no w/r/r  Cardiovascular: S1 and S2, RRR, no m/g/r  Gastrointestinal: soft; non ttp   Extremities: No edema  Vascular: 2+ peripheral pulses  Musculoskeletal: 5/5 strength b/l UE and LE;  rt low back pain ttp        LABS: All Labs Reviewed:                        13.0   5.49  )-----------( 179      ( 08 Mar 2020 06:48 )             38.7     03-07    136  |  104  |  8   ----------------------------<  96  3.5   |  26  |  0.72    Ca    9.0      07 Mar 2020 09:13  MEDICATIONS  (STANDING):  aspirin enteric coated 81 milliGRAM(s) Oral daily  carvedilol 6.25 milliGRAM(s) Oral every 12 hours  escitalopram 5 milliGRAM(s) Oral daily  famotidine    Tablet 20 milliGRAM(s) Oral two times a day  famotidine Injectable 20 milliGRAM(s) IV Push once  folic acid 1 milliGRAM(s) Oral daily  heparin  Injectable 5000 Unit(s) SubCutaneous every 8 hours  lisinopril 2.5 milliGRAM(s) Oral daily  simvastatin 40 milliGRAM(s) Oral at bedtime  thiamine 100 milliGRAM(s) Oral daily    MEDICATIONS  (PRN):  LORazepam     Tablet 2 milliGRAM(s) Oral every 2 hours PRN Symptom-triggered 2 point increase in CIWA-Ar  ondansetron Injectable 4 milliGRAM(s) IV Push once PRN Nausea and/or Vomiting            Assessment and Plan:   54y male w/    1. chest pain  resolved , likelymusculoskeletal  - No PE on imaging  - hx CAD/CABG continue home meds  - Pulm and Cardio f/u appreciated- continue home meds and outpt f/u    2. etoh dependence  - not scoring, no signs withdrawal  outpt f/u rehab/detox  - etoh cessation discussed    3. dvt px     stable for d/c home. Time 42min.

## 2020-03-08 NOTE — PROGRESS NOTE ADULT - SUBJECTIVE AND OBJECTIVE BOX
Patient is a 54y old  Male who presents with a chief complaint of complain of chest pain (01 Mar 2020 03:46)      HPI:  53 yo Male with h/o CAD (s/p double CABG in 2012; prescribed simvastatin and carvedilol but noncompliant with both) presented with complain of chest pain.  Patient complain of sharp substernal chest pain on and off since yesterday. No chest pain at present time. No sob.  Also complain of left arm pain for months, feels pain primarily in the elbow, fell onto the elbow 1-2 months ago. He has  no fever/cough.  He Quit smoking in 2012, smokes marijuana daily, drinks at least 8 large beers and some liquor daily for years. He is not confuse at this moment.  Tele negative over night  R/O MI  No symptoms at present      PAST MEDICAL & SURGICAL HISTORY:  Alcohol abuse  CAD (coronary artery disease)  HLD (hyperlipidemia)  HTN (hypertension)  S/P CABG (coronary artery bypass graft)      HPI:                PREVIOUS DIAGNOSTIC TESTING:      ECHO  FINDINGS:    STRESS  FINDINGS:    CATHETERIZATION  FINDINGS:    MEDICATIONS  (STANDING):  aspirin enteric coated 81 milliGRAM(s) Oral daily  carvedilol 6.25 milliGRAM(s) Oral every 12 hours  escitalopram 5 milliGRAM(s) Oral daily  folic acid 1 milliGRAM(s) Oral daily  lisinopril 2.5 milliGRAM(s) Oral daily  LORazepam     Tablet 2 milliGRAM(s) Oral every 4 hours  LORazepam     Tablet   Oral   simvastatin 40 milliGRAM(s) Oral at bedtime  thiamine 100 milliGRAM(s) Oral daily    MEDICATIONS  (PRN):  LORazepam     Tablet 2 milliGRAM(s) Oral every 2 hours PRN Symptom-triggered 2 point increase in CIWA-Ar      FAMILY HISTORY:  FH: heart disease  FH: diabetes mellitus      SOCIAL HISTORY:    CIGARETTES:    ALCOHOL:          Vital Signs Last 24 Hrs  T(C): 36.4 (01 Mar 2020 05:09), Max: 36.9 (29 Feb 2020 22:47)  T(F): 97.5 (01 Mar 2020 05:09), Max: 98.4 (29 Feb 2020 22:47)  HR: 78 (01 Mar 2020 05:09) (78 - 85)  BP: 106/73 (01 Mar 2020 05:09) (106/73 - 134/87)  BP(mean): --  RR: 16 (01 Mar 2020 05:09) (16 - 18)  SpO2: 96% (01 Mar 2020 05:09) (93% - 98%)    PHYSICAL EXAM-    Constitutional: The patient appears to be normal, well developed, well nourished and alert and oriented to time, place and person. The patient does not appear acutely ill. The patient is alert.     Head: Head is normocephalic and atraumatic.      Neck: The patient's neck is supple without enlargement, has no palpable thyromegaly nor thyroid nodules and has no jugular venous distention. No audible carotid bruits. There are strong carotid pulses bilaterally. No JVD.     Cardiovascular: Regular rate and rhythm without S3, S4. No murmurs or rubs are appreciated.      Respiratory: . The patient has no rales and no rhonchi. The patient has no wheezes.     Abdomen: Soft, nontender, nondistended with positive bowel sounds.      Extremity: No tenderness. There is no pitting edema, skin discoloration, clubbing and cyanosis.           INTERPRETATION OF TELEMETRY:    ECG:    I&O's Detail      LABS:                        13.1   4.86  )-----------( 216      ( 29 Feb 2020 23:17 )             39.5     02-29    140  |  106  |  7   ----------------------------<  204<H>  3.4<L>   |  25  |  0.84    Ca    8.0<L>      29 Feb 2020 23:17  Mg     2.2     02-29    TPro  7.8  /  Alb  3.9  /  TBili  0.3  /  DBili  x   /  AST  83<H>  /  ALT  70  /  AlkPhos  48  02-29    CARDIAC MARKERS ( 01 Mar 2020 02:43 )  <0.015 ng/mL / x     / x     / x     / x      CARDIAC MARKERS ( 29 Feb 2020 23:17 )  <0.015 ng/mL / x     / x     / x     / x          PT/INR - ( 29 Feb 2020 23:17 )   PT: 11.2 sec;   INR: 1.01 ratio         PTT - ( 29 Feb 2020 23:17 )  PTT:33.8 sec    I&O's Summary    BNPSerum Pro-Brain Natriuretic Peptide: 12 pg/mL (02-29 @ 23:17)    RADIOLOGY & ADDITIONAL STUDIES:

## 2020-03-08 NOTE — PROGRESS NOTE ADULT - REASON FOR ADMISSION
complain of chest tightness

## 2020-03-08 NOTE — PROGRESS NOTE ADULT - ASSESSMENT
CT angio chest reviewed with CT radiology. No definite PE seen.  Repeat CTA PE protocal negative for PE.  LE dopplers aslo negative for DVT.  Pt taken of full anticoagulation.     Alcohol abuse. Observe for withdrawal.     Seen by cardiology, edouard from cardiology standpoint.

## 2020-03-10 DIAGNOSIS — M54.9 DORSALGIA, UNSPECIFIED: ICD-10-CM

## 2020-03-10 DIAGNOSIS — R07.9 CHEST PAIN, UNSPECIFIED: ICD-10-CM

## 2020-03-10 DIAGNOSIS — Z91.14 PATIENT'S OTHER NONCOMPLIANCE WITH MEDICATION REGIMEN: ICD-10-CM

## 2020-03-10 DIAGNOSIS — E78.5 HYPERLIPIDEMIA, UNSPECIFIED: ICD-10-CM

## 2020-03-10 DIAGNOSIS — F10.230 ALCOHOL DEPENDENCE WITH WITHDRAWAL, UNCOMPLICATED: ICD-10-CM

## 2020-03-10 DIAGNOSIS — I50.22 CHRONIC SYSTOLIC (CONGESTIVE) HEART FAILURE: ICD-10-CM
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
## 2020-03-10 DIAGNOSIS — Z79.82 LONG TERM (CURRENT) USE OF ASPIRIN: ICD-10-CM

## 2020-03-10 DIAGNOSIS — Z83.3 FAMILY HISTORY OF DIABETES MELLITUS: ICD-10-CM

## 2020-03-10 DIAGNOSIS — Z87.891 PERSONAL HISTORY OF NICOTINE DEPENDENCE: ICD-10-CM

## 2020-03-10 DIAGNOSIS — I11.0 HYPERTENSIVE HEART DISEASE WITH HEART FAILURE: ICD-10-CM

## 2020-03-10 DIAGNOSIS — Z95.1 PRESENCE OF AORTOCORONARY BYPASS GRAFT: ICD-10-CM

## 2020-03-10 DIAGNOSIS — I25.10 ATHEROSCLEROTIC HEART DISEASE OF NATIVE CORONARY ARTERY WITHOUT ANGINA PECTORIS: ICD-10-CM

## 2020-03-10 DIAGNOSIS — K29.70 GASTRITIS, UNSPECIFIED, WITHOUT BLEEDING: ICD-10-CM

## 2020-03-11 DIAGNOSIS — Z71.89 OTHER SPECIFIED COUNSELING: ICD-10-CM

## 2020-03-11 NOTE — DISCUSSION/SUMMARY
[Home] : patient was discharged to home [Follow Up Appt with Provider within 7 days] : follow up appt with provider within 7 days [Other:_____] : [unfilled] [FreeTextEntry3] : Messages left.

## 2020-03-12 DIAGNOSIS — I25.10 ATHEROSCLEROTIC HEART DISEASE OF NATIVE CORONARY ARTERY WITHOUT ANGINA PECTORIS: ICD-10-CM

## 2020-03-12 DIAGNOSIS — I50.22 CHRONIC SYSTOLIC (CONGESTIVE) HEART FAILURE: ICD-10-CM

## 2020-03-12 DIAGNOSIS — M79.18 MYALGIA, OTHER SITE: ICD-10-CM

## 2020-03-12 DIAGNOSIS — Z87.891 PERSONAL HISTORY OF NICOTINE DEPENDENCE: ICD-10-CM

## 2020-03-12 DIAGNOSIS — R07.9 CHEST PAIN, UNSPECIFIED: ICD-10-CM

## 2020-03-12 DIAGNOSIS — E78.5 HYPERLIPIDEMIA, UNSPECIFIED: ICD-10-CM

## 2020-03-12 DIAGNOSIS — Z82.49 FAMILY HISTORY OF ISCHEMIC HEART DISEASE AND OTHER DISEASES OF THE CIRCULATORY SYSTEM: ICD-10-CM

## 2020-03-12 DIAGNOSIS — I11.0 HYPERTENSIVE HEART DISEASE WITH HEART FAILURE: ICD-10-CM

## 2020-03-12 DIAGNOSIS — Z83.3 FAMILY HISTORY OF DIABETES MELLITUS: ICD-10-CM

## 2020-03-12 DIAGNOSIS — E87.6 HYPOKALEMIA: ICD-10-CM

## 2020-03-12 DIAGNOSIS — Z79.52 LONG TERM (CURRENT) USE OF SYSTEMIC STEROIDS: ICD-10-CM

## 2020-03-12 DIAGNOSIS — Z79.82 LONG TERM (CURRENT) USE OF ASPIRIN: ICD-10-CM

## 2020-03-12 DIAGNOSIS — F10.20 ALCOHOL DEPENDENCE, UNCOMPLICATED: ICD-10-CM

## 2020-03-12 DIAGNOSIS — Z95.1 PRESENCE OF AORTOCORONARY BYPASS GRAFT: ICD-10-CM

## 2020-03-12 DIAGNOSIS — Z79.899 OTHER LONG TERM (CURRENT) DRUG THERAPY: ICD-10-CM

## 2020-03-13 ENCOUNTER — APPOINTMENT (OUTPATIENT)
Dept: INTERNAL MEDICINE | Facility: CLINIC | Age: 55
End: 2020-03-13
Payer: MEDICAID

## 2020-03-13 DIAGNOSIS — G47.00 INSOMNIA, UNSPECIFIED: ICD-10-CM

## 2020-03-13 DIAGNOSIS — E78.00 PURE HYPERCHOLESTEROLEMIA, UNSPECIFIED: ICD-10-CM

## 2020-03-13 DIAGNOSIS — R06.83 SNORING: ICD-10-CM

## 2020-03-13 PROCEDURE — 99496 TRANSJ CARE MGMT HIGH F2F 7D: CPT

## 2020-03-13 RX ORDER — CYCLOBENZAPRINE HYDROCHLORIDE 10 MG/1
10 TABLET, FILM COATED ORAL
Qty: 14 | Refills: 0 | Status: DISCONTINUED | COMMUNITY
Start: 2019-10-28 | End: 2020-03-13

## 2020-03-17 NOTE — HISTORY OF PRESENT ILLNESS
[Post-hospitalization from ___ Hospital] : Post-hospitalization from [unfilled] Hospital [Admitted on: ___] : The patient was admitted on [unfilled] [Discharged on ___] : discharged on [unfilled] [FreeTextEntry2] : She is a 54-year-old male with history of alcohol dependence, CAD status post CABG comes in for hospital follow up visit. He was discharge on March 8 with symptoms of chest pain. Patient had CT and q.h.s. which showed no pulmonary embolus. Patient was told it was likely muscular in nature as he is a history of a fall when intoxicated. Patient with no signs of withdrawal while in the hospital. Patient states that he is continued to state from alcohol use.\par Patient did not start taking medications discussed with him upon discharge as he would like to discuss this with his cardiologist today . Currently only taking aspirin, carvedilol and simvastatin. Not taking lisinopril or multivitamins.\par Patient denies any cp, sob,abdominal pain, nausea, vomiting, palpitations, fever, chills, constipation, diarrhea.\par

## 2020-03-17 NOTE — ASSESSMENT
[FreeTextEntry1] : 1.chest pain: Likely musculoskeletal, resolved. Patient with no PE on imaging. Continue on all cardiac meds and follow up with cardiology this afternoon.\par Advised to discuss with cardiology if he can take NSAIDs for his chest wall tenderness.\par \par 2.low back pain: Patient with history of fall when intoxicated. Will obtain lumbar x-ray. Patient opened to physical therapy.\par \par 3.alcohol dependence: Scar cessation and to followup with Alcoholics Anonymous and outpatient detoxification centers.\par \par 4.history of insomnia with snoring: Patient requests sleep study at this time.

## 2020-03-26 DIAGNOSIS — K21.9 GASTRO-ESOPHAGEAL REFLUX DISEASE W/OUT ESOPHAGITIS: ICD-10-CM

## 2020-04-30 ENCOUNTER — APPOINTMENT (OUTPATIENT)
Dept: INTERNAL MEDICINE | Facility: CLINIC | Age: 55
End: 2020-04-30

## 2020-05-01 ENCOUNTER — APPOINTMENT (OUTPATIENT)
Dept: INTERNAL MEDICINE | Facility: CLINIC | Age: 55
End: 2020-05-01

## 2020-05-05 ENCOUNTER — APPOINTMENT (OUTPATIENT)
Dept: INTERNAL MEDICINE | Facility: CLINIC | Age: 55
End: 2020-05-05
Payer: MEDICAID

## 2020-05-05 VITALS
RESPIRATION RATE: 18 BRPM | DIASTOLIC BLOOD PRESSURE: 84 MMHG | WEIGHT: 198 LBS | HEIGHT: 68 IN | SYSTOLIC BLOOD PRESSURE: 118 MMHG | OXYGEN SATURATION: 98 % | BODY MASS INDEX: 30.01 KG/M2 | TEMPERATURE: 98 F | HEART RATE: 68 BPM

## 2020-05-05 DIAGNOSIS — M54.5 LOW BACK PAIN: ICD-10-CM

## 2020-05-05 DIAGNOSIS — G89.29 LOW BACK PAIN: ICD-10-CM

## 2020-05-05 DIAGNOSIS — M62.830 MUSCLE SPASM OF BACK: ICD-10-CM

## 2020-05-05 PROCEDURE — 99214 OFFICE O/P EST MOD 30 MIN: CPT

## 2020-05-05 RX ORDER — FOLIC ACID 1 MG/1
1 TABLET ORAL
Qty: 90 | Refills: 1 | Status: DISCONTINUED | COMMUNITY
Start: 2020-03-26 | End: 2020-05-05

## 2020-05-05 RX ORDER — FAMOTIDINE 20 MG/1
20 TABLET, FILM COATED ORAL
Qty: 90 | Refills: 1 | Status: DISCONTINUED | COMMUNITY
Start: 2020-03-26 | End: 2020-05-05

## 2020-05-05 RX ORDER — LISINOPRIL 2.5 MG/1
2.5 TABLET ORAL
Qty: 90 | Refills: 1 | Status: DISCONTINUED | COMMUNITY
Start: 2020-03-26 | End: 2020-05-05

## 2020-05-05 NOTE — ASSESSMENT
[FreeTextEntry1] : Acute LBP : likely muscle strain / spasm \par Advised XRAY lumbar AP/LAT, Tone \par Zanaflex 2 mg po Q 8 hrs PRN, went over instructions and side effects of medication. Advised not to take while driving, may cause drowsiness. \par Continue warm moist heat PRN\par Avoid aggravating activities \par Call for worsening symptoms

## 2020-05-05 NOTE — PHYSICAL EXAM
[Well Nourished] : well nourished [No Acute Distress] : no acute distress [Well Developed] : well developed [No Lymphadenopathy] : no lymphadenopathy [No Respiratory Distress] : no respiratory distress  [No Accessory Muscle Use] : no accessory muscle use [Supple] : supple [Clear to Auscultation] : lungs were clear to auscultation bilaterally [Normal Rate] : normal rate  [Regular Rhythm] : with a regular rhythm [Normal S1, S2] : normal S1 and S2 [No CVA Tenderness] : no CVA  tenderness [Kyphosis] : no kyphosis [Grossly Normal Strength/Tone] : grossly normal strength/tone [Scoliosis] : no scoliosis [Coordination Grossly Intact] : coordination grossly intact [No Focal Deficits] : no focal deficits [Normal Gait] : normal gait [Normal Affect] : the affect was normal [Deep Tendon Reflexes (DTR)] : deep tendon reflexes were 2+ and symmetric [Normal Insight/Judgement] : insight and judgment were intact [Alert and Oriented x3] : oriented to person, place, and time [de-identified] : Lumbar tenderness noted on palpation , lumbar displays full ROM

## 2020-05-05 NOTE — REVIEW OF SYSTEMS
[Fever] : no fever [Chills] : no chills [Joint Stiffness] : no joint stiffness [Fatigue] : no fatigue [Back Pain] : back pain [Joint Swelling] : no joint swelling [FreeTextEntry9] : see HPI  [Negative] : Psychiatric

## 2020-05-05 NOTE — HISTORY OF PRESENT ILLNESS
[FreeTextEntry8] : Pt presents  to the office today with complaints of lumbar back pain x few weeks.  he has a history of GERD, HTN, obstructive lung disease and alcohol dependence. He reposer bent over to pick something up and " twisted his back.' He has been  using OTC heat patches with little relief. No radiation to his legs, no numbness or tingling , urinary or bowel incontinence. \par The pain is 7/10 intensity and worse with bending over and twisting. he has been taking Tylenol 650 mg po q 8 hrs with little relief. \par He denies CP, SOB, abdominal pain, nausea, vomiting, fever, chills, constipation. \par

## 2020-06-18 ENCOUNTER — APPOINTMENT (OUTPATIENT)
Dept: NEUROLOGY | Facility: CLINIC | Age: 55
End: 2020-06-18

## 2020-07-27 NOTE — CDI QUERY NOTE - NSCDI_DOCCLARIFY_GEN_ALL_CORE_FT
Refill request for Tramadol. Pt states she is experiencing a lot of back pain. Requesting alcohol pads as well. In responding to this request, please exercise your independent professional judgment.  The fact that a question is asked does not imply that any particular answer is desired or expected.

## 2020-07-29 ENCOUNTER — EMERGENCY (EMERGENCY)
Facility: HOSPITAL | Age: 55
LOS: 0 days | Discharge: HOME | End: 2020-07-29
Payer: MEDICAID

## 2020-07-29 VITALS
TEMPERATURE: 98 F | HEART RATE: 77 BPM | DIASTOLIC BLOOD PRESSURE: 74 MMHG | RESPIRATION RATE: 17 BRPM | OXYGEN SATURATION: 96 % | SYSTOLIC BLOOD PRESSURE: 139 MMHG

## 2020-07-29 DIAGNOSIS — E78.5 HYPERLIPIDEMIA, UNSPECIFIED: ICD-10-CM

## 2020-07-29 DIAGNOSIS — Z20.828 CONTACT WITH AND (SUSPECTED) EXPOSURE TO OTHER VIRAL COMMUNICABLE DISEASES: ICD-10-CM

## 2020-07-29 DIAGNOSIS — I10 ESSENTIAL (PRIMARY) HYPERTENSION: ICD-10-CM

## 2020-07-29 DIAGNOSIS — Z95.1 PRESENCE OF AORTOCORONARY BYPASS GRAFT: Chronic | ICD-10-CM

## 2020-07-29 DIAGNOSIS — Z79.82 LONG TERM (CURRENT) USE OF ASPIRIN: ICD-10-CM

## 2020-07-29 DIAGNOSIS — Z95.1 PRESENCE OF AORTOCORONARY BYPASS GRAFT: ICD-10-CM

## 2020-07-29 DIAGNOSIS — I25.10 ATHEROSCLEROTIC HEART DISEASE OF NATIVE CORONARY ARTERY WITHOUT ANGINA PECTORIS: ICD-10-CM

## 2020-07-29 PROCEDURE — 99283 EMERGENCY DEPT VISIT LOW MDM: CPT

## 2020-07-29 PROCEDURE — U0003: CPT

## 2020-07-29 NOTE — ED PROVIDER NOTE - OBJECTIVE STATEMENT
Pt presents for COVID testing after potential exposure, pt is asymptomatic and has no acute complaints at this time.

## 2020-07-29 NOTE — ED PROVIDER NOTE - NSFOLLOWUPINSTRUCTIONS_ED_ALL_ED_FT
How to get your Coronavirus (COVID-19) Testing Results:   Please be advised that you were tested for the coronavirus (COVID-19) in the Emergency Department at [facility].  You are to maintain self-quarantine procedures for 14 days until instructed otherwise by one of our healthcare agents. Please note that the test may take up to 2-4 days to result.  If you do not hear from us within 72 hours and you'd like to check on your results, you can call on of our coronavirus specialists at [address] [place] [address] (available 24/7).  Please DO NOT call the site where you received the test to obtain your results.

## 2020-07-30 LAB — SARS-COV-2 RNA SPEC QL NAA+PROBE: SIGNIFICANT CHANGE UP

## 2020-11-06 NOTE — PATIENT PROFILE ADULT - IS THERE A SUSPICION OF ABUSE/NEGLIGENCE?
Physical Therapy at [facility],   a part of [address] [place] [address], [address] [place] [address]  Phone: [phone] Fax: [phone]    Progress Note    Name: Marcin Campos   : 2003   MD: Gonzalez Haley MD       Treatment Diagnosis: Left knee pain [M25.562]  Start of Care: 10/12/2020    Visits from Start of Care: 6  Missed Visits: 0    Summary of Care: Nelia Nathan had done very well over the last few weeks working on his L knee weakness. He has worked very hard both in clinic and has been compliant with his exercises at home. We have seen significant improvements with his ability to both perform and control single limb squats. We have also been progressing him into light plyometric exercises, including 4\" box jump ups, which he is controlling well at this time. Due to the excessive weakness he began with, there is still a ways to go in order to be able to have him get back to a state where he would be safe to return to sport. As he is making such good progress, I would like to continue with him a further 3-4 weeks in order to promote increased strength and get him back to light jogging exercises. Long Term Goals: To be accomplished in 10-12 treatments:               1. Pt will be able to perform 10 single limb squats without loss of control PROGRESSING (75% MET)               2. Pt will be able to return to light jumping exercises maintaining proper form without pain PROGRESSING (>50% MET)               3. Pt will be compliant with daily use of HEP in order to promote better restoration of knee strength MET               4. Improved FOTO score to 84 or better to demonstrate improved function    Assessment / Recommendations: Other: Patient would benefit from continued PT, a further 3-4 weeks, in order to progress functional knee strength, and begin to work into more advanced plyometrics and possibly begin light jogging.      Lia Malin, PT, DPT 11/6/2020     ________________________________________________________________________  NOTE TO PHYSICIAN:  Please complete the following and fax to: [facility]: Fax: [phone] . Corina Chapa Retain this original for your records. If you are unable to process this request in 24 hours, please contact our office.        ____ I have read the above report and request that my patient continue therapy with the following changes/special instructions:  ____ I have read the above report and request that my patient be discharged from therapy    Physician's Signature:_________________ Date:___________Time:__________ no

## 2020-12-22 ENCOUNTER — APPOINTMENT (OUTPATIENT)
Dept: INTERNAL MEDICINE | Facility: CLINIC | Age: 55
End: 2020-12-22

## 2020-12-23 ENCOUNTER — APPOINTMENT (OUTPATIENT)
Dept: INTERNAL MEDICINE | Facility: CLINIC | Age: 55
End: 2020-12-23
Payer: MEDICAID
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
## 2020-12-23 DIAGNOSIS — M79.603 PAIN IN ARM, UNSPECIFIED: ICD-10-CM

## 2020-12-23 PROCEDURE — 99441: CPT

## 2020-12-30 ENCOUNTER — OUTPATIENT (OUTPATIENT)
Dept: OUTPATIENT SERVICES | Facility: HOSPITAL | Age: 55
LOS: 1 days | End: 2020-12-30
Payer: MEDICAID

## 2020-12-30 ENCOUNTER — APPOINTMENT (OUTPATIENT)
Dept: INTERNAL MEDICINE | Facility: CLINIC | Age: 55
End: 2020-12-30
Payer: MEDICAID

## 2020-12-30 ENCOUNTER — RESULT REVIEW (OUTPATIENT)
Age: 55
End: 2020-12-30

## 2020-12-30 ENCOUNTER — APPOINTMENT (OUTPATIENT)
Dept: RADIOLOGY | Facility: CLINIC | Age: 55
End: 2020-12-30
Payer: MEDICAID

## 2020-12-30 VITALS
RESPIRATION RATE: 16 BRPM | SYSTOLIC BLOOD PRESSURE: 140 MMHG | TEMPERATURE: 97.1 F | HEIGHT: 68 IN | DIASTOLIC BLOOD PRESSURE: 86 MMHG | WEIGHT: 175 LBS | HEART RATE: 72 BPM | OXYGEN SATURATION: 98 % | BODY MASS INDEX: 26.52 KG/M2

## 2020-12-30 DIAGNOSIS — M25.531 PAIN IN RIGHT WRIST: ICD-10-CM

## 2020-12-30 DIAGNOSIS — M19.022 PRIMARY OSTEOARTHRITIS, LEFT ELBOW: ICD-10-CM

## 2020-12-30 DIAGNOSIS — J44.9 CHRONIC OBSTRUCTIVE PULMONARY DISEASE, UNSPECIFIED: ICD-10-CM

## 2020-12-30 DIAGNOSIS — Z95.1 PRESENCE OF AORTOCORONARY BYPASS GRAFT: Chronic | ICD-10-CM

## 2020-12-30 DIAGNOSIS — Z00.8 ENCOUNTER FOR OTHER GENERAL EXAMINATION: ICD-10-CM

## 2020-12-30 DIAGNOSIS — R20.0 ANESTHESIA OF SKIN: ICD-10-CM

## 2020-12-30 PROBLEM — Z00.00 ENCOUNTER FOR PREVENTIVE HEALTH EXAMINATION: Status: ACTIVE | Noted: 2020-12-30

## 2020-12-30 PROCEDURE — 73080 X-RAY EXAM OF ELBOW: CPT

## 2020-12-30 PROCEDURE — 90686 IIV4 VACC NO PRSV 0.5 ML IM: CPT

## 2020-12-30 PROCEDURE — 73080 X-RAY EXAM OF ELBOW: CPT | Mod: 26,LT

## 2020-12-30 PROCEDURE — G0008: CPT

## 2020-12-30 PROCEDURE — 73100 X-RAY EXAM OF WRIST: CPT | Mod: 26,RT

## 2020-12-30 PROCEDURE — 73100 X-RAY EXAM OF WRIST: CPT

## 2020-12-30 PROCEDURE — 99072 ADDL SUPL MATRL&STAF TM PHE: CPT

## 2020-12-30 PROCEDURE — 99214 OFFICE O/P EST MOD 30 MIN: CPT | Mod: 25

## 2020-12-30 RX ORDER — TIZANIDINE 2 MG/1
2 TABLET ORAL
Qty: 20 | Refills: 0 | Status: DISCONTINUED | COMMUNITY
Start: 2020-05-05 | End: 2020-12-30

## 2020-12-30 NOTE — ASSESSMENT
[FreeTextEntry1] : 1.left elbow pain: With history of prior x-rays showing degenerative changes. Repeat left elbow x-ray and referred up orthopedics. Start meloxicam for pain relief.\par \par 2.right wrist pain: Obtain right wrist x-ray as patient has history of fall, refer to orthopedics, start meloxicam. Next\par \par 3.alcohol abuse: Counts and write to the patient, wishes to continue to cut back and quit on his own. Does not wish to go to program.\par \par 3.obstructive lung disease: Asymptomatic at this time.\par \par 4.recent fall: with mild left facial numbness, no neurological deficits in the office today. New to cut back on alcohol use.
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
## 2021-01-01 NOTE — ED ADULT TRIAGE NOTE - SOURCE OF INFORMATION
Patient
I personally performed the service described in the documentation recorded by the scribe in my presence, and it accurately and completely records my words and actions.

## 2021-01-01 NOTE — ED PROVIDER NOTE - CONTEXT
2 oz of breastmilk or formula in a bottle every 2 hours - don't breastfeed first.    Follow up on 9/7/21
unknown

## 2021-01-11 LAB
25(OH)D3 SERPL-MCNC: 17.4 NG/ML
ALBUMIN SERPL ELPH-MCNC: 4.7 G/DL
ALP BLD-CCNC: 69 U/L
ALT SERPL-CCNC: 141 U/L
ANION GAP SERPL CALC-SCNC: 14 MMOL/L
AST SERPL-CCNC: 95 U/L
BASOPHILS # BLD AUTO: 0.09 K/UL
BASOPHILS NFR BLD AUTO: 1.5 %
BILIRUB SERPL-MCNC: 0.3 MG/DL
BUN SERPL-MCNC: 8 MG/DL
CALCIUM SERPL-MCNC: 9.7 MG/DL
CHLORIDE SERPL-SCNC: 102 MMOL/L
CHOLEST SERPL-MCNC: 250 MG/DL
CO2 SERPL-SCNC: 24 MMOL/L
CREAT SERPL-MCNC: 0.9 MG/DL
EOSINOPHIL # BLD AUTO: 0.12 K/UL
EOSINOPHIL NFR BLD AUTO: 1.9 %
ESTIMATED AVERAGE GLUCOSE: 114 MG/DL
GLUCOSE SERPL-MCNC: 100 MG/DL
HBA1C MFR BLD HPLC: 5.6 %
HCT VFR BLD CALC: 42.1 %
HDLC SERPL-MCNC: 107 MG/DL
HGB BLD-MCNC: 13.4 G/DL
IMM GRANULOCYTES NFR BLD AUTO: 0.5 %
LDLC SERPL CALC-MCNC: 128 MG/DL
LYMPHOCYTES # BLD AUTO: 2.04 K/UL
LYMPHOCYTES NFR BLD AUTO: 32.9 %
MAN DIFF?: NORMAL
MCHC RBC-ENTMCNC: 28.6 PG
MCHC RBC-ENTMCNC: 31.8 GM/DL
MCV RBC AUTO: 90 FL
MONOCYTES # BLD AUTO: 0.81 K/UL
MONOCYTES NFR BLD AUTO: 13.1 %
NEUTROPHILS # BLD AUTO: 3.11 K/UL
NEUTROPHILS NFR BLD AUTO: 50.1 %
NONHDLC SERPL-MCNC: 143 MG/DL
PLATELET # BLD AUTO: 170 K/UL
POTASSIUM SERPL-SCNC: 4.1 MMOL/L
PROT SERPL-MCNC: 7.5 G/DL
RBC # BLD: 4.68 M/UL
RBC # FLD: 16.3 %
SODIUM SERPL-SCNC: 140 MMOL/L
TRIGL SERPL-MCNC: 76 MG/DL
TSH SERPL-ACNC: 4.07 UIU/ML
VIT B12 SERPL-MCNC: 1013 PG/ML
WBC # FLD AUTO: 6.2 K/UL

## 2021-01-13 ENCOUNTER — APPOINTMENT (OUTPATIENT)
Dept: ORTHOPEDIC SURGERY | Facility: CLINIC | Age: 56
End: 2021-01-13

## 2021-01-21 ENCOUNTER — APPOINTMENT (OUTPATIENT)
Dept: ORTHOPEDIC SURGERY | Facility: CLINIC | Age: 56
End: 2021-01-21

## 2021-02-09 ENCOUNTER — APPOINTMENT (OUTPATIENT)
Dept: INTERNAL MEDICINE | Facility: CLINIC | Age: 56
End: 2021-02-09

## 2021-02-11 NOTE — ED ADULT NURSE NOTE - DISCHARGE DATE/TIME
[address]                      [facility], [address] [facility]                    [facility] FUN[facility]  [facility]   54 y.o.   female  Height 65 in  Weight 200 lb      Referring provider   Anamaria Oneill MD    Reading provider   Gerardo Rob    Test meets ATS criteria for acceptability and reproducibility Yes    Diagnosis: ROMANO: Yes  Cough   Yes, wheezing Yes  Smoking   no    Spirometry   FVC            2.32 L   79%  Post bronchodilator 2.58 L  89% Change 11 %  FEV1          1.33 L  57%   Post bronchodilator  1.77 L  77%   Change 33%  FEV1/FVC  57  %             Post bronchodilator  68 %  MDZ35-27% 0.60 L  25%  Post bronchodilator  1.42 L  61%   Change 136%    Lung volume   SVC           5.67 L  92%   RV              2.75 L 141%   TLC            5.42  L 104%   RV/TLC      50 %    DLCO           89 %     Test interpretation     Spirometry meets ATS criteria for moderately severe obstructive airway disease with significant response to bronchodilator  Lung volume shows air trapping  Diffusion capacity is normal       Clinical correlation is recommended     Gregg Taylor [facility], 2/11/2021 10:35 AM 29-Jul-2020 16:59

## 2021-03-08 ENCOUNTER — APPOINTMENT (OUTPATIENT)
Dept: INTERNAL MEDICINE | Facility: CLINIC | Age: 56
End: 2021-03-08

## 2021-03-17 ENCOUNTER — APPOINTMENT (OUTPATIENT)
Dept: INTERNAL MEDICINE | Facility: CLINIC | Age: 56
End: 2021-03-17
Payer: MEDICAID

## 2021-03-17 VITALS
HEIGHT: 68 IN | OXYGEN SATURATION: 97 % | WEIGHT: 178 LBS | RESPIRATION RATE: 18 BRPM | TEMPERATURE: 98.2 F | SYSTOLIC BLOOD PRESSURE: 182 MMHG | BODY MASS INDEX: 26.98 KG/M2 | DIASTOLIC BLOOD PRESSURE: 110 MMHG | HEART RATE: 78 BPM

## 2021-03-17 VITALS — DIASTOLIC BLOOD PRESSURE: 100 MMHG | SYSTOLIC BLOOD PRESSURE: 164 MMHG

## 2021-03-17 DIAGNOSIS — R79.89 OTHER SPECIFIED ABNORMAL FINDINGS OF BLOOD CHEMISTRY: ICD-10-CM

## 2021-03-17 PROCEDURE — 99214 OFFICE O/P EST MOD 30 MIN: CPT | Mod: 25

## 2021-03-17 PROCEDURE — 99072 ADDL SUPL MATRL&STAF TM PHE: CPT

## 2021-03-17 PROCEDURE — 36415 COLL VENOUS BLD VENIPUNCTURE: CPT

## 2021-03-17 NOTE — HISTORY OF PRESENT ILLNESS
[FreeTextEntry8] : Check BP\par Here for FU.  Doing ok. Trying to remain sober but had a few beers one week ago.  Spoke with counselor yesterday and is in the process of arranging ETOH counseling.  \par Missed a week of BP meds but has restarted these one week ago.  Admits to having a lot of soups and salted popcorn.  \par Takes Meloxicam intermittently for elbow pain.  \par Last visit to  3 months ago.  \par LFTs elevated in Dec. likely from ETOH.

## 2021-03-17 NOTE — COUNSELING
[Strategies to reduce or eliminate alcohol use discussed] : Strategies to reduce or eliminate alcohol use discussed [Support options provided] : Support options provided [Quit Drinking] : Quit Drinking [Participate in Treatment Program] : Participate in treatment program

## 2021-03-18 ENCOUNTER — NON-APPOINTMENT (OUTPATIENT)
Age: 56
End: 2021-03-18

## 2021-03-18 LAB
ALBUMIN SERPL ELPH-MCNC: 5 G/DL
ALP BLD-CCNC: 54 U/L
ALT SERPL-CCNC: 20 U/L
AST SERPL-CCNC: 22 U/L
BILIRUB DIRECT SERPL-MCNC: 0.1 MG/DL
BILIRUB INDIRECT SERPL-MCNC: 0.1 MG/DL
BILIRUB SERPL-MCNC: 0.2 MG/DL
PROT SERPL-MCNC: 7.8 G/DL
PSA SERPL-MCNC: 0.39 NG/ML

## 2021-03-26 ENCOUNTER — NON-APPOINTMENT (OUTPATIENT)
Age: 56
End: 2021-03-26

## 2021-04-14 ENCOUNTER — APPOINTMENT (OUTPATIENT)
Dept: INTERNAL MEDICINE | Facility: CLINIC | Age: 56
End: 2021-04-14

## 2021-05-27 NOTE — PATIENT PROFILE ADULT - NSASFALLNEEDSASSISTWITH_GEN_A_NUR
Clindamycin oral twice a day for a week    Once daily application metrogel for 5 days  
walking/standing

## 2021-05-29 ENCOUNTER — INPATIENT (INPATIENT)
Facility: HOSPITAL | Age: 56
LOS: 2 days | Discharge: HOME | DRG: 201 | End: 2021-06-01
Attending: STUDENT IN AN ORGANIZED HEALTH CARE EDUCATION/TRAINING PROGRAM | Admitting: INTERNAL MEDICINE
Payer: MEDICAID

## 2021-05-29 VITALS — HEIGHT: 68 IN | WEIGHT: 184.97 LBS

## 2021-05-29 DIAGNOSIS — Z95.1 PRESENCE OF AORTOCORONARY BYPASS GRAFT: Chronic | ICD-10-CM

## 2021-05-29 DIAGNOSIS — R00.2 PALPITATIONS: ICD-10-CM

## 2021-05-29 PROCEDURE — 99285 EMERGENCY DEPT VISIT HI MDM: CPT

## 2021-05-29 PROCEDURE — 71045 X-RAY EXAM CHEST 1 VIEW: CPT | Mod: 26

## 2021-05-29 PROCEDURE — 93010 ELECTROCARDIOGRAM REPORT: CPT

## 2021-05-29 RX ORDER — ALPRAZOLAM 0.25 MG
0.5 TABLET ORAL ONCE
Refills: 0 | Status: DISCONTINUED | OUTPATIENT
Start: 2021-05-29 | End: 2021-05-29

## 2021-05-29 NOTE — ED ADULT TRIAGE NOTE - CHIEF COMPLAINT QUOTE
Pt ambulatory to ED c/o "irregular heart beat" and palpitations. Pt desnies chest pain/discomfort. No SOB. palpitations started about 30 minutes PTA, pt took 1 baby ASA. EKG in progress.

## 2021-05-30 ENCOUNTER — TRANSCRIPTION ENCOUNTER (OUTPATIENT)
Age: 56
End: 2021-05-30

## 2021-05-30 DIAGNOSIS — R00.2 PALPITATIONS: ICD-10-CM

## 2021-05-30 LAB
ALBUMIN SERPL ELPH-MCNC: 4.1 G/DL — SIGNIFICANT CHANGE UP (ref 3.3–5)
ALBUMIN SERPL ELPH-MCNC: 4.1 G/DL — SIGNIFICANT CHANGE UP (ref 3.3–5)
ALP SERPL-CCNC: 55 U/L — SIGNIFICANT CHANGE UP (ref 40–120)
ALP SERPL-CCNC: 69 U/L — SIGNIFICANT CHANGE UP (ref 40–120)
ALT FLD-CCNC: 34 U/L — SIGNIFICANT CHANGE UP (ref 12–78)
ALT FLD-CCNC: 41 U/L — SIGNIFICANT CHANGE UP (ref 12–78)
ANION GAP SERPL CALC-SCNC: 7 MMOL/L — SIGNIFICANT CHANGE UP (ref 5–17)
ANION GAP SERPL CALC-SCNC: 8 MMOL/L — SIGNIFICANT CHANGE UP (ref 5–17)
AST SERPL-CCNC: 43 U/L — HIGH (ref 15–37)
AST SERPL-CCNC: 53 U/L — HIGH (ref 15–37)
BASOPHILS # BLD AUTO: 0 K/UL — SIGNIFICANT CHANGE UP (ref 0–0.2)
BASOPHILS # BLD AUTO: 0.07 K/UL — SIGNIFICANT CHANGE UP (ref 0–0.2)
BASOPHILS NFR BLD AUTO: 0 % — SIGNIFICANT CHANGE UP (ref 0–2)
BASOPHILS NFR BLD AUTO: 1.1 % — SIGNIFICANT CHANGE UP (ref 0–2)
BILIRUB DIRECT SERPL-MCNC: 0.1 MG/DL — SIGNIFICANT CHANGE UP (ref 0–0.2)
BILIRUB INDIRECT FLD-MCNC: 0.4 MG/DL — SIGNIFICANT CHANGE UP (ref 0.2–1)
BILIRUB SERPL-MCNC: 0.3 MG/DL — SIGNIFICANT CHANGE UP (ref 0.2–1.2)
BILIRUB SERPL-MCNC: 0.5 MG/DL — SIGNIFICANT CHANGE UP (ref 0.2–1.2)
BUN SERPL-MCNC: 7 MG/DL — SIGNIFICANT CHANGE UP (ref 7–23)
BUN SERPL-MCNC: 8 MG/DL — SIGNIFICANT CHANGE UP (ref 7–23)
CALCIUM SERPL-MCNC: 8.6 MG/DL — SIGNIFICANT CHANGE UP (ref 8.5–10.1)
CALCIUM SERPL-MCNC: 8.8 MG/DL — SIGNIFICANT CHANGE UP (ref 8.5–10.1)
CHLORIDE SERPL-SCNC: 108 MMOL/L — SIGNIFICANT CHANGE UP (ref 96–108)
CHLORIDE SERPL-SCNC: 109 MMOL/L — HIGH (ref 96–108)
CO2 SERPL-SCNC: 26 MMOL/L — SIGNIFICANT CHANGE UP (ref 22–31)
CO2 SERPL-SCNC: 27 MMOL/L — SIGNIFICANT CHANGE UP (ref 22–31)
CREAT SERPL-MCNC: 0.67 MG/DL — SIGNIFICANT CHANGE UP (ref 0.5–1.3)
CREAT SERPL-MCNC: 0.86 MG/DL — SIGNIFICANT CHANGE UP (ref 0.5–1.3)
EOSINOPHIL # BLD AUTO: 0.13 K/UL — SIGNIFICANT CHANGE UP (ref 0–0.5)
EOSINOPHIL # BLD AUTO: 0.21 K/UL — SIGNIFICANT CHANGE UP (ref 0–0.5)
EOSINOPHIL NFR BLD AUTO: 2 % — SIGNIFICANT CHANGE UP (ref 0–6)
EOSINOPHIL NFR BLD AUTO: 5 % — SIGNIFICANT CHANGE UP (ref 0–6)
ETHANOL SERPL-MCNC: 181 MG/DL — HIGH (ref 0–10)
ETHANOL SERPL-MCNC: 191 MG/DL — HIGH (ref 0–10)
GLUCOSE SERPL-MCNC: 80 MG/DL — SIGNIFICANT CHANGE UP (ref 70–99)
GLUCOSE SERPL-MCNC: 91 MG/DL — SIGNIFICANT CHANGE UP (ref 70–99)
HCT VFR BLD CALC: 37.7 % — LOW (ref 39–50)
HCT VFR BLD CALC: 38 % — LOW (ref 39–50)
HGB BLD-MCNC: 12.5 G/DL — LOW (ref 13–17)
HGB BLD-MCNC: 12.7 G/DL — LOW (ref 13–17)
IMM GRANULOCYTES NFR BLD AUTO: 0.2 % — SIGNIFICANT CHANGE UP (ref 0–1.5)
LYMPHOCYTES # BLD AUTO: 2.49 K/UL — SIGNIFICANT CHANGE UP (ref 1–3.3)
LYMPHOCYTES # BLD AUTO: 4.61 K/UL — HIGH (ref 1–3.3)
LYMPHOCYTES # BLD AUTO: 60 % — HIGH (ref 13–44)
LYMPHOCYTES # BLD AUTO: 71 % — HIGH (ref 13–44)
MAGNESIUM SERPL-MCNC: 2.6 MG/DL — SIGNIFICANT CHANGE UP (ref 1.6–2.6)
MCHC RBC-ENTMCNC: 28.7 PG — SIGNIFICANT CHANGE UP (ref 27–34)
MCHC RBC-ENTMCNC: 29.3 PG — SIGNIFICANT CHANGE UP (ref 27–34)
MCHC RBC-ENTMCNC: 32.9 GM/DL — SIGNIFICANT CHANGE UP (ref 32–36)
MCHC RBC-ENTMCNC: 33.7 GM/DL — SIGNIFICANT CHANGE UP (ref 32–36)
MCV RBC AUTO: 87.1 FL — SIGNIFICANT CHANGE UP (ref 80–100)
MCV RBC AUTO: 87.2 FL — SIGNIFICANT CHANGE UP (ref 80–100)
MONOCYTES # BLD AUTO: 0.5 K/UL — SIGNIFICANT CHANGE UP (ref 0–0.9)
MONOCYTES # BLD AUTO: 0.64 K/UL — SIGNIFICANT CHANGE UP (ref 0–0.9)
MONOCYTES NFR BLD AUTO: 12 % — SIGNIFICANT CHANGE UP (ref 2–14)
MONOCYTES NFR BLD AUTO: 9.9 % — SIGNIFICANT CHANGE UP (ref 2–14)
NEUTROPHILS # BLD AUTO: 0.95 K/UL — LOW (ref 1.8–7.4)
NEUTROPHILS # BLD AUTO: 1.03 K/UL — LOW (ref 1.8–7.4)
NEUTROPHILS NFR BLD AUTO: 15.8 % — LOW (ref 43–77)
NEUTROPHILS NFR BLD AUTO: 23 % — LOW (ref 43–77)
NRBC # BLD: SIGNIFICANT CHANGE UP /100 WBCS (ref 0–0)
PCP SPEC-MCNC: SIGNIFICANT CHANGE UP
PHOSPHATE SERPL-MCNC: 3.4 MG/DL — SIGNIFICANT CHANGE UP (ref 2.5–4.5)
PLATELET # BLD AUTO: 159 K/UL — SIGNIFICANT CHANGE UP (ref 150–400)
PLATELET # BLD AUTO: 175 K/UL — SIGNIFICANT CHANGE UP (ref 150–400)
POTASSIUM SERPL-MCNC: 3.6 MMOL/L — SIGNIFICANT CHANGE UP (ref 3.5–5.3)
POTASSIUM SERPL-MCNC: 3.7 MMOL/L — SIGNIFICANT CHANGE UP (ref 3.5–5.3)
POTASSIUM SERPL-SCNC: 3.6 MMOL/L — SIGNIFICANT CHANGE UP (ref 3.5–5.3)
POTASSIUM SERPL-SCNC: 3.7 MMOL/L — SIGNIFICANT CHANGE UP (ref 3.5–5.3)
PROT SERPL-MCNC: 7.6 GM/DL — SIGNIFICANT CHANGE UP (ref 6–8.3)
PROT SERPL-MCNC: 8 GM/DL — SIGNIFICANT CHANGE UP (ref 6–8.3)
RBC # BLD: 4.33 M/UL — SIGNIFICANT CHANGE UP (ref 4.2–5.8)
RBC # BLD: 4.36 M/UL — SIGNIFICANT CHANGE UP (ref 4.2–5.8)
RBC # FLD: 14.5 % — SIGNIFICANT CHANGE UP (ref 10.3–14.5)
RBC # FLD: 14.6 % — HIGH (ref 10.3–14.5)
SARS-COV-2 RNA SPEC QL NAA+PROBE: SIGNIFICANT CHANGE UP
SODIUM SERPL-SCNC: 141 MMOL/L — SIGNIFICANT CHANGE UP (ref 135–145)
SODIUM SERPL-SCNC: 144 MMOL/L — SIGNIFICANT CHANGE UP (ref 135–145)
TROPONIN I SERPL-MCNC: <0.015 NG/ML — SIGNIFICANT CHANGE UP (ref 0.01–0.04)
TROPONIN I SERPL-MCNC: <0.015 NG/ML — SIGNIFICANT CHANGE UP (ref 0.01–0.04)
TSH SERPL-MCNC: 3.81 UU/ML — SIGNIFICANT CHANGE UP (ref 0.34–4.82)
WBC # BLD: 4.15 K/UL — SIGNIFICANT CHANGE UP (ref 3.8–10.5)
WBC # BLD: 6.49 K/UL — SIGNIFICANT CHANGE UP (ref 3.8–10.5)
WBC # FLD AUTO: 4.15 K/UL — SIGNIFICANT CHANGE UP (ref 3.8–10.5)
WBC # FLD AUTO: 6.49 K/UL — SIGNIFICANT CHANGE UP (ref 3.8–10.5)

## 2021-05-30 PROCEDURE — 84100 ASSAY OF PHOSPHORUS: CPT

## 2021-05-30 PROCEDURE — 93005 ELECTROCARDIOGRAM TRACING: CPT

## 2021-05-30 PROCEDURE — 93010 ELECTROCARDIOGRAM REPORT: CPT

## 2021-05-30 PROCEDURE — 86769 SARS-COV-2 COVID-19 ANTIBODY: CPT

## 2021-05-30 PROCEDURE — 36415 COLL VENOUS BLD VENIPUNCTURE: CPT

## 2021-05-30 PROCEDURE — 99221 1ST HOSP IP/OBS SF/LOW 40: CPT

## 2021-05-30 PROCEDURE — 85025 COMPLETE CBC W/AUTO DIFF WBC: CPT

## 2021-05-30 PROCEDURE — 80048 BASIC METABOLIC PNL TOTAL CA: CPT

## 2021-05-30 PROCEDURE — G0378: CPT

## 2021-05-30 PROCEDURE — 80307 DRUG TEST PRSMV CHEM ANLYZR: CPT

## 2021-05-30 PROCEDURE — 83735 ASSAY OF MAGNESIUM: CPT

## 2021-05-30 PROCEDURE — 80076 HEPATIC FUNCTION PANEL: CPT

## 2021-05-30 PROCEDURE — 87635 SARS-COV-2 COVID-19 AMP PRB: CPT

## 2021-05-30 RX ORDER — ASPIRIN/CALCIUM CARB/MAGNESIUM 324 MG
81 TABLET ORAL DAILY
Refills: 0 | Status: DISCONTINUED | OUTPATIENT
Start: 2021-05-30 | End: 2021-06-01

## 2021-05-30 RX ORDER — THIAMINE MONONITRATE (VIT B1) 100 MG
100 TABLET ORAL DAILY
Refills: 0 | Status: COMPLETED | OUTPATIENT
Start: 2021-05-30 | End: 2021-06-01

## 2021-05-30 RX ORDER — ENOXAPARIN SODIUM 100 MG/ML
40 INJECTION SUBCUTANEOUS DAILY
Refills: 0 | Status: DISCONTINUED | OUTPATIENT
Start: 2021-05-30 | End: 2021-06-01

## 2021-05-30 RX ORDER — HYDRALAZINE HCL 50 MG
10 TABLET ORAL ONCE
Refills: 0 | Status: COMPLETED | OUTPATIENT
Start: 2021-05-30 | End: 2021-05-30

## 2021-05-30 RX ORDER — SIMVASTATIN 20 MG/1
40 TABLET, FILM COATED ORAL AT BEDTIME
Refills: 0 | Status: DISCONTINUED | OUTPATIENT
Start: 2021-05-30 | End: 2021-06-01

## 2021-05-30 RX ORDER — ASPIRIN/CALCIUM CARB/MAGNESIUM 324 MG
325 TABLET ORAL ONCE
Refills: 0 | Status: COMPLETED | OUTPATIENT
Start: 2021-05-29 | End: 2021-05-29

## 2021-05-30 RX ORDER — FOLIC ACID 0.8 MG
1 TABLET ORAL DAILY
Refills: 0 | Status: DISCONTINUED | OUTPATIENT
Start: 2021-05-30 | End: 2021-06-01

## 2021-05-30 RX ORDER — ONDANSETRON 8 MG/1
8 TABLET, FILM COATED ORAL
Refills: 0 | Status: DISCONTINUED | OUTPATIENT
Start: 2021-05-30 | End: 2021-06-01

## 2021-05-30 RX ORDER — CARVEDILOL PHOSPHATE 80 MG/1
6.25 CAPSULE, EXTENDED RELEASE ORAL
Refills: 0 | Status: DISCONTINUED | OUTPATIENT
Start: 2021-05-30 | End: 2021-06-01

## 2021-05-30 RX ADMIN — Medication 81 MILLIGRAM(S): at 09:21

## 2021-05-30 RX ADMIN — Medication 100 MILLIGRAM(S): at 09:21

## 2021-05-30 RX ADMIN — Medication 2 MILLIGRAM(S): at 17:50

## 2021-05-30 RX ADMIN — Medication 1 MILLIGRAM(S): at 09:21

## 2021-05-30 RX ADMIN — Medication 325 MILLIGRAM(S): at 00:18

## 2021-05-30 RX ADMIN — Medication 1 TABLET(S): at 09:21

## 2021-05-30 RX ADMIN — CARVEDILOL PHOSPHATE 6.25 MILLIGRAM(S): 80 CAPSULE, EXTENDED RELEASE ORAL at 22:02

## 2021-05-30 RX ADMIN — Medication 10 MILLIGRAM(S): at 17:50

## 2021-05-30 RX ADMIN — SIMVASTATIN 40 MILLIGRAM(S): 20 TABLET, FILM COATED ORAL at 22:02

## 2021-05-30 RX ADMIN — Medication 0.5 MILLIGRAM(S): at 00:17

## 2021-05-30 RX ADMIN — ENOXAPARIN SODIUM 40 MILLIGRAM(S): 100 INJECTION SUBCUTANEOUS at 09:20

## 2021-05-30 RX ADMIN — CARVEDILOL PHOSPHATE 6.25 MILLIGRAM(S): 80 CAPSULE, EXTENDED RELEASE ORAL at 09:20

## 2021-05-30 RX ADMIN — Medication 10 MILLIGRAM(S): at 19:13
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
## 2021-05-30 NOTE — DISCHARGE NOTE PROVIDER - CARE PROVIDER_API CALL
Lissett Lopez)  [facility]; Cardiovascular Disease  [address] [place] [address]  Phone: [phone]  Fax: [phone]  Follow Up Time:

## 2021-05-30 NOTE — DISCHARGE NOTE PROVIDER - NSDCMRMEDTOKEN_GEN_ALL_CORE_FT
aspirin 81 mg oral tablet: 1 tab(s) orally once a day   carvedilol 6.25 mg oral tablet: 1 tab(s) orally 2 times a day  famotidine 20 mg oral tablet: 1 tab(s) orally 2 times a day  folic acid 1 mg oral tablet: 1 tab(s) orally once a day  Multiple Vitamins oral tablet: 1 tab(s) orally once a day  simvastatin 40 mg oral tablet: 1 tab(s) orally once a day (at bedtime)  thiamine 100 mg oral tablet: 1 tab(s) orally once a day   amLODIPine 10 mg oral tablet: 1 tab(s) orally once a day   aspirin 81 mg oral tablet: 1 tab(s) orally once a day   carvedilol 6.25 mg oral tablet: 1 tab(s) orally 2 times a day  famotidine 20 mg oral tablet: 1 tab(s) orally 2 times a day  folic acid 1 mg oral tablet: 1 tab(s) orally once a day  Multiple Vitamins oral tablet: 1 tab(s) orally once a day  simvastatin 40 mg oral tablet: 1 tab(s) orally once a day (at bedtime)  thiamine 100 mg oral tablet: 1 tab(s) orally once a day
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
## 2021-05-30 NOTE — ED ADULT NURSE REASSESSMENT NOTE - NS ED NURSE REASSESS COMMENT FT1
Pt received at 0200. Pt AA&Ox4. Pt denies CP, SOB. Pt states "I still feel like my heart is racing." Pt monitoring continues.

## 2021-05-30 NOTE — PATIENT PROFILE ADULT - IS THERE A SUSPICION OF ABUSE/NEGLIGENCE?
----- Message from Trudi Shaw sent at 3/23/2018  3:15 PM EDT -----  Regarding: FW: Duane  Contact: [phone]  Mom called returning office call.  ----- Message -----     From: Trudi Shaw     Sent: 3/23/2018   3:10 PM       To: [facility]  Subject: Tevin Baumann called to provide an update to Rod Sterling. Please advise [phone]. no

## 2021-05-30 NOTE — DISCHARGE NOTE NURSING/CASE MANAGEMENT/SOCIAL WORK - PATIENT PORTAL LINK FT
You can access the FollowMyHealth Patient Portal offered by [facility] by registering at the following website: [email or web address]. By joining [facility]’s FollowMyHealth portal, you will also be able to view your health information using other applications (apps) compatible with our system.

## 2021-05-30 NOTE — CONSULT NOTE ADULT - ASSESSMENT
Palpitations- NSVT probably in ER.  No strips in chart.  continue tele montior today.  His alcohol level was high at presentation - could be the reason.    CAD s/p CABG- continue home meds.  Reportedly stress test few months ago normal.  He had upcoming echo in office.  CE and EKG- no ischemia.    Alcohol abuse- advised quitting.      Other medical issues- Management per primary team.   Thank you for allowing me to participate in the care of this patient. Please feel free to contact me with any questions.
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
## 2021-05-30 NOTE — ED ADULT NURSE REASSESSMENT NOTE - NS ED NURSE REASSESS COMMENT FT1
pt states anxiety has improved and his palpitations " aren't as much" . 1st trop negative, pt SR on monitor. Girlfriend at bedside.

## 2021-05-30 NOTE — DISCHARGE NOTE PROVIDER - NSDCCPCAREPLAN_GEN_ALL_CORE_FT
PRINCIPAL DISCHARGE DIAGNOSIS  Diagnosis: Palpitations  Assessment and Plan of Treatment: - on tele no signs of arrhythmia  - we strongly recommend for you to stop drinking      SECONDARY DISCHARGE DIAGNOSES  Diagnosis: ETOH abuse  Assessment and Plan of Treatment: - we strongly recommend you to stop drinking     PRINCIPAL DISCHARGE DIAGNOSIS  Diagnosis: Palpitations  Assessment and Plan of Treatment: - on tele no signs of arrhythmia  - we strongly recommend for you to stop drinking      SECONDARY DISCHARGE DIAGNOSES  Diagnosis: ETOH abuse  Assessment and Plan of Treatment: - we strongly recommend you to stop drinking    Diagnosis: High blood pressure  Assessment and Plan of Treatment: - you have had very high blood pressures in the hospital  - please maintain a good log of your blood pressures and show your primary care physician   - please stop drinkin alcohol  - please follow up with primary care physician  - you are on norvasc and coreg

## 2021-05-30 NOTE — H&P ADULT - NSHPPHYSICALEXAM_GEN_ALL_CORE
ICU Vital Signs Last 24 Hrs  T(C): 36.4 (30 May 2021 02:28), Max: 37.2 (29 May 2021 23:14)  T(F): 97.6 (30 May 2021 02:28), Max: 98.9 (29 May 2021 23:14)  HR: 88 (30 May 2021 02:28) (69 - 88)  BP: 108/83 (30 May 2021 02:28) (108/83 - 141/102)  BP(mean): 114 (29 May 2021 23:14) (114 - 114)  ABP: --  ABP(mean): --  RR: 16 (30 May 2021 02:28) (16 - 17)  SpO2: 97% (30 May 2021 02:28) (97% - 97%)

## 2021-05-30 NOTE — H&P ADULT - ASSESSMENT
THis is a 57 yo male  with history of CAD s/p stents, HTN and HLD now admitted for palpitations and in ED NSVT     NSVT   - cardiology consult   - check electrolytes   - continue with betablocker   - ? compliance   - tele monitoring    CAD   - continue with Simvastatin     HTN  - coninue with BB     ETOH abuse   - CIWA   - patient's  on admission   - folic acid   - thiamine     VTE PPX : LOVENOX daily

## 2021-05-30 NOTE — H&P ADULT - NSHPREVIEWOFSYSTEMS_GEN_ALL_CORE
REVIEW OF SYSTEMS:    CONSTITUTIONAL: No weakness, fevers or chills  EYES/ENT: No visual changes;  No vertigo or throat pain   NECK: No pain or stiffness  RESPIRATORY: No cough, wheezing, hemoptysis; No shortness of breath  CARDIOVASCULAR: +palpitations  GASTROINTESTINAL: No abdominal or epigastric pain. No nausea, vomiting, or hematemesis; No diarrhea or constipation. No melena or hematochezia.  GENITOURINARY: No dysuria, frequency or hematuria  NEUROLOGICAL: No numbness or weakness  SKIN: No itching, burning, rashes, or lesions   All other review of systems is negative unless indicated above.

## 2021-05-30 NOTE — ED ADULT NURSE NOTE - NSIMPLEMENTINTERV_GEN_ALL_ED
[place] Implemented All Universal Safety Interventions:  [place] to call system. Call bell, personal items and telephone within reach. Instruct patient to call for assistance. Room bathroom lighting operational. Non-slip footwear when patient is off stretcher. Physically safe environment: no spills, clutter or unnecessary equipment. Stretcher in lowest position, wheels locked, appropriate side rails in place.
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
## 2021-05-30 NOTE — H&P ADULT - NSHPSOCIALHISTORY_GEN_ALL_CORE
tobacco: does not smoke cigarettes   does endorse daily marijuana smoking   etoh: drinks 4 22oz beers daily

## 2021-05-31 LAB
ANION GAP SERPL CALC-SCNC: 8 MMOL/L — SIGNIFICANT CHANGE UP (ref 5–17)
BUN SERPL-MCNC: 6 MG/DL — LOW (ref 7–23)
CALCIUM SERPL-MCNC: 9 MG/DL — SIGNIFICANT CHANGE UP (ref 8.5–10.1)
CHLORIDE SERPL-SCNC: 100 MMOL/L — SIGNIFICANT CHANGE UP (ref 96–108)
CO2 SERPL-SCNC: 25 MMOL/L — SIGNIFICANT CHANGE UP (ref 22–31)
COVID-19 SPIKE DOMAIN AB INTERP: POSITIVE
COVID-19 SPIKE DOMAIN ANTIBODY RESULT: >250 U/ML — HIGH
CREAT SERPL-MCNC: 0.82 MG/DL — SIGNIFICANT CHANGE UP (ref 0.5–1.3)
GLUCOSE SERPL-MCNC: 105 MG/DL — HIGH (ref 70–99)
MAGNESIUM SERPL-MCNC: 2.4 MG/DL — SIGNIFICANT CHANGE UP (ref 1.6–2.6)
PHOSPHATE SERPL-MCNC: 2.4 MG/DL — LOW (ref 2.5–4.5)
POTASSIUM SERPL-MCNC: 3.4 MMOL/L — LOW (ref 3.5–5.3)
POTASSIUM SERPL-SCNC: 3.4 MMOL/L — LOW (ref 3.5–5.3)
SARS-COV-2 IGG+IGM SERPL QL IA: >250 U/ML — HIGH
SARS-COV-2 IGG+IGM SERPL QL IA: POSITIVE
SODIUM SERPL-SCNC: 133 MMOL/L — LOW (ref 135–145)

## 2021-05-31 PROCEDURE — 99232 SBSQ HOSP IP/OBS MODERATE 35: CPT

## 2021-05-31 RX ORDER — AMLODIPINE BESYLATE 2.5 MG/1
5 TABLET ORAL DAILY
Refills: 0 | Status: DISCONTINUED | OUTPATIENT
Start: 2021-05-31 | End: 2021-06-01

## 2021-05-31 RX ORDER — HYDRALAZINE HCL 50 MG
10 TABLET ORAL ONCE
Refills: 0 | Status: COMPLETED | OUTPATIENT
Start: 2021-05-31 | End: 2021-05-31

## 2021-05-31 RX ORDER — ACETAMINOPHEN 500 MG
650 TABLET ORAL EVERY 6 HOURS
Refills: 0 | Status: DISCONTINUED | OUTPATIENT
Start: 2021-05-31 | End: 2021-06-01

## 2021-05-31 RX ADMIN — CARVEDILOL PHOSPHATE 6.25 MILLIGRAM(S): 80 CAPSULE, EXTENDED RELEASE ORAL at 22:15

## 2021-05-31 RX ADMIN — Medication 650 MILLIGRAM(S): at 20:20

## 2021-05-31 RX ADMIN — CARVEDILOL PHOSPHATE 6.25 MILLIGRAM(S): 80 CAPSULE, EXTENDED RELEASE ORAL at 07:06

## 2021-05-31 RX ADMIN — Medication 100 MILLIGRAM(S): at 10:20

## 2021-05-31 RX ADMIN — Medication 650 MILLIGRAM(S): at 20:50

## 2021-05-31 RX ADMIN — Medication 1 TABLET(S): at 10:19

## 2021-05-31 RX ADMIN — AMLODIPINE BESYLATE 5 MILLIGRAM(S): 2.5 TABLET ORAL at 08:49

## 2021-05-31 RX ADMIN — Medication 81 MILLIGRAM(S): at 10:19

## 2021-05-31 RX ADMIN — Medication 10 MILLIGRAM(S): at 22:15

## 2021-05-31 RX ADMIN — ENOXAPARIN SODIUM 40 MILLIGRAM(S): 100 INJECTION SUBCUTANEOUS at 10:19

## 2021-05-31 RX ADMIN — AMLODIPINE BESYLATE 5 MILLIGRAM(S): 2.5 TABLET ORAL at 10:18

## 2021-05-31 RX ADMIN — Medication 10 MILLIGRAM(S): at 13:58

## 2021-05-31 RX ADMIN — Medication 1 MILLIGRAM(S): at 10:19

## 2021-05-31 RX ADMIN — SIMVASTATIN 40 MILLIGRAM(S): 20 TABLET, FILM COATED ORAL at 22:15

## 2021-06-01 VITALS
TEMPERATURE: 98 F | HEART RATE: 84 BPM | OXYGEN SATURATION: 98 % | SYSTOLIC BLOOD PRESSURE: 133 MMHG | DIASTOLIC BLOOD PRESSURE: 95 MMHG | RESPIRATION RATE: 18 BRPM

## 2021-06-01 LAB
ANION GAP SERPL CALC-SCNC: 7 MMOL/L — SIGNIFICANT CHANGE UP (ref 5–17)
BUN SERPL-MCNC: 6 MG/DL — LOW (ref 7–23)
CALCIUM SERPL-MCNC: 10 MG/DL — SIGNIFICANT CHANGE UP (ref 8.5–10.1)
CHLORIDE SERPL-SCNC: 104 MMOL/L — SIGNIFICANT CHANGE UP (ref 96–108)
CO2 SERPL-SCNC: 25 MMOL/L — SIGNIFICANT CHANGE UP (ref 22–31)
CREAT SERPL-MCNC: 0.72 MG/DL — SIGNIFICANT CHANGE UP (ref 0.5–1.3)
GLUCOSE SERPL-MCNC: 106 MG/DL — HIGH (ref 70–99)
MAGNESIUM SERPL-MCNC: 2.7 MG/DL — HIGH (ref 1.6–2.6)
PHOSPHATE SERPL-MCNC: 3.5 MG/DL — SIGNIFICANT CHANGE UP (ref 2.5–4.5)
POTASSIUM SERPL-MCNC: 3.5 MMOL/L — SIGNIFICANT CHANGE UP (ref 3.5–5.3)
POTASSIUM SERPL-SCNC: 3.5 MMOL/L — SIGNIFICANT CHANGE UP (ref 3.5–5.3)
SODIUM SERPL-SCNC: 136 MMOL/L — SIGNIFICANT CHANGE UP (ref 135–145)

## 2021-06-01 PROCEDURE — 99232 SBSQ HOSP IP/OBS MODERATE 35: CPT

## 2021-06-01 RX ORDER — AMLODIPINE BESYLATE 2.5 MG/1
1 TABLET ORAL
Qty: 30 | Refills: 0
Start: 2021-06-01 | End: 2021-06-30

## 2021-06-01 RX ADMIN — Medication 2 MILLIGRAM(S): at 11:15

## 2021-06-01 RX ADMIN — Medication 1 TABLET(S): at 10:18

## 2021-06-01 RX ADMIN — Medication 100 MILLIGRAM(S): at 10:19

## 2021-06-01 RX ADMIN — ENOXAPARIN SODIUM 40 MILLIGRAM(S): 100 INJECTION SUBCUTANEOUS at 10:18

## 2021-06-01 RX ADMIN — Medication 650 MILLIGRAM(S): at 08:43

## 2021-06-01 RX ADMIN — CARVEDILOL PHOSPHATE 6.25 MILLIGRAM(S): 80 CAPSULE, EXTENDED RELEASE ORAL at 10:18

## 2021-06-01 RX ADMIN — Medication 2 MILLIGRAM(S): at 01:31

## 2021-06-01 RX ADMIN — AMLODIPINE BESYLATE 5 MILLIGRAM(S): 2.5 TABLET ORAL at 10:19

## 2021-06-01 RX ADMIN — Medication 1 MILLIGRAM(S): at 10:23

## 2021-06-01 RX ADMIN — Medication 81 MILLIGRAM(S): at 10:18

## 2021-06-01 NOTE — CHART NOTE - NSCHARTNOTEFT_GEN_A_CORE
Notified pt with 13 beats NSVT on tele. Pt asymptomatic. No chest pain, SOB, palpitations. VSS. Will order BMP, mag, phos. Cardio following

## 2021-06-04 ENCOUNTER — NON-APPOINTMENT (OUTPATIENT)
Age: 56
End: 2021-06-04

## 2021-06-07 ENCOUNTER — APPOINTMENT (OUTPATIENT)
Dept: INTERNAL MEDICINE | Facility: CLINIC | Age: 56
End: 2021-06-07
Payer: MEDICAID

## 2021-06-07 VITALS
OXYGEN SATURATION: 99 % | SYSTOLIC BLOOD PRESSURE: 142 MMHG | WEIGHT: 180 LBS | HEART RATE: 68 BPM | TEMPERATURE: 96.2 F | RESPIRATION RATE: 16 BRPM | HEIGHT: 68 IN | DIASTOLIC BLOOD PRESSURE: 78 MMHG | BODY MASS INDEX: 27.28 KG/M2

## 2021-06-07 VITALS — SYSTOLIC BLOOD PRESSURE: 128 MMHG | DIASTOLIC BLOOD PRESSURE: 86 MMHG

## 2021-06-07 DIAGNOSIS — K60.3 ANAL FISTULA: ICD-10-CM

## 2021-06-07 DIAGNOSIS — I10 ESSENTIAL (PRIMARY) HYPERTENSION: ICD-10-CM

## 2021-06-07 DIAGNOSIS — I25.10 ATHEROSCLEROTIC HEART DISEASE OF NATIVE CORONARY ARTERY W/OUT ANGINA PECTORIS: ICD-10-CM

## 2021-06-07 DIAGNOSIS — F10.10 ALCOHOL ABUSE, UNCOMPLICATED: ICD-10-CM

## 2021-06-07 PROCEDURE — 99496 TRANSJ CARE MGMT HIGH F2F 7D: CPT

## 2021-06-07 PROCEDURE — 99072 ADDL SUPL MATRL&STAF TM PHE: CPT

## 2021-06-07 RX ORDER — LOSARTAN POTASSIUM 50 MG/1
50 TABLET, FILM COATED ORAL
Qty: 30 | Refills: 5 | Status: DISCONTINUED | COMMUNITY
Start: 2021-03-17 | End: 2021-06-07

## 2021-06-07 RX ORDER — AMLODIPINE BESYLATE 10 MG/1
10 TABLET ORAL
Qty: 30 | Refills: 5 | Status: ACTIVE | COMMUNITY
Start: 2021-06-07 | End: 1900-01-01
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
## 2021-06-14 DIAGNOSIS — I25.10 ATHEROSCLEROTIC HEART DISEASE OF NATIVE CORONARY ARTERY WITHOUT ANGINA PECTORIS: ICD-10-CM

## 2021-06-14 DIAGNOSIS — R00.2 PALPITATIONS: ICD-10-CM

## 2021-06-14 DIAGNOSIS — F17.290 NICOTINE DEPENDENCE, OTHER TOBACCO PRODUCT, UNCOMPLICATED: ICD-10-CM

## 2021-06-14 DIAGNOSIS — Z79.82 LONG TERM (CURRENT) USE OF ASPIRIN: ICD-10-CM

## 2021-06-14 DIAGNOSIS — I10 ESSENTIAL (PRIMARY) HYPERTENSION: ICD-10-CM

## 2021-06-14 DIAGNOSIS — Z95.1 PRESENCE OF AORTOCORONARY BYPASS GRAFT: ICD-10-CM

## 2021-06-14 DIAGNOSIS — F10.10 ALCOHOL ABUSE, UNCOMPLICATED: ICD-10-CM

## 2021-06-14 DIAGNOSIS — E78.5 HYPERLIPIDEMIA, UNSPECIFIED: ICD-10-CM

## 2021-06-14 DIAGNOSIS — I47.2 VENTRICULAR TACHYCARDIA: ICD-10-CM

## 2021-06-17 NOTE — ED PROVIDER NOTE - CROS ED ALLERGIC IMMUN ALL NEG
Cancer Center Progress Note    Patient Name: Jesu City   YOB: 1942   Medical Record Number: [id number]   CSN: [id number]   Attending Physician: Apoorva Mckeon M.D.    Referring Physician: MD Dr. Miguel Ángel Lopez Dr. hold Eliquis and place an IVC filter. This was placed by IR on 7/1/20. He was seen for re-evaluation by one of our APNs 7/6. He continues to c/o of pain and swelling of the LLE. Not worse but still pretty significant and is frustrated.  At his insistence positive. Serum immunoglobulins were normal, SPEP and GENNY showed no monoclonal protein. Kappa light chains were elevated. Cold agglutinin titer was 128. 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  EGD/c-s Obesity    • Osteoarthritis    • Personal history of antineoplastic chemotherapy     JUly/2015   • Pulmonary embolism (HCC) 20 YEARS AGO    ON COUMADIN NOW   • TIA (transient ischemic attack)    • Type II or unspecified type diabetes mellitus without menti Father    • Hypertension Mother    • Arthritis Mother    • Heart Disorder Son    • Other (kidney stone) Brother    • Other (hernia) Brother        Psychosocial History:  Social History    Socioeconomic History      Marital status:       Spouse name: and negative per the HPI    Vital Signs:  /74 (BP Location: Left arm, Patient Position: Sitting, Cuff Size: adult)   Pulse 79   Temp 97.5 °F (36.4 °C) (Temporal)   Resp 16   Ht 1.778 m (5' 10\")   Wt 111.1 kg (245 lb)   SpO2 96%   BMI 35.15 kg/m² posterior thigh. Left leg is brawny and taut with varicosities noted and no erythema but hyperpigmented  Neurological: Grossly intact.        Laboratory:  Lab Results   Component Value Date    WBC 9.8 06/17/2021    RBC 5.46 06/17/2021    HGB 12.1 (L) 06/17/ sapheno-femoral junction, and posterior tibial veins. PATIENT STATED HISTORY: (As transcribed by Technologist)           FINDINGS:    SAPHENOFEMORAL JUNCTION:  No reflux.   THROMBI:  On the right, there is lack of complete compressibility involving the minimal scarring in the periphery of the left lower lobe and lingular segment laterally deep to the postsurgical changes in the ribs. There is trace linear scarring in the right lung base. There is no suspicious mass or   lymphadenopathy.   Patient is sta fluid.  ABDOMINAL WALL:  No mass or hernia. URINARY BLADDER:  No visible focal wall thickening, lesion, or calculus. PELVIC NODES:  Left inguinal surgical clips are noted. There are small less than 1 cm left inguinal and pelvic lymph nodes.   No lymp seen in the distal left popliteal vein. The proximal posterior tibial vein is not visualized in this study and the mid and distal posterior tibial veins are patent.   There is no evidence of DVT in the common femoral,   profunda femoral, superficial femora includes the Dose   Index Registry. PATIENT STATED HISTORY: (As transcribed by Technologist)  Denies any problems or complaints at this time. FINDINGS:    LUNGS:  Left mid to inferior postsurgical changes with pleural parenchymal scarring.   Ri suffered a muscle tear which bled while he was on anticoagulation. Unsure when he developed the tear but could be from his fall in May. No intervention recommended by Ortho  - repeat dopplers showed chronic clot nonocclusive clot. No hematoma described. negative. Next one in a year  - Also followed for BPH with elevated PSA. Was 4.18 back in 2018.  Down to 3.38 on 8/2020      RTC 6 months      Margarette Bridges MD  [facility] negative...

## 2021-08-23 ENCOUNTER — APPOINTMENT (OUTPATIENT)
Dept: INTERNAL MEDICINE | Facility: CLINIC | Age: 56
End: 2021-08-23

## 2021-09-20 NOTE — PATIENT PROFILE ADULT - NSPROSPIRITUALVALUESFT_GEN_A_NUR
Sick Call    Matty Needle   1966   [id number]   Dari Salinas MD   Allergies   Allergen Reactions    Codeine Nausea Only and Other (See Comments)     hallucinations    Simvastatin Other (See Comments)     Leg cramping        Last Visit:   Reason for No Same Day Appt:    Complaint: UTI, having frequency and burning, will order Urinalysis.  Please advise      Pharmacy: [facility]

## 2021-10-06 PROBLEM — I10 ESSENTIAL HYPERTENSION: Status: ACTIVE | Noted: 2017-08-11

## 2021-10-06 PROBLEM — J44.9 OBSTRUCTIVE LUNG DISEASE: Status: ACTIVE | Noted: 2017-08-11

## 2021-12-22 NOTE — ED PROVIDER NOTE - RESPIRATORY, MLM
Breath sounds clear and equal bilaterally.
Implemented All Fall with Harm Risk Interventions:  [place] to call system. Call bell, personal items and telephone within reach. Instruct patient to call for assistance. Room bathroom lighting operational. Non-slip footwear when patient is off stretcher. Physically safe environment: no spills, clutter or unnecessary equipment. Stretcher in lowest position, wheels locked, appropriate side rails in place. Provide visual cue, wrist band, yellow gown, etc. Monitor gait and stability. Monitor for mental status changes and reorient to person, place, and time. Review medications for side effects contributing to fall risk. Reinforce activity limits and safety measures with patient and family. Provide visual clues: red socks.

## 2021-12-27 ENCOUNTER — APPOINTMENT (OUTPATIENT)
Dept: INTERNAL MEDICINE | Facility: CLINIC | Age: 56
End: 2021-12-27

## 2022-02-21 NOTE — ED PROVIDER NOTE - ENMT, MLM
Advance Directive follow-up letter mailed to patient.  
Advance Directive letter and Honoring Choices Packet mailed to patient.    
Airway patent, Nasal mucosa clear. Mouth with normal mucosa. Throat has no vesicles, no oropharyngeal exudates and uvula is midline.

## 2022-05-02 NOTE — DISCHARGE NOTE NURSING/CASE MANAGEMENT/SOCIAL WORK - NSSBIRTALCACTIVEREFTXDET_GEN_A_CORE
Pt acknowledges long standing ETOH use/abuse. Pt has been sober x 3yrs 1990's & x 5yrs starting in 2012 following CABG. Pt was dc'd from  on 3/4 with referral to [facility] outpt Alcohol rehab in [place]-appt Monday 3/9 8:45a w/Intake Counselor, Ruth Chicas. Pt consumed ETOH on 3/5 prior to readmission on 3/5 for PE. Pt continues to resist inpt ETOH Rehab & states he will keep appt at [facility] on 3/9 if he is dc'd prior. Tc w/Leandro & msg left for Ruth Chicas @ [facility] alerting to pt's readmission. Leandro confirms  they will keep pt's appt in case he is dc'd over wkend. Provided [facility] Folder Instructions: This plan will send the code FBSE to the PM system.  DO NOT or CHANGE the price. Detail Level: Simple Price (Do Not Change): 0.00

## 2022-06-06 ENCOUNTER — TRANSCRIPTION ENCOUNTER (OUTPATIENT)
Age: 57
End: 2022-06-06

## 2022-06-20 NOTE — BEHAVIORAL HEALTH ASSESSMENT NOTE - IMPULSE CONTROL
Pulmonary and Critical Care Progress note. Susanne Caal    MRN# [id number]    Acct# [de-identified]  6/20/2022   6:11 PM CDT    Referring Mary Dixon MD      Chief Complaint: Shortness of breath    HPI: The patient feels significantly improved. She is on 3 L nasal cannula at this time.     Medications  magnesium oxide, 400 mg, Oral, TID    vancomycin, 1,000 mg, IntraVENous, Q12H    cefepime, 2,000 mg, IntraVENous, Q8H    vancomycin (VANCOCIN) intermittent dosing (placeholder), , Other, RX Placeholder    predniSONE, 20 mg, Oral, Daily    guaiFENesin, 400 mg, Oral, Q4H    vitamin B-12, 500 mcg, Oral, Daily    citalopram, 40 mg, Oral, Daily    gabapentin, 600 mg, Oral, TID    montelukast, 10 mg, Oral, Nightly    nicotine, 1 patch, TransDERmal, Q24H    pantoprazole, 40 mg, Oral, QAM AC    sodium chloride flush, 5-40 mL, IntraVENous, 2 times per day    enoxaparin, 40 mg, SubCUTAneous, Daily     Review of Systems:  RESPIRATORY:  positive for  dyspnea  CARDIOVASCULAR:  positive for  dyspnea      Physical Exam:  /60   Pulse 88   Temp 97.8 °F (36.6 °C) (Temporal)   Resp 22   Ht 5' 6\" (1.676 m)   Wt 115 lb (52.2 kg)   SpO2 90%   BMI 18.56 kg/m²     Intake/Output Summary (Last 24 hours) at 6/20/2022 1159  Last data filed at 6/20/2022 0800  Gross per 24 hour   Intake 2540 ml   Output 2525 ml   Net 15 ml       General appearance: Elderly white female who appears to be in no distress   HEENT: Normocephalic atraumatic  Heart: S1-S2 distant sounds no murmurs  Lungs: Diminished bilaterally no rubs or tenderness on dullness to percussion  Abdomen: Soft nontender no organomegalies normal bowel sounds  Extremities: No clubbing cyanosis or edema  Neuro: No focal findings  Skin: Intact    Recent Labs     06/18/22  0414 06/19/22  0243 06/20/22  0109   WBC 8.2 9.7 8.6   RBC 3.43* 3.40* 3.55*   HGB 10.8* 10.7* 11.2*   HCT 34.7* 34.8* 36.9*    284 311   .2* 102.4* to the above supportive care. 6. Discharge planning. Charlene Torres MD, [facility], [facility]    The above note was generated using voice recognition software. Inadvertent typographical errors in transcription may have occurred.       Electronically signed by Charlene Torres MD on 06/20/22 at 11:59 AM Normal

## 2022-07-15 NOTE — ED PROVIDER NOTE - NSTIMEPROVIDERCAREINITIATE_GEN_ER
Department of Emergency Medicine  FIRST PROVIDER TRIAGE NOTE             Independent MLP           7/14/22  9:15 PM EDT    Date of Encounter: 7/14/22   MRN: [id number]      HPI: Tunde Morales is a 62 y.o. male who presents to the ED for Toe Pain (right great toe pain, was seen at drMeme office today and dr sent to ER, states he thought he had an ingrown toe nail but toenail has turned black and magets are coming out )   toe infection     ROS: Negative for Suicidal ideation or Homicidal Ideation. PE: Gen Appearance/Constitutional: alert  CV: regular rate  Pulm: CTA bilat     Initial Plan of Care: All treatment areas with department are currently occupied. Plan to order/Initiate the following while awaiting opening in ED: labs and imaging studies.   Initiate Treatment-Testing, Proceed toTreatment Area When Bed Available for ED Attending/MLP to Continue Care    Electronically signed by DEBRA Gutierrez   DD: 7/14/22       DEBRA Gutierrez  07/14/22 1163 24-Aug-2019 10:15

## 2022-11-01 NOTE — ED PROVIDER NOTE - NS ED MD DISPO DISCHARGE
· AUA 22  · PVR 33ml  · Send urine micro and culture  · Increase Flomax to 0 8 mg daily  · Schedule ultrasound kidney bladder  · Follow-up 3 months recheck Home no

## 2023-01-22 ENCOUNTER — EMERGENCY (EMERGENCY)
Facility: HOSPITAL | Age: 58
LOS: 0 days | Discharge: HOME | End: 2023-01-22
Attending: STUDENT IN AN ORGANIZED HEALTH CARE EDUCATION/TRAINING PROGRAM
Payer: MEDICARE

## 2023-01-22 VITALS — WEIGHT: 149.91 LBS | HEIGHT: 71 IN

## 2023-01-22 VITALS
SYSTOLIC BLOOD PRESSURE: 148 MMHG | TEMPERATURE: 98 F | OXYGEN SATURATION: 100 % | HEART RATE: 54 BPM | RESPIRATION RATE: 16 BRPM | DIASTOLIC BLOOD PRESSURE: 95 MMHG
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
## 2023-01-22 DIAGNOSIS — Z95.1 PRESENCE OF AORTOCORONARY BYPASS GRAFT: Chronic | ICD-10-CM

## 2023-01-22 DIAGNOSIS — I10 ESSENTIAL (PRIMARY) HYPERTENSION: ICD-10-CM

## 2023-01-22 DIAGNOSIS — M54.50 LOW BACK PAIN, UNSPECIFIED: ICD-10-CM

## 2023-01-22 DIAGNOSIS — I25.10 ATHEROSCLEROTIC HEART DISEASE OF NATIVE CORONARY ARTERY WITHOUT ANGINA PECTORIS: ICD-10-CM

## 2023-01-22 DIAGNOSIS — E78.5 HYPERLIPIDEMIA, UNSPECIFIED: ICD-10-CM

## 2023-01-22 DIAGNOSIS — Z79.82 LONG TERM (CURRENT) USE OF ASPIRIN: ICD-10-CM

## 2023-01-22 DIAGNOSIS — Z95.1 PRESENCE OF AORTOCORONARY BYPASS GRAFT: ICD-10-CM

## 2023-01-22 LAB
APPEARANCE UR: CLEAR — SIGNIFICANT CHANGE UP
BILIRUB UR-MCNC: NEGATIVE — SIGNIFICANT CHANGE UP
COLOR SPEC: SIGNIFICANT CHANGE UP
DIFF PNL FLD: NEGATIVE — SIGNIFICANT CHANGE UP
GLUCOSE UR QL: NEGATIVE — SIGNIFICANT CHANGE UP
KETONES UR-MCNC: NEGATIVE — SIGNIFICANT CHANGE UP
LEUKOCYTE ESTERASE UR-ACNC: NEGATIVE — SIGNIFICANT CHANGE UP
NITRITE UR-MCNC: NEGATIVE — SIGNIFICANT CHANGE UP
PH UR: 7 — SIGNIFICANT CHANGE UP (ref 5–8)
PROT UR-MCNC: NEGATIVE — SIGNIFICANT CHANGE UP
SP GR SPEC: 1 — LOW (ref 1.01–1.02)
UROBILINOGEN FLD QL: NEGATIVE — SIGNIFICANT CHANGE UP

## 2023-01-22 PROCEDURE — 99284 EMERGENCY DEPT VISIT MOD MDM: CPT | Mod: 25

## 2023-01-22 PROCEDURE — 81003 URINALYSIS AUTO W/O SCOPE: CPT

## 2023-01-22 PROCEDURE — 72131 CT LUMBAR SPINE W/O DYE: CPT | Mod: 26,MG

## 2023-01-22 PROCEDURE — 99284 EMERGENCY DEPT VISIT MOD MDM: CPT

## 2023-01-22 PROCEDURE — G1004: CPT

## 2023-01-22 PROCEDURE — 87086 URINE CULTURE/COLONY COUNT: CPT

## 2023-01-22 PROCEDURE — 72131 CT LUMBAR SPINE W/O DYE: CPT | Mod: MG

## 2023-01-22 RX ORDER — ACETAMINOPHEN 500 MG
2 TABLET ORAL
Qty: 112 | Refills: 0
Start: 2023-01-22 | End: 2023-02-04

## 2023-01-22 RX ORDER — METHOCARBAMOL 500 MG/1
2 TABLET, FILM COATED ORAL
Qty: 30 | Refills: 0
Start: 2023-01-22 | End: 2023-01-26

## 2023-01-22 RX ORDER — LIDOCAINE 4 G/100G
1 CREAM TOPICAL ONCE
Refills: 0 | Status: COMPLETED | OUTPATIENT
Start: 2023-01-22 | End: 2023-01-22

## 2023-01-22 RX ORDER — ACETAMINOPHEN 500 MG
975 TABLET ORAL ONCE
Refills: 0 | Status: COMPLETED | OUTPATIENT
Start: 2023-01-22 | End: 2023-01-22

## 2023-01-22 RX ORDER — METHOCARBAMOL 500 MG/1
1500 TABLET, FILM COATED ORAL ONCE
Refills: 0 | Status: COMPLETED | OUTPATIENT
Start: 2023-01-22 | End: 2023-01-22

## 2023-01-22 RX ADMIN — Medication 975 MILLIGRAM(S): at 09:25

## 2023-01-22 RX ADMIN — LIDOCAINE 1 PATCH: 4 CREAM TOPICAL at 09:24

## 2023-01-22 RX ADMIN — METHOCARBAMOL 1500 MILLIGRAM(S): 500 TABLET, FILM COATED ORAL at 09:25

## 2023-01-22 NOTE — ED PROVIDER NOTE - PATIENT PORTAL LINK FT
You can access the FollowMyHealth Patient Portal offered by [facility] by registering at the following website: [email or web address]. By joining [employer]’s FollowMyHealth portal, you will also be able to view your health information using other applications (apps) compatible with our system.

## 2023-01-22 NOTE — ED ADULT NURSE NOTE - CAS TRG GENERAL AIRWAY, MLM
History  Chief Complaint   Patient presents with    Shortness of Breath     sudden onset,short episode  hx of anxiety,        49-year-old male presents to the ED stating that he had a little discomfort in his chest and after that he states his anxiety took over and he gets very panicky could breathe short of breath and up here in emergency department on my exam patient was starting to come down quite a bit was able to talk and states that his pain is gone now and was not sure what it is use just afraid that he had something wrong with the cough  Patient apparently has had severe anxiety in the past which seems to be getting better and that under control as he gets older  History provided by:  Patient   used: No        Prior to Admission Medications   Prescriptions Last Dose Informant Patient Reported? Taking?   citalopram (CeleXA) 20 mg tablet   Yes No   Sig: Take 20 mg by mouth daily   omeprazole (PriLOSEC) 20 mg delayed release capsule   Yes No   Sig: Take 20 mg by mouth daily      Facility-Administered Medications: None       Past Medical History:   Diagnosis Date    Psychiatric disorder        History reviewed  No pertinent surgical history  History reviewed  No pertinent family history  I have reviewed and agree with the history as documented  Social History   Substance Use Topics    Smoking status: Former Smoker    Smokeless tobacco: Current User     Types: Chew    Alcohol use No        Review of Systems   Constitutional: Negative for activity change, chills, diaphoresis and fever  HENT: Negative for congestion, ear pain, nosebleeds, sore throat, trouble swallowing and voice change  Eyes: Negative for pain, discharge and redness  Respiratory: Positive for cough  Negative for apnea, choking, shortness of breath, wheezing and stridor  Cardiovascular: Positive for chest pain  Negative for palpitations     Gastrointestinal: Negative for abdominal distention, abdominal pain, constipation, diarrhea, nausea and vomiting  Endocrine: Negative for polydipsia  Genitourinary: Negative for difficulty urinating, dysuria, flank pain, frequency, hematuria and urgency  Musculoskeletal: Negative for back pain, gait problem, joint swelling, myalgias, neck pain and neck stiffness  Skin: Negative for pallor and rash  Neurological: Negative for dizziness, tremors, syncope, speech difficulty, weakness, numbness and headaches  Hematological: Negative for adenopathy  Psychiatric/Behavioral: Negative for confusion, hallucinations, self-injury and suicidal ideas  The patient is not nervous/anxious  Physical Exam  ED Triage Vitals [02/11/18 1727]   Temperature Pulse Respirations Blood Pressure SpO2   98 2 °F (36 8 °C) 90 18 144/93 96 %      Temp Source Heart Rate Source Patient Position - Orthostatic VS BP Location FiO2 (%)   Oral Monitor Sitting Left arm --      Pain Score       No Pain           Orthostatic Vital Signs  Vitals:    02/11/18 1727   BP: 144/93   Pulse: 90   Patient Position - Orthostatic VS: Sitting       Physical Exam   Constitutional: He is oriented to person, place, and time  Vital signs are normal  He appears well-developed and well-nourished  HENT:   Head: Normocephalic and atraumatic  Eyes: EOM are normal  Pupils are equal, round, and reactive to light  Neck: Normal range of motion  Neck supple  Cardiovascular: Normal rate, regular rhythm, normal heart sounds and intact distal pulses  Pulmonary/Chest: Effort normal and breath sounds normal    Abdominal: Soft  Bowel sounds are normal    Musculoskeletal: Normal range of motion  Neurological: He is alert and oriented to person, place, and time  Skin: Skin is warm  Nursing note and vitals reviewed        ED Medications  Medications - No data to display    Diagnostic Studies  Results Reviewed     None                 XR chest 2 views    (Results Pending) Procedures  Procedures       Phone Contacts  ED Phone Contact    ED Course  ED Course                                MDM  CritCare Time    Disposition  Final diagnoses:   Anxiety     Time reflects when diagnosis was documented in both MDM as applicable and the Disposition within this note     Time User Action Codes Description Comment    2/11/2018  5:50 PM Isha Sanchez Add [F41 9] Anxiety       ED Disposition     ED Disposition Condition Comment    Discharge  Cecebassam Mykel discharge to home/self care  Condition at discharge: Stable        Follow-up Information     Follow up With Specialties Details Why Contact Ezio Fuentes MD Family Medicine  If symptoms worsen [address]   [phone]          Patient's Medications   Discharge Prescriptions    No medications on file     No discharge procedures on file      ED Provider  Electronically Signed by           Deean Enriquez DO  02/11/18 7145 [address]

## 2023-01-22 NOTE — ED ADULT NURSE NOTE - CHPI ED NUR SYMPTOMS NEG
no anorexia/no bladder dysfunction/no bowel dysfunction/no constipation/no difficulty bearing weight/no motor function loss/no neck tenderness/no tingling

## 2023-01-22 NOTE — ED ADULT TRIAGE NOTE - CHIEF COMPLAINT QUOTE
Pt presented to the ER with c/o lower back pain that radiates down both his legs and to the front of his legs. Pt stated "I was cleaning my basement 2 days ago and heard a pop." Pt has been taking Tylenol with no relief. Pt is ambulatory in triage. Pt denies any bowel or bladder incontinences.

## 2023-01-22 NOTE — ED PROVIDER NOTE - NSFOLLOWUPINSTRUCTIONS_ED_ALL_ED_FT
[facility] with [facility]. Call [phone] to make an appointment.     A prescription for Robaxin (muscle relaxer) and Acetaminophen (Tylenol) was sent to your pharmacy. Take as directed on packaging. Read medication warnings.      Back Pain    Back pain is very common in adults. The cause of back pain is rarely dangerous and the pain often gets better over time. The cause of your back pain may not be known and may include strain of muscles or ligaments, degeneration of the spinal disks, or arthritis. Occasionally the pain may radiate down your leg(s). Over-the-counter medicines to reduce pain and inflammation are often the most helpful. Stretching and remaining active frequently helps the healing process.     Low Back Strain  A strain is a stretch or tear in a muscle or the strong cords of tissue that attach muscle to bone (tendons). Strains of the lower back (lumbar spine) are a common cause of low back pain. A strain occurs when muscles or tendons are torn or are stretched beyond their limits. The muscles may become inflamed, resulting in pain and sudden muscle tightening (spasms). A strain can happen suddenly due to an injury (trauma), or it can develop gradually due to overuse.    What increases the risk?  The following factors may increase your risk of getting this condition:  Playing contact sports.  Participating in sports or activities that put excessive stress on the back and require a lot of bending and twisting, including:  Lifting weights or heavy objects, Gymnastics, Soccer, Figure skating, Snowboarding, Being overweight or obese, Having poor strength and flexibility.    What are the signs or symptoms?  Symptoms of this condition may include:  Sharp or dull pain in the lower back that does not go away. Pain may extend to the buttocks.  Stiffness.  Limited range of motion.  Inability to stand up straight due to stiffness or pain.  Muscle spasms.    How is this diagnosed?  This condition may be diagnosed based on:  Your symptoms, Your medical history, A physical exam, Your health care provider may push on certain areas of your back to determine the source of your pain. You may be asked to bend forward, backward, and side to side to assess the severity of your pain and your range of motion.  Imaging tests, such as:  X-rays, MRI.    How is this treated?  Treatment for this condition may include:  Applying heat and cold to the affected area.  Medicines to help relieve pain and to relax your muscles (muscle relaxants).  NSAIDs to help reduce swelling and discomfort.  Physical therapy.  When your symptoms improve, it is important to gradually return to your normal routine as soon as possible to reduce pain, avoid stiffness, and avoid loss of muscle strength. Generally, symptoms should improve within 6 weeks of treatment. However, recovery time varies.    Follow these instructions at home:  Managing pain, stiffness, and swelling     If directed, apply ice to the injured area during the first 24 hours after your injury.  Put ice in a plastic bag.  Place a towel between your skin and the bag.  Leave the ice on for 20 minutes, 2–3 times a day.  If directed, apply heat to the affected area as often as told by your health care provider. Use the heat source that your health care provider recommends, such as a moist heat pack or a heating pad.  Place a towel between your skin and the heat source.  Leave the heat on for 20–30 minutes.  Remove the heat if your skin turns bright red. This is especially important if you are unable to feel pain, heat, or cold. You may have a greater risk of getting burned.  Activity     Rest and return to your normal activities as told by your health care provider. Ask your health care provider what activities are safe for you.  Avoid activities that take a lot of effort (are strenuous) for as long as told by your health care provider.  Do exercises as told by your health care provider.  General instructions     Take over-the-counter and prescription medicines only as told by your health care provider.  If you have questions or concerns about safety while taking pain medicine, talk with your health care provider.  Do not drive or operate heavy machinery until you know how your pain medicine affects you.  Do not use any tobacco products, such as cigarettes, chewing tobacco, and e-cigarettes. Tobacco can delay bone healing. If you need help quitting, ask your health care provider.  Keep all follow-up visits as told by your health care provider. This is important.  How is this prevented?  Image Image Image Image Image   Warm up and stretch before being active.  Cool down and stretch after being active.  Give your body time to rest between periods of activity.  Avoid:  Being physically inactive for long periods at a time.  Exercising or playing sports when you are tired or in pain.  Use correct form when playing sports and lifting heavy objects.  Use good posture when sitting and standing.  Maintain a healthy weight.  Sleep on a mattress with medium firmness to support your back.  Make sure to use equipment that fits you, including shoes that fit well.  Be safe and responsible while being active to avoid falls.  Do at least 150 minutes of moderate-intensity exercise each week, such as brisk walking or water aerobics. Try a form of exercise that takes stress off your back, such as swimming or stationary cycling.  Maintain physical fitness, including:  Strength.  Flexibility.  Cardiovascular fitness.  Endurance.  Contact a health care provider if:  Your back pain does not improve after 6 weeks of treatment.  Your symptoms get worse.  Get help right away if:  Your back pain is severe.  You are unable to stand or walk.  You develop pain in your legs.  You develop weakness in your buttocks or legs.  You have difficulty controlling when you urinate or when you have a bowel movement.  This information is not intended to replace advice given to you by your health care provider. Make sure you discuss any questions you have with your health care provider.      SEEK IMMEDIATE MEDICAL CARE IF YOU HAVE ANY OF THE FOLLOWING SYMPTOMS: bowel or bladder control problems, unusual weakness or numbness in your arms or legs, nausea or vomiting, abdominal pain, fever, dizziness/lightheadedness. [facility] with [facility]. Call [phone] to make an appointment.     A prescription for Robaxin (muscle relaxer) and Acetaminophen (Tylenol) was sent to your pharmacy. Take as directed on packaging. Read medication warnings.    Follow up with the urologist regarding bladder wall thickening.      Back Pain    Back pain is very common in adults. The cause of back pain is rarely dangerous and the pain often gets better over time. The cause of your back pain may not be known and may include strain of muscles or ligaments, degeneration of the spinal disks, or arthritis. Occasionally the pain may radiate down your leg(s). Over-the-counter medicines to reduce pain and inflammation are often the most helpful. Stretching and remaining active frequently helps the healing process.     Low Back Strain  A strain is a stretch or tear in a muscle or the strong cords of tissue that attach muscle to bone (tendons). Strains of the lower back (lumbar spine) are a common cause of low back pain. A strain occurs when muscles or tendons are torn or are stretched beyond their limits. The muscles may become inflamed, resulting in pain and sudden muscle tightening (spasms). A strain can happen suddenly due to an injury (trauma), or it can develop gradually due to overuse.    What increases the risk?  The following factors may increase your risk of getting this condition:  Playing contact sports.  Participating in sports or activities that put excessive stress on the back and require a lot of bending and twisting, including:  Lifting weights or heavy objects, Gymnastics, Soccer, Figure skating, Snowboarding, Being overweight or obese, Having poor strength and flexibility.    What are the signs or symptoms?  Symptoms of this condition may include:  Sharp or dull pain in the lower back that does not go away. Pain may extend to the buttocks.  Stiffness.  Limited range of motion.  Inability to stand up straight due to stiffness or pain.  Muscle spasms.    How is this diagnosed?  This condition may be diagnosed based on:  Your symptoms, Your medical history, A physical exam, Your health care provider may push on certain areas of your back to determine the source of your pain. You may be asked to bend forward, backward, and side to side to assess the severity of your pain and your range of motion.  Imaging tests, such as:  X-rays, MRI.    How is this treated?  Treatment for this condition may include:  Applying heat and cold to the affected area.  Medicines to help relieve pain and to relax your muscles (muscle relaxants).  NSAIDs to help reduce swelling and discomfort.  Physical therapy.  When your symptoms improve, it is important to gradually return to your normal routine as soon as possible to reduce pain, avoid stiffness, and avoid loss of muscle strength. Generally, symptoms should improve within 6 weeks of treatment. However, recovery time varies.    Follow these instructions at home:  Managing pain, stiffness, and swelling     If directed, apply ice to the injured area during the first 24 hours after your injury.  Put ice in a plastic bag.  Place a towel between your skin and the bag.  Leave the ice on for 20 minutes, 2–3 times a day.  If directed, apply heat to the affected area as often as told by your health care provider. Use the heat source that your health care provider recommends, such as a moist heat pack or a heating pad.  Place a towel between your skin and the heat source.  Leave the heat on for 20–30 minutes.  Remove the heat if your skin turns bright red. This is especially important if you are unable to feel pain, heat, or cold. You may have a greater risk of getting burned.  Activity     Rest and return to your normal activities as told by your health care provider. Ask your health care provider what activities are safe for you.  Avoid activities that take a lot of effort (are strenuous) for as long as told by your health care provider.  Do exercises as told by your health care provider.  General instructions     Take over-the-counter and prescription medicines only as told by your health care provider.  If you have questions or concerns about safety while taking pain medicine, talk with your health care provider.  Do not drive or operate heavy machinery until you know how your pain medicine affects you.  Do not use any tobacco products, such as cigarettes, chewing tobacco, and e-cigarettes. Tobacco can delay bone healing. If you need help quitting, ask your health care provider.  Keep all follow-up visits as told by your health care provider. This is important.  How is this prevented?  Image Image Image Image Image   Warm up and stretch before being active.  Cool down and stretch after being active.  Give your body time to rest between periods of activity.  Avoid:  Being physically inactive for long periods at a time.  Exercising or playing sports when you are tired or in pain.  Use correct form when playing sports and lifting heavy objects.  Use good posture when sitting and standing.  Maintain a healthy weight.  Sleep on a mattress with medium firmness to support your back.  Make sure to use equipment that fits you, including shoes that fit well.  Be safe and responsible while being active to avoid falls.  Do at least 150 minutes of moderate-intensity exercise each week, such as brisk walking or water aerobics. Try a form of exercise that takes stress off your back, such as swimming or stationary cycling.  Maintain physical fitness, including:  Strength.  Flexibility.  Cardiovascular fitness.  Endurance.  Contact a health care provider if:  Your back pain does not improve after 6 weeks of treatment.  Your symptoms get worse.  Get help right away if:  Your back pain is severe.  You are unable to stand or walk.  You develop pain in your legs.  You develop weakness in your buttocks or legs.  You have difficulty controlling when you urinate or when you have a bowel movement.  This information is not intended to replace advice given to you by your health care provider. Make sure you discuss any questions you have with your health care provider.      SEEK IMMEDIATE MEDICAL CARE IF YOU HAVE ANY OF THE FOLLOWING SYMPTOMS: bowel or bladder control problems, unusual weakness or numbness in your arms or legs, nausea or vomiting, abdominal pain, fever, dizziness/lightheadedness.

## 2023-01-22 NOTE — ED PROVIDER NOTE - NSPTACCESSSVCSAPPTDETAILS_ED_ALL_ED_FT
lower back pain - within 1 week please 1. [facility] center - lower back pain - within 1 week please  2. urology - thickened bladder wall, within 2 week please

## 2023-01-22 NOTE — ED PROVIDER NOTE - DIFFERENTIAL DIAGNOSIS
Differential Dx includes but not limited to muscle spasm, arthritis, disc herniation, low suspicion for fx as injury was atraumatic and pt does not have midline tenderness. Differential Diagnosis

## 2023-01-22 NOTE — ED PROVIDER NOTE - PHYSICAL EXAMINATION
I have reviewed the triage vital signs.  Const: AAOx3, in NAD  Eyes: no conjunctival injection  HENT: NCAT, Neck supple, oral mucosa moist  CV: RRR, +S1, S2  Resp: CTAB, no respiratory distress  GI: Abdomen soft, NTND, no guarding  : no CVA tenderness  Extremities: No peripheral edema,  2+ radial and DP pulses  Skin: Warm, well perfused, no rash  MSK: No midline tenderness, bilateral lumbar paraspinal TTP. SLR bilateral localizes only to the back, does not go down his legs.  Neuro: No focal sensory or motor deficits  Psych: Appropriate mood and affect

## 2023-01-22 NOTE — ED PROVIDER NOTE - PROGRESS NOTE DETAILS
Maxx Wiseman: Pt reevaluated, feels much better and reports marked improvement in pain. Will send Robaxin and Tylenol prescription pending ct. DO Bowen – Pt was re-evaluated at bedside, reports feeling improved. UA sent given ?bladder wall thickening on CT lumbar and no evidence of infection. Pt denies any dysuria/hematuria. Knows to f/u with urologist for further veal. Results were discussed with patient as well as return precautions and follow up plan with PCP and/or specialist. Time was taken to answer any questions that the patient had before providing them with discharge paperwork. DO Bowen – Pt was re-evaluated at bedside, reports feeling improved. UA sent given ?bladder wall thickening on CT lumbar and no evidence of infection. Pt denies any dysuria/hematuria. Knows to f/u with urologist for further eval. Results were discussed with patient as well as return precautions and follow up plan with PCP and/or specialist. Time was taken to answer any questions that the patient had before providing them with discharge paperwork.

## 2023-01-22 NOTE — ED PROVIDER NOTE - DISCHARGE DATE
patient ambulated with decreased trunk rotation, decreased arm swing, and decreased heel strike on LLE. Patient mildly unsteady without AD, benefitted from facilitation for trunk rotation, no LOB
22-Jan-2023

## 2023-01-22 NOTE — ED PROVIDER NOTE - CLINICAL SUMMARY MEDICAL DECISION MAKING FREE TEXT BOX
58 y/o male presents with lower back pain x 3 days in the setting of lifting. No falls or trauma to back. no red flag signs for cauda equina syndrome. Differential Dx includes but not limited to muscle spasm, arthritis, disc herniation, low suspicion for fx as injury was atraumatic and pt does not have midline tenderness. Do not suspect infectious etiology such as epidural abscess. Plan for CT lumbar spine and pain control.

## 2023-01-22 NOTE — ED ADULT TRIAGE NOTE - LOCATION:
Patient to discuss with the psychiatrist about the medications he is taking and about his symptoms.  Patient can get all his medications related to his psychiatric problems from his psychiatrist.  Patient can also have the psychiatrist forward his consultation note to me.   Left arm;

## 2023-01-22 NOTE — ED PROVIDER NOTE - OBJECTIVE STATEMENT
58 y/o with PMHx of HTN, HLD, and CABG on aspirin presents to ED c/o lower back pain x 2 days. Started after he was lifting something in basement and had immediate pain. Denies fall. Pain has been persistent and worsening since then, worse with movement and ambulation. Reports radiation of pain back of legs when walking, does not go past knees. Denies urinary/stool incontinence, numbness, tingling, fevers, unexplained weight loss, night sweats, IV drug use. Does report having a back injury remotely which was never evaluated. No CP, SOB, no urinary symptoms. Took aspirin PTA. Pt heard a "pop" at time of injury.

## 2023-01-22 NOTE — ED ADULT NURSE NOTE - OBJECTIVE STATEMENT
Patient presents to the emergency room with complaints of back pain/injury. Patient states two days ago that he was lifting weights and he felt something pop in his back. He complains of lower back pain, denies numbness/tingling, incontinence, N/V. Patient ambulatory.

## 2023-01-23 LAB
CULTURE RESULTS: NO GROWTH — SIGNIFICANT CHANGE UP
SPECIMEN SOURCE: SIGNIFICANT CHANGE UP

## 2023-01-24 ENCOUNTER — INPATIENT (INPATIENT)
Facility: HOSPITAL | Age: 58
LOS: 5 days | Discharge: NURSING, REHAB OR LONG-TERM CARE | DRG: 516 | End: 2023-01-30
Attending: INTERNAL MEDICINE | Admitting: INTERNAL MEDICINE
Payer: MEDICARE

## 2023-01-24 VITALS
HEART RATE: 57 BPM | HEIGHT: 66 IN | DIASTOLIC BLOOD PRESSURE: 99 MMHG | TEMPERATURE: 98 F | OXYGEN SATURATION: 99 % | RESPIRATION RATE: 16 BRPM | SYSTOLIC BLOOD PRESSURE: 204 MMHG | WEIGHT: 164.91 LBS

## 2023-01-24 DIAGNOSIS — M54.9 DORSALGIA, UNSPECIFIED: ICD-10-CM

## 2023-01-24 DIAGNOSIS — Z95.1 PRESENCE OF AORTOCORONARY BYPASS GRAFT: Chronic | ICD-10-CM

## 2023-01-24 LAB
ALBUMIN SERPL ELPH-MCNC: 4.4 G/DL — SIGNIFICANT CHANGE UP (ref 3.3–5)
ALP SERPL-CCNC: 60 U/L — SIGNIFICANT CHANGE UP (ref 40–120)
ALT FLD-CCNC: 42 U/L — SIGNIFICANT CHANGE UP (ref 12–78)
ANION GAP SERPL CALC-SCNC: 3 MMOL/L — LOW (ref 5–17)
APPEARANCE UR: CLEAR — SIGNIFICANT CHANGE UP
AST SERPL-CCNC: 64 U/L — HIGH (ref 15–37)
BASOPHILS # BLD AUTO: 0 K/UL — SIGNIFICANT CHANGE UP (ref 0–0.2)
BASOPHILS NFR BLD AUTO: 0 % — SIGNIFICANT CHANGE UP (ref 0–2)
BILIRUB SERPL-MCNC: 0.5 MG/DL — SIGNIFICANT CHANGE UP (ref 0.2–1.2)
BILIRUB UR-MCNC: NEGATIVE — SIGNIFICANT CHANGE UP
BUN SERPL-MCNC: 9 MG/DL — SIGNIFICANT CHANGE UP (ref 7–23)
CALCIUM SERPL-MCNC: 9.2 MG/DL — SIGNIFICANT CHANGE UP (ref 8.5–10.1)
CHLORIDE SERPL-SCNC: 105 MMOL/L — SIGNIFICANT CHANGE UP (ref 96–108)
CO2 SERPL-SCNC: 31 MMOL/L — SIGNIFICANT CHANGE UP (ref 22–31)
COLOR SPEC: YELLOW — SIGNIFICANT CHANGE UP
CREAT SERPL-MCNC: 0.83 MG/DL — SIGNIFICANT CHANGE UP (ref 0.5–1.3)
DIFF PNL FLD: NEGATIVE — SIGNIFICANT CHANGE UP
EGFR: 102 ML/MIN/1.73M2 — SIGNIFICANT CHANGE UP
EOSINOPHIL # BLD AUTO: 0.1 K/UL — SIGNIFICANT CHANGE UP (ref 0–0.5)
EOSINOPHIL NFR BLD AUTO: 1 % — SIGNIFICANT CHANGE UP (ref 0–6)
FLUAV AG NPH QL: SIGNIFICANT CHANGE UP
FLUBV AG NPH QL: SIGNIFICANT CHANGE UP
GLUCOSE SERPL-MCNC: 101 MG/DL — HIGH (ref 70–99)
GLUCOSE UR QL: NEGATIVE — SIGNIFICANT CHANGE UP
HCT VFR BLD CALC: 44.8 % — SIGNIFICANT CHANGE UP (ref 39–50)
HGB BLD-MCNC: 15.2 G/DL — SIGNIFICANT CHANGE UP (ref 13–17)
KETONES UR-MCNC: NEGATIVE — SIGNIFICANT CHANGE UP
LEUKOCYTE ESTERASE UR-ACNC: NEGATIVE — SIGNIFICANT CHANGE UP
LIDOCAIN IGE QN: 60 U/L — LOW (ref 73–393)
LYMPHOCYTES # BLD AUTO: 2.39 K/UL — SIGNIFICANT CHANGE UP (ref 1–3.3)
LYMPHOCYTES # BLD AUTO: 23 % — SIGNIFICANT CHANGE UP (ref 13–44)
MCHC RBC-ENTMCNC: 29 PG — SIGNIFICANT CHANGE UP (ref 27–34)
MCHC RBC-ENTMCNC: 33.9 GM/DL — SIGNIFICANT CHANGE UP (ref 32–36)
MCV RBC AUTO: 85.5 FL — SIGNIFICANT CHANGE UP (ref 80–100)
MONOCYTES # BLD AUTO: 0.41 K/UL — SIGNIFICANT CHANGE UP (ref 0–0.9)
MONOCYTES NFR BLD AUTO: 4 % — SIGNIFICANT CHANGE UP (ref 2–14)
NEUTROPHILS # BLD AUTO: 6.53 K/UL — SIGNIFICANT CHANGE UP (ref 1.8–7.4)
NEUTROPHILS NFR BLD AUTO: 63 % — SIGNIFICANT CHANGE UP (ref 43–77)
NITRITE UR-MCNC: NEGATIVE — SIGNIFICANT CHANGE UP
NRBC # BLD: SIGNIFICANT CHANGE UP /100 WBCS (ref 0–0)
PH UR: 7 — SIGNIFICANT CHANGE UP (ref 5–8)
PLATELET # BLD AUTO: 193 K/UL — SIGNIFICANT CHANGE UP (ref 150–400)
POTASSIUM SERPL-MCNC: 3.7 MMOL/L — SIGNIFICANT CHANGE UP (ref 3.5–5.3)
POTASSIUM SERPL-SCNC: 3.7 MMOL/L — SIGNIFICANT CHANGE UP (ref 3.5–5.3)
PROT SERPL-MCNC: 8.1 GM/DL — SIGNIFICANT CHANGE UP (ref 6–8.3)
PROT UR-MCNC: NEGATIVE — SIGNIFICANT CHANGE UP
RBC # BLD: 5.24 M/UL — SIGNIFICANT CHANGE UP (ref 4.2–5.8)
RBC # FLD: 13.9 % — SIGNIFICANT CHANGE UP (ref 10.3–14.5)
RSV RNA NPH QL NAA+NON-PROBE: SIGNIFICANT CHANGE UP
SARS-COV-2 RNA SPEC QL NAA+PROBE: SIGNIFICANT CHANGE UP
SODIUM SERPL-SCNC: 139 MMOL/L — SIGNIFICANT CHANGE UP (ref 135–145)
SP GR SPEC: 1.02 — SIGNIFICANT CHANGE UP (ref 1.01–1.02)
TROPONIN I, HIGH SENSITIVITY RESULT: 10.78 NG/L — SIGNIFICANT CHANGE UP
UROBILINOGEN FLD QL: NEGATIVE — SIGNIFICANT CHANGE UP
WBC # BLD: 10.37 K/UL — SIGNIFICANT CHANGE UP (ref 3.8–10.5)
WBC # FLD AUTO: 10.37 K/UL — SIGNIFICANT CHANGE UP (ref 3.8–10.5)

## 2023-01-24 PROCEDURE — C9399: CPT

## 2023-01-24 PROCEDURE — A9579: CPT

## 2023-01-24 PROCEDURE — 86900 BLOOD TYPING SEROLOGIC ABO: CPT

## 2023-01-24 PROCEDURE — 85610 PROTHROMBIN TIME: CPT

## 2023-01-24 PROCEDURE — 86664 EPSTEIN-BARR NUCLEAR ANTIGEN: CPT

## 2023-01-24 PROCEDURE — 97530 THERAPEUTIC ACTIVITIES: CPT | Mod: GP

## 2023-01-24 PROCEDURE — 80048 BASIC METABOLIC PNL TOTAL CA: CPT

## 2023-01-24 PROCEDURE — 86850 RBC ANTIBODY SCREEN: CPT

## 2023-01-24 PROCEDURE — 86663 EPSTEIN-BARR ANTIBODY: CPT

## 2023-01-24 PROCEDURE — 84100 ASSAY OF PHOSPHORUS: CPT

## 2023-01-24 PROCEDURE — 82962 GLUCOSE BLOOD TEST: CPT

## 2023-01-24 PROCEDURE — 85025 COMPLETE CBC W/AUTO DIFF WBC: CPT

## 2023-01-24 PROCEDURE — 83735 ASSAY OF MAGNESIUM: CPT

## 2023-01-24 PROCEDURE — 72157 MRI CHEST SPINE W/O & W/DYE: CPT

## 2023-01-24 PROCEDURE — 97162 PT EVAL MOD COMPLEX 30 MIN: CPT | Mod: GP

## 2023-01-24 PROCEDURE — 99285 EMERGENCY DEPT VISIT HI MDM: CPT

## 2023-01-24 PROCEDURE — 72148 MRI LUMBAR SPINE W/O DYE: CPT

## 2023-01-24 PROCEDURE — 72100 X-RAY EXAM L-S SPINE 2/3 VWS: CPT

## 2023-01-24 PROCEDURE — 86140 C-REACTIVE PROTEIN: CPT

## 2023-01-24 PROCEDURE — 99223 1ST HOSP IP/OBS HIGH 75: CPT | Mod: 57

## 2023-01-24 PROCEDURE — 97166 OT EVAL MOD COMPLEX 45 MIN: CPT | Mod: GO

## 2023-01-24 PROCEDURE — 93010 ELECTROCARDIOGRAM REPORT: CPT

## 2023-01-24 PROCEDURE — 86665 EPSTEIN-BARR CAPSID VCA: CPT

## 2023-01-24 PROCEDURE — 72158 MRI LUMBAR SPINE W/O & W/DYE: CPT

## 2023-01-24 PROCEDURE — 71046 X-RAY EXAM CHEST 2 VIEWS: CPT | Mod: 26

## 2023-01-24 PROCEDURE — 72148 MRI LUMBAR SPINE W/O DYE: CPT | Mod: 26,52,MA

## 2023-01-24 PROCEDURE — 85027 COMPLETE CBC AUTOMATED: CPT

## 2023-01-24 PROCEDURE — 80307 DRUG TEST PRSMV CHEM ANLYZR: CPT

## 2023-01-24 PROCEDURE — 85730 THROMBOPLASTIN TIME PARTIAL: CPT

## 2023-01-24 PROCEDURE — 97116 GAIT TRAINING THERAPY: CPT | Mod: GP

## 2023-01-24 PROCEDURE — 85652 RBC SED RATE AUTOMATED: CPT

## 2023-01-24 PROCEDURE — 87635 SARS-COV-2 COVID-19 AMP PRB: CPT

## 2023-01-24 PROCEDURE — 80076 HEPATIC FUNCTION PANEL: CPT

## 2023-01-24 PROCEDURE — 36415 COLL VENOUS BLD VENIPUNCTURE: CPT

## 2023-01-24 PROCEDURE — C1889: CPT

## 2023-01-24 PROCEDURE — 97163 PT EVAL HIGH COMPLEX 45 MIN: CPT | Mod: GP

## 2023-01-24 PROCEDURE — 86901 BLOOD TYPING SEROLOGIC RH(D): CPT

## 2023-01-24 RX ORDER — ACETAMINOPHEN 500 MG
650 TABLET ORAL EVERY 6 HOURS
Refills: 0 | Status: DISCONTINUED | OUTPATIENT
Start: 2023-01-24 | End: 2023-01-25

## 2023-01-24 RX ORDER — HYDROMORPHONE HYDROCHLORIDE 2 MG/ML
1 INJECTION INTRAMUSCULAR; INTRAVENOUS; SUBCUTANEOUS ONCE
Refills: 0 | Status: DISCONTINUED | OUTPATIENT
Start: 2023-01-24 | End: 2023-01-24

## 2023-01-24 RX ORDER — SODIUM CHLORIDE 9 MG/ML
1000 INJECTION INTRAMUSCULAR; INTRAVENOUS; SUBCUTANEOUS ONCE
Refills: 0 | Status: COMPLETED | OUTPATIENT
Start: 2023-01-24 | End: 2023-01-24

## 2023-01-24 RX ORDER — MORPHINE SULFATE 50 MG/1
4 CAPSULE, EXTENDED RELEASE ORAL ONCE
Refills: 0 | Status: DISCONTINUED | OUTPATIENT
Start: 2023-01-24 | End: 2023-01-24

## 2023-01-24 RX ORDER — DIAZEPAM 5 MG
5 TABLET ORAL ONCE
Refills: 0 | Status: DISCONTINUED | OUTPATIENT
Start: 2023-01-24 | End: 2023-01-24

## 2023-01-24 RX ORDER — ONDANSETRON 8 MG/1
4 TABLET, FILM COATED ORAL ONCE
Refills: 0 | Status: COMPLETED | OUTPATIENT
Start: 2023-01-24 | End: 2023-01-24

## 2023-01-24 RX ADMIN — HYDROMORPHONE HYDROCHLORIDE 1 MILLIGRAM(S): 2 INJECTION INTRAMUSCULAR; INTRAVENOUS; SUBCUTANEOUS at 19:48

## 2023-01-24 RX ADMIN — Medication 2 MILLIGRAM(S): at 21:28

## 2023-01-24 RX ADMIN — HYDROMORPHONE HYDROCHLORIDE 1 MILLIGRAM(S): 2 INJECTION INTRAMUSCULAR; INTRAVENOUS; SUBCUTANEOUS at 20:30

## 2023-01-24 RX ADMIN — SODIUM CHLORIDE 2000 MILLILITER(S): 9 INJECTION INTRAMUSCULAR; INTRAVENOUS; SUBCUTANEOUS at 19:04

## 2023-01-24 RX ADMIN — HYDROMORPHONE HYDROCHLORIDE 1 MILLIGRAM(S): 2 INJECTION INTRAMUSCULAR; INTRAVENOUS; SUBCUTANEOUS at 20:32

## 2023-01-24 RX ADMIN — Medication 5 MILLIGRAM(S): at 18:41

## 2023-01-24 RX ADMIN — SODIUM CHLORIDE 1000 MILLILITER(S): 9 INJECTION INTRAMUSCULAR; INTRAVENOUS; SUBCUTANEOUS at 20:16

## 2023-01-24 RX ADMIN — Medication 2 MILLIGRAM(S): at 20:30

## 2023-01-24 RX ADMIN — ONDANSETRON 4 MILLIGRAM(S): 8 TABLET, FILM COATED ORAL at 18:41

## 2023-01-24 RX ADMIN — MORPHINE SULFATE 4 MILLIGRAM(S): 50 CAPSULE, EXTENDED RELEASE ORAL at 18:41

## 2023-01-24 NOTE — CONSULT NOTE ADULT - ASSESSMENT
58 yo male with PMH CAD on ASA presented to the ER with worsening lower back pain w/ radiculopathy numbness to his RLE and weakness of bilateral lower extremities. Pt states he as lifting weights a few days ago and his back "gave out" on him. Pt came to the ER and was sent home with pain medication, Pt states the pain starts in his lower back and radiated down bilateral posterior legs and anterior thigh. Pt states he has numbness to his Right lower extremity. Pt states he was unable to get up, ambulate, bear weight or move his lower extremities but unclear the exact onset of the time since the day prior to coming to the ER. Pt called an ambulance and was brought to the ER. Pt denies any bowel or bladder incontinence, states his last BM was yesterday and pt is voiding currently. Pt was unable to tolerate a MRI in the ER even after being given Dilaudid ativan, and morphine. Pt was admitted to the medical service and awaiting a MRI with anesthesia.   Pt was seen and examined on [address] with the RN in the room, pt denies any saddle anesthesia and had sensation for a rectal exam, although there was some decrease in his rectal tone, pt was able to bear down. Pt is able to void without difficulty.   Pt was d/w Dr. Schaefer, the very limited images of the MRI were reviewed which are concerning for a disc herniation at L2/3, pt will still need further imaging. Pt to be kept NPO, and will plan for possible surgery in the AM per Dr. 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 Schaefer who formulated the plan   - plan d/w RN    56 yo male with PMH CAD on ASA presented to the ER with worsening lower back pain w/ radiculopathy numbness to his RLE and weakness of bilateral lower extremities. Pt states he as lifting weights a few days ago and his back "gave out" on him. Pt came to the ER and was sent home with pain medication, Pt states the pain starts in his lower back and radiated down bilateral posterior legs and anterior thigh. Pt states he has numbness to his Right lower extremity. Pt states he was unable to get up, ambulate, bear weight or move his lower extremities but unclear the exact onset of the time since the day prior to coming to the ER. Pt called an ambulance and was brought to the ER. Pt denies any bowel or bladder incontinence, states his last BM was yesterday and pt is voiding currently. Pt was unable to tolerate a MRI in the ER even after being given Dilaudid ativan, and morphine. Pt was admitted to the medical service and awaiting a MRI with anesthesia.   Pt was seen and examined on [address] [facility] with the RN in the room, pt denies any saddle anesthesia and had sensation for a rectal exam, although there was some decrease in his rectal tone, pt was able to bear down. Pt is able to void without difficulty.   Pt was d/w Dr. Schaefer, the very limited images of the MRI were reviewed which are concerning for a disc herniation at L2/3, pt will still need further imaging with MRI w/ anesthesia. Pt to be kept NPO, and will plan for possible surgery this AM per Dr. 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 Schaefer who formulated the plan   - plan d/w RN

## 2023-01-24 NOTE — ED ADULT NURSE NOTE - OBJECTIVE STATEMENT
2008: MRI safety sheet completed, pt taken to MRI, TX placed per order 2008: MRI safety sheet completed, pt taken to MRI, TX placed per order.    2050: Pt returned from MRI, was unable to lay flat for test despite additional medications, provider at bedside.

## 2023-01-24 NOTE — ED PROVIDER NOTE - CLINICAL SUMMARY MEDICAL DECISION MAKING FREE TEXT BOX
Pain control, d/w neuro spine, Pain control, d/w neuro spine, recommended MRI, patient was given multiple doses of medications here but he was unable to lay flat for the MRI this was attempted multiple times.  His labs are otherwise nonactionable, his pain slightly improved with the medications, discussed with medicine and will admit for intractable back pain.

## 2023-01-24 NOTE — CONSULT NOTE ADULT - SUBJECTIVE AND OBJECTIVE BOX
Patient is a 57y old  Male who presents with a chief complaint of     HPI:  58 yo male with PMH CAD on ASA presented to the ER with worsening lower back pain w/ radiculopathy numbness to his RLE and weakness of bilateral lower extremities. Pt states he as lifting weights a few days ago and his back "gave out" on him. Pt came to the ER and was sent home with pain medication, Pt states the pain starts in his lower back and radiated down bilateral posterior legs and anterior thigh. Pt states he has numbness to his Right lower extremity. Pt states he was unable to get up, ambulate, bear weight or move his lower extremities but unclear the exact onset of the time since the day prior to coming to the ER. Pt called an ambulance and was brought to the ER. Pt denies any bowel or bladder incontinence, states his last BM was yesterday and pt is voiding currently. Pt was unable to tolerate a MRI in the ER even after being given Dilaudid ativan, and morphine. Pt was admitted to the medical service and awaiting a MRI with anesthesia.   Pt was seen and examined on [facility] with the RN in the room, pt denies any saddle anesthesia and had sensation for a rectal exam, although there was some decrease in his rectal tone, pt was able to bear down. Pt is able to void without difficulty.   Pt was d/w Dr. Schaefer, the very limited images of the MRI were reviewed which are concerning for a disc herniation at L2/3, pt will still need further imaging. Pt to be kept NPO, and will plan for possible surgery in the AM per Dr. Schaefer.     PAST MEDICAL & SURGICAL HISTORY:  HTN (hypertension)      HLD (hyperlipidemia)      CAD (coronary artery disease)      Alcohol abuse      S/P CABG (coronary artery bypass graft)          FAMILY HISTORY:  FH: diabetes mellitus    FH: heart disease        Social Hx:  Nonsmoker, no drug or alcohol use    MEDICATIONS  (STANDING):       Allergies    No Known Allergies    Intolerances        ROS: Pertinent positives in HPI, all other ROS were reviewed and are negative.      Vital Signs Last 24 Hrs  T(C): 37 (25 Jan 2023 00:03), Max: 37 (25 Jan 2023 00:03)  T(F): 98.6 (25 Jan 2023 00:03), Max: 98.6 (25 Jan 2023 00:03)  HR: 84 (25 Jan 2023 00:03) (57 - 84)  BP: 127/83 (25 Jan 2023 00:03) (127/83 - 204/99)  BP(mean): --  RR: 17 (25 Jan 2023 00:03) (16 - 18)  SpO2: 98% (25 Jan 2023 00:03) (97% - 99%)    Parameters below as of 25 Jan 2023 00:03  Patient On (Oxygen Delivery Method): room air      Constitutional: awake and alert.  HEENT: PERRLA, EOMI,   Neck: Supple.  Respiratory: Breath sounds are clear bilaterally  Cardiovascular: S1 and S2, regular  rhythm  Gastrointestinal: soft, nontender  Rectal- + sensation, + decreased tone   Extremities:  no edema  Musculoskeletal: no joint swelling/tenderness, no abnormal movements  Skin: No rashes    Neurological exam:  HF: A x O x 3. Appropriately interactive, normal affect. Speech fluent, No Aphasia or paraphasic errors. Naming /repetition intact   CN: LOPEZ, EOMI, VFF, facial sensation normal, no NLFD, tongue midline, Palate moves equally, SCM equal bilaterally  Motor: No pronator drift, lan UE 5/5, hip flex 2/5 lan able to rotate in bed unable to lift antigravity, quad 0/5, ham 2/5, df/pf 0/5 bilateral   Sens: decreased sensation circumferential on Right from knee down unable to different sharp/dull, no saddle anesthesia, + decreased rectal tone,   Reflexes: no clonus   Coord:  No FNFA, dysmetria, RENO intact   Gait/Balance: Cannot test          Labs:   01-24    139  |  105  |  9   ----------------------------<  101<H>  3.7   |  31  |  0.83    Ca    9.2      24 Jan 2023 18:28    TPro  8.1  /  Alb  4.4  /  TBili  0.5  /  DBili  x   /  AST  64<H>  /  ALT  42  /  AlkPhos  60  01-24                              15.2   10.37 )-----------( 193      ( 24 Jan 2023 18:28 )             44.8       Radiology:    ******PRELIMINARY REPORT******      ******PRELIMINARY REPORT******         ACC: [id number] EXAM:  MR SPINE LUMBAR DC   ORDERED BY: LAINE RO     PROCEDURE DATE:  01/24/2023    ******PRELIMINARY REPORT******      ******PRELIMINARY REPORT******           INTERPRETATION:  Discontinued MRI.  Sagittal T2 and sagittal STIR images   were obtained.  Images are motion degraded.  Disc bulges disc bulges are   noted at L2-L3, L4-L5 and L5-S1.        ******PRELIMINARY REPORT******      ******PRELIMINARY REPORT******         MARJORIE GARCIA MD; Attending Radiologist  This document is a PRELIMINARY interpretation and is pending final   attending approval. Jan 24 2023 10:23PM   Patient is a 57y old  Male who presents with a chief complaint of     HPI:  56 yo male with PMH CAD on ASA presented to the ER with worsening lower back pain w/ radiculopathy numbness to his RLE and weakness of bilateral lower extremities. Pt states he as lifting weights a few days ago and his back "gave out" on him. Pt came to the ER and was sent home with pain medication, Pt states the pain starts in his lower back and radiated down bilateral posterior legs and anterior thigh. Pt states he has numbness to his Right lower extremity. Pt states he was unable to get up, ambulate, bear weight or move his lower extremities but unclear the exact onset of the time since the day prior to coming to the ER. Pt called an ambulance and was brought to the ER. Pt denies any bowel or bladder incontinence, states his last BM was yesterday and pt is voiding currently. Pt was unable to tolerate a MRI in the ER even after being given Dilaudid ativan, and morphine. Pt was admitted to the medical service and awaiting a MRI with anesthesia.   Pt was seen and examined on 2 North with the RN in the room, pt denies any saddle anesthesia and had sensation for a rectal exam, although there was some decrease in his rectal tone, pt was able to bear down. Pt is able to void without difficulty.   Pt was d/w Dr. Schaefer, the very limited images of the MRI were reviewed which are concerning for a disc herniation at L2/3, pt will still need further imaging of MRI to be done with anesthesia. Pt to be kept NPO, and will plan for possible surgery this AM per Dr. Schaefer.     PAST MEDICAL & SURGICAL HISTORY:  HTN (hypertension)      HLD (hyperlipidemia)      CAD (coronary artery disease)      Alcohol abuse      S/P CABG (coronary artery bypass graft)          FAMILY HISTORY:  FH: diabetes mellitus    FH: heart disease        Social Hx:  Nonsmoker, no drug or alcohol use    MEDICATIONS  (STANDING):       Allergies    No Known Allergies    Intolerances        ROS: Pertinent positives in HPI, all other ROS were reviewed and are negative.      Vital Signs Last 24 Hrs  T(C): 37 (25 Jan 2023 00:03), Max: 37 (25 Jan 2023 00:03)  T(F): 98.6 (25 Jan 2023 00:03), Max: 98.6 (25 Jan 2023 00:03)  HR: 84 (25 Jan 2023 00:03) (57 - 84)  BP: 127/83 (25 Jan 2023 00:03) (127/83 - 204/99)  BP(mean): --  RR: 17 (25 Jan 2023 00:03) (16 - 18)  SpO2: 98% (25 Jan 2023 00:03) (97% - 99%)    Parameters below as of 25 Jan 2023 00:03  Patient On (Oxygen Delivery Method): room air      Constitutional: awake and alert.  HEENT: PERRLA, EOMI,   Neck: Supple.  Respiratory: Breath sounds are clear bilaterally  Cardiovascular: S1 and S2, regular  rhythm  Gastrointestinal: soft, nontender  Rectal- + sensation, + decreased tone   Extremities:  no edema  Musculoskeletal: no joint swelling/tenderness, no abnormal movements  Skin: No rashes    Neurological exam:  HF: A x O x 3. Appropriately interactive, normal affect. Speech fluent, No Aphasia or paraphasic errors. Naming /repetition intact   CN: LOPEZ, EOMI, VFF, facial sensation normal, no NLFD, tongue midline, Palate moves equally, SCM equal bilaterally  Motor: No pronator drift, lan UE 5/5, hip flex 2/5 lan able to rotate in bed unable to lift antigravity, quad 0/5, ham 2/5, df/pf 0/5 bilateral   Sens: decreased sensation circumferential on Right from knee down unable to different sharp/dull, no saddle anesthesia, + decreased rectal tone,   Reflexes: no clonus   Coord:  No FNFA, dysmetria, RENO intact   Gait/Balance: Cannot test          Labs:   01-24    139  |  105  |  9   ----------------------------<  101<H>  3.7   |  31  |  0.83    Ca    9.2      24 Jan 2023 18:28    TPro  8.1  /  Alb  4.4  /  TBili  0.5  /  DBili  x   /  AST  64<H>  /  ALT  42  /  AlkPhos  60  01-24                              15.2   10.37 )-----------( 193      ( 24 Jan 2023 18:28 )             44.8       Radiology:    ******PRELIMINARY REPORT******      ******PRELIMINARY REPORT******         ACC: [id number] EXAM:  MR SPINE LUMBAR DC   ORDERED BY: LAINE RO     PROCEDURE DATE:  01/24/2023    ******PRELIMINARY REPORT******      ******PRELIMINARY REPORT******           INTERPRETATION:  Discontinued MRI.  Sagittal T2 and sagittal STIR images   were obtained.  Images are motion degraded.  Disc bulges disc bulges are   noted at L2-L3, L4-L5 and L5-S1.        ******PRELIMINARY REPORT******      ******PRELIMINARY REPORT******         MARJORIE GARCIA MD; Attending Radiologist  This document is a PRELIMINARY interpretation and is pending final   attending approval. Jan 24 2023 10:23PM

## 2023-01-24 NOTE — ED ADULT NURSE NOTE - ED STAT RN HANDOFF DETAILS
Received pt endorsed from RN Cilis, evaluated for worsening back pain with inability to stand up or ambulate, pt was medicated by previous RN, pending MRI, in NAD

## 2023-01-24 NOTE — ED ADULT NURSE NOTE - CHIEF COMPLAINT QUOTE
Patient comes in with lower back pain radiating down legs. Denies trauma. Patient was here recently for similar issues.

## 2023-01-24 NOTE — ED PROVIDER NOTE - OBJECTIVE STATEMENT
56 y/o male with a PMHx of CAD, HTN, HLD presents to the ED BIBEMS from home, here 2 days ago c/o worsening back pain pt cannot ambulate, cannot stand up. Pt took medications has been following all precautions from recent ED visit, has been taking Robaxin with no relief. Pt endorses symptoms started with his left leg and have now shifted to both legs. Pt denies any numbness/tingling in legs, no incontinence, no abd pain.

## 2023-01-24 NOTE — ED PROVIDER NOTE - PHYSICAL EXAMINATION
Constitutional: NAD AAOx3  Eyes: PERRL, EOMI  Head: Normocephalic atraumatic  Mouth: MMM  Cardiac: regular rate   Resp: Lungs CTAB  GI: Abd s/nt/nd  Back: +ttp of lumbar spine, + straight leg test on right  Neuro: CN2-12 intact  Extremities: Intact distal pulses b/l, no calf tenderness, normal ROM b/l UE and LE   Skin: No rashes

## 2023-01-25 ENCOUNTER — APPOINTMENT (OUTPATIENT)
Dept: NEUROSURGERY | Facility: HOSPITAL | Age: 58
End: 2023-01-25

## 2023-01-25 LAB
ADD ON TEST-SPECIMEN IN LAB: SIGNIFICANT CHANGE UP
ALBUMIN SERPL ELPH-MCNC: 3.8 G/DL — SIGNIFICANT CHANGE UP (ref 3.3–5)
ALP SERPL-CCNC: 55 U/L — SIGNIFICANT CHANGE UP (ref 40–120)
ALT FLD-CCNC: 34 U/L — SIGNIFICANT CHANGE UP (ref 12–78)
ANION GAP SERPL CALC-SCNC: 5 MMOL/L — SIGNIFICANT CHANGE UP (ref 5–17)
APTT BLD: 35 SEC — SIGNIFICANT CHANGE UP (ref 27.5–35.5)
AST SERPL-CCNC: 49 U/L — HIGH (ref 15–37)
BASOPHILS # BLD AUTO: 0.04 K/UL — SIGNIFICANT CHANGE UP (ref 0–0.2)
BASOPHILS NFR BLD AUTO: 0.5 % — SIGNIFICANT CHANGE UP (ref 0–2)
BILIRUB DIRECT SERPL-MCNC: 0.1 MG/DL — SIGNIFICANT CHANGE UP (ref 0–0.3)
BILIRUB INDIRECT FLD-MCNC: 0.4 MG/DL — SIGNIFICANT CHANGE UP (ref 0.2–1)
BILIRUB SERPL-MCNC: 0.5 MG/DL — SIGNIFICANT CHANGE UP (ref 0.2–1.2)
BLD GP AB SCN SERPL QL: SIGNIFICANT CHANGE UP
BUN SERPL-MCNC: 10 MG/DL — SIGNIFICANT CHANGE UP (ref 7–23)
CALCIUM SERPL-MCNC: 8.6 MG/DL — SIGNIFICANT CHANGE UP (ref 8.5–10.1)
CHLORIDE SERPL-SCNC: 106 MMOL/L — SIGNIFICANT CHANGE UP (ref 96–108)
CO2 SERPL-SCNC: 26 MMOL/L — SIGNIFICANT CHANGE UP (ref 22–31)
CREAT SERPL-MCNC: 0.7 MG/DL — SIGNIFICANT CHANGE UP (ref 0.5–1.3)
CRP SERPL-MCNC: <3 MG/L — SIGNIFICANT CHANGE UP
EGFR: 107 ML/MIN/1.73M2 — SIGNIFICANT CHANGE UP
EOSINOPHIL # BLD AUTO: 0.02 K/UL — SIGNIFICANT CHANGE UP (ref 0–0.5)
EOSINOPHIL NFR BLD AUTO: 0.3 % — SIGNIFICANT CHANGE UP (ref 0–6)
ERYTHROCYTE [SEDIMENTATION RATE] IN BLOOD: 7 MM/HR — SIGNIFICANT CHANGE UP (ref 0–20)
ETHANOL SERPL-MCNC: <10 MG/DL — SIGNIFICANT CHANGE UP (ref 0–10)
GLUCOSE BLDC GLUCOMTR-MCNC: 97 MG/DL — SIGNIFICANT CHANGE UP (ref 70–99)
GLUCOSE SERPL-MCNC: 99 MG/DL — SIGNIFICANT CHANGE UP (ref 70–99)
HCT VFR BLD CALC: 41 % — SIGNIFICANT CHANGE UP (ref 39–50)
HGB BLD-MCNC: 14 G/DL — SIGNIFICANT CHANGE UP (ref 13–17)
IMM GRANULOCYTES NFR BLD AUTO: 0.1 % — SIGNIFICANT CHANGE UP (ref 0–0.9)
INR BLD: 1.15 RATIO — SIGNIFICANT CHANGE UP (ref 0.88–1.16)
LYMPHOCYTES # BLD AUTO: 2.06 K/UL — SIGNIFICANT CHANGE UP (ref 1–3.3)
LYMPHOCYTES # BLD AUTO: 27.5 % — SIGNIFICANT CHANGE UP (ref 13–44)
MAGNESIUM SERPL-MCNC: 2 MG/DL — SIGNIFICANT CHANGE UP (ref 1.6–2.6)
MCHC RBC-ENTMCNC: 29 PG — SIGNIFICANT CHANGE UP (ref 27–34)
MCHC RBC-ENTMCNC: 34.1 GM/DL — SIGNIFICANT CHANGE UP (ref 32–36)
MCV RBC AUTO: 85.1 FL — SIGNIFICANT CHANGE UP (ref 80–100)
MONOCYTES # BLD AUTO: 0.53 K/UL — SIGNIFICANT CHANGE UP (ref 0–0.9)
MONOCYTES NFR BLD AUTO: 7.1 % — SIGNIFICANT CHANGE UP (ref 2–14)
NEUTROPHILS # BLD AUTO: 4.83 K/UL — SIGNIFICANT CHANGE UP (ref 1.8–7.4)
NEUTROPHILS NFR BLD AUTO: 64.5 % — SIGNIFICANT CHANGE UP (ref 43–77)
PCP SPEC-MCNC: SIGNIFICANT CHANGE UP
PHOSPHATE SERPL-MCNC: 3.3 MG/DL — SIGNIFICANT CHANGE UP (ref 2.5–4.5)
PLATELET # BLD AUTO: 164 K/UL — SIGNIFICANT CHANGE UP (ref 150–400)
POTASSIUM SERPL-MCNC: 3.5 MMOL/L — SIGNIFICANT CHANGE UP (ref 3.5–5.3)
POTASSIUM SERPL-SCNC: 3.5 MMOL/L — SIGNIFICANT CHANGE UP (ref 3.5–5.3)
PROT SERPL-MCNC: 7.2 GM/DL — SIGNIFICANT CHANGE UP (ref 6–8.3)
PROTHROM AB SERPL-ACNC: 13.4 SEC — SIGNIFICANT CHANGE UP (ref 10.5–13.4)
RBC # BLD: 4.82 M/UL — SIGNIFICANT CHANGE UP (ref 4.2–5.8)
RBC # FLD: 13.6 % — SIGNIFICANT CHANGE UP (ref 10.3–14.5)
SODIUM SERPL-SCNC: 137 MMOL/L — SIGNIFICANT CHANGE UP (ref 135–145)
WBC # BLD: 7.49 K/UL — SIGNIFICANT CHANGE UP (ref 3.8–10.5)
WBC # FLD AUTO: 7.49 K/UL — SIGNIFICANT CHANGE UP (ref 3.8–10.5)

## 2023-01-25 PROCEDURE — 72100 X-RAY EXAM L-S SPINE 2/3 VWS: CPT | Mod: 26

## 2023-01-25 PROCEDURE — 63047 LAM FACETEC & FORAMOT LUMBAR: CPT

## 2023-01-25 PROCEDURE — 99223 1ST HOSP IP/OBS HIGH 75: CPT

## 2023-01-25 PROCEDURE — 72148 MRI LUMBAR SPINE W/O DYE: CPT | Mod: 26

## 2023-01-25 PROCEDURE — 63047 LAM FACETEC & FORAMOT LUMBAR: CPT | Mod: AS

## 2023-01-25 RX ORDER — SODIUM CHLORIDE 9 MG/ML
1000 INJECTION INTRAMUSCULAR; INTRAVENOUS; SUBCUTANEOUS
Refills: 0 | Status: DISCONTINUED | OUTPATIENT
Start: 2023-01-25 | End: 2023-01-27

## 2023-01-25 RX ORDER — ACETAMINOPHEN 500 MG
650 TABLET ORAL EVERY 6 HOURS
Refills: 0 | Status: DISCONTINUED | OUTPATIENT
Start: 2023-01-25 | End: 2023-01-27

## 2023-01-25 RX ORDER — ONDANSETRON 8 MG/1
4 TABLET, FILM COATED ORAL ONCE
Refills: 0 | Status: DISCONTINUED | OUTPATIENT
Start: 2023-01-25 | End: 2023-01-25

## 2023-01-25 RX ORDER — HYDROMORPHONE HYDROCHLORIDE 2 MG/ML
0.5 INJECTION INTRAMUSCULAR; INTRAVENOUS; SUBCUTANEOUS
Refills: 0 | Status: DISCONTINUED | OUTPATIENT
Start: 2023-01-25 | End: 2023-01-25

## 2023-01-25 RX ORDER — MAGNESIUM HYDROXIDE 400 MG/1
30 TABLET, CHEWABLE ORAL EVERY 12 HOURS
Refills: 0 | Status: DISCONTINUED | OUTPATIENT
Start: 2023-01-25 | End: 2023-01-30

## 2023-01-25 RX ORDER — HYDROMORPHONE HYDROCHLORIDE 2 MG/ML
1 INJECTION INTRAMUSCULAR; INTRAVENOUS; SUBCUTANEOUS
Refills: 0 | Status: DISCONTINUED | OUTPATIENT
Start: 2023-01-25 | End: 2023-01-27

## 2023-01-25 RX ORDER — OXYCODONE HYDROCHLORIDE 5 MG/1
5 TABLET ORAL EVERY 4 HOURS
Refills: 0 | Status: DISCONTINUED | OUTPATIENT
Start: 2023-01-25 | End: 2023-01-30

## 2023-01-25 RX ORDER — MORPHINE SULFATE 50 MG/1
2 CAPSULE, EXTENDED RELEASE ORAL EVERY 4 HOURS
Refills: 0 | Status: DISCONTINUED | OUTPATIENT
Start: 2023-01-25 | End: 2023-01-25

## 2023-01-25 RX ORDER — CEFAZOLIN SODIUM 1 G
2000 VIAL (EA) INJECTION EVERY 8 HOURS
Refills: 0 | Status: COMPLETED | OUTPATIENT
Start: 2023-01-25 | End: 2023-01-26

## 2023-01-25 RX ORDER — MORPHINE SULFATE 50 MG/1
4 CAPSULE, EXTENDED RELEASE ORAL EVERY 4 HOURS
Refills: 0 | Status: DISCONTINUED | OUTPATIENT
Start: 2023-01-25 | End: 2023-01-25

## 2023-01-25 RX ORDER — ASPIRIN/CALCIUM CARB/MAGNESIUM 324 MG
81 TABLET ORAL DAILY
Refills: 0 | Status: DISCONTINUED | OUTPATIENT
Start: 2023-01-25 | End: 2023-01-25

## 2023-01-25 RX ORDER — FENTANYL CITRATE 50 UG/ML
25 INJECTION INTRAVENOUS
Refills: 0 | Status: DISCONTINUED | OUTPATIENT
Start: 2023-01-25 | End: 2023-01-25

## 2023-01-25 RX ORDER — NALOXONE HYDROCHLORIDE 4 MG/.1ML
0.4 SPRAY NASAL ONCE
Refills: 0 | Status: DISCONTINUED | OUTPATIENT
Start: 2023-01-25 | End: 2023-01-30

## 2023-01-25 RX ORDER — SENNA PLUS 8.6 MG/1
2 TABLET ORAL AT BEDTIME
Refills: 0 | Status: DISCONTINUED | OUTPATIENT
Start: 2023-01-25 | End: 2023-01-25

## 2023-01-25 RX ORDER — GABAPENTIN 400 MG/1
100 CAPSULE ORAL THREE TIMES A DAY
Refills: 0 | Status: DISCONTINUED | OUTPATIENT
Start: 2023-01-25 | End: 2023-01-27

## 2023-01-25 RX ORDER — SENNA PLUS 8.6 MG/1
2 TABLET ORAL AT BEDTIME
Refills: 0 | Status: DISCONTINUED | OUTPATIENT
Start: 2023-01-25 | End: 2023-01-30

## 2023-01-25 RX ORDER — LANOLIN ALCOHOL/MO/W.PET/CERES
3 CREAM (GRAM) TOPICAL AT BEDTIME
Refills: 0 | Status: DISCONTINUED | OUTPATIENT
Start: 2023-01-25 | End: 2023-01-30

## 2023-01-25 RX ORDER — POLYETHYLENE GLYCOL 3350 17 G/17G
17 POWDER, FOR SOLUTION ORAL DAILY
Refills: 0 | Status: DISCONTINUED | OUTPATIENT
Start: 2023-01-25 | End: 2023-01-27

## 2023-01-25 RX ORDER — ONDANSETRON 8 MG/1
4 TABLET, FILM COATED ORAL EVERY 8 HOURS
Refills: 0 | Status: DISCONTINUED | OUTPATIENT
Start: 2023-01-25 | End: 2023-01-30

## 2023-01-25 RX ORDER — CARVEDILOL PHOSPHATE 80 MG/1
6.25 CAPSULE, EXTENDED RELEASE ORAL EVERY 12 HOURS
Refills: 0 | Status: DISCONTINUED | OUTPATIENT
Start: 2023-01-25 | End: 2023-01-27

## 2023-01-25 RX ORDER — OXYCODONE HYDROCHLORIDE 5 MG/1
10 TABLET ORAL EVERY 4 HOURS
Refills: 0 | Status: DISCONTINUED | OUTPATIENT
Start: 2023-01-25 | End: 2023-01-30

## 2023-01-25 RX ORDER — LIDOCAINE 4 G/100G
1 CREAM TOPICAL DAILY
Refills: 0 | Status: DISCONTINUED | OUTPATIENT
Start: 2023-01-25 | End: 2023-01-30

## 2023-01-25 RX ORDER — SODIUM CHLORIDE 9 MG/ML
1000 INJECTION, SOLUTION INTRAVENOUS
Refills: 0 | Status: DISCONTINUED | OUTPATIENT
Start: 2023-01-25 | End: 2023-01-25

## 2023-01-25 RX ORDER — ACETAMINOPHEN 500 MG
1000 TABLET ORAL ONCE
Refills: 0 | Status: DISCONTINUED | OUTPATIENT
Start: 2023-01-25 | End: 2023-01-30

## 2023-01-25 RX ORDER — SIMVASTATIN 20 MG/1
40 TABLET, FILM COATED ORAL AT BEDTIME
Refills: 0 | Status: DISCONTINUED | OUTPATIENT
Start: 2023-01-25 | End: 2023-01-30

## 2023-01-25 RX ORDER — POLYETHYLENE GLYCOL 3350 17 G/17G
17 POWDER, FOR SOLUTION ORAL DAILY
Refills: 0 | Status: DISCONTINUED | OUTPATIENT
Start: 2023-01-25 | End: 2023-01-25

## 2023-01-25 RX ORDER — METHOCARBAMOL 500 MG/1
1500 TABLET, FILM COATED ORAL THREE TIMES A DAY
Refills: 0 | Status: DISCONTINUED | OUTPATIENT
Start: 2023-01-25 | End: 2023-01-30

## 2023-01-25 RX ADMIN — METHOCARBAMOL 1500 MILLIGRAM(S): 500 TABLET, FILM COATED ORAL at 06:17

## 2023-01-25 RX ADMIN — SENNA PLUS 2 TABLET(S): 8.6 TABLET ORAL at 21:58

## 2023-01-25 RX ADMIN — CARVEDILOL PHOSPHATE 6.25 MILLIGRAM(S): 80 CAPSULE, EXTENDED RELEASE ORAL at 21:57

## 2023-01-25 RX ADMIN — METHOCARBAMOL 1500 MILLIGRAM(S): 500 TABLET, FILM COATED ORAL at 21:58

## 2023-01-25 RX ADMIN — CARVEDILOL PHOSPHATE 6.25 MILLIGRAM(S): 80 CAPSULE, EXTENDED RELEASE ORAL at 10:10

## 2023-01-25 RX ADMIN — Medication 3 MILLIGRAM(S): at 22:00

## 2023-01-25 RX ADMIN — SODIUM CHLORIDE 75 MILLILITER(S): 9 INJECTION, SOLUTION INTRAVENOUS at 06:13

## 2023-01-25 RX ADMIN — SIMVASTATIN 40 MILLIGRAM(S): 20 TABLET, FILM COATED ORAL at 21:58

## 2023-01-25 RX ADMIN — Medication 100 MILLIGRAM(S): at 22:01

## 2023-01-25 RX ADMIN — MORPHINE SULFATE 4 MILLIGRAM(S): 50 CAPSULE, EXTENDED RELEASE ORAL at 13:12

## 2023-01-25 NOTE — H&P ADULT - NSICDXPASTMEDICALHX_GEN_ALL_CORE_FT
Pt came into office stating that she was suppose to  3 rx at her pharmacy...    - one rx for High BP  -the second one for Pain   -And the third one for the Swelling    She was only able to  Meloxicam and needs to have the other 2 RX sent to her pharmacy     Please advice.       (Once this encounter is done, sign off on it and press DONE as well.    
PAST MEDICAL HISTORY:  Alcohol abuse     CAD (coronary artery disease)     HLD (hyperlipidemia)     HTN (hypertension)

## 2023-01-25 NOTE — PATIENT PROFILE ADULT - FUNCTIONAL ASSESSMENT - BASIC MOBILITY 6.
2-calculated by average/Not able to assess (calculate score using [facility] averaging method)

## 2023-01-25 NOTE — SBIRT NOTE ADULT - NSSBIRTALCPOSREINDET_GEN_A_CORE
Pt reported he has sustained sobriety for the past 2 years and was not in need of further intervention. Pt thanked SW and declined additional support and resources.

## 2023-01-25 NOTE — PHYSICAL THERAPY INITIAL EVALUATION ADULT - PERTINENT HX OF CURRENT PROBLEM, REHAB EVAL
58 y/o M with PMH CAD, s/p CABG, HTN, HLD, ETOH abuse (no alcohol use in 2 years as per pt) presented with back pain. Pt was seen in the ER on 1/22/23 and discharged home with a muscle relaxer and tylenol. He presented to the ER for worsening back pain. He states that 2 days ago he was lifting weights in his basement and then he heard a pop.  He also noticed pain and decreased sensation down his legs bilaterally. MRI spine with Disc bulges at L2-L3, L4-L5 and L5-S1. Pt. s/p . Low back pain and lumbar radiculopathy likely secondary to disc herniation vs. Cauda Equina vs. transverse myelitis

## 2023-01-25 NOTE — H&P ADULT - NSHPPHYSICALEXAM_GEN_ALL_CORE
ICU Vital Signs Last 24 Hrs  T(C): 37 (25 Jan 2023 00:03), Max: 37 (25 Jan 2023 00:03)  T(F): 98.6 (25 Jan 2023 00:03), Max: 98.6 (25 Jan 2023 00:03)  HR: 84 (25 Jan 2023 00:03) (57 - 84)  BP: 127/83 (25 Jan 2023 00:03) (127/83 - 204/99)  RR: 17 (25 Jan 2023 00:03) (16 - 18)  SpO2: 98% (25 Jan 2023 00:03) (97% - 99%)    O2 Parameters below as of 25 Jan 2023 00:03  Patient On (Oxygen Delivery Method): room air    General: Awake and alert, cooperative with exam. No acute distress.   Skin: Warm, dry, and pink.   Eyes: Pupils equal and reactive to light. Extraocular eye movements intact. No conjunctival injection, discharge, or scleral icterus.   HEENT: Atraumatic, normocephalic. Moist mucus membranes.   Cardiology: Normal S1, S2. No murmurs, rubs, or gallops. Regular rate and rhythm.   Respiratory: Lungs clear to ascultation bilaterally. Good air exchange. No wheezes, rales, or rhonchi. Normal chest expansion.   Gastrointestinal: Positive bowel sounds. Soft, non-tender, non-distended. No guarding, rigidity, or rebound tenderness. No hepatosplenomegaly.   Back: Tenderness on palpation of the lower lumbar spine and paraspinal regions b/l.   Extremities: No peripheral edema bilaterally. Dorsalis pedis pulses 2+ bilaterally.   Neurological: A+Ox3 (person, place, and time). Cranial nerves 2-12 intact. Normal speech. No facial droop. 5/5 motor strength of b/l upper extremities. 0/5 strength of b/l lower extremities. Normal sensation of upper extremities b/l. Decreased sensation of lower extremities b/l. Able to dorsiflex, plantar flex at the hip. Unable to dorsiflex or plantar flex b/l feet.   Psychiatric: Normal affect. Normal mood.

## 2023-01-25 NOTE — BRIEF OPERATIVE NOTE - NSICDXBRIEFPROCEDURE_GEN_ALL_CORE_FT
PROCEDURES:  Laminectomy, spine, lumbar, with discectomy, 1 level 26-Jan-2023 08:04:29  Zack Schaefer

## 2023-01-25 NOTE — H&P ADULT - NSHPSOCIALHISTORY_GEN_ALL_CORE
Lives with his girlfriend. Independent with ADLs and IADLs. Smoked 1 ppd for 20 years, quit in 2012. Used to drink 7-8 tall cans of alcohol a day, stopped 2 years ago. Reports marijuana use.

## 2023-01-25 NOTE — PATIENT PROFILE ADULT - FALL HARM RISK - RISK INTERVENTIONS
Assistance OOB with selected safe patient handling equipment/Assistance with ambulation/Communicate Fall Risk and Risk Factors to all staff, patient, and family/Discuss with provider need for PT consult/Monitor gait and stability/Reinforce activity limits and safety measures with patient and family/Visual Cue: Yellow wristband/Bed in lowest position, wheels locked, appropriate side rails in place/Call bell, personal items and telephone in reach/Instruct patient to call for assistance before getting out of bed or chair/Non-slip footwear when patient is out of bed/[place] to call system/Physically safe environment - no spills, clutter or unnecessary equipment/Purposeful Proactive Rounding/Room/bathroom lighting operational, light cord in reach

## 2023-01-25 NOTE — BRIEF OPERATIVE NOTE - NSICDXBRIEFPREOP_GEN_ALL_CORE_FT
PRE-OP DIAGNOSIS:  Cauda equina syndrome 26-Jan-2023 08:04:38  Zack Schaefer  Lumbar disc herniation 26-Jan-2023 08:05:08  Zack Schaefer

## 2023-01-25 NOTE — H&P ADULT - ASSESSMENT
58 y/o M presented with back pain     1. Low back pain and lumbar radiculopathy likely secondary to disc herniation vs. Cauda Equina (less likely [place] Syndrome)  - Admit to med/surg   - Pt was unable to tolerate MRI LS spine, will be having MRI under anesthesia   - c/w Methocarbamol for muscle spasm   - Pain management - Tylenol, Morphine   - Lidocaine patch   - PT evaluation   - Neurosurgery recs appreciated (Dr. Schaefer) - possible OR in AM   - Patient is medically optimized for surgery. The patient is determined to be an average risk for complications following an average risk procedure. Benefits and risks of surgery discussed with pt at the bedside.   - Revised cardiac risk index for pre-operative risk = 0 points, class I risk -> 3.9% for a 30 day risk of death, MI, or cardiac arrest     2. Elevated AST   - AST 64, trend     3. History of ETOH withdrawal  - Pt reports that he stopped drinking 2 years ago   - Ordered alcohol level and Utox   - Will place on CIWA if not scoring in 48-72 hours would d/c   - Monitor for sxs of alcohol withdrawal (tremors, agitation, fasciculations -> none present on current exam)     4. History of CAD, s/p CABG, HTN, HLD, ETOH abuse (no alcohol use in 2 years as per pt)   - c/w home medications; verified with pt at the bedside   - Reactive lymphocytes 9.0% -> may be stress related, ordered EBV, CMV -> if persistent elevation will consult Heme/Onc     DVT ppx: SCDs (advance to pharmacological if no planned surgical intervention)   Code status: Full code (Pt agrees to chest compressions and intubation if required).     I spent a total of 80 minutes on the date of this encounter coordinating the patient's care. This includes reviewing prior documentation, results and imaging in addition to completing a full history and physical examination on the patient. Further tests, medications, and procedures have been ordered as indicated. Laboratory results and the plan of care were communicated to the patient and/or their family member. Supporting documentation was completed and added to the patient's chart.  58 y/o M presented with back pain     1. Low back pain and lumbar radiculopathy likely secondary to disc herniation vs. Cauda Equina vs. transverse myelitis (less likely [place] Syndrome)  - Admit to med/surg   - Pt was unable to tolerate MRI LS spine, will be having MRI under anesthesia   - c/w Methocarbamol for muscle spasm   - Pain management - Tylenol, Morphine   - Lidocaine patch   - PT evaluation   - Neurosurgery recs appreciated (Dr. Schaefer) - possible OR in AM   - Patient is medically optimized for surgery. The patient is determined to be an average risk for complications following an average risk procedure. Benefits and risks of surgery discussed with pt at the bedside.   - Revised cardiac risk index for pre-operative risk = 0 points, class I risk -> 3.9% for a 30 day risk of death, MI, or cardiac arrest     2. Elevated AST   - AST 64, trend     3. History of ETOH withdrawal  - Pt reports that he stopped drinking 2 years ago   - Ordered alcohol level and Utox   - Will place on CIWA if not scoring in 48-72 hours would d/c   - Monitor for sxs of alcohol withdrawal (tremors, agitation, fasciculations -> none present on current exam)     4. History of CAD, s/p CABG, HTN, HLD, ETOH abuse (no alcohol use in 2 years as per pt)   - c/w home medications; verified with pt at the bedside   - Reactive lymphocytes 9.0% -> may be stress related, ordered EBV, CMV -> if persistent elevation will consult Heme/Onc     DVT ppx: SCDs (advance to pharmacological if no planned surgical intervention)   Code status: Full code (Pt agrees to chest compressions and intubation if required).     I spent a total of 80 minutes on the date of this encounter coordinating the patient's care. This includes reviewing prior documentation, results and imaging in addition to completing a full history and physical examination on the patient. Further tests, medications, and procedures have been ordered as indicated. Laboratory results and the plan of care were communicated to the patient and/or their family member. Supporting documentation was completed and added to the patient's chart.

## 2023-01-25 NOTE — H&P ADULT - HISTORY OF PRESENT ILLNESS
58 y/o M with PMH CAD, s/p CABG, HTN, HLD, ETOH abuse (no alcohol use in 2 years as per pt) presented with back pain. Pt was seen in the ER on 1/22/23 and discharged home with a muscle relaxer and tylenol. He presented to the ER for worsening back pain. He states that 2 days ago he was lifting weights in his basement and then he heard a pop. He immediately had pain in the lower back. Was taking Tylenol and the muscle relaxer. Yesterday he was able to take a shower and then later in the day he was unable to get off of the couch d/t lower extremity weakness. He also noticed pain down his legs bilaterally. He also has decreased sensation of his lower extremities b/l. No urinary incontinence or retention. Pt does report constipation over the last 2 days. Has not had an ECHO or stress test in years, has tolerated anesthesia in the past without complication, prior surgeries occurred without complication. Was able to walk a flight of stairs prior to this injury. No chest pain or SOB with exertion. No recent vaccinations. No recent illnesses. Does not report a history of COVID.     Prior admission:   - 5/30/21: ETOH withdrawal, high BP    ER course: VSS. Labs: Reactive lymphocytes 9.0%, glucose 101, AST 64. UA: negative. COVID, influenza A/B, RSV negative. EKG: NSR with HR 80 bpm, normal intervals, T wave inversions in AVL, V4 (personally reviewed).    Imaging:   - MRI Lumbar spine: Discontinued MRI.  Sagittal T2 and sagittal STIR images  were obtained. Images are motion degraded. Disc bulges disc bulges are  noted at L2-L3, L4-L5 and L5-S1.    Pt was given 1L of NS, Valium, Dilaudid, Ativan, Morphine, Zofran. He is being admitted to med/surg for further management.

## 2023-01-25 NOTE — H&P ADULT - NSHPREVIEWOFSYSTEMS_GEN_ALL_CORE
Constitutional: negative for fatigue, negative for fever, negative for chills, negative for decreased appetite.  Skin: negative for rashes, negative for open wounds, negative for jaundice.   Eyes: negative for blurry vision, negative for double vision.   Ears, nose, throat: negative for ear pain, negative for nasal congestion, negative for sore throat, negative for lymph node swelling.   Cardiovascular: negative for chest pain, negative for palpitations, negative for lower extremity swelling.   Respiratory: negative for shortness of breath, negative for wheezing, negative for cough.   Gastrointestinal: negative for abdominal pain, negative for nausea, negative for vomiting, negative for diarrhea, negative for constipation, negative for blood in the stool, negative for black tarry stools.   Genitourinary: negative for burning on urination, negative for urinary urgency or frequency, negative for blood in the urine.   Endocrine: negative for cold intolerance, negative for heat intolerance, negative for increased thirst.   Hematologic: negative for easy bruising or bleeding.   Musculoskeletal: positive for muscle/joint pain, positive for decreased range of motion.   Neurological: negative for dizziness, negative for headaches, negative for loss of consciousness, positive for motor weakness, positive for sensory deficits.   Psychiatric: negative for depression, negative for anxiety.

## 2023-01-26 LAB
ANION GAP SERPL CALC-SCNC: 4 MMOL/L — LOW (ref 5–17)
BASOPHILS # BLD AUTO: 0 K/UL — SIGNIFICANT CHANGE UP (ref 0–0.2)
BASOPHILS NFR BLD AUTO: 0 % — SIGNIFICANT CHANGE UP (ref 0–2)
BUN SERPL-MCNC: 11 MG/DL — SIGNIFICANT CHANGE UP (ref 7–23)
CALCIUM SERPL-MCNC: 8.9 MG/DL — SIGNIFICANT CHANGE UP (ref 8.5–10.1)
CHLORIDE SERPL-SCNC: 104 MMOL/L — SIGNIFICANT CHANGE UP (ref 96–108)
CMV DNA CSF QL NAA+PROBE: SIGNIFICANT CHANGE UP
CMV DNA SPEC NAA+PROBE-LOG#: SIGNIFICANT CHANGE UP LOG10IU/ML
CO2 SERPL-SCNC: 28 MMOL/L — SIGNIFICANT CHANGE UP (ref 22–31)
CREAT SERPL-MCNC: 0.67 MG/DL — SIGNIFICANT CHANGE UP (ref 0.5–1.3)
EBV EA AB SER IA-ACNC: 5.6 U/ML — SIGNIFICANT CHANGE UP
EBV EA AB TITR SER IF: POSITIVE
EBV EA IGG SER-ACNC: NEGATIVE — SIGNIFICANT CHANGE UP
EBV NA IGG SER IA-ACNC: >600 U/ML — HIGH
EBV PATRN SPEC IB-IMP: SIGNIFICANT CHANGE UP
EBV VCA IGG AVIDITY SER QL IA: POSITIVE
EBV VCA IGM SER IA-ACNC: <10 U/ML — SIGNIFICANT CHANGE UP
EBV VCA IGM SER IA-ACNC: >750 U/ML — HIGH
EBV VCA IGM TITR FLD: NEGATIVE — SIGNIFICANT CHANGE UP
EGFR: 109 ML/MIN/1.73M2 — SIGNIFICANT CHANGE UP
EOSINOPHIL # BLD AUTO: 0 K/UL — SIGNIFICANT CHANGE UP (ref 0–0.5)
EOSINOPHIL NFR BLD AUTO: 0 % — SIGNIFICANT CHANGE UP (ref 0–6)
GLUCOSE SERPL-MCNC: 114 MG/DL — HIGH (ref 70–99)
HCT VFR BLD CALC: 38.4 % — LOW (ref 39–50)
HGB BLD-MCNC: 12.8 G/DL — LOW (ref 13–17)
LYMPHOCYTES # BLD AUTO: 1.88 K/UL — SIGNIFICANT CHANGE UP (ref 1–3.3)
LYMPHOCYTES # BLD AUTO: 17 % — SIGNIFICANT CHANGE UP (ref 13–44)
MAGNESIUM SERPL-MCNC: 2.1 MG/DL — SIGNIFICANT CHANGE UP (ref 1.6–2.6)
MANUAL SMEAR VERIFICATION: SIGNIFICANT CHANGE UP
MCHC RBC-ENTMCNC: 28.3 PG — SIGNIFICANT CHANGE UP (ref 27–34)
MCHC RBC-ENTMCNC: 33.3 GM/DL — SIGNIFICANT CHANGE UP (ref 32–36)
MCV RBC AUTO: 85 FL — SIGNIFICANT CHANGE UP (ref 80–100)
MONOCYTES # BLD AUTO: 0.66 K/UL — SIGNIFICANT CHANGE UP (ref 0–0.9)
MONOCYTES NFR BLD AUTO: 6 % — SIGNIFICANT CHANGE UP (ref 2–14)
NEUTROPHILS # BLD AUTO: 8.42 K/UL — HIGH (ref 1.8–7.4)
NEUTROPHILS NFR BLD AUTO: 76 % — SIGNIFICANT CHANGE UP (ref 43–77)
NRBC # BLD: 0 /100 — SIGNIFICANT CHANGE UP (ref 0–0)
NRBC # BLD: SIGNIFICANT CHANGE UP /100 WBCS (ref 0–0)
PHOSPHATE SERPL-MCNC: 2.9 MG/DL — SIGNIFICANT CHANGE UP (ref 2.5–4.5)
PLAT MORPH BLD: NORMAL — SIGNIFICANT CHANGE UP
PLATELET # BLD AUTO: 166 K/UL — SIGNIFICANT CHANGE UP (ref 150–400)
POTASSIUM SERPL-MCNC: 3.7 MMOL/L — SIGNIFICANT CHANGE UP (ref 3.5–5.3)
POTASSIUM SERPL-SCNC: 3.7 MMOL/L — SIGNIFICANT CHANGE UP (ref 3.5–5.3)
RBC # BLD: 4.52 M/UL — SIGNIFICANT CHANGE UP (ref 4.2–5.8)
RBC # FLD: 13.6 % — SIGNIFICANT CHANGE UP (ref 10.3–14.5)
RBC BLD AUTO: NORMAL — SIGNIFICANT CHANGE UP
SODIUM SERPL-SCNC: 136 MMOL/L — SIGNIFICANT CHANGE UP (ref 135–145)
VARIANT LYMPHS # BLD: 1 % — SIGNIFICANT CHANGE UP (ref 0–6)
WBC # BLD: 11.08 K/UL — HIGH (ref 3.8–10.5)
WBC # FLD AUTO: 11.08 K/UL — HIGH (ref 3.8–10.5)

## 2023-01-26 PROCEDURE — 99233 SBSQ HOSP IP/OBS HIGH 50: CPT

## 2023-01-26 PROCEDURE — 72158 MRI LUMBAR SPINE W/O & W/DYE: CPT | Mod: 26

## 2023-01-26 PROCEDURE — 72157 MRI CHEST SPINE W/O & W/DYE: CPT | Mod: 26

## 2023-01-26 PROCEDURE — 99223 1ST HOSP IP/OBS HIGH 75: CPT

## 2023-01-26 RX ORDER — HEPARIN SODIUM 5000 [USP'U]/ML
5000 INJECTION INTRAVENOUS; SUBCUTANEOUS EVERY 12 HOURS
Refills: 0 | Status: COMPLETED | OUTPATIENT
Start: 2023-01-26 | End: 2023-01-27

## 2023-01-26 RX ADMIN — CARVEDILOL PHOSPHATE 6.25 MILLIGRAM(S): 80 CAPSULE, EXTENDED RELEASE ORAL at 21:35

## 2023-01-26 RX ADMIN — OXYCODONE HYDROCHLORIDE 10 MILLIGRAM(S): 5 TABLET ORAL at 05:15

## 2023-01-26 RX ADMIN — HEPARIN SODIUM 5000 UNIT(S): 5000 INJECTION INTRAVENOUS; SUBCUTANEOUS at 21:35

## 2023-01-26 RX ADMIN — OXYCODONE HYDROCHLORIDE 10 MILLIGRAM(S): 5 TABLET ORAL at 06:15

## 2023-01-26 RX ADMIN — SIMVASTATIN 40 MILLIGRAM(S): 20 TABLET, FILM COATED ORAL at 21:35

## 2023-01-26 RX ADMIN — OXYCODONE HYDROCHLORIDE 10 MILLIGRAM(S): 5 TABLET ORAL at 21:15

## 2023-01-26 RX ADMIN — Medication 100 MILLIGRAM(S): at 05:15

## 2023-01-26 RX ADMIN — LIDOCAINE 1 PATCH: 4 CREAM TOPICAL at 22:33

## 2023-01-26 RX ADMIN — METHOCARBAMOL 1500 MILLIGRAM(S): 500 TABLET, FILM COATED ORAL at 14:43

## 2023-01-26 RX ADMIN — METHOCARBAMOL 1500 MILLIGRAM(S): 500 TABLET, FILM COATED ORAL at 05:15

## 2023-01-26 RX ADMIN — SENNA PLUS 2 TABLET(S): 8.6 TABLET ORAL at 21:36

## 2023-01-26 RX ADMIN — METHOCARBAMOL 1500 MILLIGRAM(S): 500 TABLET, FILM COATED ORAL at 21:36

## 2023-01-26 RX ADMIN — OXYCODONE HYDROCHLORIDE 10 MILLIGRAM(S): 5 TABLET ORAL at 20:38

## 2023-01-26 RX ADMIN — LIDOCAINE 1 PATCH: 4 CREAM TOPICAL at 14:44

## 2023-01-26 RX ADMIN — POLYETHYLENE GLYCOL 3350 17 GRAM(S): 17 POWDER, FOR SOLUTION ORAL at 14:43

## 2023-01-26 RX ADMIN — Medication 1 TABLET(S): at 14:43

## 2023-01-26 RX ADMIN — GABAPENTIN 100 MILLIGRAM(S): 400 CAPSULE ORAL at 00:39

## 2023-01-26 RX ADMIN — CARVEDILOL PHOSPHATE 6.25 MILLIGRAM(S): 80 CAPSULE, EXTENDED RELEASE ORAL at 14:43

## 2023-01-26 NOTE — PROGRESS NOTE ADULT - SUBJECTIVE AND OBJECTIVE BOX
Neurosurgery:    HPI:  58 y/o M with PMH CAD, s/p CABG, HTN, HLD, ETOH abuse (no alcohol use in 2 years as per pt) presented with back pain. Pt was seen in the ER on 1/22/23 and discharged home with a muscle relaxer and tylenol. He presented to the ER for worsening back pain. He states that 2 days ago he was lifting weights in his basement and then he heard a pop. He immediately had pain in the lower back. Was taking Tylenol and the muscle relaxer. Yesterday he was able to take a shower and then later in the day he was unable to get off of the couch d/t lower extremity weakness. He also noticed pain down his legs bilaterally. He also has decreased sensation of his lower extremities b/l. No urinary incontinence or retention. Pt does report constipation over the last 2 days.  Pt had MRI with anesthesia demonstrating L2-3 HNP with thecal sac compression, impending cauda equina without such symptoms.    1/25/23:  POD#0  L2-3 laminectomy / discectomy, large extruded disc.  1/26/23:  POD#1  improved power in LE's.  No radicular pain, numbness remains but improved also.    MEDICATIONS  (STANDING):  carvedilol 6.25 milliGRAM(s) Oral every 12 hours  lidocaine   4% Patch 1 Patch Transdermal daily  methocarbamol 1500 milliGRAM(s) Oral three times a day  multivitamin 1 Tablet(s) Oral daily  naloxone Injectable 0.4 milliGRAM(s) IV Push once  polyethylene glycol 3350 17 Gram(s) Oral daily  senna 2 Tablet(s) Oral at bedtime  simvastatin 40 milliGRAM(s) Oral at bedtime  sodium chloride 0.9%. 1000 milliLiter(s) (75 mL/Hr) IV Continuous <Continuous>     Vital Signs Last 24 Hrs  T(C): 36.3 (26 Jan 2023 05:58), Max: 37.2 (25 Jan 2023 15:58)  T(F): 97.4 (26 Jan 2023 05:58), Max: 99 (25 Jan 2023 15:58)  HR: 60 (26 Jan 2023 05:58) (59 - 79)  BP: 165/76 (26 Jan 2023 06:15) (124/65 - 197/93)  BP(mean): 101 (25 Jan 2023 17:28) (101 - 101)  RR: 18 (26 Jan 2023 05:58) (10 - 18)  SpO2: 100% (26 Jan 2023 05:58) (95% - 100%)    Parameters below as of 26 Jan 2023 05:58  Patient On (Oxygen Delivery Method): room air        Labs:                        14.0   7.49  )-----------( 164      ( 25 Jan 2023 05:53 )             41.0     01-25    137  |  106  |  10  ----------------------------<  99  3.5   |  26  |  0.70    Ca    8.6      25 Jan 2023 05:53  Phos  3.3     01-25  Mg     2.0     01-25    TPro  7.2  /  Alb  3.8  /  TBili  0.5  /  DBili  0.1  /  AST  49<H>  /  ALT  34  /  AlkPhos  55  01-25    PT/INR - ( 25 Jan 2023 08:14 )   PT: 13.4 sec;   INR: 1.15 ratio    PTT - ( 25 Jan 2023 08:14 )  PTT:35.0 sec    Radiology report:  - CT head:    Physical Exam:  Constitutional: Awake / alert  HEENT: PERRLA, EOMI  Neck: Supple  Respiratory: Breath sounds are clear bilaterally  Cardiovascular: S1 and S2, regular rhythm  Gastrointestinal: Soft, NT/ND  Extremities:  no edema      Neurological Exam:  HF: A x O x 3, appropriately interactive.  CN: PERRL, EOMI, VFF, facial sensation normal, no NLFD, tongue midline  Motor: No further pain.  Pt with improved power bilaterally  Inc: dressing some saturation / changed by nursing staff this am  Sens: Intact to light touch  Reflexes: Symmetric and normal, downgoing toes b/l  Coord:  No FNFA, dysmetria, RENO intact   Gait/Balance: Cannot test    A/P: 58 yo male with PMH CAD on ASA presented to the ER with worsening lower back pain w/ radiculopathy numbness to his RLE and weakness of bilateral lower extremities.  L2/3 disc identified.  Urgent OR laminectomy / discectomy at this level on 1/25/23.    - PT / OT  - SCDs dvt ppx  - Pain rx and muscle relaxants prn  - voiding monitoring  - sbp control  - continue to hold ASA for now  - d/w Dr. Schaefer    Care Time spent: 42 minutes spent on total encounter; more than 50% of the visit was spent counseling and / or coordinating care by the Neurosurgical team.  While the remainder including evaluating the patient, medical record, imaging studies, and discussions with fellow staff members / patient / and or family.   Neurosurgery:    HPI:  56 y/o M with PMH CAD, s/p CABG, HTN, HLD, ETOH abuse (no alcohol use in 2 years as per pt) presented with back pain. Pt was seen in the ER on 1/22/23 and discharged home with a muscle relaxer and tylenol. He presented to the ER for worsening back pain. He states that 2 days ago he was lifting weights in his basement and then he heard a pop. He immediately had pain in the lower back. Was taking Tylenol and the muscle relaxer. Yesterday he was able to take a shower and then later in the day he was unable to get off of the couch d/t lower extremity weakness. He also noticed pain down his legs bilaterally. He also has decreased sensation of his lower extremities b/l. No urinary incontinence or retention. Pt does report constipation over the last 2 days.  Pt had MRI with anesthesia demonstrating L2-3 HNP with thecal sac compression, impending cauda equina without such symptoms.    1/25/23:  POD#0  L2-3 laminectomy / discectomy, large extruded disc.  1/26/23:  POD#1  improved power in LE's.  No radicular pain, numbness remains but improved also.    MEDICATIONS  (STANDING):  carvedilol 6.25 milliGRAM(s) Oral every 12 hours  lidocaine   4% Patch 1 Patch Transdermal daily  methocarbamol 1500 milliGRAM(s) Oral three times a day  multivitamin 1 Tablet(s) Oral daily  naloxone Injectable 0.4 milliGRAM(s) IV Push once  polyethylene glycol 3350 17 Gram(s) Oral daily  senna 2 Tablet(s) Oral at bedtime  simvastatin 40 milliGRAM(s) Oral at bedtime  sodium chloride 0.9%. 1000 milliLiter(s) (75 mL/Hr) IV Continuous <Continuous>     Vital Signs Last 24 Hrs  T(C): 36.3 (26 Jan 2023 05:58), Max: 37.2 (25 Jan 2023 15:58)  T(F): 97.4 (26 Jan 2023 05:58), Max: 99 (25 Jan 2023 15:58)  HR: 60 (26 Jan 2023 05:58) (59 - 79)  BP: 165/76 (26 Jan 2023 06:15) (124/65 - 197/93)  BP(mean): 101 (25 Jan 2023 17:28) (101 - 101)  RR: 18 (26 Jan 2023 05:58) (10 - 18)  SpO2: 100% (26 Jan 2023 05:58) (95% - 100%)    Parameters below as of 26 Jan 2023 05:58  Patient On (Oxygen Delivery Method): room air        Labs:                        14.0   7.49  )-----------( 164      ( 25 Jan 2023 05:53 )             41.0     01-25    137  |  106  |  10  ----------------------------<  99  3.5   |  26  |  0.70    Ca    8.6      25 Jan 2023 05:53  Phos  3.3     01-25  Mg     2.0     01-25    TPro  7.2  /  Alb  3.8  /  TBili  0.5  /  DBili  0.1  /  AST  49<H>  /  ALT  34  /  AlkPhos  55  01-25    PT/INR - ( 25 Jan 2023 08:14 )   PT: 13.4 sec;   INR: 1.15 ratio    PTT - ( 25 Jan 2023 08:14 )  PTT:35.0 sec    Radiology report:  - CT head:    Physical Exam:  Constitutional: Awake / alert  HEENT: PERRLA, EOMI  Neck: Supple  Respiratory: Breath sounds are clear bilaterally  Cardiovascular: S1 and S2, regular rhythm  Gastrointestinal: Soft, NT/ND  Extremities:  no edema      Neurological Exam:  HF: A x O x 3, appropriately interactive.  CN: PERRL, EOMI, VFF, facial sensation normal, no NLFD, tongue midline  Motor: No further pain.  Pt with improved power bilaterally in proximal motor groups, still with distal motor group paraparesis  RLE: IP 3+/5.  Knee flex 3+/5. Knee ext 2+/5 (almost kicks heel off bed). DF/PF/EHL no movement.  Numbness from Lower thigh to foot   LLE: IP 4-/5. Knee flex 4-/5.  Knee ext 3/5 (just antigrav with heel off bed).  DF/PF/EHL no movment. sensorium from hip to foot present.  Inc: dressing some saturation / changed by nursing staff this am  Sens: Intact to light touch  Reflexes: Symmetric and normal, downgoing toes b/l  Coord:  No FNFA, dysmetria, RENO intact   Gait/Balance: Cannot test    A/P: 58 yo male with PMH CAD on ASA presented to the ER with worsening lower back pain w/ radiculopathy numbness to his RLE and weakness of bilateral lower extremities.  L2/3 disc identified.  Urgent OR laminectomy / discectomy at this level on 1/25/23.    - PT / OT  - Pain resolved, numbness persists, motor proximal improved / distal group motor weakness persists.  - SCDs dvt ppx  - consider start lovenox /   - Pain rx and muscle relaxants prn  - voiding monitoring  - sbp control  - continue to hold ASA for now  - d/w Dr. Schaefer    Care Time spent: 42 minutes spent on total encounter; more than 50% of the visit was spent counseling and / or coordinating care by the Neurosurgical team.  While the remainder including evaluating the patient, medical record, imaging studies, and discussions with fellow staff members / patient / and or family.

## 2023-01-26 NOTE — PROGRESS NOTE ADULT - SUBJECTIVE AND OBJECTIVE BOX
CC- back pain    HPI:  58 y/o M with PMH CAD, s/p CABG, HTN, HLD, ETOH abuse (no alcohol use in 2 years as per pt) presented with back pain. Pt was seen in the ER on 23 and discharged home with a muscle relaxer and tylenol. He presented to the ER for worsening back pain. He states that 2 days ago he was lifting weights in his basement and then he heard a pop. He immediately had pain in the lower back. Was taking Tylenol and the muscle relaxer. Yesterday he was able to take a shower and then later in the day he was unable to get off of the couch d/t lower extremity weakness. He also noticed pain down his legs bilaterally. He also has decreased sensation of his lower extremities b/l. No urinary incontinence or retention. Pt does report constipation over the last 2 days. Has not had an ECHO or stress test in years, has tolerated anesthesia in the past without complication, prior surgeries occurred without complication. Was able to walk a flight of stairs prior to this injury. No chest pain or SOB with exertion. No recent vaccinations. No recent illnesses. Does not report a history of COVID.     Patient underwent urgent L2 laminectomy/ discectomy on 23 for cauda equina     23- c/o "cold sensation" both legs, not much movement in both LE's. Went for repeat MRI ordered by NS team    Review of system- All 10 systems reviewed and is as per HPI otherwise negative.     T(C): 36.3 (23 @ 05:58), Max: 37.2 (23 @ 15:58)  HR: 60 (23 @ 05:58) (60 - 79)  BP: 165/76 (23 @ 06:15) (124/65 - 173/72)  RR: 18 (23 @ 05:58) (10 - 18)  SpO2: 100% (23 @ 05:58) (95% - 100%)  Wt(kg): --    LABS:                        12.8   11.08 )-----------( 166      ( 2023 10:23 )             38.4         136  |  104  |  11  ----------------------------<  114<H>  3.7   |  28  |  0.67    Ca    8.9      2023 10:23  Phos  2.9       Mg     2.1         TPro  7.2  /  Alb  3.8  /  TBili  0.5  /  DBili  0.1  /  AST  49<H>  /  ALT  34  /  AlkPhos  55      PT/INR - ( 2023 08:14 )   PT: 13.4 sec;   INR: 1.15 ratio      PTT - ( 2023 08:14 )  PTT:35.0 sec    Urinalysis Basic - ( 2023 19:51 )  Color: Yellow / Appearance: Clear / S.020 / pH: x  Gluc: x / Ketone: Negative  / Bili: Negative / Urobili: Negative   Blood: x / Protein: Negative / Nitrite: Negative   Leuk Esterase: Negative / RBC: x / WBC x   Sq Epi: x / Non Sq Epi: x / Bacteria: x    CAPILLARY BLOOD GLUCOSE  POCT Blood Glucose.: 97 mg/dL (2023 16:04)    RADIOLOGY & ADDITIONAL TESTS:  EXAM:  MR SPINE LUMBAR WAW IC   ORDERED BY: AMBER ZUNIGA     ACC: [id number] EXAM:  MR SPINE THORACIC WAW IC   ORDERED BY: AMBER ZUNIGA     PROCEDURE DATE:  2023      INTERPRETATION:  MRI of the lumbar spine with contrast    CLINICAL INDICATION:  Status L2 laminectomy and discectomy. Lower   extremity paresis.    TECHNIQUE: Multiplanar, multisequence MR images of the lumbar spine were   obtained before and after the intravenous administration of 7.5 cc of   Gadavist.0 cc were discarded.    COMPARISON: MRI lumbar spine 2023. CT lumbar spine 2023. CT   angiography chest 3/7/2020    FINDINGS:    MRI THORACIC SPINE:    Vertebral body height, marrow signal homogeneity, and facet alignment are   maintained throughout the visualized spinal segments.    No spinal cord compression or abnormal intrinsic cord signal.    No abnormal enhancement in the thoracic region.    Mild multilevel degenerative changes without significant spinal canal   stenosis or neuralforaminal narrowing.    MRI LUMBAR SPINE:    Interval total L2 laminectomy.    Vertebral body height, marrow signal homogeneity, and facet alignment are   maintained throughout the visualized spinal segments.    No intervertebral disc space narrowing. Disc desiccation at L4-L5 and   L5-S1.    T12-L1: Bilateral facet hypertrophy. Mild posterior thecal sac   compression. No neural foraminal.    L1-L2: Bilateral facet hypertrophy. No spinal canal stenosis or neural   foraminal narrowing.    L2-L3: Previously seen ventral disc material has been largely resected.   Residual moderate to severe high-grade stenosis due residual disc bulge,   thin enhancing ventral granulation tissue, and persistent posterior   facet/ligamentous hypertrophy. Mild right and mild to moderate left   neural foraminal narrowing.    L3-L4: Mild disc bulge and bilateral facet/ligamentous hypertrophy. Mild   thecal sac compression. Mild left neural foraminal narrowing.    L4-L5: Disc bulge and bilateral facet/ligamentous hypertrophy. Mild   thecal sac compression. Mild to moderate and bilateral neural foraminal   narrowing.    L5-S1: Disc bulge with superimposed broad-based central disc protrusion.   Bilateral facet hypertrophy. Mild ventral thecal sac compression.   Moderate left and mild right neural foraminal.    IMPRESSION:    MRI THORACIC SPINE:  No spinal cord compression or abnormal intrinsic cord signal.    No abnormal enhancement in the thoracic region.    Mild multilevel degenerative changes without significant spinal canal   stenosis or neural foraminal narrowing.    MRI LUMBAR SPINE:  Interval total L2 laminectomy.    Previously seen ventral disc material at L2-L3 has been largely resected.   Residual moderate to severe high-grade stenosis due residual disc bulge,   thin enhancing ventral granulation tissue, and persistent posterior   facet/ligamentous hypertrophy. Mild right and mild to moderate left   neural foraminal narrowing.    Other degenerative changes similar to the prior MRI.      PHYSICAL EXAM:  GENERAL: NAD, well-groomed, well-developed  HEAD:  Atraumatic, Normocephalic  EYES: EOMI, PERRLA, conjunctiva and sclera clear  HEENT: Moist mucous membranes  NECK: Supple, No JVD  NERVOUS SYSTEM:  Alert & Oriented X3, minimal sensation both LE, no significant doriflexion/ planta flexion both sides  CHEST/LUNG: Clear to auscultation bilaterally; No rales, rhonchi, wheezing, or rubs  HEART: Regular rate and rhythm; No murmurs, rubs, or gallops  ABDOMEN: Soft, Nontender, Nondistended; Bowel sounds present  GENITOURINARY- Vergara+  EXTREMITIES:  2+ Peripheral Pulses, No clubbing, cyanosis, or edema  MUSCULOSKELTAL- No muscle tenderness, Muscle tone normal, No joint tenderness, no Joint swelling, Joint range of motion-normal  SKIN-no rash, no lesion    MEDICATIONS  (STANDING):  carvedilol 6.25 milliGRAM(s) Oral every 12 hours  lidocaine   4% Patch 1 Patch Transdermal daily  methocarbamol 1500 milliGRAM(s) Oral three times a day  multivitamin 1 Tablet(s) Oral daily  naloxone Injectable 0.4 milliGRAM(s) IV Push once  polyethylene glycol 3350 17 Gram(s) Oral daily  senna 2 Tablet(s) Oral at bedtime  simvastatin 40 milliGRAM(s) Oral at bedtime  sodium chloride 0.9%. 1000 milliLiter(s) (75 mL/Hr) IV Continuous <Continuous>    MEDICATIONS  (PRN):  acetaminophen     Tablet .. 650 milliGRAM(s) Oral every 6 hours PRN Temp greater or equal to 38C (100.4F), Mild Pain (1 - 3)  acetaminophen   IVPB .. 1000 milliGRAM(s) IV Intermittent once PRN Moderate Pain (4 - 6)  aluminum hydroxide/magnesium hydroxide/simethicone Suspension 30 milliLiter(s) Oral every 4 hours PRN Dyspepsia  gabapentin 100 milliGRAM(s) Oral three times a day PRN neuropathic pain  HYDROmorphone  Injectable 1 milliGRAM(s) IV Push every 3 hours PRN severe breakthrough pain  magnesium hydroxide Suspension 30 milliLiter(s) Oral every 12 hours PRN Constipation  melatonin 3 milliGRAM(s) Oral at bedtime PRN Insomnia  ondansetron Injectable 4 milliGRAM(s) IV Push every 8 hours PRN Nausea and/or Vomiting  oxyCODONE    IR 5 milliGRAM(s) Oral every 4 hours PRN Moderate Pain (4 - 6)  oxyCODONE    IR 10 milliGRAM(s) Oral every 4 hours PRN Severe Pain (7 - 10)    Assessment/Plan  #Cauda equina syndrome/ large HNP/ severe lumbar stenosis  #S/p L2-3 laminectomy/ discectomy  NS team following  Motor/ sensory function both LE's are largely diminished  S/p repeat MRI LS spine- residual mod-severe high grade stenosis present due to residual disc  Further plan per NS team  D/w NS team- no role in steroids at present  Neurology eval pending  Pain meds prn  Vergara in  PT as tolerated  Muscle relaxants prn  Bowel regimen  Incentive spirometry    #No evidence of withdrawal  Patient reports being sober for 2 years- will DC ciwa    #CAD s/p CABG  Hold aspirin postop, cont statin    #HTN  Increase coreg to 12.5mg BID    #DVT proph- will start on heparin sq, if HH remain stable will transition to lovenox sq

## 2023-01-26 NOTE — PHYSICAL THERAPY INITIAL EVALUATION ADULT - AMBULATION SKILLS, REHAB EVAL
----- Message from Sonam Rodrigues MD sent at 5/1/2017 12:37 PM EDT -----  Labs stable/improved. Recommend vitamin D 1396-9311 international units daily. Return in 6-months for follow up.
independent
independent

## 2023-01-26 NOTE — PHYSICAL THERAPY INITIAL EVALUATION ADULT - MANUAL MUSCLE TESTING RESULTS, REHAB EVAL
Bilateral distal LE grossly 0/5, bilateral Proximal LE grossly 2/5, bilateral UE grossly 3+/5 to WFL/WNL/grossly assessed due to

## 2023-01-26 NOTE — CONSULT NOTE ADULT - ASSESSMENT
58 y/o M with PMH CAD, s/p CABG, HTN, HLD, ETOH abuse (no alcohol use in 2 years as per pt) presented with back pain. Pt was seen in the ER on 1/22/23 and discharged home with a muscle relaxer and tylenol. He presented to the ER on 1/24/23  for worsening back pain. He states that 2 days ago he was lifting weights in his basement and then he heard a pop. He immediately had pain in the lower back. He was taking Tylenol and the muscle relaxer. On 1/23 he was able to take a shower and then later in the day he was unable to get off of the couch due to  lower extremity weakness. He also noticed pain down his legs bilaterally. He also has decreased sensation of his lower extremities b/l. Imaging was concerning for cauda equina syndrome.  On 1/25/23 he underwent L2/3 laminectomy and discectomy for cauda equina syndrome. He was found to have a large disc fragment.    Persistent lower extremity weakness from knees distally.  -Cauda equina syndrome and severe spinal stenosis  -Continue to hold aspirin until cleared by neurosurgery  -Continue PT/OT  -When appropriate, get PM&R consult to [facility] for acute rehab (possibly a spinal cord injury program).  -Pain management    Will f/u as needed

## 2023-01-26 NOTE — CONSULT NOTE ADULT - SUBJECTIVE AND OBJECTIVE BOX
Patient is a 57y old  Male who presents with a chief complaint of Back pain (26 Jan 2023 14:24)      HPI:  56 y/o M with PMH CAD, s/p CABG, HTN, HLD, ETOH abuse (no alcohol use in 2 years as per pt) presented with back pain. Pt was seen in the ER on 1/22/23 and discharged home with a muscle relaxer and tylenol. He presented to the ER on 1/24/23  for worsening back pain. He states that 2 days ago he was lifting weights in his basement and then he heard a pop. He immediately had pain in the lower back. He was taking Tylenol and the muscle relaxer. On 1/23 he was able to take a shower and then later in the day he was unable to get off of the couch due to  lower extremity weakness. He also noticed pain down his legs bilaterally. He also has decreased sensation of his lower extremities b/l. He denied urinary incontinence or retention. He did report constipation for two days. Has not had an ECHO or stress test in years, has tolerated anesthesia in the past without complication, prior surgeries occurred without complication. Was able to walk a flight of stairs prior to this injury. No chest pain or SOB with exertion. No recent vaccinations. No recent illnesses. Does not report a history of COVID.     On 1/25/23 he underwent L2/3 laminectomy and discectomy for cauda equina syndrome. He was found to have a large disc fragment.    He is still having sensory changes and significant weakness from his knees distally.  He also reports pain in his back rated 8/10.      PAST MEDICAL & SURGICAL HISTORY:  HTN (hypertension)    HLD (hyperlipidemia)    CAD (coronary artery disease)    Alcohol abuse    S/P CABG (coronary artery bypass graft)          FAMILY HISTORY:  FH: diabetes mellitus    FH: heart disease        Social Hx:  Nonsmoker, no recent alcohol use; past history of alcohol abuse    MEDICATIONS  (STANDING):  carvedilol 6.25 milliGRAM(s) Oral every 12 hours  heparin   Injectable 5000 Unit(s) SubCutaneous every 12 hours  lidocaine   4% Patch 1 Patch Transdermal daily  methocarbamol 1500 milliGRAM(s) Oral three times a day  multivitamin 1 Tablet(s) Oral daily  naloxone Injectable 0.4 milliGRAM(s) IV Push once  polyethylene glycol 3350 17 Gram(s) Oral daily  senna 2 Tablet(s) Oral at bedtime  simvastatin 40 milliGRAM(s) Oral at bedtime  sodium chloride 0.9%. 1000 milliLiter(s) (75 mL/Hr) IV Continuous <Continuous>       Allergies    No Known Allergies    Intolerances        ROS: Pertinent positives in HPI, all other ROS were reviewed and are negative.      Vital Signs Last 24 Hrs  T(C): 36.3 (26 Jan 2023 05:58), Max: 36.9 (25 Jan 2023 16:45)  T(F): 97.4 (26 Jan 2023 05:58), Max: 98.4 (25 Jan 2023 16:45)  HR: 65 (26 Jan 2023 14:35) (60 - 70)  BP: 161/91 (26 Jan 2023 14:35) (124/65 - 173/72)  BP(mean): 101 (25 Jan 2023 17:28) (101 - 101)  RR: 18 (26 Jan 2023 14:35) (12 - 18)  SpO2: 100% (26 Jan 2023 14:35) (95% - 100%)    Parameters below as of 26 Jan 2023 14:35  Patient On (Oxygen Delivery Method): room air            Constitutional: awake and alert.  HEENT: PERRLA, EOMI,   Neck: Supple.  Respiratory: Breath sounds are clear bilaterally  Cardiovascular: S1 and S2, regular / irregular rhythm  Gastrointestinal: soft, nontender  Extremities:  no edema  Musculoskeletal: no joint swelling/tenderness, no abnormal movements  Skin: No rashes    Neurological exam: Patient sitting in chair  HF: A x O x 3. Appropriately interactive, normal affect. Speech fluent, No Aphasia or paraphasic errors. Naming /repetition intact   CN: LOPEZ, EOMI, VFF, facial sensation normal, no NLFD, tongue midline, Palate moves equally, SCM equal bilaterally  Motor: No pronator drift, Strength 5/5 in b/l upper extremities  Lower extremities:  3+ in b/l hip flexion, Flexion at knees seems to be from pulling back at hip and not true knee flexion. 1/5 knee extension, 0/5 in dorsiflexion and ankle flexion  Sens: Intact to light touch in upper extremities. Normal sensation in proximal lower extremities.  Decreased sensation/feels cold distal to knees.  Difficulty with joint position sense on right foot  Reflexes: Symmetric and normal . Biceps 2+ on left, 1+ on right, Radialis 2+ on left, 1 + on right, triceps 1+ b/l, KJ 0, AJ 0, no ankle clonus, plantars mute  Coord: finger to nose intact b/l  Gait/Balance: unable to test    NIHSS:        Labs:   01-26    136  |  104  |  11  ----------------------------<  114<H>  3.7   |  28  |  0.67    Ca    8.9      26 Jan 2023 10:23  Phos  2.9     01-26  Mg     2.1     01-26    TPro  7.2  /  Alb  3.8  /  TBili  0.5  /  DBili  0.1  /  AST  49<H>  /  ALT  34  /  AlkPhos  55  01-25                              12.8   11.08 )-----------( 166      ( 26 Jan 2023 10:23 )             38.4       Radiology:  MRI lumbar spine 1/24/23:  Discontinued MRI.  Suspicion for cauda equina equina   compression at L2-L3, not well evaluated.  The patient will return for   repeat MRI with anesthesia.    MRI lumbar spine 1/25/23:  IMPRESSION:  Anterior extradural soft tissue from the L2-3 disc level extending   cranially to just below the level of the superior endplate of L2   measuring approximately 3 cm craniocaudad by 0.8 cm anterior to posterior   by 1.5 cm in width. Large extruded disc fragment is favored. Associated   severe narrowing of the spinal canal and compression of the proximal   cauda equina.  Preliminary report provided by the radiologist on call.    MRI thoracic and lumbar spine with and without contrast 1/26/23:    MRI THORACIC SPINE:  No spinal cord compression or abnormal intrinsic cord signal.    No abnormal enhancement in the thoracic region.    Mild multilevel degenerative changes without significant spinal canal   stenosis or neural foraminal narrowing.    MRI LUMBAR SPINE:  Interval total L2 laminectomy.    Previously seen ventral disc material at L2-L3 has been largely resected.   Residual moderate to severe high-grade stenosis due residual disc bulge,   thin enhancing ventral granulation tissue, and persistent posterior   facet/ligamentous hypertrophy. Mild right and mild to moderate left   neural foraminal narrowing.    Other degenerative changes similar to the prior MRI.

## 2023-01-26 NOTE — PHYSICAL THERAPY INITIAL EVALUATION ADULT - MODALITIES TREATMENT COMMENTS
The pt demonstrated good overall activity tolerance, responding well to therex review, transfer tx/ lars flex usage. The pt was left sitting OOB and in  chair as the patient requested, RN aware, chair alarm on. CB, tray and phone in place. The pt was in NAD at end of tx.

## 2023-01-26 NOTE — PHYSICAL THERAPY INITIAL EVALUATION ADULT - TRANSFER SAFETY CONCERNS NOTED: SIT/STAND, REHAB EVAL
unable to safely use RW for sit to stand training due to the patient c/o not feeling the ground and reported sensation of falling while attempting to bear weight on bilateral LE.  The pt required the assistance of the powered sit to stand device, Milly Flex in order to attempt standing training and transfers from bed to chair.

## 2023-01-26 NOTE — PHYSICAL THERAPY INITIAL EVALUATION ADULT - GAIT TRAINING, PT EVAL
Then pt will be able to tolerate standing ambulation tx with weight shifted limb advancement using the least restrictive assistive device tolerated.

## 2023-01-26 NOTE — PHYSICAL THERAPY INITIAL EVALUATION ADULT - PERTINENT HX OF CURRENT PROBLEM, REHAB EVAL
56 y/o M with PMH CAD, s/p CABG, HTN, HLD, ETOH abuse (no alcohol use in 2 years as per pt) presented with back pain. Pt was seen in the ER on 1/22/23 and discharged home with a muscle relaxer and tylenol. He presented to the ER for worsening back pain. He states that 2 days ago he was lifting weights in his basement and then he heard a pop. He immediately had pain in the lower back. Was taking Tylenol and the muscle relaxer. Yesterday he was able to take a shower and then later in the day he was unable to get off of the couch d/t lower extremity weakness. He also noticed pain down his legs bilaterally. He also has decreased sensation of his lower extremities b/l. No urinary incontinence or retention. Pt does report constipation over the last 2 days.  Pt had MRI with anesthesia demonstrating L2-3 HNP with thecal sac compression, impending cauda equina without such symptoms.

## 2023-01-26 NOTE — PHYSICAL THERAPY INITIAL EVALUATION ADULT - DID THE PATIENT HAVE SURGERY?
s/p L2-L3 Laminectomy with persistent paraplegia --  58 yo male with PMH CAD on ASA presented to the ER with worsening lower back pain w/ radiculopathy numbness to his RLE and weakness of bilateral lower extremities.  L2/3 disc identified.  Urgent OR laminectomy / discectomy at this level on 1/25/23.

## 2023-01-26 NOTE — PHYSICAL THERAPY INITIAL EVALUATION ADULT - DIAGNOSIS, PT EVAL
57 y.o. male s/p L2-L3 laminectomy / discectomy, large extruded disc POD1 by Dr. Schaefer 57 y.o. male s/p L2-L3 laminectomy / discectomy, large extruded disc POD1, presenting with cauda equina syndrome by Dr. Schaefer

## 2023-01-26 NOTE — PHYSICAL THERAPY INITIAL EVALUATION ADULT - ADDITIONAL COMMENTS
Lives with his girlfriend. Independent with ADLs and IADLs. Smoked 1 ppd for 20 years, quit in 2012. Used to drink 7-8 tall cans of alcohol a day, stopped 2 years ago. Reports marijuana use.
The pt reports having multiple steps at home

## 2023-01-26 NOTE — PHYSICAL THERAPY INITIAL EVALUATION ADULT - WORK/LEISURE ACTIVITY, REHAB EVAL
[FreeTextEntry1] : see HPI  [de-identified] : Here for f/u Diabetes mellitus. She has been feeling sad due to her personal medical issues and worried about her jobless son who is  from his wife and  lives in patient's home in [facility] [place] with his mentally ill child. Patient has not been eating healthy diet or exercise over last several weeks . Her fasting blood sugars are 130-170.  She is AAOX3,NAD. She is due for lab work. . She has gained 30 pounds since beginning of year and is stress eating. independent

## 2023-01-26 NOTE — PHYSICAL THERAPY INITIAL EVALUATION ADULT - GENERAL OBSERVATIONS, REHAB EVAL
Pt. received supine in bed in 2N, girlfriend at . PT asked social Questions, however, Pt. is going to OR today. AS per RN, Pt. will bed evaluated post-op.
The pt was received on 2N, supine, pleasant and cooperative with PT. Neuro PA present prior to PT eval. The pt reported that he lacks any sensation or motor control below the level of bilateral knees at present, the pt demonstrates some proximal LE muscle activation but is lacking overall in strength and endurance. The pt reports completely lacking any sensation of touch or proprioceptive awareness of bilateral feet, RN aware.

## 2023-01-27 LAB
ANION GAP SERPL CALC-SCNC: 5 MMOL/L — SIGNIFICANT CHANGE UP (ref 5–17)
BUN SERPL-MCNC: 11 MG/DL — SIGNIFICANT CHANGE UP (ref 7–23)
CALCIUM SERPL-MCNC: 8.9 MG/DL — SIGNIFICANT CHANGE UP (ref 8.5–10.1)
CHLORIDE SERPL-SCNC: 104 MMOL/L — SIGNIFICANT CHANGE UP (ref 96–108)
CO2 SERPL-SCNC: 28 MMOL/L — SIGNIFICANT CHANGE UP (ref 22–31)
CREAT SERPL-MCNC: 0.65 MG/DL — SIGNIFICANT CHANGE UP (ref 0.5–1.3)
EGFR: 110 ML/MIN/1.73M2 — SIGNIFICANT CHANGE UP
GLUCOSE SERPL-MCNC: 104 MG/DL — HIGH (ref 70–99)
HCT VFR BLD CALC: 39.1 % — SIGNIFICANT CHANGE UP (ref 39–50)
HGB BLD-MCNC: 13.2 G/DL — SIGNIFICANT CHANGE UP (ref 13–17)
MCHC RBC-ENTMCNC: 29.1 PG — SIGNIFICANT CHANGE UP (ref 27–34)
MCHC RBC-ENTMCNC: 33.8 GM/DL — SIGNIFICANT CHANGE UP (ref 32–36)
MCV RBC AUTO: 86.1 FL — SIGNIFICANT CHANGE UP (ref 80–100)
PLATELET # BLD AUTO: 158 K/UL — SIGNIFICANT CHANGE UP (ref 150–400)
POTASSIUM SERPL-MCNC: 3.5 MMOL/L — SIGNIFICANT CHANGE UP (ref 3.5–5.3)
POTASSIUM SERPL-SCNC: 3.5 MMOL/L — SIGNIFICANT CHANGE UP (ref 3.5–5.3)
RBC # BLD: 4.54 M/UL — SIGNIFICANT CHANGE UP (ref 4.2–5.8)
RBC # FLD: 13.8 % — SIGNIFICANT CHANGE UP (ref 10.3–14.5)
SODIUM SERPL-SCNC: 137 MMOL/L — SIGNIFICANT CHANGE UP (ref 135–145)
WBC # BLD: 10.56 K/UL — HIGH (ref 3.8–10.5)
WBC # FLD AUTO: 10.56 K/UL — HIGH (ref 3.8–10.5)

## 2023-01-27 PROCEDURE — 99221 1ST HOSP IP/OBS SF/LOW 40: CPT

## 2023-01-27 PROCEDURE — 99232 SBSQ HOSP IP/OBS MODERATE 35: CPT

## 2023-01-27 RX ORDER — POLYETHYLENE GLYCOL 3350 17 G/17G
17 POWDER, FOR SOLUTION ORAL
Refills: 0 | Status: DISCONTINUED | OUTPATIENT
Start: 2023-01-27 | End: 2023-01-30

## 2023-01-27 RX ORDER — CARVEDILOL PHOSPHATE 80 MG/1
12.5 CAPSULE, EXTENDED RELEASE ORAL EVERY 12 HOURS
Refills: 0 | Status: DISCONTINUED | OUTPATIENT
Start: 2023-01-27 | End: 2023-01-30

## 2023-01-27 RX ORDER — ENOXAPARIN SODIUM 100 MG/ML
40 INJECTION SUBCUTANEOUS EVERY 24 HOURS
Refills: 0 | Status: DISCONTINUED | OUTPATIENT
Start: 2023-01-28 | End: 2023-01-30

## 2023-01-27 RX ORDER — ACETAMINOPHEN 500 MG
975 TABLET ORAL EVERY 6 HOURS
Refills: 0 | Status: DISCONTINUED | OUTPATIENT
Start: 2023-01-27 | End: 2023-01-30

## 2023-01-27 RX ORDER — GABAPENTIN 400 MG/1
100 CAPSULE ORAL
Refills: 0 | Status: DISCONTINUED | OUTPATIENT
Start: 2023-01-27 | End: 2023-01-30

## 2023-01-27 RX ADMIN — HEPARIN SODIUM 5000 UNIT(S): 5000 INJECTION INTRAVENOUS; SUBCUTANEOUS at 21:32

## 2023-01-27 RX ADMIN — METHOCARBAMOL 1500 MILLIGRAM(S): 500 TABLET, FILM COATED ORAL at 21:32

## 2023-01-27 RX ADMIN — METHOCARBAMOL 1500 MILLIGRAM(S): 500 TABLET, FILM COATED ORAL at 14:50

## 2023-01-27 RX ADMIN — LIDOCAINE 1 PATCH: 4 CREAM TOPICAL at 19:40

## 2023-01-27 RX ADMIN — GABAPENTIN 100 MILLIGRAM(S): 400 CAPSULE ORAL at 21:32

## 2023-01-27 RX ADMIN — LIDOCAINE 1 PATCH: 4 CREAM TOPICAL at 22:05

## 2023-01-27 RX ADMIN — OXYCODONE HYDROCHLORIDE 10 MILLIGRAM(S): 5 TABLET ORAL at 06:00

## 2023-01-27 RX ADMIN — Medication 3 MILLIGRAM(S): at 21:33

## 2023-01-27 RX ADMIN — CARVEDILOL PHOSPHATE 12.5 MILLIGRAM(S): 80 CAPSULE, EXTENDED RELEASE ORAL at 21:32

## 2023-01-27 RX ADMIN — LIDOCAINE 1 PATCH: 4 CREAM TOPICAL at 10:12

## 2023-01-27 RX ADMIN — POLYETHYLENE GLYCOL 3350 17 GRAM(S): 17 POWDER, FOR SOLUTION ORAL at 21:32

## 2023-01-27 RX ADMIN — Medication 1 TABLET(S): at 10:11

## 2023-01-27 RX ADMIN — LIDOCAINE 1 PATCH: 4 CREAM TOPICAL at 02:09

## 2023-01-27 RX ADMIN — POLYETHYLENE GLYCOL 3350 17 GRAM(S): 17 POWDER, FOR SOLUTION ORAL at 10:12

## 2023-01-27 RX ADMIN — METHOCARBAMOL 1500 MILLIGRAM(S): 500 TABLET, FILM COATED ORAL at 05:13

## 2023-01-27 RX ADMIN — SIMVASTATIN 40 MILLIGRAM(S): 20 TABLET, FILM COATED ORAL at 21:33

## 2023-01-27 RX ADMIN — OXYCODONE HYDROCHLORIDE 10 MILLIGRAM(S): 5 TABLET ORAL at 05:13

## 2023-01-27 RX ADMIN — OXYCODONE HYDROCHLORIDE 10 MILLIGRAM(S): 5 TABLET ORAL at 22:05

## 2023-01-27 RX ADMIN — Medication 975 MILLIGRAM(S): at 18:10

## 2023-01-27 RX ADMIN — OXYCODONE HYDROCHLORIDE 10 MILLIGRAM(S): 5 TABLET ORAL at 21:33

## 2023-01-27 RX ADMIN — HEPARIN SODIUM 5000 UNIT(S): 5000 INJECTION INTRAVENOUS; SUBCUTANEOUS at 10:12

## 2023-01-27 RX ADMIN — SENNA PLUS 2 TABLET(S): 8.6 TABLET ORAL at 21:33

## 2023-01-27 RX ADMIN — CARVEDILOL PHOSPHATE 6.25 MILLIGRAM(S): 80 CAPSULE, EXTENDED RELEASE ORAL at 10:11

## 2023-01-27 NOTE — OCCUPATIONAL THERAPY INITIAL EVALUATION ADULT - ADDITIONAL COMMENTS
Patient reports he resides in a house with his girlfriend and her mother, 3 YANETH and FOS to go upstairs/downstairs- reports his room is in the basement, walk in shower stall, standard toilet. Patient (I) with ADL, IADL tasks PTA, walked without AD. Working, LHD, reading glasses, drives. UNK

## 2023-01-27 NOTE — OCCUPATIONAL THERAPY INITIAL EVALUATION ADULT - PLANNED THERAPY INTERVENTIONS, OT EVAL
ADL retraining/IADL retraining/balance training/bed mobility training/motor coordination training/neuromuscular re-education/parent/caregiver training.../ROM/strengthening/transfer training

## 2023-01-27 NOTE — PROGRESS NOTE ADULT - SUBJECTIVE AND OBJECTIVE BOX
Interval History:  1//27/23: States that pain is slightly improved    MEDICATIONS  (STANDING):  carvedilol 6.25 milliGRAM(s) Oral every 12 hours  heparin   Injectable 5000 Unit(s) SubCutaneous every 12 hours  lidocaine   4% Patch 1 Patch Transdermal daily  methocarbamol 1500 milliGRAM(s) Oral three times a day  multivitamin 1 Tablet(s) Oral daily  naloxone Injectable 0.4 milliGRAM(s) IV Push once  polyethylene glycol 3350 17 Gram(s) Oral daily  senna 2 Tablet(s) Oral at bedtime  simvastatin 40 milliGRAM(s) Oral at bedtime  sodium chloride 0.9%. 1000 milliLiter(s) (75 mL/Hr) IV Continuous <Continuous>    MEDICATIONS  (PRN):  acetaminophen     Tablet .. 650 milliGRAM(s) Oral every 6 hours PRN Temp greater or equal to 38C (100.4F), Mild Pain (1 - 3)  acetaminophen   IVPB .. 1000 milliGRAM(s) IV Intermittent once PRN Moderate Pain (4 - 6)  aluminum hydroxide/magnesium hydroxide/simethicone Suspension 30 milliLiter(s) Oral every 4 hours PRN Dyspepsia  gabapentin 100 milliGRAM(s) Oral three times a day PRN neuropathic pain  HYDROmorphone  Injectable 1 milliGRAM(s) IV Push every 3 hours PRN severe breakthrough pain  magnesium hydroxide Suspension 30 milliLiter(s) Oral every 12 hours PRN Constipation  melatonin 3 milliGRAM(s) Oral at bedtime PRN Insomnia  ondansetron Injectable 4 milliGRAM(s) IV Push every 8 hours PRN Nausea and/or Vomiting  oxyCODONE    IR 5 milliGRAM(s) Oral every 4 hours PRN Moderate Pain (4 - 6)  oxyCODONE    IR 10 milliGRAM(s) Oral every 4 hours PRN Severe Pain (7 - 10)      Allergies    No Known Allergies    Intolerances        PHYSICAL EXAM:  Vital Signs Last 24 Hrs  T(F): 97.3 (01-27-23 @ 08:57)  HR: 57 (01-27-23 @ 08:57)  BP: 159/90 (01-27-23 @ 08:57)  RR: 17 (01-27-23 @ 08:57)    GENERAL: NAD, well-groomed, well-developed  HEAD:  Atraumatic, Normocephalic  Neuro:  Awake, alert, no aphasia  CN: PERRL, EOMI, no nystagmus, no facial weakness, no dysarthria  Motor: No pronator drift, Strength 5/5 in b/l upper extremities  Lower extremities:  3+ in b/l hip flexion, 1/5 in knee flexion, extension, 0/5 in dorsiflexion and ankle flexion  Sens: Intact to light touch in upper extremities. Normal sensation in proximal lower extremities.  Decreased sensation/feels cold distal to knees.  Difficulty with joint position sense on right foot  Reflexes: Symmetric and normal . Biceps 2+ on left, 1+ on right, Radialis 2+ on left, 1 + on right, triceps 1+ b/l, KJ 0, AJ 0, no ankle clonus, plantars mute  Coord: finger to nose intact b/l  Gait/Balance: unable to test          LABS:                        13.2   10.56 )-----------( 158      ( 27 Jan 2023 07:59 )             39.1     01-27    137  |  104  |  11  ----------------------------<  104<H>  3.5   |  28  |  0.65    Ca    8.9      27 Jan 2023 07:59  Phos  2.9     01-26  Mg     2.1     01-26            RADIOLOGY & ADDITIONAL STUDIES:  MRI lumbar spine 1/24/23:  Discontinued MRI.  Suspicion for cauda equina equina   compression at L2-L3, not well evaluated.  The patient will return for   repeat MRI with anesthesia.    MRI lumbar spine 1/25/23:  IMPRESSION:  Anterior extradural soft tissue from the L2-3 disc level extending   cranially to just below the level of the superior endplate of L2   measuring approximately 3 cm craniocaudad by 0.8 cm anterior to posterior   by 1.5 cm in width. Large extruded disc fragment is favored. Associated   severe narrowing of the spinal canal and compression of the proximal   cauda equina.  Preliminary report provided by the radiologist on call.    MRI thoracic and lumbar spine with and without contrast 1/26/23:    MRI THORACIC SPINE:  No spinal cord compression or abnormal intrinsic cord signal.    No abnormal enhancement in the thoracic region.    Mild multilevel degenerative changes without significant spinal canal   stenosis or neural foraminal narrowing.    MRI LUMBAR SPINE:  Interval total L2 laminectomy.    Previously seen ventral disc material at L2-L3 has been largely resected.   Residual moderate to severe high-grade stenosis due residual disc bulge,   thin enhancing ventral granulation tissue, and persistent posterior   facet/ligamentous hypertrophy. Mild right and mild to moderate left   neural foraminal narrowing.    Other degenerative changes similar to the prior MRI.

## 2023-01-27 NOTE — OCCUPATIONAL THERAPY INITIAL EVALUATION ADULT - ADL RETRAINING, OT EVAL
Pt will perform LBD using compensatory techniques with maximal assistance in 2 weeks. Pt will perform LBD using compensatory techniques/AE with maximal assistance in 2 weeks.

## 2023-01-27 NOTE — OCCUPATIONAL THERAPY INITIAL EVALUATION ADULT - NSACTIVITYREC_GEN_A_OT
Pt educated on spinal precautions and impact on ADL engagement and handout. Pt provided with HEP and therband. Pt presents with impaired balance, endurance and muscle strength that will benefit from skilled OT to improve independence in ADLs, reduce fall risk and chance for readmission.

## 2023-01-27 NOTE — PROGRESS NOTE ADULT - SUBJECTIVE AND OBJECTIVE BOX
58 y/o M with PMH CAD, s/p CABG, HTN, HLD, ETOH abuse (no alcohol use in 2 years as per pt) presented with back pain. Pt was seen in the ER on 1/22/23 and discharged home with a muscle relaxer and tylenol. He presented to the ER for worsening back pain. He states that 2 days ago he was lifting weights in his basement and then he heard a pop. He immediately had pain in the lower back. Was taking Tylenol and the muscle relaxer. Yesterday he was able to take a shower and then later in the day he was unable to get off of the couch d/t lower extremity weakness. He also noticed pain down his legs bilaterally. He also has decreased sensation of his lower extremities b/l. No urinary incontinence or retention. Pt does report constipation over the last 2 days.  Pt had MRI with anesthesia demonstrating L2-3 HNP with thecal sac compression, impending cauda equina without such symptoms.    1/25/23:  POD#0  L2-3 laminectomy / discectomy, large extruded disc.  1/26/23:  POD#1  improved power in LE's.  No radicular pain, numbness remains but improved also.  1/27 POD#2 L2-3 laminectomy discectomy No events overnight. Patient feels he is getting some movement back  No significant back pain or radicular pain    Vital Signs Last 24 Hrs  T(C): 36.3 (27 Jan 2023 08:57), Max: 36.8 (27 Jan 2023 00:30)  T(F): 97.3 (27 Jan 2023 08:57), Max: 98.3 (27 Jan 2023 05:15)  HR: 57 (27 Jan 2023 08:57) (57 - 66)  BP: 159/90 (27 Jan 2023 08:57) (145/86 - 161/91)  BP(mean): --  RR: 17 (27 Jan 2023 08:57) (16 - 18)  SpO2: 100% (27 Jan 2023 08:57) (99% - 100%)    Parameters below as of 27 Jan 2023 08:57  Patient On (Oxygen Delivery Method): room air    MEDICATIONS  (STANDING):  carvedilol 6.25 milliGRAM(s) Oral every 12 hours  heparin   Injectable 5000 Unit(s) SubCutaneous every 12 hours  lidocaine   4% Patch 1 Patch Transdermal daily  methocarbamol 1500 milliGRAM(s) Oral three times a day  multivitamin 1 Tablet(s) Oral daily  naloxone Injectable 0.4 milliGRAM(s) IV Push once  polyethylene glycol 3350 17 Gram(s) Oral daily  senna 2 Tablet(s) Oral at bedtime  simvastatin 40 milliGRAM(s) Oral at bedtime  sodium chloride 0.9%. 1000 milliLiter(s) (75 mL/Hr) IV Continuous <Continuous>    Physical Exam:  Constitutional: Awake / alert  HEENT: PERRLA, EOMI  Neck: Supple  Respiratory: Breath sounds are clear bilaterally  Cardiovascular: S1 and S2, regular rhythm  Gastrointestinal: Soft, NT/ND  Extremities:  no edema      Neurological Exam:  HF: A x O x 3, appropriately interactive.  CN: PERRL, EOMI, VFF, facial sensation normal, no NLFD, tongue midline  Motor: No further pain.  Pt with improved power bilaterally in proximal motor groups, still with distal motor group paraparesis  RLE: IP 3+/5.  Knee flex 3+/5. Knee ext 2+/5 (almost kicks heel off bed). DF/PF/EHL no movement.  Numbness improved per patient  from Lower thigh to foot   LLE: IP 4-/5. Knee flex 4-/5.  Knee ext 3/5 (just antigrav with heel off bed).  DF/PF/EHL no movment. sensorium from hip to foot present.  Inc: dressing some saturation / changed by nursing staff this am  Sens: Intact to light touch  Reflexes: Symmetric and normal, downgoing toes b/l  Coord:  No FNFA, dysmetria, RENO intact   Gait/Balance: Cannot test

## 2023-01-27 NOTE — CONSULT NOTE ADULT - CONSULT REASON
worsening lower back pain with lower extremity weakness
Evaluate for Acute Rehabilitation
paraparesis

## 2023-01-27 NOTE — CONSULT NOTE ADULT - SUBJECTIVE AND OBJECTIVE BOX
57yM was admitted on 01-24    Patient is a 57y old  Male who presents with a chief complaint of Back pain (27 Jan 2023 10:46)    HPI:  58 y/o M with PMH CAD, s/p CABG, HTN, HLD, ETOH abuse (no alcohol use in 2 years as per pt) presented with back pain. Pt was seen in the ER on 1/22/23 and discharged home with a muscle relaxer and tylenol. He presented to the ER for worsening back pain. He states that 2 days ago he was lifting weights in his basement and then he heard a pop. He immediately had pain in the lower back. Was taking Tylenol and the muscle relaxer. Yesterday he was able to take a shower and then later in the day he was unable to get off of the couch d/t lower extremity weakness. He also noticed pain down his legs bilaterally. He also has decreased sensation of his lower extremities b/l. No urinary incontinence or retention. Pt does report constipation over the last 2 days. Has not had an ECHO or stress test in years, has tolerated anesthesia in the past without complication, prior surgeries occurred without complication. Was able to walk a flight of stairs prior to this injury. No chest pain or SOB with exertion. No recent vaccinations. No recent illnesses. Does not report a history of COVID.     Prior admission:   - 5/30/21: ETOH withdrawal, high BP    ER course: VSS. Labs: Reactive lymphocytes 9.0%, glucose 101, AST 64. UA: negative. COVID, influenza A/B, RSV negative. EKG: NSR with HR 80 bpm, normal intervals, T wave inversions in AVL, V4 (personally reviewed).    Imaging:   CT lumbar spine 1/24/2023  No acute osseous abnormality in the lumbar spine. Multilevel degenerative changes, progressed since study from 2020, incompletely characterized by CT technique, consider MRI lumbar spine as outpatient in the proper clinical setting.    MRI of the lumbar spine 1/24/2023  Discontinued MRI. Suspicion for cauda equina equina compression at L2-L3, not well evaluated. The patient will return for repeat MRI with anesthesia.    MRI Lumbar spine 1/25/2023  SPINAL CANAL:  Anterior extradural soft tissue within the spinal canal from the L2-3 disc level extending cranially to just below the level of the superior endplate of L2. This measures approximately 3 cm craniocaudad by 0.8 cm anterior to posterior by 1.5 cm in width. Large extruded disc fragment is favored. Associated severe narrowing of the spinal canal and compression of the proximal cauda equina.    OTHER DISC LEVELS:  L1-2: Mild bulging of the disc annulus with mild facet degenerative changes. Mild canal and bilateral neural foramina narrowing.  L2-3: Please see spinal canal section above.  L3-4: Mild to moderate disc bulge with moderate facet and ligamentous degenerative changes. Mild to moderate canal and bilateral neural foramina narrowing.  L4-5: Moderate disc bulge with a central annular fissure. Moderate facet and ligamentous degenerative changes. Moderate canal and severe bilateral neural foramina narrowing.  L5-S1: Small central disc protrusion with an associated central/left paracentral annular fissure. Mild to moderate facet degenerative changes. Mild canal and left greater than right lateral recess narrowing. Severe bilateral neural foramina narrowing.    OTHER:  The sacro-illiac joints appear intact.    IMPRESSION:  Anterior extradural soft tissue from the L2-3 disc level extending cranially to just below the level of the superior endplate of L2 measuring approximately 3 cm craniocaudad by 0.8 cm anterior to posterior by 1.5 cm in width. Large extruded disc fragment is favored. Associated severe narrowing of the spinal canal and compression of the proximal cauda equina.    MRI THORACIC SPINE: 1/26/2023  No spinal cord compression or abnormal intrinsic cord signal.    No abnormal enhancement in the thoracic region.    Mild multilevel degenerative changes without significant spinal canal stenosis or neural foraminal narrowing.    MRI LUMBAR SPINE: 1/26/2023  Interval total L2 laminectomy.    Previously seen ventral disc material at L2-L3 has been largely resected. Residual moderate to severe high-grade stenosis due residual disc bulge, thin enhancing ventral granulation tissue, and persistent posterior facet/ligamentous hypertrophy. Mild right and mild to moderate left neural foraminal narrowing.    Other degenerative changes similar to the prior MRI.    REVIEW OF SYSTEMS  Constitutional - No fever, No weight loss, No fatigue  HEENT - No eye pain, No visual disturbances, No difficulty hearing, No tinnitus, No vertigo, No neck pain  Respiratory - No cough, No wheezing, No shortness of breath  Cardiovascular - No chest pain, No palpitations  Gastrointestinal - No abdominal pain, No nausea, No vomiting, No diarrhea, No constipation  Genitourinary - No dysuria, No frequency, No hematuria, No incontinence  Neurological - No headaches, No memory loss, No loss of strength, No numbness, No tremors  Skin - No itching, No rashes, No lesions   Endocrine - No temperature intolerance  Musculoskeletal - No joint pain, No joint swelling, No muscle pain  Psychiatric - No depression, No anxiety    VITALS  T(C): 36.3 (01-27-23 @ 08:57), Max: 36.8 (01-27-23 @ 00:30)  HR: 57 (01-27-23 @ 08:57) (57 - 66)  BP: 159/90 (01-27-23 @ 08:57) (145/86 - 161/91)  RR: 17 (01-27-23 @ 08:57) (16 - 18)  SpO2: 100% (01-27-23 @ 08:57) (99% - 100%)  Wt(kg): --    PAST MEDICAL & SURGICAL HISTORY  HTN (hypertension)    HLD (hyperlipidemia)    CAD (coronary artery disease)    Alcohol abuse    S/P CABG (coronary artery bypass graft)    SOCIAL HISTORY - as per documentation/history  Smoking - None  EtOH - None  Drugs - None    FUNCTIONAL HISTORY  Lives   Independent    CURRENT FUNCTIONAL STATUS    FAMILY HISTORY   FH: diabetes mellitus    FH: heart disease        RECENT LABS - Reviewed  CBC Full  -  ( 27 Jan 2023 07:59 )  WBC Count : 10.56 K/uL  RBC Count : 4.54 M/uL  Hemoglobin : 13.2 g/dL  Hematocrit : 39.1 %  Platelet Count - Automated : 158 K/uL  Mean Cell Volume : 86.1 fl  Mean Cell Hemoglobin : 29.1 pg  Mean Cell Hemoglobin Concentration : 33.8 gm/dL  Auto Neutrophil # : x  Auto Lymphocyte # : x  Auto Monocyte # : x  Auto Eosinophil # : x  Auto Basophil # : x  Auto Neutrophil % : x  Auto Lymphocyte % : x  Auto Monocyte % : x  Auto Eosinophil % : x  Auto Basophil % : x    01-27    137  |  104  |  11  ----------------------------<  104<H>  3.5   |  28  |  0.65    Ca    8.9      27 Jan 2023 07:59  Phos  2.9     01-26  Mg     2.1     01-26          ALLERGIES  No Known Allergies      MEDICATIONS   acetaminophen     Tablet .. 650 milliGRAM(s) Oral every 6 hours PRN  acetaminophen   IVPB .. 1000 milliGRAM(s) IV Intermittent once PRN  aluminum hydroxide/magnesium hydroxide/simethicone Suspension 30 milliLiter(s) Oral every 4 hours PRN  carvedilol 6.25 milliGRAM(s) Oral every 12 hours  gabapentin 100 milliGRAM(s) Oral three times a day PRN  heparin   Injectable 5000 Unit(s) SubCutaneous every 12 hours  HYDROmorphone  Injectable 1 milliGRAM(s) IV Push every 3 hours PRN  lidocaine   4% Patch 1 Patch Transdermal daily  magnesium hydroxide Suspension 30 milliLiter(s) Oral every 12 hours PRN  melatonin 3 milliGRAM(s) Oral at bedtime PRN  methocarbamol 1500 milliGRAM(s) Oral three times a day  multivitamin 1 Tablet(s) Oral daily  naloxone Injectable 0.4 milliGRAM(s) IV Push once  ondansetron Injectable 4 milliGRAM(s) IV Push every 8 hours PRN  oxyCODONE    IR 5 milliGRAM(s) Oral every 4 hours PRN  oxyCODONE    IR 10 milliGRAM(s) Oral every 4 hours PRN  polyethylene glycol 3350 17 Gram(s) Oral daily  senna 2 Tablet(s) Oral at bedtime  simvastatin 40 milliGRAM(s) Oral at bedtime  sodium chloride 0.9%. 1000 milliLiter(s) IV Continuous <Continuous>      ----------------------------------------------------------------------------------------  PHYSICAL EXAM  Constitutional - NAD, Comfortable  HEENT - NCAT, EOMI  Neck - Supple, No limited ROM  Chest - Breathing comfortably, No wheezing  Cardiovascular - S1S2   Abdomen - Soft   Extremities - No C/C/E, No calf tenderness   Neurologic Exam -                    Cognitive - AAO to self, place, date, year, situation     Communication - Fluent, No dysarthria     Cranial Nerves - CN 2-12 intact     Motor - No focal deficits                    LEFT    UE - ShAB 5/5, EF 5/5, EE 5/5, WE 5/5,  5/5                    RIGHT UE - ShAB 5/5, EF 5/5, EE 5/5, WE 5/5,  5/5                    LEFT    LE - HF 5/5, KE 5/5, DF 5/5, PF 5/5                    RIGHT LE - HF 5/5, KE 5/5, DF 5/5, PF 5/5        Sensory - Intact to LT     Reflexes - DTR Intact, No primitive reflexive     Coordination - FTN intact     OculoVestibular - No saccades, No nystagmus, VOR         Balance - WNL Static  Psychiatric - Mood stable, Affect WNL  ----------------------------------------------------------------------------------------   57yM was admitted on 01-24    Patient is a 57y old  Male who presents with a chief complaint of Back pain (27 Jan 2023 10:46)    HPI:  56 y/o M with PMH CAD, s/p CABG, HTN, HLD, ETOH abuse (no alcohol use in 2 years as per pt) presented with back pain. Pt was seen in the ER on 1/22/23 and discharged home with a muscle relaxer and tylenol. He presented to the ER for worsening back pain. He states that 2 days ago he was lifting weights in his basement and then he heard a pop. He immediately had pain in the lower back. Was taking Tylenol and the muscle relaxer. Yesterday he was able to take a shower and then later in the day he was unable to get off of the couch d/t lower extremity weakness. He also noticed pain down his legs bilaterally. He also has decreased sensation of his lower extremities b/l. No urinary incontinence or retention. Pt does report constipation over the last 2 days. Has not had an ECHO or stress test in years, has tolerated anesthesia in the past without complication, prior surgeries occurred without complication. Was able to walk a flight of stairs prior to this injury. No chest pain or SOB with exertion. No recent vaccinations. No recent illnesses. Does not report a history of COVID.     Prior admission:   - 5/30/21: ETOH withdrawal, high BP    ER course: VSS. Labs: Reactive lymphocytes 9.0%, glucose 101, AST 64. UA: negative. COVID, influenza A/B, RSV negative. EKG: NSR with HR 80 bpm, normal intervals, T wave inversions in AVL, V4 (personally reviewed).    1/25/23:  POD#0  L2-3 laminectomy / discectomy, large extruded disc.  1/26/23:  POD#1  improved power in LE's.  No radicular pain, numbness remains but improved also.      Imaging:   CT lumbar spine 1/24/2023  No acute osseous abnormality in the lumbar spine. Multilevel degenerative changes, progressed since study from 2020, incompletely characterized by CT technique, consider MRI lumbar spine as outpatient in the proper clinical setting.    MRI of the lumbar spine 1/24/2023  Discontinued MRI. Suspicion for cauda equina equina compression at L2-L3, not well evaluated. The patient will return for repeat MRI with anesthesia.    MRI Lumbar spine 1/25/2023  SPINAL CANAL:  Anterior extradural soft tissue within the spinal canal from the L2-3 disc level extending cranially to just below the level of the superior endplate of L2. This measures approximately 3 cm craniocaudad by 0.8 cm anterior to posterior by 1.5 cm in width. Large extruded disc fragment is favored. Associated severe narrowing of the spinal canal and compression of the proximal cauda equina.    OTHER DISC LEVELS:  L1-2: Mild bulging of the disc annulus with mild facet degenerative changes. Mild canal and bilateral neural foramina narrowing.  L2-3: Please see spinal canal section above.  L3-4: Mild to moderate disc bulge with moderate facet and ligamentous degenerative changes. Mild to moderate canal and bilateral neural foramina narrowing.  L4-5: Moderate disc bulge with a central annular fissure. Moderate facet and ligamentous degenerative changes. Moderate canal and severe bilateral neural foramina narrowing.  L5-S1: Small central disc protrusion with an associated central/left paracentral annular fissure. Mild to moderate facet degenerative changes. Mild canal and left greater than right lateral recess narrowing. Severe bilateral neural foramina narrowing.    OTHER:  The sacro-illiac joints appear intact.    IMPRESSION:  Anterior extradural soft tissue from the L2-3 disc level extending cranially to just below the level of the superior endplate of L2 measuring approximately 3 cm craniocaudad by 0.8 cm anterior to posterior by 1.5 cm in width. Large extruded disc fragment is favored. Associated severe narrowing of the spinal canal and compression of the proximal cauda equina.    MRI THORACIC SPINE: 1/26/2023  No spinal cord compression or abnormal intrinsic cord signal.    No abnormal enhancement in the thoracic region.    Mild multilevel degenerative changes without significant spinal canal stenosis or neural foraminal narrowing.    MRI LUMBAR SPINE: 1/26/2023  Interval total L2 laminectomy.    Previously seen ventral disc material at L2-L3 has been largely resected. Residual moderate to severe high-grade stenosis due residual disc bulge, thin enhancing ventral granulation tissue, and persistent posterior facet/ligamentous hypertrophy. Mild right and mild to moderate left neural foraminal narrowing.    Other degenerative changes similar to the prior MRI.    REVIEW OF SYSTEMS  Constitutional - No fever, No weight loss, No fatigue  HEENT - No eye pain, No visual disturbances, No difficulty hearing, No tinnitus, No vertigo, No neck pain  Respiratory - No cough, No wheezing, No shortness of breath  Cardiovascular - No chest pain, No palpitations  Gastrointestinal - No abdominal pain, No nausea, No vomiting, No diarrhea, No constipation  Genitourinary - No dysuria, No frequency, No hematuria, No incontinence  Neurological - No headaches, No memory loss, No loss of strength, No numbness, No tremors  Skin - No itching, No rashes, No lesions   Endocrine - No temperature intolerance  Musculoskeletal - No joint pain, No joint swelling, No muscle pain  Psychiatric - No depression, No anxiety    VITALS  T(C): 36.3 (01-27-23 @ 08:57), Max: 36.8 (01-27-23 @ 00:30)  HR: 57 (01-27-23 @ 08:57) (57 - 66)  BP: 159/90 (01-27-23 @ 08:57) (145/86 - 161/91)  RR: 17 (01-27-23 @ 08:57) (16 - 18)  SpO2: 100% (01-27-23 @ 08:57) (99% - 100%)  Wt(kg): --    PAST MEDICAL & SURGICAL HISTORY  HTN (hypertension)    HLD (hyperlipidemia)    CAD (coronary artery disease)    Alcohol abuse    S/P CABG (coronary artery bypass graft)    SOCIAL HISTORY - as per documentation/history  Smoking - None  EtOH - None  Drugs - None    FUNCTIONAL HISTORY  Lives with girlfriend. [facility]. PTA I ADL I amb w/o AD    CURRENT FUNCTIONAL STATUS  Supine to sit moderate assist  Sit to stand maximum assist of 2 persons    FAMILY HISTORY   FH: diabetes mellitus    FH: heart disease    RECENT LABS - Reviewed  CBC Full  -  ( 27 Jan 2023 07:59 )  WBC Count : 10.56 K/uL  RBC Count : 4.54 M/uL  Hemoglobin : 13.2 g/dL  Hematocrit : 39.1 %  Platelet Count - Automated : 158 K/uL  Mean Cell Volume : 86.1 fl  Mean Cell Hemoglobin : 29.1 pg  Mean Cell Hemoglobin Concentration : 33.8 gm/dL  Auto Neutrophil # : x  Auto Lymphocyte # : x  Auto Monocyte # : x  Auto Eosinophil # : x  Auto Basophil # : x  Auto Neutrophil % : x  Auto Lymphocyte % : x  Auto Monocyte % : x  Auto Eosinophil % : x  Auto Basophil % : x    01-27    137  |  104  |  11  ----------------------------<  104<H>  3.5   |  28  |  0.65    Ca    8.9      27 Jan 2023 07:59  Phos  2.9     01-26  Mg     2.1     01-26          ALLERGIES  No Known Allergies      MEDICATIONS   acetaminophen     Tablet .. 650 milliGRAM(s) Oral every 6 hours PRN  acetaminophen   IVPB .. 1000 milliGRAM(s) IV Intermittent once PRN  aluminum hydroxide/magnesium hydroxide/simethicone Suspension 30 milliLiter(s) Oral every 4 hours PRN  carvedilol 6.25 milliGRAM(s) Oral every 12 hours  gabapentin 100 milliGRAM(s) Oral three times a day PRN  heparin   Injectable 5000 Unit(s) SubCutaneous every 12 hours  HYDROmorphone  Injectable 1 milliGRAM(s) IV Push every 3 hours PRN  lidocaine   4% Patch 1 Patch Transdermal daily  magnesium hydroxide Suspension 30 milliLiter(s) Oral every 12 hours PRN  melatonin 3 milliGRAM(s) Oral at bedtime PRN  methocarbamol 1500 milliGRAM(s) Oral three times a day  multivitamin 1 Tablet(s) Oral daily  naloxone Injectable 0.4 milliGRAM(s) IV Push once  ondansetron Injectable 4 milliGRAM(s) IV Push every 8 hours PRN  oxyCODONE    IR 5 milliGRAM(s) Oral every 4 hours PRN  oxyCODONE    IR 10 milliGRAM(s) Oral every 4 hours PRN  polyethylene glycol 3350 17 Gram(s) Oral daily  senna 2 Tablet(s) Oral at bedtime  simvastatin 40 milliGRAM(s) Oral at bedtime  sodium chloride 0.9%. 1000 milliLiter(s) IV Continuous <Continuous>      ----------------------------------------------------------------------------------------  PHYSICAL EXAM  Constitutional - NAD, Comfortable  HEENT - NCAT, EOMI  Neck - Supple, No limited ROM  Chest - Breathing comfortably, No wheezing  Cardiovascular - S1S2   Abdomen - Soft   Extremities - No C/C/E, No calf tenderness   Neurologic Exam -                    Cognitive - AAO to self, place, date, year, situation     Communication - Fluent, No dysarthria     Cranial Nerves - CN 2-12 intact     Motor - No focal deficits                    LEFT    UE - ShAB 5/5, EF 5/5, EE 5/5, WE 5/5,  5/5                    RIGHT UE - ShAB 5/5, EF 5/5, EE 5/5, WE 5/5,  5/5                    LEFT    LE - HF 0/5, KE 2-/5, DF 0/5, PF 2/5                    RIGHT LE - HF 0/5, KE 2-/5, DF 0/5, PF 2/5       Sensory - no sensation below the knees     Reflexes - DTR 0/4    Coordination - FTN intact     OculoVestibular - No saccades, No nystagmus, VOR         Balance - sitting fair.  Psychiatric - Mood stable, Affect WNL  ----------------------------------------------------------------------------------------

## 2023-01-27 NOTE — PROGRESS NOTE ADULT - ASSESSMENT
· Assessment	  ASSESSMENT/PLAN  57y male with functional deficits after cauda equina syndrome L2/L3 s/p decompressive laminectomy  Pain - Tylenol  DVT PPX - SCDs  Rehab -       Recommend [facility],. Patient needs a more prolonged stay to achieve transition to community.    Will continue to follow. Functional progress will determine ongoing rehab dispo recommendations, which may change.  Continue bedside therapy as well as OOB throughout the day with mobilization by staff to maintain cardiopulmonary function and prevention of secondary complications related to debility.

## 2023-01-27 NOTE — OCCUPATIONAL THERAPY INITIAL EVALUATION ADULT - GENERAL OBSERVATIONS, REHAB EVAL
Patient rec'd/left semi-supine in bed, bed alarmed, CB/T/phone IR, lines intact, +youssef, needs met, agreeable to OT IE.

## 2023-01-27 NOTE — OCCUPATIONAL THERAPY INITIAL EVALUATION ADULT - LIGHT TOUCH SENSATION, LLE, REHAB EVAL
diminished sensation on the shin, intact to LT on foot/thigh, reports L side LE sensation is better compared to R

## 2023-01-27 NOTE — CONSULT NOTE ADULT - ASSESSMENT
ASSESSMENT/PLAN  57yMale with functional deficits after  Pain - Tylenol  DVT PPX - SCDs  Rehab -    Recommend ACUTE inpatient rehabilitation for the functional deficits consisting of 3 hours of therapy/day & 24 hour RN/daily PMR physician for comorbid medical management. Patient will be able to tolerate 3 hours a day.   Recommend [facility], patient DOES NOT meet acute inpatient rehabilitation criteria. Patient needs a more prolonged stay to achieve transition to community.    Expect patient to achieve functional goals for DC HOME with OUTPATIENT   Expect patient to achieve functional goals for DC HOME with HOME CARE   Follow up with [facility] - Call [phone] for an appointment    Will continue to follow. Functional progress will determine ongoing rehab dispo recommendations, which may change.    Continue bedside therapy as well as OOB throughout the day with mobilization by staff to maintain cardiopulmonary function and prevention of secondary complications related to debility.     Discussed with rehab team.  ASSESSMENT/PLAN  57yMale with functional deficits after cauda equina syndrome L2/L3 s/p decompressive laminectomy  Pain - Tylenol  DVT PPX - SCDs  Rehab -       Recommend [facility],. Patient needs a more prolonged stay to achieve transition to community.    Will continue to follow. Functional progress will determine ongoing rehab dispo recommendations, which may change.  Continue bedside therapy as well as OOB throughout the day with mobilization by staff to maintain cardiopulmonary function and prevention of secondary complications related to debility.

## 2023-01-27 NOTE — OCCUPATIONAL THERAPY INITIAL EVALUATION ADULT - PRECAUTIONS/LIMITATIONS, REHAB EVAL
notify if systolic >160 diastolic >90 HR >100 <60/cardiac precautions/spinal precautions/surgical precautions

## 2023-01-27 NOTE — PROGRESS NOTE ADULT - ASSESSMENT
56 y/o M with PMH CAD, s/p CABG, HTN, HLD, ETOH abuse (no alcohol use in 2 years as per pt) presented with back pain. Pt was seen in the ER on 1/22/23 and discharged home with a muscle relaxer and tylenol. He presented to the ER on 1/24/23  for worsening back pain. He states that 2 days ago he was lifting weights in his basement and then he heard a pop. He immediately had pain in the lower back. He was taking Tylenol and the muscle relaxer. On 1/23 he was able to take a shower and then later in the day he was unable to get off of the couch due to  lower extremity weakness. He also noticed pain down his legs bilaterally. He also has decreased sensation of his lower extremities b/l. Imaging was concerning for cauda equina syndrome.  On 1/25/23 he underwent L2/3 laminectomy and discectomy for cauda equina syndrome. He was found to have a large disc fragment.    Lower extremity weakness from knees distally.  -Cauda equina syndrome and severe spinal stenosis  -Continue to hold aspirin until cleared by neurosurgery  -Continue PT/OT  - PM&R consult to [facility] for acute rehab (possibly a spinal cord injury program).  -Pain management    Will f/u as needed    Dr. Meier will cover neurology on 1/28, 1/29. Please call if needed.

## 2023-01-27 NOTE — OCCUPATIONAL THERAPY INITIAL EVALUATION ADULT - MD ORDER
"OT Evaluate and Treat"- MD orders received. Chart reviewed, contents noted, conferred with NAKIA Gregorio.

## 2023-01-27 NOTE — OCCUPATIONAL THERAPY INITIAL EVALUATION ADULT - BALANCE TRAINING, PT EVAL
Pt will demonstrate improved static/dynamic balance 1/2 grade in order to increase safety and independence in ADLs within 4 weeks

## 2023-01-27 NOTE — PROGRESS NOTE ADULT - SUBJECTIVE AND OBJECTIVE BOX
CC- back pain    HPI:  56 y/o M with PMH CAD, s/p CABG, HTN, HLD, ETOH abuse (no alcohol use in 2 years as per pt) presented with back pain. Pt was seen in the ER on 1/22/23 and discharged home with a muscle relaxer and tylenol. He presented to the ER for worsening back pain. He states that 2 days ago he was lifting weights in his basement and then he heard a pop. He immediately had pain in the lower back. Was taking Tylenol and the muscle relaxer. Yesterday he was able to take a shower and then later in the day he was unable to get off of the couch d/t lower extremity weakness. He also noticed pain down his legs bilaterally. He also has decreased sensation of his lower extremities b/l. No urinary incontinence or retention. Pt does report constipation over the last 2 days. Has not had an ECHO or stress test in years, has tolerated anesthesia in the past without complication, prior surgeries occurred without complication. Was able to walk a flight of stairs prior to this injury. No chest pain or SOB with exertion. No recent vaccinations. No recent illnesses. Does not report a history of COVID.     Patient underwent urgent L2-3 laminectomy/ discectomy on 1/25/23 for cauda equina syndrome    1/26/23- c/o "cold sensation" both legs, not much movement in both LE's. Went for repeat MRI ordered by NS team  1/27/23- have movements both legs above the knees, still not much below the knees    Review of system- All 10 systems reviewed and is as per HPI otherwise negative.     Vital Signs Last 24 Hrs  T(C): 36.3 (27 Jan 2023 08:57), Max: 36.8 (27 Jan 2023 00:30)  T(F): 97.3 (27 Jan 2023 08:57), Max: 98.3 (27 Jan 2023 05:15)  HR: 57 (27 Jan 2023 08:57) (57 - 66)  BP: 159/90 (27 Jan 2023 08:57) (145/86 - 161/91)  BP(mean): --  RR: 17 (27 Jan 2023 08:57) (16 - 18)  SpO2: 100% (27 Jan 2023 08:57) (99% - 100%)    Parameters below as of 27 Jan 2023 08:57  Patient On (Oxygen Delivery Method): room air    LABS:                          13.2   10.56 )-----------( 158      ( 27 Jan 2023 07:59 )             39.1     27 Jan 2023 07:59    137    |  104    |  11     ----------------------------<  104    3.5     |  28     |  0.65     Ca    8.9        27 Jan 2023 07:59  Phos  2.9       26 Jan 2023 10:23  Mg     2.1       26 Jan 2023 10:23          CAPILLARY BLOOD GLUCOSE                RADIOLOGY & ADDITIONAL TESTS:  EXAM:  MR SPINE LUMBAR WAW IC   ORDERED BY: AMBER ZUNIGA     ACC: [id number] EXAM:  MR SPINE THORACIC WAW IC   ORDERED BY: AMBER ZUNIGA     PROCEDURE DATE:  01/26/2023      INTERPRETATION:  MRI of the lumbar spine with contrast    CLINICAL INDICATION:  Status L2 laminectomy and discectomy. Lower   extremity paresis.    TECHNIQUE: Multiplanar, multisequence MR images of the lumbar spine were   obtained before and after the intravenous administration of 7.5 cc of   Gadavist.0 cc were discarded.    COMPARISON: MRI lumbar spine 1/25/2023. CT lumbar spine 1/22/2023. CT   angiography chest 3/7/2020    FINDINGS:    MRI THORACIC SPINE:    Vertebral body height, marrow signal homogeneity, and facet alignment are   maintained throughout the visualized spinal segments.    No spinal cord compression or abnormal intrinsic cord signal.    No abnormal enhancement in the thoracic region.    Mild multilevel degenerative changes without significant spinal canal   stenosis or neuralforaminal narrowing.    MRI LUMBAR SPINE:    Interval total L2 laminectomy.    Vertebral body height, marrow signal homogeneity, and facet alignment are   maintained throughout the visualized spinal segments.    No intervertebral disc space narrowing. Disc desiccation at L4-L5 and   L5-S1.    T12-L1: Bilateral facet hypertrophy. Mild posterior thecal sac   compression. No neural foraminal.    L1-L2: Bilateral facet hypertrophy. No spinal canal stenosis or neural   foraminal narrowing.    L2-L3: Previously seen ventral disc material has been largely resected.   Residual moderate to severe high-grade stenosis due residual disc bulge,   thin enhancing ventral granulation tissue, and persistent posterior   facet/ligamentous hypertrophy. Mild right and mild to moderate left   neural foraminal narrowing.    L3-L4: Mild disc bulge and bilateral facet/ligamentous hypertrophy. Mild   thecal sac compression. Mild left neural foraminal narrowing.    L4-L5: Disc bulge and bilateral facet/ligamentous hypertrophy. Mild   thecal sac compression. Mild to moderate and bilateral neural foraminal   narrowing.    L5-S1: Disc bulge with superimposed broad-based central disc protrusion.   Bilateral facet hypertrophy. Mild ventral thecal sac compression.   Moderate left and mild right neural foraminal.    IMPRESSION:    MRI THORACIC SPINE:  No spinal cord compression or abnormal intrinsic cord signal.    No abnormal enhancement in the thoracic region.    Mild multilevel degenerative changes without significant spinal canal   stenosis or neural foraminal narrowing.    MRI LUMBAR SPINE:  Interval total L2 laminectomy.    Previously seen ventral disc material at L2-L3 has been largely resected.   Residual moderate to severe high-grade stenosis due residual disc bulge,   thin enhancing ventral granulation tissue, and persistent posterior   facet/ligamentous hypertrophy. Mild right and mild to moderate left   neural foraminal narrowing.    Other degenerative changes similar to the prior MRI.      PHYSICAL EXAM:  GENERAL: NAD, well-groomed, well-developed  HEAD:  Atraumatic, Normocephalic  EYES: EOMI, PERRLA, conjunctiva and sclera clear  HEENT: Moist mucous membranes  NECK: Supple, No JVD  NERVOUS SYSTEM:  Alert & Oriented X3, minimal sensation both LE, no significant doriflexion/ planta flexion both sides  CHEST/LUNG: Clear to auscultation bilaterally; No rales, rhonchi, wheezing, or rubs  HEART: Regular rate and rhythm; No murmurs, rubs, or gallops  ABDOMEN: Soft, Nontender, Nondistended; Bowel sounds present  GENITOURINARY- Vergara+  EXTREMITIES:  2+ Peripheral Pulses, No clubbing, cyanosis, or edema  MUSCULOSKELTAL- No muscle tenderness, Muscle tone normal, No joint tenderness, no Joint swelling, Joint range of motion-normal  SKIN-no rash, no lesion    MEDICATIONS  (STANDING):  carvedilol 6.25 milliGRAM(s) Oral every 12 hours  lidocaine   4% Patch 1 Patch Transdermal daily  methocarbamol 1500 milliGRAM(s) Oral three times a day  multivitamin 1 Tablet(s) Oral daily  naloxone Injectable 0.4 milliGRAM(s) IV Push once  polyethylene glycol 3350 17 Gram(s) Oral daily  senna 2 Tablet(s) Oral at bedtime  simvastatin 40 milliGRAM(s) Oral at bedtime  sodium chloride 0.9%. 1000 milliLiter(s) (75 mL/Hr) IV Continuous <Continuous>    MEDICATIONS  (PRN):  acetaminophen     Tablet .. 650 milliGRAM(s) Oral every 6 hours PRN Temp greater or equal to 38C (100.4F), Mild Pain (1 - 3)  acetaminophen   IVPB .. 1000 milliGRAM(s) IV Intermittent once PRN Moderate Pain (4 - 6)  aluminum hydroxide/magnesium hydroxide/simethicone Suspension 30 milliLiter(s) Oral every 4 hours PRN Dyspepsia  gabapentin 100 milliGRAM(s) Oral three times a day PRN neuropathic pain  HYDROmorphone  Injectable 1 milliGRAM(s) IV Push every 3 hours PRN severe breakthrough pain  magnesium hydroxide Suspension 30 milliLiter(s) Oral every 12 hours PRN Constipation  melatonin 3 milliGRAM(s) Oral at bedtime PRN Insomnia  ondansetron Injectable 4 milliGRAM(s) IV Push every 8 hours PRN Nausea and/or Vomiting  oxyCODONE    IR 5 milliGRAM(s) Oral every 4 hours PRN Moderate Pain (4 - 6)  oxyCODONE    IR 10 milliGRAM(s) Oral every 4 hours PRN Severe Pain (7 - 10)    Assessment/Plan  #Cauda equina syndrome/ large HNP/ severe lumbar stenosis  #S/p L2-3 laminectomy/ discectomy  NS team following  Motor/ sensory function both LE's are largely diminished  S/p repeat MRI LS spine- residual mod-severe high grade stenosis present due to residual disc  Further plan per NS team  D/w NS team- no role in steroids at present  Neurology eval pending  Pain meds prn  Vergara in  PT as tolerated  Muscle relaxants prn  Bowel regimen  Incentive spirometry    #No evidence of withdrawal  Patient reports being sober for 2 years- will DC ciwa    #CAD s/p CABG  Hold aspirin postop, cont statin    #HTN  Increase coreg to 12.5mg BID    #DVT proph- will start on heparin sq, if HH remain stable will transition to lovenox sq         CC- back pain    HPI:  58 y/o M with PMH CAD, s/p CABG, HTN, HLD, ETOH abuse (no alcohol use in 2 years as per pt) presented with back pain. Pt was seen in the ER on 1/22/23 and discharged home with a muscle relaxer and tylenol. He presented to the ER for worsening back pain. He states that 2 days ago he was lifting weights in his basement and then he heard a pop. He immediately had pain in the lower back. Was taking Tylenol and the muscle relaxer. Yesterday he was able to take a shower and then later in the day he was unable to get off of the couch d/t lower extremity weakness. He also noticed pain down his legs bilaterally. He also has decreased sensation of his lower extremities b/l. No urinary incontinence or retention. Pt does report constipation over the last 2 days. Has not had an ECHO or stress test in years, has tolerated anesthesia in the past without complication, prior surgeries occurred without complication. Was able to walk a flight of stairs prior to this injury. No chest pain or SOB with exertion. No recent vaccinations. No recent illnesses. Does not report a history of COVID.     Patient underwent urgent L2-3 laminectomy/ discectomy on 1/25/23 for cauda equina syndrome    1/26/23- c/o "cold sensation" both legs, not much movement in both LE's. Went for repeat MRI ordered by NS team  1/27/23- have movements both legs above the knees, still not much below the knees    Review of system- All 10 systems reviewed and is as per HPI otherwise negative.     Vital Signs Last 24 Hrs  T(C): 36.3 (27 Jan 2023 08:57), Max: 36.8 (27 Jan 2023 00:30)  T(F): 97.3 (27 Jan 2023 08:57), Max: 98.3 (27 Jan 2023 05:15)  HR: 57 (27 Jan 2023 08:57) (57 - 66)  BP: 159/90 (27 Jan 2023 08:57) (145/86 - 161/91)  BP(mean): --  RR: 17 (27 Jan 2023 08:57) (16 - 18)  SpO2: 100% (27 Jan 2023 08:57) (99% - 100%)    Parameters below as of 27 Jan 2023 08:57  Patient On (Oxygen Delivery Method): room air    LABS:                        13.2   10.56 )-----------( 158      ( 27 Jan 2023 07:59 )             39.1     27 Jan 2023 07:59    137    |  104    |  11     ----------------------------<  104    3.5     |  28     |  0.65     Ca    8.9        27 Jan 2023 07:59  Phos  2.9       26 Jan 2023 10:23  Mg     2.1       26 Jan 2023 10:23    RADIOLOGY & ADDITIONAL TESTS:  EXAM:  MR SPINE LUMBAR WAW IC   ORDERED BY: AMBER ZUNIGA     ACC: [id number] EXAM:  MR SPINE THORACIC WAW IC   ORDERED BY: AMBER ZUNIGA     PROCEDURE DATE:  01/26/2023      INTERPRETATION:  MRI of the lumbar spine with contrast    CLINICAL INDICATION:  Status L2 laminectomy and discectomy. Lower   extremity paresis.    TECHNIQUE: Multiplanar, multisequence MR images of the lumbar spine were   obtained before and after the intravenous administration of 7.5 cc of   Gadavist.0 cc were discarded.    COMPARISON: MRI lumbar spine 1/25/2023. CT lumbar spine 1/22/2023. CT   angiography chest 3/7/2020    FINDINGS:    MRI THORACIC SPINE:    Vertebral body height, marrow signal homogeneity, and facet alignment are   maintained throughout the visualized spinal segments.    No spinal cord compression or abnormal intrinsic cord signal.    No abnormal enhancement in the thoracic region.    Mild multilevel degenerative changes without significant spinal canal   stenosis or neuralforaminal narrowing.    MRI LUMBAR SPINE:    Interval total L2 laminectomy.    Vertebral body height, marrow signal homogeneity, and facet alignment are   maintained throughout the visualized spinal segments.    No intervertebral disc space narrowing. Disc desiccation at L4-L5 and   L5-S1.    T12-L1: Bilateral facet hypertrophy. Mild posterior thecal sac   compression. No neural foraminal.    L1-L2: Bilateral facet hypertrophy. No spinal canal stenosis or neural   foraminal narrowing.    L2-L3: Previously seen ventral disc material has been largely resected.   Residual moderate to severe high-grade stenosis due residual disc bulge,   thin enhancing ventral granulation tissue, and persistent posterior   facet/ligamentous hypertrophy. Mild right and mild to moderate left   neural foraminal narrowing.    L3-L4: Mild disc bulge and bilateral facet/ligamentous hypertrophy. Mild   thecal sac compression. Mild left neural foraminal narrowing.    L4-L5: Disc bulge and bilateral facet/ligamentous hypertrophy. Mild   thecal sac compression. Mild to moderate and bilateral neural foraminal   narrowing.    L5-S1: Disc bulge with superimposed broad-based central disc protrusion.   Bilateral facet hypertrophy. Mild ventral thecal sac compression.   Moderate left and mild right neural foraminal.    IMPRESSION:    MRI THORACIC SPINE:  No spinal cord compression or abnormal intrinsic cord signal.    No abnormal enhancement in the thoracic region.    Mild multilevel degenerative changes without significant spinal canal   stenosis or neural foraminal narrowing.    MRI LUMBAR SPINE:  Interval total L2 laminectomy.    Previously seen ventral disc material at L2-L3 has been largely resected.   Residual moderate to severe high-grade stenosis due residual disc bulge,   thin enhancing ventral granulation tissue, and persistent posterior   facet/ligamentous hypertrophy. Mild right and mild to moderate left   neural foraminal narrowing.    Other degenerative changes similar to the prior MRI.      PHYSICAL EXAM:  GENERAL: NAD, well-groomed, well-developed  HEAD:  Atraumatic, Normocephalic  EYES: EOMI, PERRLA, conjunctiva and sclera clear  HEENT: Moist mucous membranes  NECK: Supple, No JVD  NERVOUS SYSTEM:  Alert & Oriented X3, able to flex/extend proximal to knees both LE, no dorsiflexion/ plantar flexion both feet  CHEST/LUNG: Clear to auscultation bilaterally; No rales, rhonchi, wheezing, or rubs  HEART: Regular rate and rhythm; No murmurs, rubs, or gallops  ABDOMEN: Soft, Nontender, Nondistended; Bowel sounds present  GENITOURINARY- Vergara+  EXTREMITIES:  2+ Peripheral Pulses, No clubbing, cyanosis, or edema  MUSCULOSKELTAL- No muscle tenderness, Muscle tone normal, No joint tenderness, no Joint swelling, Joint range of motion-normal  SKIN-no rash, no lesion    MEDICATIONS  (STANDING):  carvedilol 6.25 milliGRAM(s) Oral every 12 hours  heparin   Injectable 5000 Unit(s) SubCutaneous every 12 hours  lidocaine   4% Patch 1 Patch Transdermal daily  methocarbamol 1500 milliGRAM(s) Oral three times a day  multivitamin 1 Tablet(s) Oral daily  naloxone Injectable 0.4 milliGRAM(s) IV Push once  polyethylene glycol 3350 17 Gram(s) Oral daily  senna 2 Tablet(s) Oral at bedtime  simvastatin 40 milliGRAM(s) Oral at bedtime  sodium chloride 0.9%. 1000 milliLiter(s) (75 mL/Hr) IV Continuous <Continuous>    MEDICATIONS  (PRN):  acetaminophen     Tablet .. 650 milliGRAM(s) Oral every 6 hours PRN Temp greater or equal to 38C (100.4F), Mild Pain (1 - 3)  acetaminophen   IVPB .. 1000 milliGRAM(s) IV Intermittent once PRN Moderate Pain (4 - 6)  aluminum hydroxide/magnesium hydroxide/simethicone Suspension 30 milliLiter(s) Oral every 4 hours PRN Dyspepsia  gabapentin 100 milliGRAM(s) Oral three times a day PRN neuropathic pain  HYDROmorphone  Injectable 1 milliGRAM(s) IV Push every 3 hours PRN severe breakthrough pain  magnesium hydroxide Suspension 30 milliLiter(s) Oral every 12 hours PRN Constipation  melatonin 3 milliGRAM(s) Oral at bedtime PRN Insomnia  ondansetron Injectable 4 milliGRAM(s) IV Push every 8 hours PRN Nausea and/or Vomiting  oxyCODONE    IR 5 milliGRAM(s) Oral every 4 hours PRN Moderate Pain (4 - 6)  oxyCODONE    IR 10 milliGRAM(s) Oral every 4 hours PRN Severe Pain (7 - 10)    Assessment/Plan  #Cauda equina syndrome/ large HNP/ severe lumbar stenosis  #S/p L2-3 laminectomy/ discectomy  NS team and neurology following  S/p repeat MRI LS spine- residual mod-severe high grade stenosis present due to residual disc  Neurodeficits consistent with spinal cord injury  Physiatry eval appreciated- Acute rehab on discharge  Pain meds prn  Vergara in- likely remove once neurodeficits improve  PT as tolerated  Muscle relaxants prn  Bowel regimen  Incentive spirometry    #No evidence of withdrawal  Patient reports being sober for 2 years- will DC ciwa    #CAD s/p CABG  Hold aspirin postop, cont statin    #HTN  Increase coreg to 12.5mg BID    #DVT proph-  will switch to lovenox sq    #Dispo- acute rehab

## 2023-01-27 NOTE — OCCUPATIONAL THERAPY INITIAL EVALUATION ADULT - PERTINENT HX OF CURRENT PROBLEM, REHAB EVAL
Pt is a 58 y/o M with PMH CAD, s/p CABG, HTN, HLD, ETOH abuse (no alcohol use in 2 years as per pt) presented with back pain. Pt was seen in the ER on 1/22/23 and discharged home with a muscle relaxer and Tylenol. He presented to the ER for worsening back pain. Per H&P- he states that 2 days ago he was lifting weights in his basement and then he heard a pop. He immediately had pain in the lower back. Was taking Tylenol and the muscle relaxer. Yesterday he was able to take a shower and then later in the day he was unable to get off of the couch d/t lower extremity weakness. He also noticed pain down his legs bilaterally. He also has decreased sensation of his lower extremities b/l. No urinary incontinence or retention. Pt does report constipation over the last 2 days. Has not had an ECHO or stress test in years, has tolerated anesthesia in the past without complication, prior surgeries occurred without complication. Was able to walk a flight of stairs prior to this injury. No chest pain or SOB with exertion. No recent vaccinations. No recent illnesses. Does not report a history of COVID. Patient underwent urgent L2-3 laminectomy/ discectomy on 1/25/23 for cauda equina syndrome

## 2023-01-28 PROCEDURE — 99232 SBSQ HOSP IP/OBS MODERATE 35: CPT

## 2023-01-28 RX ORDER — LOSARTAN POTASSIUM 100 MG/1
25 TABLET, FILM COATED ORAL DAILY
Refills: 0 | Status: DISCONTINUED | OUTPATIENT
Start: 2023-01-28 | End: 2023-01-29

## 2023-01-28 RX ADMIN — METHOCARBAMOL 1500 MILLIGRAM(S): 500 TABLET, FILM COATED ORAL at 21:10

## 2023-01-28 RX ADMIN — Medication 975 MILLIGRAM(S): at 17:43

## 2023-01-28 RX ADMIN — POLYETHYLENE GLYCOL 3350 17 GRAM(S): 17 POWDER, FOR SOLUTION ORAL at 11:05

## 2023-01-28 RX ADMIN — GABAPENTIN 100 MILLIGRAM(S): 400 CAPSULE ORAL at 11:05

## 2023-01-28 RX ADMIN — Medication 975 MILLIGRAM(S): at 05:10

## 2023-01-28 RX ADMIN — LIDOCAINE 1 PATCH: 4 CREAM TOPICAL at 23:49

## 2023-01-28 RX ADMIN — Medication 975 MILLIGRAM(S): at 23:15

## 2023-01-28 RX ADMIN — GABAPENTIN 100 MILLIGRAM(S): 400 CAPSULE ORAL at 21:11

## 2023-01-28 RX ADMIN — LOSARTAN POTASSIUM 25 MILLIGRAM(S): 100 TABLET, FILM COATED ORAL at 14:34

## 2023-01-28 RX ADMIN — Medication 975 MILLIGRAM(S): at 23:51

## 2023-01-28 RX ADMIN — OXYCODONE HYDROCHLORIDE 10 MILLIGRAM(S): 5 TABLET ORAL at 05:50

## 2023-01-28 RX ADMIN — Medication 975 MILLIGRAM(S): at 11:05

## 2023-01-28 RX ADMIN — Medication 975 MILLIGRAM(S): at 05:40

## 2023-01-28 RX ADMIN — Medication 1 TABLET(S): at 11:05

## 2023-01-28 RX ADMIN — ENOXAPARIN SODIUM 40 MILLIGRAM(S): 100 INJECTION SUBCUTANEOUS at 05:10

## 2023-01-28 RX ADMIN — LIDOCAINE 1 PATCH: 4 CREAM TOPICAL at 18:01

## 2023-01-28 RX ADMIN — Medication 975 MILLIGRAM(S): at 18:01

## 2023-01-28 RX ADMIN — CARVEDILOL PHOSPHATE 12.5 MILLIGRAM(S): 80 CAPSULE, EXTENDED RELEASE ORAL at 21:11

## 2023-01-28 RX ADMIN — SENNA PLUS 2 TABLET(S): 8.6 TABLET ORAL at 21:11

## 2023-01-28 RX ADMIN — SIMVASTATIN 40 MILLIGRAM(S): 20 TABLET, FILM COATED ORAL at 21:11

## 2023-01-28 RX ADMIN — OXYCODONE HYDROCHLORIDE 10 MILLIGRAM(S): 5 TABLET ORAL at 05:10

## 2023-01-28 RX ADMIN — CARVEDILOL PHOSPHATE 12.5 MILLIGRAM(S): 80 CAPSULE, EXTENDED RELEASE ORAL at 11:05

## 2023-01-28 RX ADMIN — POLYETHYLENE GLYCOL 3350 17 GRAM(S): 17 POWDER, FOR SOLUTION ORAL at 21:11

## 2023-01-28 RX ADMIN — METHOCARBAMOL 1500 MILLIGRAM(S): 500 TABLET, FILM COATED ORAL at 05:10

## 2023-01-28 RX ADMIN — LIDOCAINE 1 PATCH: 4 CREAM TOPICAL at 11:05

## 2023-01-28 RX ADMIN — METHOCARBAMOL 1500 MILLIGRAM(S): 500 TABLET, FILM COATED ORAL at 14:34

## 2023-01-28 RX ADMIN — Medication 975 MILLIGRAM(S): at 12:45

## 2023-01-28 NOTE — PROGRESS NOTE ADULT - SUBJECTIVE AND OBJECTIVE BOX
CC- back pain    HPI:  56 y/o M with PMH CAD, s/p CABG, HTN, HLD, ETOH abuse (no alcohol use in 2 years as per pt) presented with back pain. Pt was seen in the ER on 1/22/23 and discharged home with a muscle relaxer and tylenol. He presented to the ER for worsening back pain. He states that 2 days ago he was lifting weights in his basement and then he heard a pop. He immediately had pain in the lower back. Was taking Tylenol and the muscle relaxer. Yesterday he was able to take a shower and then later in the day he was unable to get off of the couch d/t lower extremity weakness. He also noticed pain down his legs bilaterally. He also has decreased sensation of his lower extremities b/l. No urinary incontinence or retention. Pt does report constipation over the last 2 days. Has not had an ECHO or stress test in years, has tolerated anesthesia in the past without complication, prior surgeries occurred without complication. Was able to walk a flight of stairs prior to this injury. No chest pain or SOB with exertion. No recent vaccinations. No recent illnesses. Does not report a history of COVID.     Patient underwent urgent L2-3 laminectomy/ discectomy on 1/25/23 for cauda equina syndrome    1/26/23- c/o "cold sensation" both legs, not much movement in both LE's. Went for repeat MRI ordered by NS team  1/27/23- have movements both legs above the knees, still not much below the knees  1/28/23- some sensation in both feet today, more pins a needles feeling. Some movement in both feet    Review of system- All 10 systems reviewed and is as per HPI otherwise negative.     Vital Signs Last 24 Hrs  T(C): 36.4 (28 Jan 2023 08:34), Max: 36.8 (27 Jan 2023 20:15)  T(F): 97.5 (28 Jan 2023 08:34), Max: 98.3 (27 Jan 2023 20:15)  HR: 54 (28 Jan 2023 11:00) (53 - 68)  BP: 174/78 (28 Jan 2023 11:00) (126/80 - 174/78)  BP(mean): --  RR: 16 (28 Jan 2023 11:00) (16 - 16)  SpO2: 97% (28 Jan 2023 11:00) (97% - 100%)    Parameters below as of 28 Jan 2023 11:00  Patient On (Oxygen Delivery Method): room air    LABS:                                 13.2   10.56 )-----------( 158      ( 27 Jan 2023 07:59 )             39.1     27 Jan 2023 07:59    137    |  104    |  11     ----------------------------<  104    3.5     |  28     |  0.65     Ca    8.9        27 Jan 2023 07:59    RADIOLOGY & ADDITIONAL TESTS:  EXAM:  MR SPINE LUMBAR WAW IC   ORDERED BY: AMBER ZUNIGA     ACC: [id number] EXAM:  MR SPINE THORACIC WAW IC   ORDERED BY: AMBER ZUNIGA     PROCEDURE DATE:  01/26/2023      INTERPRETATION:  MRI of the lumbar spine with contrast    CLINICAL INDICATION:  Status L2 laminectomy and discectomy. Lower   extremity paresis.    TECHNIQUE: Multiplanar, multisequence MR images of the lumbar spine were   obtained before and after the intravenous administration of 7.5 cc of   Gadavist.0 cc were discarded.    COMPARISON: MRI lumbar spine 1/25/2023. CT lumbar spine 1/22/2023. CT   angiography chest 3/7/2020    FINDINGS:    MRI THORACIC SPINE:    Vertebral body height, marrow signal homogeneity, and facet alignment are   maintained throughout the visualized spinal segments.    No spinal cord compression or abnormal intrinsic cord signal.    No abnormal enhancement in the thoracic region.    Mild multilevel degenerative changes without significant spinal canal   stenosis or neuralforaminal narrowing.    MRI LUMBAR SPINE:    Interval total L2 laminectomy.    Vertebral body height, marrow signal homogeneity, and facet alignment are   maintained throughout the visualized spinal segments.    No intervertebral disc space narrowing. Disc desiccation at L4-L5 and   L5-S1.    T12-L1: Bilateral facet hypertrophy. Mild posterior thecal sac   compression. No neural foraminal.    L1-L2: Bilateral facet hypertrophy. No spinal canal stenosis or neural   foraminal narrowing.    L2-L3: Previously seen ventral disc material has been largely resected.   Residual moderate to severe high-grade stenosis due residual disc bulge,   thin enhancing ventral granulation tissue, and persistent posterior   facet/ligamentous hypertrophy. Mild right and mild to moderate left   neural foraminal narrowing.    L3-L4: Mild disc bulge and bilateral facet/ligamentous hypertrophy. Mild   thecal sac compression. Mild left neural foraminal narrowing.    L4-L5: Disc bulge and bilateral facet/ligamentous hypertrophy. Mild   thecal sac compression. Mild to moderate and bilateral neural foraminal   narrowing.    L5-S1: Disc bulge with superimposed broad-based central disc protrusion.   Bilateral facet hypertrophy. Mild ventral thecal sac compression.   Moderate left and mild right neural foraminal.    IMPRESSION:    MRI THORACIC SPINE:  No spinal cord compression or abnormal intrinsic cord signal.    No abnormal enhancement in the thoracic region.    Mild multilevel degenerative changes without significant spinal canal   stenosis or neural foraminal narrowing.    MRI LUMBAR SPINE:  Interval total L2 laminectomy.    Previously seen ventral disc material at L2-L3 has been largely resected.   Residual moderate to severe high-grade stenosis due residual disc bulge,   thin enhancing ventral granulation tissue, and persistent posterior   facet/ligamentous hypertrophy. Mild right and mild to moderate left   neural foraminal narrowing.    Other degenerative changes similar to the prior MRI.      PHYSICAL EXAM:  GENERAL: NAD, well-groomed, well-developed  HEAD:  Atraumatic, Normocephalic  EYES: EOMI, PERRLA, conjunctiva and sclera clear  HEENT: Moist mucous membranes  NECK: Supple, No JVD  NERVOUS SYSTEM:  Alert & Oriented X3, able to flex/extend proximal to knees both LE, some minimal dorsiflexion/ plantar flexion both feet  CHEST/LUNG: Clear to auscultation bilaterally; No rales, rhonchi, wheezing, or rubs  HEART: Regular rate and rhythm; No murmurs, rubs, or gallops  ABDOMEN: Soft, Nontender, Nondistended; Bowel sounds present  GENITOURINARY- Vergara+  EXTREMITIES:  2+ Peripheral Pulses, No clubbing, cyanosis, or edema  MUSCULOSKELTAL- No muscle tenderness, Muscle tone normal, No joint tenderness, no Joint swelling, Joint range of motion-normal  SKIN-no rash, no lesion    MEDICATIONS  (STANDING):  acetaminophen     Tablet .. 975 milliGRAM(s) Oral every 6 hours  carvedilol 12.5 milliGRAM(s) Oral every 12 hours  enoxaparin Injectable 40 milliGRAM(s) SubCutaneous every 24 hours  gabapentin 100 milliGRAM(s) Oral two times a day  lidocaine   4% Patch 1 Patch Transdermal daily  methocarbamol 1500 milliGRAM(s) Oral three times a day  multivitamin 1 Tablet(s) Oral daily  naloxone Injectable 0.4 milliGRAM(s) IV Push once  polyethylene glycol 3350 17 Gram(s) Oral two times a day  senna 2 Tablet(s) Oral at bedtime  simvastatin 40 milliGRAM(s) Oral at bedtime    MEDICATIONS  (PRN):  acetaminophen     Tablet .. 650 milliGRAM(s) Oral every 6 hours PRN Temp greater or equal to 38C (100.4F), Mild Pain (1 - 3)  acetaminophen   IVPB .. 1000 milliGRAM(s) IV Intermittent once PRN Moderate Pain (4 - 6)  aluminum hydroxide/magnesium hydroxide/simethicone Suspension 30 milliLiter(s) Oral every 4 hours PRN Dyspepsia  gabapentin 100 milliGRAM(s) Oral three times a day PRN neuropathic pain  HYDROmorphone  Injectable 1 milliGRAM(s) IV Push every 3 hours PRN severe breakthrough pain  magnesium hydroxide Suspension 30 milliLiter(s) Oral every 12 hours PRN Constipation  melatonin 3 milliGRAM(s) Oral at bedtime PRN Insomnia  ondansetron Injectable 4 milliGRAM(s) IV Push every 8 hours PRN Nausea and/or Vomiting  oxyCODONE    IR 5 milliGRAM(s) Oral every 4 hours PRN Moderate Pain (4 - 6)  oxyCODONE    IR 10 milliGRAM(s) Oral every 4 hours PRN Severe Pain (7 - 10)    Assessment/Plan  #Cauda equina syndrome/ large HNP/ severe lumbar stenosis  #S/p L2-3 laminectomy/ discectomy  NS team and neurology following  S/p repeat MRI LS spine- residual mod-severe high grade stenosis present due to residual disc  Neurodeficits consistent with spinal cord injury  Physiatry eval appreciated- Acute rehab on discharge  Pain meds prn  Vergara in- as patient just started having better sensation of the pelvic area- would keep Vergara another day and reassess tomorrow for voiding trial  PT as tolerated  Muscle relaxants prn, neurontin TID  Bowel regimen  Incentive spirometry    #No evidence of withdrawal  Patient reports being sober for 2 years- will DC ciwa    #CAD s/p CABG  Hold aspirin postop, cont statin    #HTN  Increase coreg to 12.5mg BID  Add cozaar 25mg daily    #DVT proph-  lovenox sq    #Dispo- acute rehab [facility] Monday

## 2023-01-28 NOTE — PROGRESS NOTE ADULT - SUBJECTIVE AND OBJECTIVE BOX
57 year old RH male with PMH CAD, s/p CABG, HTN, HLD, ETOH abuse (no alcohol use in 2 years as per pt) presented with back pain. Pt was seen in the ER on 1/22/23 and discharged home with a muscle relaxer and tylenol. He presented to the ER on 1/24 for worsening back pain. He states that 2 days ago he was lifting weights in his basement and then he heard a pop. He immediately had pain in the lower back. Was taking Tylenol and the muscle relaxer. Yesterday he was able to take a shower and then later in the day he was unable to get off of the couch d/t lower extremity weakness. He also noticed pain down his legs bilaterally. He also has decreased sensation of his lower extremities b/l. No urinary incontinence or retention. Pt does report constipation over the last 2 days.  Pt had MRI with anesthesia demonstrating L2-3 HNP with thecal sac compression, impending cauda equina without such symptoms.    1/25/23:  POD#0  L2-3 laminectomy / discectomy, large extruded disc.    1/26/23:  POD#1  improved power in LE's.  No radicular pain, numbness remains but improved also.    1/27 POD#2 L2-3 laminectomy discectomy No events overnight. Patient feels he is getting some movement back  No significant back pain or radicular pain    1/28 POD#3 L2-3 laminectomy, discectomy, feel well he states he has improved strength in LEs today    ICU Vital Signs Last 24 Hrs  T(C): 36.4 (28 Jan 2023 08:34), Max: 36.8 (27 Jan 2023 20:15)  T(F): 97.5 (28 Jan 2023 08:34), Max: 98.3 (27 Jan 2023 20:15)  HR: 60 (28 Jan 2023 14:30) (53 - 68)  BP: 142/92 (28 Jan 2023 14:30) (126/80 - 174/78)  BP(mean): --  ABP: --  ABP(mean): --  RR: 16 (28 Jan 2023 14:30) (16 - 16)  SpO2: 97% (28 Jan 2023 11:00) (97% - 100%)    O2 Parameters below as of 28 Jan 2023 11:00  Patient On (Oxygen Delivery Method): room air    MEDICATIONS  (STANDING):  acetaminophen     Tablet .. 975 milliGRAM(s) Oral every 6 hours  carvedilol 12.5 milliGRAM(s) Oral every 12 hours  enoxaparin Injectable 40 milliGRAM(s) SubCutaneous every 24 hours  gabapentin 100 milliGRAM(s) Oral two times a day  lidocaine   4% Patch 1 Patch Transdermal daily  losartan 25 milliGRAM(s) Oral daily  methocarbamol 1500 milliGRAM(s) Oral three times a day  multivitamin 1 Tablet(s) Oral daily  naloxone Injectable 0.4 milliGRAM(s) IV Push once  polyethylene glycol 3350 17 Gram(s) Oral two times a day  senna 2 Tablet(s) Oral at bedtime  simvastatin 40 milliGRAM(s) Oral at bedtime    MEDICATIONS  (PRN):  acetaminophen   IVPB .. 1000 milliGRAM(s) IV Intermittent once PRN Moderate Pain (4 - 6)  aluminum hydroxide/magnesium hydroxide/simethicone Suspension 30 milliLiter(s) Oral every 4 hours PRN Dyspepsia  magnesium hydroxide Suspension 30 milliLiter(s) Oral every 12 hours PRN Constipation  melatonin 3 milliGRAM(s) Oral at bedtime PRN Insomnia  ondansetron Injectable 4 milliGRAM(s) IV Push every 8 hours PRN Nausea and/or Vomiting  oxyCODONE    IR 5 milliGRAM(s) Oral every 4 hours PRN Moderate Pain (4 - 6)  oxyCODONE    IR 10 milliGRAM(s) Oral every 4 hours PRN Severe Pain (7 - 10)    Physical Exam:  Constitutional: Awake & alert AOx3  HEENT: PERRL, EOMI  Neck: Supple  Respiratory: Breath sounds are clear bilaterally  Cardiovascular: S1 and S2, regular rhythm  Gastrointestinal: Soft, NT/ND  Extremities:  no edema   Back dressing changed, incision site healing well without drainage      Neurological Exam:  HF: A&O x 3, appropriately interactive.  CN: PERRL, EOMI, VFF, facial sensation normal, no NLFD, tongue midline  Motor: No further pain.  Pt with improved power bilaterally in proximal motor groups, still with distal motor group paraparesis  RLE: IP 3+/5.  Knee flex 3+/5. Knee ext 2+/5 (almost kicks heel off bed). 0/5 DF, 1/5 PF, 1/5 EHL  LLE: IP 4-/5. Knee flex 4-/5.  Knee ext 3/5 (just antigravity with heel off bed).  DF/PF/EHL no movement.  Sensation: Intact to light touch, dysesthesia in feet, , Numbness improved per patient  from knees to feet  Coord:  No FNFA, dysmetria, RENO intact   Gait/Balance: Cannot test

## 2023-01-28 NOTE — PROGRESS NOTE ADULT - ASSESSMENT
57 year old male POD#3 L2-3 laminectomy, discectomy    incentive spirometry  SCDs for DVT prophylaxis  OOB/PT  [facility]     57 year old male POD#3 L2-3 laminectomy, discectomy    TOV in am  incentive spirometry  SCDs for DVT prophylaxis  OO[facility]/PT  [facility]

## 2023-01-29 PROCEDURE — 99232 SBSQ HOSP IP/OBS MODERATE 35: CPT

## 2023-01-29 RX ORDER — LOSARTAN POTASSIUM 100 MG/1
50 TABLET, FILM COATED ORAL DAILY
Refills: 0 | Status: DISCONTINUED | OUTPATIENT
Start: 2023-01-30 | End: 2023-01-30

## 2023-01-29 RX ADMIN — Medication 975 MILLIGRAM(S): at 22:30

## 2023-01-29 RX ADMIN — SENNA PLUS 2 TABLET(S): 8.6 TABLET ORAL at 21:17

## 2023-01-29 RX ADMIN — Medication 975 MILLIGRAM(S): at 05:43

## 2023-01-29 RX ADMIN — LIDOCAINE 1 PATCH: 4 CREAM TOPICAL at 11:22

## 2023-01-29 RX ADMIN — Medication 1 TABLET(S): at 11:24

## 2023-01-29 RX ADMIN — SIMVASTATIN 40 MILLIGRAM(S): 20 TABLET, FILM COATED ORAL at 21:18

## 2023-01-29 RX ADMIN — Medication 975 MILLIGRAM(S): at 11:24

## 2023-01-29 RX ADMIN — Medication 975 MILLIGRAM(S): at 22:57

## 2023-01-29 RX ADMIN — ENOXAPARIN SODIUM 40 MILLIGRAM(S): 100 INJECTION SUBCUTANEOUS at 05:35

## 2023-01-29 RX ADMIN — Medication 975 MILLIGRAM(S): at 05:33

## 2023-01-29 RX ADMIN — CARVEDILOL PHOSPHATE 12.5 MILLIGRAM(S): 80 CAPSULE, EXTENDED RELEASE ORAL at 11:21

## 2023-01-29 RX ADMIN — GABAPENTIN 100 MILLIGRAM(S): 400 CAPSULE ORAL at 11:23

## 2023-01-29 RX ADMIN — Medication 975 MILLIGRAM(S): at 17:46

## 2023-01-29 RX ADMIN — GABAPENTIN 100 MILLIGRAM(S): 400 CAPSULE ORAL at 21:17

## 2023-01-29 RX ADMIN — LOSARTAN POTASSIUM 25 MILLIGRAM(S): 100 TABLET, FILM COATED ORAL at 11:23

## 2023-01-29 RX ADMIN — POLYETHYLENE GLYCOL 3350 17 GRAM(S): 17 POWDER, FOR SOLUTION ORAL at 21:17

## 2023-01-29 RX ADMIN — POLYETHYLENE GLYCOL 3350 17 GRAM(S): 17 POWDER, FOR SOLUTION ORAL at 11:22

## 2023-01-29 RX ADMIN — Medication 975 MILLIGRAM(S): at 15:13

## 2023-01-29 RX ADMIN — METHOCARBAMOL 1500 MILLIGRAM(S): 500 TABLET, FILM COATED ORAL at 14:22

## 2023-01-29 RX ADMIN — CARVEDILOL PHOSPHATE 12.5 MILLIGRAM(S): 80 CAPSULE, EXTENDED RELEASE ORAL at 21:17

## 2023-01-29 RX ADMIN — METHOCARBAMOL 1500 MILLIGRAM(S): 500 TABLET, FILM COATED ORAL at 21:17

## 2023-01-29 RX ADMIN — LIDOCAINE 1 PATCH: 4 CREAM TOPICAL at 16:17

## 2023-01-29 RX ADMIN — METHOCARBAMOL 1500 MILLIGRAM(S): 500 TABLET, FILM COATED ORAL at 05:34

## 2023-01-29 NOTE — PROGRESS NOTE ADULT - SUBJECTIVE AND OBJECTIVE BOX
57 year old RH male with PMH CAD, s/p CABG, HTN, HLD, ETOH abuse (no alcohol use in 2 years as per pt) presented with back pain. Pt was seen in the ER on 1/22/23 and discharged home with a muscle relaxer and tylenol. He presented to the ER on 1/24 for worsening back pain. He states that 2 days ago he was lifting weights in his basement and then he heard a pop. He immediately had pain in the lower back. Was taking Tylenol and the muscle relaxer. Yesterday he was able to take a shower and then later in the day he was unable to get off of the couch d/t lower extremity weakness. He also noticed pain down his legs bilaterally. He also has decreased sensation of his lower extremities b/l. No urinary incontinence or retention. Pt does report constipation over the last 2 days.  Pt had MRI with anesthesia demonstrating L2-3 HNP with thecal sac compression, impending cauda equina without such symptoms.    1/25/23:  POD#0  L2-3 laminectomy / discectomy, large extruded disc.    1/26/23:  POD#1  improved power in LE's.  No radicular pain, numbness remains but improved also.    1/27 POD#2 L2-3 laminectomy discectomy No events overnight. Patient feels he is getting some movement back  No significant back pain or radicular pain    1/28 POD#3 L2-3 laminectomy, discectomy, feel well he states he has improved strength in LEs today    1/29 POD#4 L2-3 laminectomy, discectomy feels strength is getting better    ICU Vital Signs Last 24 Hrs  T(C): 36.7 (29 Jan 2023 08:19), Max: 36.7 (29 Jan 2023 08:19)  T(F): 98 (29 Jan 2023 08:19), Max: 98 (29 Jan 2023 08:19)  HR: 55 (29 Jan 2023 08:19) (51 - 62)  BP: 153/88 (29 Jan 2023 08:19) (140/83 - 153/88)  BP(mean): 102 (28 Jan 2023 21:27) (102 - 102)  ABP: --  ABP(mean): --  RR: 18 (29 Jan 2023 08:19) (17 - 18)  SpO2: 100% (29 Jan 2023 08:19) (99% - 100%)    O2 Parameters below as of 29 Jan 2023 08:19  Patient On (Oxygen Delivery Method): room air        MEDICATIONS  (STANDING):  acetaminophen     Tablet .. 975 milliGRAM(s) Oral every 6 hours  carvedilol 12.5 milliGRAM(s) Oral every 12 hours  enoxaparin Injectable 40 milliGRAM(s) SubCutaneous every 24 hours  gabapentin 100 milliGRAM(s) Oral two times a day  lidocaine   4% Patch 1 Patch Transdermal daily  methocarbamol 1500 milliGRAM(s) Oral three times a day  multivitamin 1 Tablet(s) Oral daily  naloxone Injectable 0.4 milliGRAM(s) IV Push once  polyethylene glycol 3350 17 Gram(s) Oral two times a day  senna 2 Tablet(s) Oral at bedtime  simvastatin 40 milliGRAM(s) Oral at bedtime    MEDICATIONS  (PRN):  acetaminophen   IVPB .. 1000 milliGRAM(s) IV Intermittent once PRN Moderate Pain (4 - 6)  aluminum hydroxide/magnesium hydroxide/simethicone Suspension 30 milliLiter(s) Oral every 4 hours PRN Dyspepsia  magnesium hydroxide Suspension 30 milliLiter(s) Oral every 12 hours PRN Constipation  melatonin 3 milliGRAM(s) Oral at bedtime PRN Insomnia  ondansetron Injectable 4 milliGRAM(s) IV Push every 8 hours PRN Nausea and/or Vomiting  oxyCODONE    IR 5 milliGRAM(s) Oral every 4 hours PRN Moderate Pain (4 - 6)  oxyCODONE    IR 10 milliGRAM(s) Oral every 4 hours PRN Severe Pain (7 - 10)    Physical Exam:  Constitutional: Awake & alert AOx3  HEENT: PERRL, EOMI  Neck: Supple  Respiratory: Breath sounds are clear bilaterally  Cardiovascular: S1 and S2, regular rhythm  Gastrointestinal: Soft, NT/ND  Extremities:  no edema   Back dressing changed, incision site healing well without drainage      Neurological Exam:  HF: A&O x 3, appropriately interactive.  CN: PERRL, EOMI, VFF, facial sensation normal, no NLFD, tongue midline  Motor: No further pain.  Pt with improved power bilaterally in proximal motor groups, still with distal motor group paraparesis  RLE: IP 3+/5.  Knee flex 3+/5. Knee ext 2+/5 (almost kicks heel off bed). 0/5 DF, 1/5 PF, 1/5 EHL  LLE: IP 4-/5. Knee flex 4-/5.  Knee ext 3/5 (just antigravity with heel off bed).  0/5 DF,  1/5 PF,  0/5 EHL no movement.  Sensation: Intact to light touch, dysesthesia in feet, , Numbness improved per patient  from knees to feet  Coord:  No FNFA, dysmetria, RENO intact   Gait/Balance: Cannot test

## 2023-01-29 NOTE — PROGRESS NOTE ADULT - ASSESSMENT
57 year old male POD#4 L2-3 laminectomy, discectomy    TESSIE edmonds  incentive spirometry  SCDs for DVT prophylaxis  OOB/PT  plan for [facility] Monday  d/w Dr Schaefer

## 2023-01-29 NOTE — PROGRESS NOTE ADULT - SUBJECTIVE AND OBJECTIVE BOX
CC- back pain    HPI:  58 y/o M with PMH CAD, s/p CABG, HTN, HLD, ETOH abuse (no alcohol use in 2 years as per pt) presented with back pain. Pt was seen in the ER on 1/22/23 and discharged home with a muscle relaxer and tylenol. He presented to the ER for worsening back pain. He states that 2 days ago he was lifting weights in his basement and then he heard a pop. He immediately had pain in the lower back. Was taking Tylenol and the muscle relaxer. Yesterday he was able to take a shower and then later in the day he was unable to get off of the couch d/t lower extremity weakness. He also noticed pain down his legs bilaterally. He also has decreased sensation of his lower extremities b/l. No urinary incontinence or retention. Pt does report constipation over the last 2 days. Has not had an ECHO or stress test in years, has tolerated anesthesia in the past without complication, prior surgeries occurred without complication. Was able to walk a flight of stairs prior to this injury. No chest pain or SOB with exertion. No recent vaccinations. No recent illnesses. Does not report a history of COVID.     Patient underwent urgent L2-3 laminectomy/ discectomy on 1/25/23 for cauda equina syndrome    1/26/23- c/o "cold sensation" both legs, not much movement in both LE's. Went for repeat MRI ordered by NS team  1/27/23- have movements both legs above the knees, still not much below the knees  1/28/23- some sensation in both feet today, more pins a needles feeling. Some movement in both feet  1/29/23- up with PT, slow return of sensation in feet.     Review of system- All 10 systems reviewed and is as per HPI otherwise negative.     Vital Signs Last 24 Hrs  T(C): 36.7 (29 Jan 2023 08:19), Max: 36.7 (29 Jan 2023 08:19)  T(F): 98 (29 Jan 2023 08:19), Max: 98 (29 Jan 2023 08:19)  HR: 55 (29 Jan 2023 08:19) (51 - 62)  BP: 153/88 (29 Jan 2023 08:19) (140/83 - 153/88)  BP(mean): 102 (28 Jan 2023 21:27) (102 - 102)  RR: 18 (29 Jan 2023 08:19) (16 - 18)  SpO2: 100% (29 Jan 2023 08:19) (99% - 100%)    Parameters below as of 29 Jan 2023 08:19  Patient On (Oxygen Delivery Method): room air      LABS:    No new labs today    RADIOLOGY & ADDITIONAL TESTS:  EXAM:  MR SPINE LUMBAR WAW IC   ORDERED BY: AMBER ZUNIGA     ACC: [id number] EXAM:  MR SPINE THORACIC WAW IC   ORDERED BY: AMBER ZUNIGA     PROCEDURE DATE:  01/26/2023      INTERPRETATION:  MRI of the lumbar spine with contrast    CLINICAL INDICATION:  Status L2 laminectomy and discectomy. Lower   extremity paresis.    TECHNIQUE: Multiplanar, multisequence MR images of the lumbar spine were   obtained before and after the intravenous administration of 7.5 cc of   Gadavist.0 cc were discarded.    COMPARISON: MRI lumbar spine 1/25/2023. CT lumbar spine 1/22/2023. CT   angiography chest 3/7/2020    FINDINGS:    MRI THORACIC SPINE:    Vertebral body height, marrow signal homogeneity, and facet alignment are   maintained throughout the visualized spinal segments.    No spinal cord compression or abnormal intrinsic cord signal.    No abnormal enhancement in the thoracic region.    Mild multilevel degenerative changes without significant spinal canal   stenosis or neuralforaminal narrowing.    MRI LUMBAR SPINE:    Interval total L2 laminectomy.    Vertebral body height, marrow signal homogeneity, and facet alignment are   maintained throughout the visualized spinal segments.    No intervertebral disc space narrowing. Disc desiccation at L4-L5 and   L5-S1.    T12-L1: Bilateral facet hypertrophy. Mild posterior thecal sac   compression. No neural foraminal.    L1-L2: Bilateral facet hypertrophy. No spinal canal stenosis or neural   foraminal narrowing.    L2-L3: Previously seen ventral disc material has been largely resected.   Residual moderate to severe high-grade stenosis due residual disc bulge,   thin enhancing ventral granulation tissue, and persistent posterior   facet/ligamentous hypertrophy. Mild right and mild to moderate left   neural foraminal narrowing.    L3-L4: Mild disc bulge and bilateral facet/ligamentous hypertrophy. Mild   thecal sac compression. Mild left neural foraminal narrowing.    L4-L5: Disc bulge and bilateral facet/ligamentous hypertrophy. Mild   thecal sac compression. Mild to moderate and bilateral neural foraminal   narrowing.    L5-S1: Disc bulge with superimposed broad-based central disc protrusion.   Bilateral facet hypertrophy. Mild ventral thecal sac compression.   Moderate left and mild right neural foraminal.    IMPRESSION:    MRI THORACIC SPINE:  No spinal cord compression or abnormal intrinsic cord signal.    No abnormal enhancement in the thoracic region.    Mild multilevel degenerative changes without significant spinal canal   stenosis or neural foraminal narrowing.    MRI LUMBAR SPINE:  Interval total L2 laminectomy.    Previously seen ventral disc material at L2-L3 has been largely resected.   Residual moderate to severe high-grade stenosis due residual disc bulge,   thin enhancing ventral granulation tissue, and persistent posterior   facet/ligamentous hypertrophy. Mild right and mild to moderate left   neural foraminal narrowing.    Other degenerative changes similar to the prior MRI.      PHYSICAL EXAM:  GENERAL: NAD, well-groomed, well-developed  HEAD:  Atraumatic, Normocephalic  EYES: EOMI, PERRLA, conjunctiva and sclera clear  HEENT: Moist mucous membranes  NECK: Supple, No JVD  NERVOUS SYSTEM:  Alert & Oriented X3, able to flex/extend proximal to knees both LE, some minimal dorsiflexion/ plantar flexion both feet  CHEST/LUNG: Clear to auscultation bilaterally; No rales, rhonchi, wheezing, or rubs  HEART: Regular rate and rhythm; No murmurs, rubs, or gallops  ABDOMEN: Soft, Nontender, Nondistended; Bowel sounds present  GENITOURINARY- Vergara+  EXTREMITIES:  2+ Peripheral Pulses, No clubbing, cyanosis, or edema  MUSCULOSKELTAL- No muscle tenderness, Muscle tone normal, No joint tenderness, no Joint swelling, Joint range of motion-normal  SKIN-no rash, no lesion    MEDICATIONS  (STANDING):  acetaminophen     Tablet .. 975 milliGRAM(s) Oral every 6 hours  carvedilol 12.5 milliGRAM(s) Oral every 12 hours  enoxaparin Injectable 40 milliGRAM(s) SubCutaneous every 24 hours  gabapentin 100 milliGRAM(s) Oral two times a day  lidocaine   4% Patch 1 Patch Transdermal daily  methocarbamol 1500 milliGRAM(s) Oral three times a day  multivitamin 1 Tablet(s) Oral daily  naloxone Injectable 0.4 milliGRAM(s) IV Push once  polyethylene glycol 3350 17 Gram(s) Oral two times a day  senna 2 Tablet(s) Oral at bedtime  simvastatin 40 milliGRAM(s) Oral at bedtime    MEDICATIONS  (PRN):  acetaminophen     Tablet .. 650 milliGRAM(s) Oral every 6 hours PRN Temp greater or equal to 38C (100.4F), Mild Pain (1 - 3)  acetaminophen   IVPB .. 1000 milliGRAM(s) IV Intermittent once PRN Moderate Pain (4 - 6)  aluminum hydroxide/magnesium hydroxide/simethicone Suspension 30 milliLiter(s) Oral every 4 hours PRN Dyspepsia  gabapentin 100 milliGRAM(s) Oral three times a day PRN neuropathic pain  HYDROmorphone  Injectable 1 milliGRAM(s) IV Push every 3 hours PRN severe breakthrough pain  magnesium hydroxide Suspension 30 milliLiter(s) Oral every 12 hours PRN Constipation  melatonin 3 milliGRAM(s) Oral at bedtime PRN Insomnia  ondansetron Injectable 4 milliGRAM(s) IV Push every 8 hours PRN Nausea and/or Vomiting  oxyCODONE    IR 5 milliGRAM(s) Oral every 4 hours PRN Moderate Pain (4 - 6)  oxyCODONE    IR 10 milliGRAM(s) Oral every 4 hours PRN Severe Pain (7 - 10)    Assessment/Plan  #Cauda equina syndrome/ large HNP/ severe lumbar stenosis  #S/p L2-3 laminectomy/ discectomy  NS team and neurology following  S/p repeat MRI LS spine- residual mod-severe high grade stenosis present due to residual disc  Neurodeficits consistent with spinal cord injury  Physiatry eval appreciated- Acute rehab on discharge  Pain meds prn  Vergara in- voiding trial tonight  PT as tolerated  Muscle relaxants prn, neurontin TID  Bowel regimen  Incentive spirometry    #No evidence of withdrawal  Patient reports being sober for 2 years- will DC [facility]wa    #CAD s/p CABG  Hold aspirin postop x7 days, cont statin    #HTN  Increase coreg to 12.5mg BID  Increase cozaar 50mg daily    #DVT proph-  lovenox sq    #Dispo- voiding trial tonight.  Acute rehab/ spinal cord injury [facility] tomorrow

## 2023-01-30 ENCOUNTER — TRANSCRIPTION ENCOUNTER (OUTPATIENT)
Age: 58
End: 2023-01-30

## 2023-01-30 ENCOUNTER — INPATIENT (INPATIENT)
Facility: HOSPITAL | Age: 58
LOS: 24 days | Discharge: NURSING, REHAB OR LONG-TERM CARE | DRG: 949 | End: 2023-02-24
Attending: INTERNAL MEDICINE | Admitting: PHYSICAL MEDICINE & REHABILITATION
Payer: MEDICARE

## 2023-01-30 VITALS
RESPIRATION RATE: 18 BRPM | DIASTOLIC BLOOD PRESSURE: 90 MMHG | SYSTOLIC BLOOD PRESSURE: 149 MMHG | TEMPERATURE: 98 F | OXYGEN SATURATION: 97 % | HEART RATE: 56 BPM

## 2023-01-30 VITALS
SYSTOLIC BLOOD PRESSURE: 167 MMHG | RESPIRATION RATE: 16 BRPM | OXYGEN SATURATION: 99 % | WEIGHT: 166.45 LBS | HEART RATE: 57 BPM | HEIGHT: 68 IN | DIASTOLIC BLOOD PRESSURE: 99 MMHG | TEMPERATURE: 98 F

## 2023-01-30 DIAGNOSIS — Z95.1 PRESENCE OF AORTOCORONARY BYPASS GRAFT: Chronic | ICD-10-CM

## 2023-01-30 DIAGNOSIS — G83.4 CAUDA EQUINA SYNDROME: ICD-10-CM

## 2023-01-30 LAB
ANION GAP SERPL CALC-SCNC: 4 MMOL/L — LOW (ref 5–17)
APPEARANCE UR: CLEAR — SIGNIFICANT CHANGE UP
BILIRUB UR-MCNC: NEGATIVE — SIGNIFICANT CHANGE UP
BUN SERPL-MCNC: 18 MG/DL — SIGNIFICANT CHANGE UP (ref 7–23)
CALCIUM SERPL-MCNC: 9.3 MG/DL — SIGNIFICANT CHANGE UP (ref 8.5–10.1)
CHLORIDE SERPL-SCNC: 105 MMOL/L — SIGNIFICANT CHANGE UP (ref 96–108)
CO2 SERPL-SCNC: 27 MMOL/L — SIGNIFICANT CHANGE UP (ref 22–31)
COLOR SPEC: YELLOW — SIGNIFICANT CHANGE UP
CREAT SERPL-MCNC: 0.68 MG/DL — SIGNIFICANT CHANGE UP (ref 0.5–1.3)
DIFF PNL FLD: ABNORMAL
EGFR: 108 ML/MIN/1.73M2 — SIGNIFICANT CHANGE UP
GLUCOSE SERPL-MCNC: 102 MG/DL — HIGH (ref 70–99)
GLUCOSE UR QL: NEGATIVE — SIGNIFICANT CHANGE UP
HCT VFR BLD CALC: 40.3 % — SIGNIFICANT CHANGE UP (ref 39–50)
HGB BLD-MCNC: 13.2 G/DL — SIGNIFICANT CHANGE UP (ref 13–17)
KETONES UR-MCNC: NEGATIVE — SIGNIFICANT CHANGE UP
LEUKOCYTE ESTERASE UR-ACNC: ABNORMAL
MCHC RBC-ENTMCNC: 28.5 PG — SIGNIFICANT CHANGE UP (ref 27–34)
MCHC RBC-ENTMCNC: 32.8 GM/DL — SIGNIFICANT CHANGE UP (ref 32–36)
MCV RBC AUTO: 87 FL — SIGNIFICANT CHANGE UP (ref 80–100)
NITRITE UR-MCNC: NEGATIVE — SIGNIFICANT CHANGE UP
PH UR: 6 — SIGNIFICANT CHANGE UP (ref 5–8)
PLATELET # BLD AUTO: 209 K/UL — SIGNIFICANT CHANGE UP (ref 150–400)
POTASSIUM SERPL-MCNC: 3.9 MMOL/L — SIGNIFICANT CHANGE UP (ref 3.5–5.3)
POTASSIUM SERPL-SCNC: 3.9 MMOL/L — SIGNIFICANT CHANGE UP (ref 3.5–5.3)
PROT UR-MCNC: 15
RBC # BLD: 4.63 M/UL — SIGNIFICANT CHANGE UP (ref 4.2–5.8)
RBC # FLD: 13.5 % — SIGNIFICANT CHANGE UP (ref 10.3–14.5)
SARS-COV-2 RNA SPEC QL NAA+PROBE: SIGNIFICANT CHANGE UP
SODIUM SERPL-SCNC: 136 MMOL/L — SIGNIFICANT CHANGE UP (ref 135–145)
SP GR SPEC: 1.02 — SIGNIFICANT CHANGE UP (ref 1.01–1.02)
UROBILINOGEN FLD QL: 4
WBC # BLD: 6.47 K/UL — SIGNIFICANT CHANGE UP (ref 3.8–10.5)
WBC # FLD AUTO: 6.47 K/UL — SIGNIFICANT CHANGE UP (ref 3.8–10.5)

## 2023-01-30 PROCEDURE — 99239 HOSP IP/OBS DSCHRG MGMT >30: CPT

## 2023-01-30 PROCEDURE — 99232 SBSQ HOSP IP/OBS MODERATE 35: CPT

## 2023-01-30 RX ORDER — SENNA PLUS 8.6 MG/1
2 TABLET ORAL AT BEDTIME
Refills: 0 | Status: DISCONTINUED | OUTPATIENT
Start: 2023-01-30 | End: 2023-02-24

## 2023-01-30 RX ORDER — CARVEDILOL PHOSPHATE 80 MG/1
12.5 CAPSULE, EXTENDED RELEASE ORAL EVERY 12 HOURS
Refills: 0 | Status: DISCONTINUED | OUTPATIENT
Start: 2023-01-30 | End: 2023-01-30

## 2023-01-30 RX ORDER — ACETAMINOPHEN 500 MG
975 TABLET ORAL EVERY 6 HOURS
Refills: 0 | Status: DISCONTINUED | OUTPATIENT
Start: 2023-01-30 | End: 2023-02-24

## 2023-01-30 RX ORDER — LIDOCAINE 4 G/100G
1 CREAM TOPICAL DAILY
Refills: 0 | Status: DISCONTINUED | OUTPATIENT
Start: 2023-01-31 | End: 2023-02-14

## 2023-01-30 RX ORDER — OXYCODONE HYDROCHLORIDE 5 MG/1
5 TABLET ORAL EVERY 4 HOURS
Refills: 0 | Status: DISCONTINUED | OUTPATIENT
Start: 2023-01-30 | End: 2023-02-03

## 2023-01-30 RX ORDER — CARVEDILOL PHOSPHATE 80 MG/1
1 CAPSULE, EXTENDED RELEASE ORAL
Qty: 0 | Refills: 0 | DISCHARGE

## 2023-01-30 RX ORDER — POLYETHYLENE GLYCOL 3350 17 G/17G
17 POWDER, FOR SOLUTION ORAL
Refills: 0 | Status: DISCONTINUED | OUTPATIENT
Start: 2023-01-30 | End: 2023-02-24

## 2023-01-30 RX ORDER — SIMVASTATIN 20 MG/1
40 TABLET, FILM COATED ORAL AT BEDTIME
Refills: 0 | Status: DISCONTINUED | OUTPATIENT
Start: 2023-01-30 | End: 2023-02-24

## 2023-01-30 RX ORDER — CARVEDILOL PHOSPHATE 80 MG/1
12.5 CAPSULE, EXTENDED RELEASE ORAL EVERY 12 HOURS
Refills: 0 | Status: DISCONTINUED | OUTPATIENT
Start: 2023-01-31 | End: 2023-02-24

## 2023-01-30 RX ORDER — OXYCODONE HYDROCHLORIDE 5 MG/1
10 TABLET ORAL EVERY 4 HOURS
Refills: 0 | Status: DISCONTINUED | OUTPATIENT
Start: 2023-01-30 | End: 2023-02-03

## 2023-01-30 RX ORDER — ENOXAPARIN SODIUM 100 MG/ML
40 INJECTION SUBCUTANEOUS
Refills: 0 | Status: DISCONTINUED | OUTPATIENT
Start: 2023-01-31 | End: 2023-02-24

## 2023-01-30 RX ORDER — LANOLIN ALCOHOL/MO/W.PET/CERES
3 CREAM (GRAM) TOPICAL AT BEDTIME
Refills: 0 | Status: DISCONTINUED | OUTPATIENT
Start: 2023-01-30 | End: 2023-02-07

## 2023-01-30 RX ORDER — GABAPENTIN 400 MG/1
1 CAPSULE ORAL
Qty: 0 | Refills: 0 | DISCHARGE
Start: 2023-01-30

## 2023-01-30 RX ORDER — MAGNESIUM HYDROXIDE 400 MG/1
30 TABLET, CHEWABLE ORAL EVERY 12 HOURS
Refills: 0 | Status: DISCONTINUED | OUTPATIENT
Start: 2023-01-30 | End: 2023-02-24

## 2023-01-30 RX ORDER — METHOCARBAMOL 500 MG/1
1500 TABLET, FILM COATED ORAL THREE TIMES A DAY
Refills: 0 | Status: DISCONTINUED | OUTPATIENT
Start: 2023-01-30 | End: 2023-02-24

## 2023-01-30 RX ORDER — POLYETHYLENE GLYCOL 3350 17 G/17G
17 POWDER, FOR SOLUTION ORAL
Qty: 0 | Refills: 0 | DISCHARGE
Start: 2023-01-30

## 2023-01-30 RX ORDER — LOSARTAN POTASSIUM 100 MG/1
1 TABLET, FILM COATED ORAL
Qty: 0 | Refills: 0 | DISCHARGE
Start: 2023-01-30

## 2023-01-30 RX ORDER — OXYCODONE HYDROCHLORIDE 5 MG/1
1 TABLET ORAL
Qty: 0 | Refills: 0 | DISCHARGE
Start: 2023-01-30

## 2023-01-30 RX ORDER — CARVEDILOL PHOSPHATE 80 MG/1
1 CAPSULE, EXTENDED RELEASE ORAL
Qty: 0 | Refills: 0 | DISCHARGE
Start: 2023-01-30

## 2023-01-30 RX ORDER — LOSARTAN POTASSIUM 100 MG/1
50 TABLET, FILM COATED ORAL DAILY
Refills: 0 | Status: DISCONTINUED | OUTPATIENT
Start: 2023-01-30 | End: 2023-01-31

## 2023-01-30 RX ORDER — INFLUENZA VIRUS VACCINE 15; 15; 15; 15 UG/.5ML; UG/.5ML; UG/.5ML; UG/.5ML
0.5 SUSPENSION INTRAMUSCULAR ONCE
Refills: 0 | Status: COMPLETED | OUTPATIENT
Start: 2023-01-30 | End: 2023-01-30

## 2023-01-30 RX ORDER — SENNA PLUS 8.6 MG/1
2 TABLET ORAL
Qty: 0 | Refills: 0 | DISCHARGE
Start: 2023-01-30

## 2023-01-30 RX ORDER — GABAPENTIN 400 MG/1
100 CAPSULE ORAL
Refills: 0 | Status: DISCONTINUED | OUTPATIENT
Start: 2023-01-30 | End: 2023-02-06

## 2023-01-30 RX ADMIN — OXYCODONE HYDROCHLORIDE 10 MILLIGRAM(S): 5 TABLET ORAL at 11:34

## 2023-01-30 RX ADMIN — LIDOCAINE 1 PATCH: 4 CREAM TOPICAL at 03:00

## 2023-01-30 RX ADMIN — METHOCARBAMOL 1500 MILLIGRAM(S): 500 TABLET, FILM COATED ORAL at 13:13

## 2023-01-30 RX ADMIN — SENNA PLUS 2 TABLET(S): 8.6 TABLET ORAL at 23:10

## 2023-01-30 RX ADMIN — OXYCODONE HYDROCHLORIDE 10 MILLIGRAM(S): 5 TABLET ORAL at 10:24

## 2023-01-30 RX ADMIN — METHOCARBAMOL 1500 MILLIGRAM(S): 500 TABLET, FILM COATED ORAL at 06:32

## 2023-01-30 RX ADMIN — OXYCODONE HYDROCHLORIDE 10 MILLIGRAM(S): 5 TABLET ORAL at 15:38

## 2023-01-30 RX ADMIN — Medication 1 TABLET(S): at 10:24

## 2023-01-30 RX ADMIN — Medication 975 MILLIGRAM(S): at 06:58

## 2023-01-30 RX ADMIN — POLYETHYLENE GLYCOL 3350 17 GRAM(S): 17 POWDER, FOR SOLUTION ORAL at 10:23

## 2023-01-30 RX ADMIN — GABAPENTIN 100 MILLIGRAM(S): 400 CAPSULE ORAL at 23:11

## 2023-01-30 RX ADMIN — Medication 975 MILLIGRAM(S): at 23:11

## 2023-01-30 RX ADMIN — METHOCARBAMOL 1500 MILLIGRAM(S): 500 TABLET, FILM COATED ORAL at 23:11

## 2023-01-30 RX ADMIN — Medication 975 MILLIGRAM(S): at 06:32

## 2023-01-30 RX ADMIN — ENOXAPARIN SODIUM 40 MILLIGRAM(S): 100 INJECTION SUBCUTANEOUS at 06:32

## 2023-01-30 RX ADMIN — CARVEDILOL PHOSPHATE 12.5 MILLIGRAM(S): 80 CAPSULE, EXTENDED RELEASE ORAL at 10:24

## 2023-01-30 RX ADMIN — LIDOCAINE 1 PATCH: 4 CREAM TOPICAL at 10:25

## 2023-01-30 RX ADMIN — Medication 975 MILLIGRAM(S): at 12:05

## 2023-01-30 RX ADMIN — LOSARTAN POTASSIUM 50 MILLIGRAM(S): 100 TABLET, FILM COATED ORAL at 10:24

## 2023-01-30 RX ADMIN — OXYCODONE HYDROCHLORIDE 10 MILLIGRAM(S): 5 TABLET ORAL at 16:30

## 2023-01-30 RX ADMIN — Medication 975 MILLIGRAM(S): at 11:43

## 2023-01-30 RX ADMIN — SIMVASTATIN 40 MILLIGRAM(S): 20 TABLET, FILM COATED ORAL at 23:11

## 2023-01-30 RX ADMIN — GABAPENTIN 100 MILLIGRAM(S): 400 CAPSULE ORAL at 10:24

## 2023-01-30 NOTE — H&P ADULT - NS ATTEND AMEND GEN_ALL_CORE FT
Rehab Attending- Patient seen and examined by me - Case discussed, above note reviewed by me with modifications made    This is a 58 YO male with PMH of CAD, s/p CABG, HTN, HLD, ETOH abuse (no alcohol use in 2 years as per pt) who presented to [facility] on  with back pain and progressive lower extremity weakness. Examination with concern for cauda equina. He was admitted for cauda equina syndrome and he  underwent urgent L2-3 laminectomy/ discectomy on 23.   Patient now with gait Instability, ADL impairments and Functional impairments.    Doing well  reports increasing strength vel LLE  no BM X> 7 days- had urge to go while in WC but passed  no dizziness, no headaches  no nausea, no vomiting  no SOB, No chest pain  BPs high meds adjusted    MEDICATIONS  (STANDING):  acetaminophen     Tablet .. 975 milliGRAM(s) Oral every 6 hours  carvedilol 12.5 milliGRAM(s) Oral every 12 hours  enoxaparin Injectable 40 milliGRAM(s) SubCutaneous <User Schedule>  gabapentin 100 milliGRAM(s) Oral two times a day  influenza   Vaccine 0.5 milliLiter(s) IntraMuscular once  lidocaine   4% Patch 1 Patch Transdermal daily  losartan 100 milliGRAM(s) Oral daily  methocarbamol 1500 milliGRAM(s) Oral three times a day  multivitamin 1 Tablet(s) Oral daily  polyethylene glycol 3350 17 Gram(s) Oral two times a day  saline laxative (FLEET) Rectal Enema 1 Enema Rectal once  senna 2 Tablet(s) Oral at bedtime  simvastatin 40 milliGRAM(s) Oral at bedtime    MEDICATIONS  (PRN):  magnesium hydroxide Suspension 30 milliLiter(s) Oral every 12 hours PRN Constipation  melatonin 3 milliGRAM(s) Oral at bedtime PRN Insomnia  oxyCODONE    IR 5 milliGRAM(s) Oral every 4 hours PRN Moderate Pain (4 - 6)  oxyCODONE    IR 10 milliGRAM(s) Oral every 4 hours PRN Severe Pain (7 - 10)                            13.1   6.60  )-----------( 206      ( 2023 06:25 )             40.2     -    141  |  104  |  19  ----------------------------<  99  4.2   |  31  |  0.66    Ca    9.0      2023 06:25    TPro  7.2  /  Alb  3.6  /  TBili  0.4  /  DBili  x   /  AST  24  /  ALT  48<H>  /  AlkPhos  56      Urinalysis Basic - ( 2023 23:30 )    Color: Yellow / Appearance: Clear / S.020 / pH: x  Gluc: x / Ketone: Negative  / Bili: Negative / Urobili: 4   Blood: x / Protein: 15 / Nitrite: Negative   Leuk Esterase: Trace / RBC: 5-10 /HPF / WBC 0-2 /HPF   Sq Epi: x / Non Sq Epi: Neg.-Few / Bacteria: Negative /HPF        CAPILLARY BLOOD GLUCOSE          Vital Signs Last 24 Hrs  T(C): 36.5 (2023 07:49), Max: 36.5 (2023 07:49)  T(F): 97.7 (2023 07:49), Max: 97.7 (2023 07:49)  HR: 63 (2023 18:19) (55 - 67)  BP: 171/96 (2023 18:19) (142/85 - 172/91)  BP(mean): --  RR: 16 (2023 07:49) (16 - 16)  SpO2: 98% (2023 07:49) (98% - 99%)    Parameters below as of 2023 07:49  Patient On (Oxygen Delivery Method): room air    Physical exam as above  Noted paraparesis RLE weaker than Left  RLE essentially < antigravity  sens diminished below knees lan  + Vergara    labs reviewed by me- stable    IMP Rehab paraparesis secondary to cauda equina syndrome secondary to HNP sp L23 lami/discectomy- Good candidate for intensive rehab- will tolerate three hours rehab daily  B/B- No BM > 7 days- enema tonight- miralax/senna- will try to regulate BMs to AM- May need suppos daily in AM once initial BM achieved  ? TOV once more ambulatory- patient sensed St caths at [facility]- against [facility] at present  HTN- On Coreg and losartan- Losartan increased to 100mg daily  CAD/ Hx CABG- on ASA  DVT Proph - Lovenox

## 2023-01-30 NOTE — DISCHARGE NOTE PROVIDER - HOSPITAL COURSE
57M PMH CAD, s/p CABG, HTN, HLD, ETOH abuse (no alcohol use in 2 years as per pt) presented with back pain. Pt was seen in the ER on 1/22/23 and discharged home with a muscle relaxer and tylenol. He presented to the ER for worsening back pain. He stated that 2 pta he was lifting weights in his basement and then he heard a pop. He immediately had pain in the lower back. Was taking Tylenol and the muscle relaxer. The day prior to admission,  he was able to take a shower and then later in the day he was unable to get off of the couch d/t lower extremity weakness. He also noticed pain down his legs bilaterally. He also has decreased sensation of his lower extremities b/l. He was evaluated by neurosurgery in ED and admitted to medicine. He underwent urgent L2-3 laminectomy/ discectomy on 1/25/23 for cauda equina syndrome.   Post op is slowly regaining strength. He did require a youssef for urinary retention. He is advised tov @ [facility] once more mobile. His bb was increased and he was started on losartan for uncontrolled htn. He may need further titration of meds while at rehab.   He is medically stable for dc to Acute rehab. ASA may be resumed 7 days post op per neurosurgery.     Vital Signs Last 24 Hrs  T(C): 36.7 (30 Jan 2023 09:05), Max: 36.8 (29 Jan 2023 20:26)  T(F): 98 (30 Jan 2023 09:05), Max: 98.2 (29 Jan 2023 20:26)  HR: 57 (30 Jan 2023 09:05) (56 - 65)  BP: 154/82 (30 Jan 2023 09:05) (147/89 - 158/88)  BP(mean): 103 (29 Jan 2023 20:26) (103 - 103)  RR: 18 (30 Jan 2023 09:05) (16 - 18)  SpO2: 97% (30 Jan 2023 09:05) (97% - 100%)    Parameters below as of 30 Jan 2023 09:05  Patient On (Oxygen Delivery Method): room air    PHYSICAL EXAM:    GENERAL: Comfortable, no acute distress   HEAD:  Normocephalic, atraumatic  EYES: EOMI, PERRLA  HEENT: Moist mucous membranes  NECK: Supple, No JVD  NERVOUS SYSTEM:  Alert & Oriented X3, 5/5 power bilateral UE, LLE 4-/5 power, RLE 3/5 power.   CHEST/LUNG: Clear to auscultation bilaterally  HEART: Regular rate and rhythm  ABDOMEN: Soft, Nontender, Nondistended, Bowel sounds present  GENITOURINARY: Voiding, no palpable bladder  EXTREMITIES:   No clubbing, cyanosis, or edema  MUSCULOSKELETAL- No muscle tenderness, no joint tenderness  SKIN-no rash    LABS:                        13.2   6.47  )-----------( 209      ( 30 Jan 2023 07:58 )             40.3     01-30    136  |  105  |  18  ----------------------------<  102<H>  3.9   |  27  |  0.68    Ca    9.3      30 Jan 2023 07:58     MR Lumbar Spine w/wo IV Cont (01.26.23 @ 10:01) >  IMPRESSION:  MRI THORACIC SPINE:  No spinal cord compression or abnormal intrinsic cord signal.  No abnormal enhancement in the thoracic region.  Mild multilevel degenerative changes without significant spinal canal   stenosis or neural foraminal narrowing.  MRI LUMBAR SPINE:  Interval total L2 laminectomy.  Previously seen ventral disc material at L2-L3 has been largely resected.   Residual moderate to severe high-grade stenosis due residual disc bulge,   thin enhancing ventral granulation tissue, and persistent posterior   facet/ligamentous hypertrophy. Mild right and mild to moderate left   neural foraminal narrowing.    Xray Chest 2 Views PA/Lat (01.24.23 @ 20:57) >  Impression:  No acute pulmonary disease.    FINAL DIAGNOSIS:  #Cauda equina syndrome/ large HNP/ severe lumbar stenosis  #S/p L2-3 laminectomy/ discectomy  #CAD s/p CABG  #HTN    time taken for dc 45 min  d/w pt  summary to be faxed to pcp.             57M PMH CAD, s/p CABG, HTN, HLD, ETOH abuse (no alcohol use in 2 years as per pt) presented with back pain. Pt was seen in the ER on 1/22/23 and discharged home with a muscle relaxer and tylenol. He presented to the ER for worsening back pain. He stated that 2 pta he was lifting weights in his basement and then he heard a pop. He immediately had pain in the lower back. Was taking Tylenol and the muscle relaxer. The day prior to admission,  he was able to take a shower and then later in the day he was unable to get off of the couch d/t lower extremity weakness. He also noticed pain down his legs bilaterally. He also has decreased sensation of his lower extremities b/l. He was evaluated by neurosurgery in ED and admitted to medicine. He underwent urgent L2-3 laminectomy/ discectomy on 1/25/23 for cauda equina syndrome.   Post op is slowly regaining strength. He did require reinsertion of youssef for failed TOV/urinary retention. he is advised to f/u with urology regarding this. He will be started on flomax. His bb was increased and he was started on losartan for uncontrolled htn. He may need further titration of meds while at rehab.   He is medically stable for dc to Acute rehab. ASA may be resumed 7 days post op per neurosurgery.     Vital Signs Last 24 Hrs  T(C): 36.7 (30 Jan 2023 09:05), Max: 36.8 (29 Jan 2023 20:26)  T(F): 98 (30 Jan 2023 09:05), Max: 98.2 (29 Jan 2023 20:26)  HR: 57 (30 Jan 2023 09:05) (56 - 65)  BP: 154/82 (30 Jan 2023 09:05) (147/89 - 158/88)  BP(mean): 103 (29 Jan 2023 20:26) (103 - 103)  RR: 18 (30 Jan 2023 09:05) (16 - 18)  SpO2: 97% (30 Jan 2023 09:05) (97% - 100%)    Parameters below as of 30 Jan 2023 09:05  Patient On (Oxygen Delivery Method): room air    PHYSICAL EXAM:    GENERAL: Comfortable, no acute distress   HEAD:  Normocephalic, atraumatic  EYES: EOMI, PERRLA  HEENT: Moist mucous membranes  NECK: Supple, No JVD  NERVOUS SYSTEM:  Alert & Oriented X3, 5/5 power bilateral UE, LLE 4-/5 power, RLE 3/5 power.   CHEST/LUNG: Clear to auscultation bilaterally  HEART: Regular rate and rhythm  ABDOMEN: Soft, Nontender, Nondistended, Bowel sounds present  GENITOURINARY: Voiding, no palpable bladder  EXTREMITIES:   No clubbing, cyanosis, or edema  MUSCULOSKELETAL- No muscle tenderness, no joint tenderness  SKIN-no rash    LABS:                        13.2   6.47  )-----------( 209      ( 30 Jan 2023 07:58 )             40.3     01-30    136  |  105  |  18  ----------------------------<  102<H>  3.9   |  27  |  0.68    Ca    9.3      30 Jan 2023 07:58     MR Lumbar Spine w/wo IV Cont (01.26.23 @ 10:01) >  IMPRESSION:  MRI THORACIC SPINE:  No spinal cord compression or abnormal intrinsic cord signal.  No abnormal enhancement in the thoracic region.  Mild multilevel degenerative changes without significant spinal canal   stenosis or neural foraminal narrowing.  MRI LUMBAR SPINE:  Interval total L2 laminectomy.  Previously seen ventral disc material at L2-L3 has been largely resected.   Residual moderate to severe high-grade stenosis due residual disc bulge,   thin enhancing ventral granulation tissue, and persistent posterior   facet/ligamentous hypertrophy. Mild right and mild to moderate left   neural foraminal narrowing.    Xray Chest 2 Views PA/Lat (01.24.23 @ 20:57) >  Impression:  No acute pulmonary disease.    FINAL DIAGNOSIS:  #Cauda equina syndrome/ large HNP/ severe lumbar stenosis  #S/p L2-3 laminectomy/ discectomy  #CAD s/p CABG  #HTN    time taken for dc 45 min  d/w pt  summary to be faxed to pcp.

## 2023-01-30 NOTE — DISCHARGE NOTE NURSING/CASE MANAGEMENT/SOCIAL WORK - NSDCPEFALRISK_GEN_ALL_CORE
For information on Fall & Injury Prevention, visit: [email or web address] OR  [email or web address] OR  https://www.cdc.gov/steadi/patient.html

## 2023-01-30 NOTE — H&P ADULT - NSHPPHYSICALEXAM_GEN_ALL_CORE
Constitutional - NAD, Comfortable  HEENT - NCAT, EOMI  Neck - Supple, No limited ROM  Chest - good chest expansion, good respiratory effort, CTAB   Cardio - warm and well perfused   Abdomen -  Soft, NTND  Extremities - No peripheral edema, No calf tenderness   Neurologic Exam:                    Cognitive -             Orientation: Awake, Alert, AAO to self, place, date, year, situation            Thought: process, content appropriate     Speech - Fluent, Comprehensible, No dysarthria, No aphasia      Cranial Nerves - No facial asymmetry, Tongue midline, Shoulder shrug intact     Motor -                      LEFT    UE - ShAB 5/5, EF 5/5, EE 5/5, WE 5/5,  WNL                    RIGHT UE - ShAB 5/5, EF 5/5, EE 5/5, WE 5/5,  WNL                    LEFT    LE - HF 2/5, KE 2/5, DF 1/5, PF 3/5                    RIGHT LE - HF 2/5, KE 2/5, DF 0/5, PF 3/5        Sensory - decrease sensation below the knee.      OculoVestibular -  No nystagmus  Psychiatric - Mood stable, Affect WNL  Skin on admission: lumbar incision looks well healed. open to air currently.  otherwise the skin is warm dry & intact. No rashes wounds or scars Constitutional - NAD, Comfortable  HEENT - NCAT, EOMI  Neck - Supple, No limited ROM  Chest - good chest expansion, good respiratory effort, CTAB   Cardio - warm and well perfused   Abdomen -  Soft, NTND + Vergara  Extremities - No peripheral edema, No calf tenderness   Neurologic Exam:                    Cognitive -             Orientation: Awake, Alert, AAO to self, place, date, year, situation            Thought: process, content appropriate     Speech - Fluent, Comprehensible, No dysarthria, No aphasia      Cranial Nerves - No facial asymmetry, Tongue midline, Shoulder shrug intact     Motor -                      LEFT    UE - ShAB 5/5, EF 5/5, EE 5/5, WE 5/5,  WNL                    RIGHT UE - ShAB 5/5, EF 5/5, EE 5/5, WE 5/5,  WNL                    LEFT    LE - HF 2/5, KE 3+/5, DF 1/5, PF 4/5 Strong hip extensors4+/5                    RIGHT LE - HF 2/5, KE 2/5, DF 0/5, PF 4/5        Sensory - decrease sensation below the knee. R>L     OculoVestibular -  No nystagmus  Psychiatric - Mood stable, Affect WNL  Skin on admission: lumbar incision looks well healed. open to air currently.  otherwise the skin is warm dry & intact. No rashes wounds or scars

## 2023-01-30 NOTE — PROGRESS NOTE ADULT - ASSESSMENT
57 year old male POD#5 L2-3 laminectomy, discectomy    Vergara re-inserted today, voiding but retaining >500cc   incentive spirometry  SCDs for DVT prophylaxis  [facility]  [facility] Dr [facility]

## 2023-01-30 NOTE — H&P ADULT - NSHPSOCIALHISTORY_GEN_ALL_CORE
Smoking -  EtOH -   Drugs -     Marital status:    Patient lives   PTA: Independent in ADLs and ambulation     CURRENT FUNCTIONAL STATUS  Date:   Bed Mobility:   Transfers:   Gait:   Upper Body Dressing:  Lower Body Dressing:  Toileting:  Bathing: Smoking -  EtOH -   Drugs -     Patient lives with girlfriend. [address].  PTA: Independent in ADLs and ambulation     CURRENT FUNCTIONAL STATUS  Date: 1/30  Bed Mobility:  CG, 1 person  Transfers: min a, 1 person  Gait: min-mod, 1 person, lars steady  Upper Body Dressing:  Lower Body Dressing: Smoking -  EtOH -   Drugs -     Patient lives with girlfriend. [address]. 10 Steps to Bedroom one rail  PTA: Independent in ADLs and ambulation     CURRENT FUNCTIONAL STATUS  Date: 1/30  Bed Mobility:  CG, 1 person  Transfers: min a, 1 person  Gait: min-mod, 1 person, lars steady  Upper Body Dressing:  Lower Body Dressing:

## 2023-01-30 NOTE — DISCHARGE NOTE PROVIDER - CARE PROVIDERS DIRECT ADDRESSES
[email or web address]

## 2023-01-30 NOTE — PROVIDER CONTACT NOTE (OTHER) - ASSESSMENT
patient refused new youssef insertion. Youssef was inserted today in the previous facility prior to his arrival to this facility

## 2023-01-30 NOTE — DISCHARGE NOTE PROVIDER - CARE PROVIDER_API CALL
Zack Schaefer; PhD)  Neurosurgery  [address], [address] [place] [address]  Phone: [phone]  Fax: [phone]  Follow Up Time: 1 week

## 2023-01-30 NOTE — DISCHARGE NOTE NURSING/CASE MANAGEMENT/SOCIAL WORK - PATIENT PORTAL LINK FT
You can access the FollowMyHealth Patient Portal offered by [facility] by registering at the following website: [email or web address]. By joining [facility]’s FollowMyHealth portal, you will also be able to view your health information using other applications (apps) compatible with our system.

## 2023-01-30 NOTE — H&P ADULT - NSHPREVIEWOFSYSTEMS_GEN_ALL_CORE
REVIEW OF SYSTEMS  Constitutional: No fever, No Chills, No fatigue  HEENT: No eye pain, No visual disturbances, No difficulty hearing  Pulm: No cough,  No shortness of breath  Cardio: No chest pain, No palpitations  GI:  No abdominal pain, No nausea, No vomiting, No diarrhea, No constipation  : No dysuria, No frequency, No hematuria  Neuro: No headaches, No memory loss, + loss of strength, + numbness, No tremors  Skin: No itching, No rashes, No lesions   Endo: No temperature intolerance  MSK: No joint pain, No joint swelling, No muscle pain, + Back pain  Psych:  No depression, No anxiety

## 2023-01-30 NOTE — PROGRESS NOTE ADULT - ASSESSMENT
ASSESSMENT/PLAN  57yMale with functional deficits after cauda equina syndrome L2/L3 s/p decompressive laminectomy  Pain - Tylenol  DVT PPX - SCDs  Rehab -      Patient is stronger than my initial visit on 1/26/2023.  Standing with moderate to maximum assist of 2 persons. Voiding with urinal  Patient will beneit from acute inpatient rehabilitation. He will tolerate 3 hours of therapy a day including the services of PT, OT SW, MD and RN   Will continue to follow. Functional progress will determine ongoing rehab dispo recommendations, which may change.  Continue bedside therapy as well as OOB throughout the day with mobilization by staff to maintain cardiopulmonary function and prevention of secondary complications related to debility.

## 2023-01-30 NOTE — H&P ADULT - NSHPLABSRESULTS_GEN_ALL_CORE
RECENT LABS/IMAGING                        13.2   6.47  )-----------( 209      ( 30 Jan 2023 07:58 )             40.3     01-30    136  |  105  |  18  ----------------------------<  102<H>  3.9   |  27  |  0.68    Ca    9.3      30 Jan 2023 07:58    MR Lumbar Spine w/wo IV Cont (01.26.23 @ 10:01)     IMPRESSION:    MRI THORACIC SPINE:  No spinal cord compression or abnormal intrinsic cord signal.  No abnormal enhancement in the thoracic region.  Mild multilevel degenerative changes without significant spinal canal stenosis or neural foraminal narrowing.    MRI LUMBAR SPINE:  Interval total L2 laminectomy.    Previously seen ventral disc material at L2-L3 has been largely resected.   Residual moderate to severe high-grade stenosis due residual disc bulge,   thin enhancing ventral granulation tissue, and persistent posterior facet/ligamentous hypertrophy. Mild right and mild to moderate left neural foraminal narrowing.  Other degenerative changes similar to the prior MRI.      MR Lumbar Spine No Cont (01.25.23 @ 05:24)     IMPRESSION:  Anterior extradural soft tissue from the L2-3 disc level extending cranially to just below the level of the superior endplate of L2 measuring approximately 3 cm craniocaudad by 0.8 cm anterior to posterior by 1.5 cm in width. Large extruded disc fragment is favored. Associated   severe narrowing of the spinal canal and compression of the proximal cauda equina.

## 2023-01-30 NOTE — PROGRESS NOTE ADULT - PROVIDER SPECIALTY LIST ADULT
Hospitalist
Hospitalist
Neurology
Hospitalist
Hospitalist
Neurosurgery
Physiatry
Neurosurgery
Neurosurgery

## 2023-01-30 NOTE — H&P ADULT - HISTORY OF PRESENT ILLNESS
This is a 56 YO male with PMH of CAD, s/p CABG, HTN, HLD, ETOH abuse (no alcohol use in 2 years as per pt) who presented to [facility] on 1/24 with back pain. Pt was seen in the ER on 1/22/23 and discharged home with a muscle relaxer and tylenol. He represented to the ER for worsening back pain. He stated that 2 days prior to admission, he was lifting weights in his basement and then he heard a pop. He immediately had pain in the lower back. Was taking Tylenol and the muscle relaxer.     The day prior to admission, he was able to take a shower and then later in the day he was unable to get off of the couch d/t lower extremity weakness. He also noticed pain down his legs bilaterally. He also has decreased sensation of his lower extremities b/l. He was evaluated by neurosurgery in ED and admitted to medicine. He underwent urgent L2-3 laminectomy/ discectomy on 1/25/23 for cauda equina syndrome. He did require reinsertion of  youssef for failed TOV/urinary retention. He was started on Flomax.  His BB was increased and he was started on losartan for uncontrolled HTN. ASA may be resumed 7 days post op per neurosurgery. Patient was evaluated by PM&R and therapy for functional deficits, gait/ADL impairments and acute rehabilitation was recommended. Patient was medically optimized for discharge to [facility] IRU on 1/30/23.         This is a 58 YO male with PMH of CAD, s/p CABG, HTN, HLD, ETOH abuse (no alcohol use in 2 years as per pt) who presented to [facility] on 1/24 with back pain. Pt was seen in the ER on 1/22/23 and discharged home with a muscle relaxer and tylenol. He represented to the ER for worsening back pain. He stated that 2 days prior to admission, he was lifting weights in his basement and then he heard a pop. He immediately had pain in the lower back. Was taking Tylenol and the muscle relaxer.     The day prior to admission, he was able to take a shower and then later in the day he was unable to get off of the couch d/t lower extremity weakness. He also noticed pain down his legs bilaterally. He also has decreased sensation of his lower extremities b/l. He was evaluated by neurosurgery in ED and admitted to medicine. He underwent urgent L2-3 laminectomy/ discectomy on 1/25/23 for cauda equina syndrome. He did require reinsertion of  youssef for failed TOV/urinary retention. He was started on Flomax.  His BB was increased and he was started on losartan for uncontrolled HTN. ASA may be resumed 7 days post op per neurosurgery. Patient was evaluated by PM&R and therapy for functional deficits, gait/ADL impairments and acute rehabilitation was recommended. Patient was medically optimized for discharge to [facility] IRU on 1/30/23.

## 2023-01-30 NOTE — PATIENT PROFILE ADULT - FALL HARM RISK - HARM RISK INTERVENTIONS
Assistance with ambulation/Assistance OOB with selected safe patient handling equipment/Communicate Risk of Fall with Harm to all staff/Discuss with provider need for PT consult/Monitor gait and stability/Provide patient with walking aids - walker, cane, crutches/Reinforce activity limits and safety measures with patient and family/Sit up slowly, dangle for a short time, stand at bedside before walking/Tailored Fall Risk Interventions/Use of alarms - bed, chair and/or voice tab/Visual Cue: Yellow wristband and red socks/Bed in lowest position, wheels locked, appropriate side rails in place/Call bell, personal items and telephone in reach/Instruct patient to call for assistance before getting out of bed or chair/Non-slip footwear when patient is out of bed/[place] to call system/Physically safe environment - no spills, clutter or unnecessary equipment/Purposeful Proactive Rounding/Room/bathroom lighting operational, light cord in reach

## 2023-01-30 NOTE — PROGRESS NOTE ADULT - SUBJECTIVE AND OBJECTIVE BOX
57yM was admitted on 01-24    Patient is a 57y old  Male who presents with a chief complaint of Back pain (27 Jan 2023 10:46)    HPI:  58 y/o M with PMH CAD, s/p CABG, HTN, HLD, ETOH abuse (no alcohol use in 2 years as per pt) presented with back pain. Pt was seen in the ER on 1/22/23 and discharged home with a muscle relaxer and tylenol. He presented to the ER for worsening back pain. He states that 2 days ago he was lifting weights in his basement and then he heard a pop. He immediately had pain in the lower back. Was taking Tylenol and the muscle relaxer. Yesterday he was able to take a shower and then later in the day he was unable to get off of the couch d/t lower extremity weakness. He also noticed pain down his legs bilaterally. He also has decreased sensation of his lower extremities b/l. No urinary incontinence or retention. Pt does report constipation over the last 2 days. Has not had an ECHO or stress test in years, has tolerated anesthesia in the past without complication, prior surgeries occurred without complication. Was able to walk a flight of stairs prior to this injury. No chest pain or SOB with exertion. No recent vaccinations. No recent illnesses. Does not report a history of COVID.     Prior admission:   - 5/30/21: ETOH withdrawal, high BP    ER course: VSS. Labs: Reactive lymphocytes 9.0%, glucose 101, AST 64. UA: negative. COVID, influenza A/B, RSV negative. EKG: NSR with HR 80 bpm, normal intervals, T wave inversions in AVL, V4 (personally reviewed).    1/25/23:  POD#0  L2-3 laminectomy / discectomy, large extruded disc.  1/26/23:  POD#1  improved power in LE's.  No radicular pain, numbness remains but improved also.    1/27 POD#2 L2-3 laminectomy discectomy No events overnight. Patient feels he is getting some movement back  No significant back pain or radicular pain    1/28 POD#3 L2-3 laminectomy, discectomy, feel well he states he has improved strength in LEs today    1/29 POD#4 L2-3 laminectomy, discectomy feels strength is getting better    Imaging:   CT lumbar spine 1/24/2023  No acute osseous abnormality in the lumbar spine. Multilevel degenerative changes, progressed since study from 2020, incompletely characterized by CT technique, consider MRI lumbar spine as outpatient in the proper clinical setting.    MRI of the lumbar spine 1/24/2023  Discontinued MRI. Suspicion for cauda equina equina compression at L2-L3, not well evaluated. The patient will return for repeat MRI with anesthesia.    MRI Lumbar spine 1/25/2023  SPINAL CANAL:  Anterior extradural soft tissue within the spinal canal from the L2-3 disc level extending cranially to just below the level of the superior endplate of L2. This measures approximately 3 cm craniocaudad by 0.8 cm anterior to posterior by 1.5 cm in width. Large extruded disc fragment is favored. Associated severe narrowing of the spinal canal and compression of the proximal cauda equina.    OTHER DISC LEVELS:  L1-2: Mild bulging of the disc annulus with mild facet degenerative changes. Mild canal and bilateral neural foramina narrowing.  L2-3: Please see spinal canal section above.  L3-4: Mild to moderate disc bulge with moderate facet and ligamentous degenerative changes. Mild to moderate canal and bilateral neural foramina narrowing.  L4-5: Moderate disc bulge with a central annular fissure. Moderate facet and ligamentous degenerative changes. Moderate canal and severe bilateral neural foramina narrowing.  L5-S1: Small central disc protrusion with an associated central/left paracentral annular fissure. Mild to moderate facet degenerative changes. Mild canal and left greater than right lateral recess narrowing. Severe bilateral neural foramina narrowing.    OTHER:  The sacro-illiac joints appear intact.    IMPRESSION:  Anterior extradural soft tissue from the L2-3 disc level extending cranially to just below the level of the superior endplate of L2 measuring approximately 3 cm craniocaudad by 0.8 cm anterior to posterior by 1.5 cm in width. Large extruded disc fragment is favored. Associated severe narrowing of the spinal canal and compression of the proximal cauda equina.    MRI THORACIC SPINE: 1/26/2023  No spinal cord compression or abnormal intrinsic cord signal.    No abnormal enhancement in the thoracic region.    Mild multilevel degenerative changes without significant spinal canal stenosis or neural foraminal narrowing.    MRI LUMBAR SPINE: 1/26/2023  Interval total L2 laminectomy.    Previously seen ventral disc material at L2-L3 has been largely resected. Residual moderate to severe high-grade stenosis due residual disc bulge, thin enhancing ventral granulation tissue, and persistent posterior facet/ligamentous hypertrophy. Mild right and mild to moderate left neural foraminal narrowing.    Other degenerative changes similar to the prior MRI.    REVIEW OF SYSTEMS  Constitutional - No fever, No weight loss, No fatigue  HEENT - No eye pain, No visual disturbances, No difficulty hearing, No tinnitus, No vertigo, No neck pain  Respiratory - No cough, No wheezing, No shortness of breath  Cardiovascular - No chest pain, No palpitations  Gastrointestinal - No abdominal pain, No nausea, No vomiting, No diarrhea, No constipation  Genitourinary - No dysuria, No frequency, No hematuria, No incontinence  Neurological - No headaches, No memory loss, No loss of strength, No numbness, No tremors  Skin - No itching, No rashes, No lesions   Endocrine - No temperature intolerance  Musculoskeletal - No joint pain, No joint swelling, No muscle pain  Psychiatric - No depression, No anxiety    ICU Vital Signs Last 24 Hrs  T(C): 36.7 (30 Jan 2023 09:05), Max: 36.8 (29 Jan 2023 20:26)  T(F): 98 (30 Jan 2023 09:05), Max: 98.2 (29 Jan 2023 20:26)  HR: 57 (30 Jan 2023 09:05) (56 - 65)  BP: 154/82 (30 Jan 2023 09:05) (147/89 - 158/88)  BP(mean): 103 (29 Jan 2023 20:26) (103 - 103)  SpO2: 97% (30 Jan 2023 09:05) (97% - 100%)    PAST MEDICAL & SURGICAL HISTORY  HTN (hypertension)    HLD (hyperlipidemia)    CAD (coronary artery disease)    Alcohol abuse    S/P CABG (coronary artery bypass graft)    SOCIAL HISTORY - as per documentation/history  Smoking - None  EtOH - None  Drugs - None    FUNCTIONAL HISTORY  Lives with girlfriend. 6 Y[facility]. PTA I ADL I amb w/o AD    CURRENT FUNCTIONAL STATUS  Supine to sit moderate assist  Sit to stand maximum assist of 2 persons    FAMILY HISTORY   FH: diabetes mellitus    FH: heart disease    RECENT LABS - Reviewed  CBC Full  -  ( 27 Jan 2023 07:59 )  WBC Count : 10.56 K/uL  RBC Count : 4.54 M/uL  Hemoglobin : 13.2 g/dL  Hematocrit : 39.1 %  Platelet Count - Automated : 158 K/uL  Mean Cell Volume : 86.1 fl  Mean Cell Hemoglobin : 29.1 pg  Mean Cell Hemoglobin Concentration : 33.8 gm/dL  Auto Neutrophil # : x  Auto Lymphocyte # : x  Auto Monocyte # : x  Auto Eosinophil # : x  Auto Basophil # : x  Auto Neutrophil % : x  Auto Lymphocyte % : x  Auto Monocyte % : x  Auto Eosinophil % : x  Auto Basophil % : x    01-27    137  |  104  |  11  ----------------------------<  104<H>  3.5   |  28  |  0.65    Ca    8.9      27 Jan 2023 07:59  Phos  2.9     01-26  Mg     2.1     01-26          ALLERGIES  No Known Allergies      MEDICATIONS   acetaminophen     Tablet .. 650 milliGRAM(s) Oral every 6 hours PRN  acetaminophen   IVPB .. 1000 milliGRAM(s) IV Intermittent once PRN  aluminum hydroxide/magnesium hydroxide/simethicone Suspension 30 milliLiter(s) Oral every 4 hours PRN  carvedilol 6.25 milliGRAM(s) Oral every 12 hours  gabapentin 100 milliGRAM(s) Oral three times a day PRN  heparin   Injectable 5000 Unit(s) SubCutaneous every 12 hours  HYDROmorphone  Injectable 1 milliGRAM(s) IV Push every 3 hours PRN  lidocaine   4% Patch 1 Patch Transdermal daily  magnesium hydroxide Suspension 30 milliLiter(s) Oral every 12 hours PRN  melatonin 3 milliGRAM(s) Oral at bedtime PRN  methocarbamol 1500 milliGRAM(s) Oral three times a day  multivitamin 1 Tablet(s) Oral daily  naloxone Injectable 0.4 milliGRAM(s) IV Push once  ondansetron Injectable 4 milliGRAM(s) IV Push every 8 hours PRN  oxyCODONE    IR 5 milliGRAM(s) Oral every 4 hours PRN  oxyCODONE    IR 10 milliGRAM(s) Oral every 4 hours PRN  polyethylene glycol 3350 17 Gram(s) Oral daily  senna 2 Tablet(s) Oral at bedtime  simvastatin 40 milliGRAM(s) Oral at bedtime  sodium chloride 0.9%. 1000 milliLiter(s) IV Continuous <Continuous>      ----------------------------------------------------------------------------------------  PHYSICAL EXAM  Constitutional - NAD, Comfortable  HEENT - NCAT, EOMI  Neck - Supple, No limited ROM  Chest - Breathing comfortably, No wheezing  Cardiovascular - S1S2   Abdomen - Soft   Extremities - No C/C/E, No calf tenderness   Neurologic Exam -                    Cognitive - AAO to self, place, date, year, situation     Communication - Fluent, No dysarthria     Cranial Nerves - CN 2-12 intact     Motor - No focal deficits                    LEFT    UE - ShAB 5/5, EF 5/5, EE 5/5, WE 5/5,  5/5                    RIGHT UE - ShAB 5/5, EF 5/5, EE 5/5, WE 5/5,  5/5                    LEFT    LE - HF 2/5, KE 2/5, DF 1/5, PF 2/5                    RIGHT LE - HF 1/5, KE 2-/5, DF 0/5, PF 2/5       Sensory - no sensation below the knees     Reflexes - DTR 0/4    Coordination - FTN intact     OculoVestibular - No saccades, No nystagmus, VOR         Balance - sitting fair.  Psychiatric - Mood stable, Affect WNL  ----------------------------------------------------------------------------------------

## 2023-01-30 NOTE — DISCHARGE NOTE PROVIDER - NSDCMRMEDTOKEN_GEN_ALL_CORE_FT
acetaminophen 500 mg oral tablet: 2 tab(s) orally every 8 hours, As Needed for mild pain.     carvedilol 12.5 mg oral tablet: 1 tab(s) orally every 12 hours  gabapentin 100 mg oral capsule: 1 cap(s) orally 2 times a day  losartan 50 mg oral tablet: 1 tab(s) orally once a day  methocarbamol 750 mg oral tablet: 2 tab(s) orally 3 times a day   Multiple Vitamins oral tablet: 1 tab(s) orally once a day  oxyCODONE 10 mg oral tablet: 1 tab(s) orally every 4 hours, As needed, Severe Pain (7 - 10)  polyethylene glycol 3350 oral powder for reconstitution: 17 gram(s) orally 2 times a day  senna leaf extract oral tablet: 2 tab(s) orally once a day (at bedtime)  simvastatin 40 mg oral tablet: 1 tab(s) orally once a day (at bedtime)   acetaminophen 500 mg oral tablet: 2 tab(s) orally every 8 hours, As Needed for mild pain.     aspirin 81 mg oral tablet: orally once a day. RESUME 2/2/23  carvedilol 12.5 mg oral tablet: 1 tab(s) orally every 12 hours  Flomax 0.4 mg oral capsule: 1 cap(s) orally once a day (at bedtime)  gabapentin 100 mg oral capsule: 1 cap(s) orally 2 times a day  losartan 50 mg oral tablet: 1 tab(s) orally once a day  methocarbamol 750 mg oral tablet: 2 tab(s) orally 3 times a day   Multiple Vitamins oral tablet: 1 tab(s) orally once a day  oxyCODONE 10 mg oral tablet: 1 tab(s) orally every 4 hours, As needed, Severe Pain (7 - 10)  polyethylene glycol 3350 oral powder for reconstitution: 17 gram(s) orally 2 times a day  senna leaf extract oral tablet: 2 tab(s) orally once a day (at bedtime)  simvastatin 40 mg oral tablet: 1 tab(s) orally once a day (at bedtime)

## 2023-01-30 NOTE — DISCHARGE NOTE NURSING/CASE MANAGEMENT/SOCIAL WORK - NSDCVIVACCINE_GEN_ALL_CORE_FT
Tdap; 18-May-2019 01:01; Glory Urr[place]ia ([place]); [employer]; [id number] (Exp. Date: 26-Feb-2021); IntraMuscular; Deltoid Right.; 0.5 milliLiter(s); VIS (VIS Published: 09-May-2013, VIS Presented: 18-May-2019);

## 2023-01-30 NOTE — PROGRESS NOTE ADULT - REASON FOR ADMISSION
Back pain
Back pain   paraplegia
Back pain

## 2023-01-30 NOTE — PROGRESS NOTE ADULT - SUBJECTIVE AND OBJECTIVE BOX
57 year old RH male with PMH CAD, s/p CABG, HTN, HLD, ETOH abuse (no alcohol use in 2 years as per pt) presented with back pain. Pt was seen in the ER on 1/22/23 and discharged home with a muscle relaxer and tylenol. He presented to the ER on 1/24 for worsening back pain. He states that 2 days ago he was lifting weights in his basement and then he heard a pop. He immediately had pain in the lower back. Was taking Tylenol and the muscle relaxer. Yesterday he was able to take a shower and then later in the day he was unable to get off of the couch d/t lower extremity weakness. He also noticed pain down his legs bilaterally. He also has decreased sensation of his lower extremities b/l. No urinary incontinence or retention. Pt does report constipation over the last 2 days.  Pt had MRI with anesthesia demonstrating L2-3 HNP with thecal sac compression, impending cauda equina without such symptoms.    1/25/23:  POD#0  L2-3 laminectomy / discectomy, large extruded disc.    1/26/23:  POD#1  improved power in LE's.  No radicular pain, numbness remains but improved also.    1/27 POD#2 L2-3 laminectomy discectomy No events overnight. Patient feels he is getting some movement back  No significant back pain or radicular pain    1/28 POD#3 L2-3 laminectomy, discectomy, feel well he states he has improved strength in LEs today    1/29 POD#4 L2-3 laminectomy, discectomy feels strength is getting better    1/30 POD #5 Patient seen and examined with Dr. Schaefer, patient working with PT at the time.  Endorses improvement in mobility / sensation.     Vital Signs Last 24 Hrs  T(C): 36.7 (30 Jan 2023 09:05), Max: 36.8 (29 Jan 2023 20:26)  T(F): 98 (30 Jan 2023 09:05), Max: 98.2 (29 Jan 2023 20:26)  HR: 57 (30 Jan 2023 09:05) (56 - 65)  BP: 154/82 (30 Jan 2023 09:05) (147/89 - 158/88)  BP(mean): 103 (29 Jan 2023 20:26) (103 - 103)  RR: 18 (30 Jan 2023 09:05) (16 - 18)  SpO2: 97% (30 Jan 2023 09:05) (97% - 100%)    Parameters below as of 30 Jan 2023 09:05  Patient On (Oxygen Delivery Method): room air      MEDICATIONS  (STANDING):  acetaminophen     Tablet .. 975 milliGRAM(s) Oral every 6 hours  carvedilol 12.5 milliGRAM(s) Oral every 12 hours  enoxaparin Injectable 40 milliGRAM(s) SubCutaneous every 24 hours  gabapentin 100 milliGRAM(s) Oral two times a day  lidocaine   4% Patch 1 Patch Transdermal daily  methocarbamol 1500 milliGRAM(s) Oral three times a day  multivitamin 1 Tablet(s) Oral daily  naloxone Injectable 0.4 milliGRAM(s) IV Push once  polyethylene glycol 3350 17 Gram(s) Oral two times a day  senna 2 Tablet(s) Oral at bedtime  simvastatin 40 milliGRAM(s) Oral at bedtime    MEDICATIONS  (PRN):  acetaminophen   IVPB .. 1000 milliGRAM(s) IV Intermittent once PRN Moderate Pain (4 - 6)  aluminum hydroxide/magnesium hydroxide/simethicone Suspension 30 milliLiter(s) Oral every 4 hours PRN Dyspepsia  magnesium hydroxide Suspension 30 milliLiter(s) Oral every 12 hours PRN Constipation  melatonin 3 milliGRAM(s) Oral at bedtime PRN Insomnia  ondansetron Injectable 4 milliGRAM(s) IV Push every 8 hours PRN Nausea and/or Vomiting  oxyCODONE    IR 5 milliGRAM(s) Oral every 4 hours PRN Moderate Pain (4 - 6)  oxyCODONE    IR 10 milliGRAM(s) Oral every 4 hours PRN Severe Pain (7 - 10)    Physical Exam:  Constitutional: Awake & alert AOx3  HEENT: PERRL, EOMI  Neck: Supple  Respiratory: Breath sounds are clear bilaterally  Cardiovascular: S1 and S2, regular rhythm  Gastrointestinal: Soft, NT/ND  Extremities:  no edema   Back dressing changed, incision site healing well without drainage      Neurological Exam:  HF: A&O x 3, appropriately interactive.  CN: PERRL, EOMI, VFF, facial sensation normal, no NLFD, tongue midline  Motor: No further pain.  Pt with improved power bilaterally in proximal motor groups, still with distal motor group paraparesis  RLE: IP 3+/5.  Knee flex 3+/5. Knee ext 2+/5 (almost kicks heel off bed). 0/5 DF, 1/5 PF, 1/5 EHL  LLE: IP 4-/5. Knee flex 4-/5.  Knee ext 3/5 (just antigravity with heel off bed).  0/5 DF,  1/5 PF,  0/5 EHL no movement.  Sensation: Intact to light touch, dysesthesia in feet, , Numbness improved per patient  from knees to feet  Coord:  No FNFA, dysmetria, RENO intact   Gait/Balance: Cannot test

## 2023-01-30 NOTE — H&P ADULT - ASSESSMENT
ASSESSMENT/PLAN  This is a 58 YO male with PMH of CAD, s/p CABG, HTN, HLD, ETOH abuse (no alcohol use in 2 years as per pt) who presented to HNT on 1/24 with back pain and progressive lower extremity weakness. He was admitted for cauda equina syndrome and he  underwent urgent L2-3 laminectomy/ discectomy on 1/25/23.     The day prior to admission, he was able to take a shower and then later in the day he was unable to get off of the couch d/t lower extremity weakness. He also noticed pain down his legs bilaterally. He also has decreased sensation of his lower extremities b/l. He was evaluated by neurosurgery in ED and admitted to medicine. He underwent urgent L2-3 laminectomy/ discectomy on 1/25/23 for cauda equina syndrome. He did require reinsertion of  youssef for failed TOV/urinary retention. He was started on Flomax.  His BB was increased and he was started on losartan for uncontrolled HTN. ASA may be resumed 7 days post op per neurosurgery. Patient was evaluated by PM&R and therapy for functional deficits, gait/ADL impairments and acute rehabilitation was recommended. Patient was medically optimized for discharge to [facility] IRU on 1/30/23.    Patient now with gait Instability, ADL impairments and Functional impairments.    #Cauda Equina Syndrome  -    - Start Comprehensive Rehab Program: PT/OT/ST, 3hours daily and 5 days weekly  - PT: Focused on improving strength, endurance, coordination, balance, functional mobility, and transfers  - OT: Focused on improving strength, fine motor skills, coordination, posture and ADLs.    - ST: to diagnose and treat deficits in swallowing, cognition and communication.   - WB Status:  - Prosthetic and Othortic as needed    #CVA  -    #HTN  -    #HLD  -    #DM II  - ISS and FS  - Admelog and Lantus  - A1c ..... on     #A-fib  -    #Pain management  - Tylenol PRN, Oxycodone PRN    #DVT ppx  - Lovenox, Heparin, SCD, TEDs    #GI ppx  - Pepcid 20mg , Nexium, Protonix 40mg    #Bowel Regimen  - Senna, miralax PRN    #Bladder management  - BS on admission, and q 8 hours (SC if > 400)  - Monitor UO    #FEN   - Diet:  - Supplements:    #Skin:  - No active issues at this time  - Skin on admission: ***  - Pressure injury/Skin: Turn Q2hrs while in bed, OOB to Chair, PT/OT     #Dysphagia    - SLP: evaluation and treatment    #Mood/Cognition:  - Neuropsychology consult PRN    #Sleep:   - Maintain quiet hours and low stim environment.  - Melatonin PRN to maximize participation in therapy during the day.   - Monitor sleep logs    #Precaution  - Fall, Aspiration, cardiac, Sternal, Spinal, Seizure, Hip (Anterior or Posterior)      #GOC  CODE STATUS: FULL CODE , DNR, DNI    Outpatient Follow-up (Specialty/Name of physician):        MEDICAL PROGNOSIS: GOOD            REHAB POTENTIAL: GOOD             ESTIMATED DISPOSITION: HOME WITH HOME CARE            ELOS: 10-14 Days   EXPECTED THERAPY:     P.T. 1hr/day       O.T. 1hr/day      S.L.P. 1hr/day     P&O Unnecessary     EXP FREQUENCY: 5 days per 7 day period     PRESCREEN COMPARISON:   I have reviewed the prescreen information and I have found no relevant changes between the preadmission screening and my post admission evaluation     RATIONALE FOR INPATIENT ADMISSION - Patient demonstrates the following: (check all that apply)  [X] Medically appropriate for rehabilitation admission  [X] Has attainable rehab goals with an appropriate initial discharge plan  [X] Has rehabilitation potential (expected to make a significant improvement within a reasonable period of time)   [X] Requires close medical management by a rehab physician, rehab nursing care, Hospitalist and comprehensive interdisciplinary team (including PT, OT, & or SLP, Prosthetics and Orthotics)   ASSESSMENT/PLAN  This is a 58 YO male with PMH of CAD, s/p CABG, HTN, HLD, ETOH abuse (no alcohol use in 2 years as per pt) who presented to HNT on 1/24 with back pain and progressive lower extremity weakness. Examination with concern for cauda equina. He was admitted for cauda equina syndrome and he  underwent urgent L2-3 laminectomy/ discectomy on 1/25/23.   Patient now with gait Instability, ADL impairments and Functional impairments.    #Cauda Equina Syndrome  - s/p L2-3 laminectomy/ discectomy on 1/25/23.  - Start Comprehensive Rehab Program: PT/OT, 3hours daily and 5 days weekly  - PT: Focused on improving strength, endurance, coordination, balance, functional mobility, and transfers  - OT: Focused on improving strength, fine motor skills, coordination, posture and ADLs.      #HTN  - Losartan 50mg daily  - Carvedilol 12.5mg BID    #CAD  -  s/p CABG  - resume ASA on 2/2    #HLD  - Zocor 40mg daily    #Pain management  - Tylenol PRN, lidocaine, Oxycodone PRN, Robaxin, gabapentin 100mg BID    #DVT ppx  - Lovenox, SCD, TEDs    #Bowel Regimen  - Senna, miralax BID    #Bladder management  - BS on admission, and q 8 hours (SC if > 400)  - Monitor UO    #FEN   - Diet: Regular  - Supplements: MVI    #Skin:  - Skin on admission: ***  - Pressure injury/Skin: Turn Q2hrs while in bed, OOB to Chair, PT/OT     #Sleep:   - Maintain quiet hours and low stim environment.  - Melatonin 3mg nightly PRN to maximize participation in therapy during the day.     #Precaution  - Fall, Aspiration, spinal    #GOC  CODE STATUS: FULL CODE     Outpatient Follow-up (Specialty/Name of physician):    Zack Schaefer (MD; PhD)  Neurosurgery  [address] [facility], 2nd [place] [address]  Phone: [phone]  Fax: [phone]      MEDICAL PROGNOSIS: GOOD            REHAB POTENTIAL: GOOD             ESTIMATED DISPOSITION: HOME WITH HOME CARE            ELOS: 10-14 Days   EXPECTED THERAPY:     P.T. 1hr/day       O.T. 1hr/day      S.L.P. 1hr/day     P&O Unnecessary     EXP FREQUENCY: 5 days per 7 day period     PRESCREEN COMPARISON:   I have reviewed the prescreen information and I have found no relevant changes between the preadmission screening and my post admission evaluation     RATIONALE FOR INPATIENT ADMISSION - Patient demonstrates the following: (check all that apply)  [X] Medically appropriate for rehabilitation admission  [X] Has attainable rehab goals with an appropriate initial discharge plan  [X] Has rehabilitation potential (expected to make a significant improvement within a reasonable period of time)   [X] Requires close medical management by a rehab physician, rehab nursing care, Hospitalist and comprehensive interdisciplinary team (including PT, OT, & or SLP, Prosthetics and Orthotics)   ASSESSMENT/PLAN  This is a 58 YO male with PMH of CAD, s/p CABG, HTN, HLD, ETOH abuse (no alcohol use in 2 years as per pt) who presented to HNT on 1/24 with back pain and progressive lower extremity weakness. Examination with concern for cauda equina. He was admitted for cauda equina syndrome and he  underwent urgent L2-3 laminectomy/ discectomy on 1/25/23.   Patient now with gait Instability, ADL impairments and Functional impairments.    #Cauda Equina Syndrome  - s/p L2-3 laminectomy/ discectomy on 1/25/23.  - Start Comprehensive Rehab Program: PT/OT, 3hours daily and 5 days weekly  - PT: Focused on improving strength, endurance, coordination, balance, functional mobility, and transfers  - OT: Focused on improving strength, fine motor skills, coordination, posture and ADLs.      #HTN  - Losartan 50mg daily  - Carvedilol 12.5mg BID    #CAD  -  s/p CABG  - resume ASA on 2/2    #HLD  - Zocor 40mg daily    #Pain management  - Tylenol PRN, lidocaine, Oxycodone PRN, Robaxin, gabapentin 100mg BID    #DVT ppx  - Lovenox, SCD, TEDs    #Bowel Regimen  - Senna, miralax BID    #Bladder management  - BS on admission, and q 8 hours (SC if > 400)  - Monitor UO    #FEN   - Diet: Regular  - Supplements: MVI    #Skin:  - Skin on admission:  lumbar incision looks well healed. open to air currently  - Pressure injury/Skin: Turn Q2hrs while in bed, OOB to Chair, PT/OT     #Sleep:   - Maintain quiet hours and low stim environment.  - Melatonin 3mg nightly PRN to maximize participation in therapy during the day.     #Precaution  - Fall, Aspiration, spinal    #GOC  CODE STATUS: FULL CODE     Outpatient Follow-up (Specialty/Name of physician):    Zack Schaefer (MD; PhD)  Neurosurgery  [address] [facility], [address]nd [place] [address]  Phone: [phone]  Fax: [phone]      MEDICAL PROGNOSIS: GOOD            REHAB POTENTIAL: GOOD             ESTIMATED DISPOSITION: HOME WITH HOME CARE            ELOS: 10-14 Days   EXPECTED THERAPY:     P.T. 1hr/day       O.T. 1hr/day      S.L.P. 1hr/day     P&O Unnecessary     EXP FREQUENCY: 5 days per 7 day period     PRESCREEN COMPARISON:   I have reviewed the prescreen information and I have found no relevant changes between the preadmission screening and my post admission evaluation     RATIONALE FOR INPATIENT ADMISSION - Patient demonstrates the following: (check all that apply)  [X] Medically appropriate for rehabilitation admission  [X] Has attainable rehab goals with an appropriate initial discharge plan  [X] Has rehabilitation potential (expected to make a significant improvement within a reasonable period of time)   [X] Requires close medical management by a rehab physician, rehab nursing care, Hospitalist and comprehensive interdisciplinary team (including PT, OT, & or SLP, Prosthetics and Orthotics)   ASSESSMENT/PLAN  This is a 56 YO male with PMH of CAD, s/p CABG, HTN, HLD, ETOH abuse (no alcohol use in 2 years as per pt) who presented to HNT on 1/24 with back pain and progressive lower extremity weakness. Examination with concern for cauda equina. He was admitted for cauda equina syndrome and he  underwent urgent L2-3 laminectomy/ discectomy on 1/25/23.   Patient now with gait Instability, ADL impairments and Functional impairments.    #Cauda Equina Syndrome  - s/p L2-3 laminectomy/ discectomy on 1/25/23.  - Start Comprehensive Rehab Program: PT/OT, 3hours daily and 5 days weekly  - PT: Focused on improving strength, endurance, coordination, balance, functional mobility, and transfers  - OT: Focused on improving strength, fine motor skills, coordination, posture and ADLs.      #HTN  - Losartan 50mg daily  - Carvedilol 12.5mg BID    #CAD  -  s/p CABG  - resume ASA on 2/2    #HLD  - Zocor 40mg daily    #Pain management  - Tylenol PRN, lidocaine, Oxycodone PRN, Robaxin, gabapentin 100mg BID    #DVT ppx  - Lovenox, SCD, TEDs    #Bowel Regimen  - Senna, miralax BID    #Bladder management  - BS on admission, and q 8 hours (SC if > 400)  - Monitor UO    #FEN   - Diet: Regular  - Supplements: MVI    #Skin:  - Skin on admission:  lumbar incision looks well healed. open to air currently  - Pressure injury/Skin: Turn Q2hrs while in bed, OOB to Chair, PT/OT     #Sleep:   - Maintain quiet hours and low stim environment.  - Melatonin 3mg nightly PRN to maximize participation in therapy during the day.     #Precaution  - Fall, Aspiration, spinal    #GOC  CODE STATUS: FULL CODE     Outpatient Follow-up (Specialty/Name of physician):    Zack Schaefer (MD; PhD)  Neurosurgery  [address] [facility], 2nd [place] [address]  Phone: [phone]  Fax: [phone]      MEDICAL PROGNOSIS: GOOD            REHAB POTENTIAL: GOOD             ESTIMATED DISPOSITION: HOME WITH HOME CARE            ELOS: 10-14 Days   EXPECTED THERAPY:     P.T. 2hr/day       O.T. 1hr/day      S.L.P. NA     P&O Unnecessary     EXP FREQUENCY: 5 days per 7 day period     PRESCREEN COMPARISON:   I have reviewed the prescreen information and I have found no relevant changes between the preadmission screening and my post admission evaluation     RATIONALE FOR INPATIENT ADMISSION - Patient demonstrates the following: (check all that apply)  [X] Medically appropriate for rehabilitation admission  [X] Has attainable rehab goals with an appropriate initial discharge plan  [X] Has rehabilitation potential (expected to make a significant improvement within a reasonable period of time)   [X] Requires close medical management by a rehab physician, rehab nursing care, Hospitalist and comprehensive interdisciplinary team (including PT, OT, & or SLP, Prosthetics and Orthotics)

## 2023-01-30 NOTE — DISCHARGE NOTE PROVIDER - NSDCCPCAREPLAN_GEN_ALL_CORE_FT
PRINCIPAL DISCHARGE DIAGNOSIS  Diagnosis: Back pain  Assessment and Plan of Treatment: you were found to have cauda equina syndrome d/t a large herniated disc requiring surgery.   you may resume aspirin 81mg daily on 2/2/23   pain meds as needed   follow up with Dr. Schaefer in 1 week.   if worsening weakness/numbness, call DR. Schaefer or return to ED       PRINCIPAL DISCHARGE DIAGNOSIS  Diagnosis: Back pain  Assessment and Plan of Treatment: you were found to have cauda equina syndrome d/t a large herniated disc requiring surgery.   you may resume aspirin 81mg daily on 2/2/23   pain meds as needed   follow up with Dr. Schaefer in 1 week.   if worsening weakness/numbness, call DR. Schaefer or return to ED  follow up with urology regarding urinary retention.

## 2023-01-31 LAB
ALBUMIN SERPL ELPH-MCNC: 3.6 G/DL — SIGNIFICANT CHANGE UP (ref 3.3–5)
ALP SERPL-CCNC: 56 U/L — SIGNIFICANT CHANGE UP (ref 40–120)
ALT FLD-CCNC: 48 U/L — HIGH (ref 10–45)
ANION GAP SERPL CALC-SCNC: 6 MMOL/L — SIGNIFICANT CHANGE UP (ref 5–17)
AST SERPL-CCNC: 24 U/L — SIGNIFICANT CHANGE UP (ref 10–40)
BASOPHILS # BLD AUTO: 0.07 K/UL — SIGNIFICANT CHANGE UP (ref 0–0.2)
BASOPHILS NFR BLD AUTO: 1.1 % — SIGNIFICANT CHANGE UP (ref 0–2)
BILIRUB SERPL-MCNC: 0.4 MG/DL — SIGNIFICANT CHANGE UP (ref 0.2–1.2)
BUN SERPL-MCNC: 19 MG/DL — SIGNIFICANT CHANGE UP (ref 7–23)
CALCIUM SERPL-MCNC: 9 MG/DL — SIGNIFICANT CHANGE UP (ref 8.4–10.5)
CHLORIDE SERPL-SCNC: 104 MMOL/L — SIGNIFICANT CHANGE UP (ref 96–108)
CO2 SERPL-SCNC: 31 MMOL/L — SIGNIFICANT CHANGE UP (ref 22–31)
CREAT SERPL-MCNC: 0.66 MG/DL — SIGNIFICANT CHANGE UP (ref 0.5–1.3)
EGFR: 109 ML/MIN/1.73M2 — SIGNIFICANT CHANGE UP
EOSINOPHIL # BLD AUTO: 0.17 K/UL — SIGNIFICANT CHANGE UP (ref 0–0.5)
EOSINOPHIL NFR BLD AUTO: 2.6 % — SIGNIFICANT CHANGE UP (ref 0–6)
GLUCOSE SERPL-MCNC: 99 MG/DL — SIGNIFICANT CHANGE UP (ref 70–99)
HCT VFR BLD CALC: 40.2 % — SIGNIFICANT CHANGE UP (ref 39–50)
HGB BLD-MCNC: 13.1 G/DL — SIGNIFICANT CHANGE UP (ref 13–17)
IMM GRANULOCYTES NFR BLD AUTO: 0.3 % — SIGNIFICANT CHANGE UP (ref 0–0.9)
LYMPHOCYTES # BLD AUTO: 3.58 K/UL — HIGH (ref 1–3.3)
LYMPHOCYTES # BLD AUTO: 54.2 % — HIGH (ref 13–44)
MCHC RBC-ENTMCNC: 28.6 PG — SIGNIFICANT CHANGE UP (ref 27–34)
MCHC RBC-ENTMCNC: 32.6 GM/DL — SIGNIFICANT CHANGE UP (ref 32–36)
MCV RBC AUTO: 87.8 FL — SIGNIFICANT CHANGE UP (ref 80–100)
MONOCYTES # BLD AUTO: 0.62 K/UL — SIGNIFICANT CHANGE UP (ref 0–0.9)
MONOCYTES NFR BLD AUTO: 9.4 % — SIGNIFICANT CHANGE UP (ref 2–14)
NEUTROPHILS # BLD AUTO: 2.14 K/UL — SIGNIFICANT CHANGE UP (ref 1.8–7.4)
NEUTROPHILS NFR BLD AUTO: 32.4 % — LOW (ref 43–77)
NRBC # BLD: 0 /100 WBCS — SIGNIFICANT CHANGE UP (ref 0–0)
PLATELET # BLD AUTO: 206 K/UL — SIGNIFICANT CHANGE UP (ref 150–400)
POTASSIUM SERPL-MCNC: 4.2 MMOL/L — SIGNIFICANT CHANGE UP (ref 3.5–5.3)
POTASSIUM SERPL-SCNC: 4.2 MMOL/L — SIGNIFICANT CHANGE UP (ref 3.5–5.3)
PROT SERPL-MCNC: 7.2 G/DL — SIGNIFICANT CHANGE UP (ref 6–8.3)
RBC # BLD: 4.58 M/UL — SIGNIFICANT CHANGE UP (ref 4.2–5.8)
RBC # FLD: 13.6 % — SIGNIFICANT CHANGE UP (ref 10.3–14.5)
SARS-COV-2 RNA SPEC QL NAA+PROBE: SIGNIFICANT CHANGE UP
SODIUM SERPL-SCNC: 141 MMOL/L — SIGNIFICANT CHANGE UP (ref 135–145)
WBC # BLD: 6.6 K/UL — SIGNIFICANT CHANGE UP (ref 3.8–10.5)
WBC # FLD AUTO: 6.6 K/UL — SIGNIFICANT CHANGE UP (ref 3.8–10.5)

## 2023-01-31 PROCEDURE — 99223 1ST HOSP IP/OBS HIGH 75: CPT | Mod: GC

## 2023-01-31 PROCEDURE — 99223 1ST HOSP IP/OBS HIGH 75: CPT

## 2023-01-31 RX ORDER — LOSARTAN POTASSIUM 100 MG/1
100 TABLET, FILM COATED ORAL DAILY
Refills: 0 | Status: DISCONTINUED | OUTPATIENT
Start: 2023-02-01 | End: 2023-02-24

## 2023-01-31 RX ADMIN — Medication 1 TABLET(S): at 12:47

## 2023-01-31 RX ADMIN — Medication 3 MILLIGRAM(S): at 22:07

## 2023-01-31 RX ADMIN — POLYETHYLENE GLYCOL 3350 17 GRAM(S): 17 POWDER, FOR SOLUTION ORAL at 06:03

## 2023-01-31 RX ADMIN — METHOCARBAMOL 1500 MILLIGRAM(S): 500 TABLET, FILM COATED ORAL at 13:18

## 2023-01-31 RX ADMIN — CARVEDILOL PHOSPHATE 12.5 MILLIGRAM(S): 80 CAPSULE, EXTENDED RELEASE ORAL at 18:23

## 2023-01-31 RX ADMIN — METHOCARBAMOL 1500 MILLIGRAM(S): 500 TABLET, FILM COATED ORAL at 22:06

## 2023-01-31 RX ADMIN — LIDOCAINE 1 PATCH: 4 CREAM TOPICAL at 19:00

## 2023-01-31 RX ADMIN — GABAPENTIN 100 MILLIGRAM(S): 400 CAPSULE ORAL at 06:01

## 2023-01-31 RX ADMIN — LOSARTAN POTASSIUM 50 MILLIGRAM(S): 100 TABLET, FILM COATED ORAL at 06:02

## 2023-01-31 RX ADMIN — Medication 975 MILLIGRAM(S): at 06:02

## 2023-01-31 RX ADMIN — SENNA PLUS 2 TABLET(S): 8.6 TABLET ORAL at 22:06

## 2023-01-31 RX ADMIN — ENOXAPARIN SODIUM 40 MILLIGRAM(S): 100 INJECTION SUBCUTANEOUS at 06:01

## 2023-01-31 RX ADMIN — SIMVASTATIN 40 MILLIGRAM(S): 20 TABLET, FILM COATED ORAL at 22:06

## 2023-01-31 RX ADMIN — METHOCARBAMOL 1500 MILLIGRAM(S): 500 TABLET, FILM COATED ORAL at 06:02

## 2023-01-31 RX ADMIN — POLYETHYLENE GLYCOL 3350 17 GRAM(S): 17 POWDER, FOR SOLUTION ORAL at 18:21

## 2023-01-31 RX ADMIN — GABAPENTIN 100 MILLIGRAM(S): 400 CAPSULE ORAL at 18:20

## 2023-01-31 RX ADMIN — CARVEDILOL PHOSPHATE 12.5 MILLIGRAM(S): 80 CAPSULE, EXTENDED RELEASE ORAL at 06:02

## 2023-01-31 RX ADMIN — LIDOCAINE 1 PATCH: 4 CREAM TOPICAL at 12:47
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
## 2023-02-01 DIAGNOSIS — G83.4 CAUDA EQUINA SYNDROME: ICD-10-CM

## 2023-02-01 DIAGNOSIS — E78.5 HYPERLIPIDEMIA, UNSPECIFIED: ICD-10-CM

## 2023-02-01 DIAGNOSIS — Z20.822 CONTACT WITH AND (SUSPECTED) EXPOSURE TO COVID-19: ICD-10-CM

## 2023-02-01 DIAGNOSIS — F10.10 ALCOHOL ABUSE, UNCOMPLICATED: ICD-10-CM

## 2023-02-01 DIAGNOSIS — M48.061 SPINAL STENOSIS, LUMBAR REGION WITHOUT NEUROGENIC CLAUDICATION: ICD-10-CM

## 2023-02-01 DIAGNOSIS — Z79.82 LONG TERM (CURRENT) USE OF ASPIRIN: ICD-10-CM
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
## 2023-02-01 DIAGNOSIS — M54.16 RADICULOPATHY, LUMBAR REGION: ICD-10-CM

## 2023-02-01 DIAGNOSIS — R33.8 OTHER RETENTION OF URINE: ICD-10-CM

## 2023-02-01 DIAGNOSIS — S33.121A: ICD-10-CM

## 2023-02-01 DIAGNOSIS — I10 ESSENTIAL (PRIMARY) HYPERTENSION: ICD-10-CM
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
## 2023-02-01 DIAGNOSIS — I25.10 ATHEROSCLEROTIC HEART DISEASE OF NATIVE CORONARY ARTERY WITHOUT ANGINA PECTORIS: ICD-10-CM

## 2023-02-01 DIAGNOSIS — Z87.891 PERSONAL HISTORY OF NICOTINE DEPENDENCE: ICD-10-CM

## 2023-02-01 DIAGNOSIS — Z95.1 PRESENCE OF AORTOCORONARY BYPASS GRAFT: ICD-10-CM

## 2023-02-01 DIAGNOSIS — G82.20 PARAPLEGIA, UNSPECIFIED: ICD-10-CM

## 2023-02-01 PROCEDURE — 99232 SBSQ HOSP IP/OBS MODERATE 35: CPT

## 2023-02-01 PROCEDURE — 99232 SBSQ HOSP IP/OBS MODERATE 35: CPT | Mod: GC

## 2023-02-01 RX ADMIN — LIDOCAINE 1 PATCH: 4 CREAM TOPICAL at 18:00

## 2023-02-01 RX ADMIN — LOSARTAN POTASSIUM 100 MILLIGRAM(S): 100 TABLET, FILM COATED ORAL at 05:19

## 2023-02-01 RX ADMIN — SIMVASTATIN 40 MILLIGRAM(S): 20 TABLET, FILM COATED ORAL at 22:37

## 2023-02-01 RX ADMIN — Medication 975 MILLIGRAM(S): at 04:53

## 2023-02-01 RX ADMIN — LIDOCAINE 1 PATCH: 4 CREAM TOPICAL at 12:33

## 2023-02-01 RX ADMIN — Medication 1 TABLET(S): at 12:33

## 2023-02-01 RX ADMIN — METHOCARBAMOL 1500 MILLIGRAM(S): 500 TABLET, FILM COATED ORAL at 05:19

## 2023-02-01 RX ADMIN — GABAPENTIN 100 MILLIGRAM(S): 400 CAPSULE ORAL at 05:20

## 2023-02-01 RX ADMIN — CARVEDILOL PHOSPHATE 12.5 MILLIGRAM(S): 80 CAPSULE, EXTENDED RELEASE ORAL at 18:14

## 2023-02-01 RX ADMIN — LIDOCAINE 1 PATCH: 4 CREAM TOPICAL at 00:00

## 2023-02-01 RX ADMIN — Medication 975 MILLIGRAM(S): at 22:36

## 2023-02-01 RX ADMIN — Medication 975 MILLIGRAM(S): at 04:23

## 2023-02-01 RX ADMIN — CARVEDILOL PHOSPHATE 12.5 MILLIGRAM(S): 80 CAPSULE, EXTENDED RELEASE ORAL at 05:19

## 2023-02-01 RX ADMIN — METHOCARBAMOL 1500 MILLIGRAM(S): 500 TABLET, FILM COATED ORAL at 13:30

## 2023-02-01 RX ADMIN — Medication 975 MILLIGRAM(S): at 11:40

## 2023-02-01 RX ADMIN — Medication 3 MILLIGRAM(S): at 22:36

## 2023-02-01 RX ADMIN — GABAPENTIN 100 MILLIGRAM(S): 400 CAPSULE ORAL at 18:14

## 2023-02-01 RX ADMIN — Medication 975 MILLIGRAM(S): at 12:40

## 2023-02-01 RX ADMIN — METHOCARBAMOL 1500 MILLIGRAM(S): 500 TABLET, FILM COATED ORAL at 22:37

## 2023-02-01 RX ADMIN — SENNA PLUS 2 TABLET(S): 8.6 TABLET ORAL at 22:36

## 2023-02-01 RX ADMIN — ENOXAPARIN SODIUM 40 MILLIGRAM(S): 100 INJECTION SUBCUTANEOUS at 05:20

## 2023-02-01 NOTE — DIETITIAN INITIAL EVALUATION ADULT - PERSON TAUGHT/METHOD
Heart-Healthy DASH/TLC/verbal instruction/written material/teach back - (Patient repeats in own words)/patient instructed

## 2023-02-01 NOTE — PROGRESS NOTE ADULT - SUBJECTIVE AND OBJECTIVE BOX
CC: Cauda Equina    Today's Subjective & Objective Findings:  Patient seen and examined in PT this AM.   States that he feels well this morning and feels like his LEs are getting stronger.   Last BM on .   No other complaints at this time.     Denies headache, dizziness, visual changes, chest pain, SOB/CONRAD, abdominal pain, nausea, vomiting, diarrhea, dysuria, numbness or tingling.     Vital Signs Last 24 Hrs  T(C): 36.3 (2023 09:28), Max: 36.4 (2023 21:17)  T(F): 97.4 (2023 09:28), Max: 97.6 (2023 21:17)  HR: 68 (2023 09:) (56 - 68)  BP: 138/95 (2023 09:28) (138/95 - 171/96)  BP(mean): --  RR: 16 (2023 09:28) (16 - 16)  SpO2: 98% (2023 09:28) (98% - 99%)    PHYSICAL EXAM:  Constitutional - NAD, Comfortable  HEENT - NCAT, EOMI  Neck - Supple, No limited ROM  Chest - good chest expansion, good respiratory effort, CTAB   Cardio - warm and well perfused   Abdomen -  Soft, NTND + Vergara  Extremities - No peripheral edema, No calf tenderness   Neurologic Exam:                    Cognitive -             Orientation: Awake, Alert, AAO to self, place, date, year, situation            Thought: process, content appropriate     Speech - Fluent, Comprehensible, No dysarthria, No aphasia      Cranial Nerves - No facial asymmetry, Tongue midline, Shoulder shrug intact     Motor -                      LEFT    UE - ShAB 5/5, EF 5/5, EE 5/5, WE 5/5,  WNL                    RIGHT UE - ShAB 5/5, EF 5/5, EE 5/5, WE 5/5,  WNL                    LEFT    LE - HF 2/5, KE 3+/5, DF 1/5, PF 4/5 Strong hip extensors4+/5                    RIGHT LE - HF 2/5, KE 2/5, DF 0/5, PF 4/5        Sensory - decrease sensation below the knee. R>L     OculoVestibular -  No nystagmus  Psychiatric - Mood stable, Affect WNL  Skin on admission: lumbar incision looks well healed. open to air currently.  otherwise the skin is warm dry & intact. No rashes wounds or scars      LAB                        13.1   6.60  )-----------( 206      ( 2023 06:25 )             40.2         141  |  104  |  19  ----------------------------<  99  4.2   |  31  |  0.66    Ca    9.0      2023 06:25    TPro  7.2  /  Alb  3.6  /  TBili  0.4  /  DBili  x   /  AST  24  /  ALT  48<H>  /  AlkPhos  56      LIVER FUNCTIONS - ( 2023 06:25 )  Alb: 3.6 g/dL / Pro: 7.2 g/dL / ALK PHOS: 56 U/L / ALT: 48 U/L / AST: 24 U/L / GGT: x             Urinalysis Basic - ( 2023 23:30 )  Color: Yellow / Appearance: Clear / S.020 / pH: x  Gluc: x / Ketone: Negative  / Bili: Negative / Urobili: 4   Blood: x / Protein: 15 / Nitrite: Negative   Leuk Esterase: Trace / RBC: 5-10 /HPF / WBC 0-2 /HPF   Sq Epi: x / Non Sq Epi: Neg.-Few / Bacteria: Negative /HPF      MEDICATIONS  (STANDING):  acetaminophen     Tablet .. 975 milliGRAM(s) Oral every 6 hours  carvedilol 12.5 milliGRAM(s) Oral every 12 hours  enoxaparin Injectable 40 milliGRAM(s) SubCutaneous <User Schedule>  gabapentin 100 milliGRAM(s) Oral two times a day  influenza   Vaccine 0.5 milliLiter(s) IntraMuscular once  lidocaine   4% Patch 1 Patch Transdermal daily  losartan 100 milliGRAM(s) Oral daily  methocarbamol 1500 milliGRAM(s) Oral three times a day  multivitamin 1 Tablet(s) Oral daily  polyethylene glycol 3350 17 Gram(s) Oral two times a day  senna 2 Tablet(s) Oral at bedtime  simvastatin 40 milliGRAM(s) Oral at bedtime    MEDICATIONS  (PRN):  magnesium hydroxide Suspension 30 milliLiter(s) Oral every 12 hours PRN Constipation  melatonin 3 milliGRAM(s) Oral at bedtime PRN Insomnia  oxyCODONE    IR 5 milliGRAM(s) Oral every 4 hours PRN Moderate Pain (4 - 6)  oxyCODONE    IR 10 milliGRAM(s) Oral every 4 hours PRN Severe Pain (7 - 10)

## 2023-02-01 NOTE — CHART NOTE - NSCHARTNOTEFT_GEN_A_CORE
[facility] Plan of Care    REHABILITATION DIAGNOSIS:  Cauda equina syndrome          COMORBIDITIES/COMPLICATING CONDITIONS IMPACTING REHABILITATION:  HEALTH ISSUES - PROBLEM Dx:  urinary retention  HTN        PAST MEDICAL & SURGICAL HISTORY:  HTN (hypertension)      HLD (hyperlipidemia)      CAD (coronary artery disease)      Alcohol abuse      S/P CABG (coronary artery bypass graft)          Based upon consideration of the patient's impairments, functional status, complicating conditions and any other contributing factors and after information garnered from the assessments of all therapy disciplines involved in treating the patient and other pertinent clinicians:    INTERDISCIPLINARY REHABILITATION INTERVENTIONS:    [ X  ] Transfer Training  [ X  ] Bed Mobility  [ X  ] Therapeutic Exercise  [ X ] Balance/Coordination Exercises  [ X ] Locomotion retraining  [ X  ] Stairs  [  X ] Functional Transfer Training  [ x  ] Bowel/Bladder program  [  x ] Pain Management  [   ] Skin/Wound Care  [   ] Visual/Perceptual Training  [   ] Therapeutic Recreation Activities  [   ] Neuromuscular Re-education  [ X  ] Activities of Daily Living  [   ] Speech Exercise  [   ] Swallowing Exercises  [   ] Vital Stim  [   ] Dietary Supplements  [   ] Calorie Count  [   ] Cognitive Exercises  [   ] Congnitive/Linguistic Treatment  [   ] Behavior Program  [   ] Neuropsych Therapy  [ X  ] Patient/Family Counseling  [ X ] Family Training  [ X  ] Community Re-entry  [   ] Orthotic Evaluation  [   ] Prosthetic Eval/Training    MEDICAL PROGNOSIS:  good    REHAB POTENTIAL:  good  EXPECTED DAILY THERAPY:         PT:2hr       OT:1hr       ST:       P&O:    EXPECTED INTENSITY OF PROGRAM:  3 hrs / Day    EXPECTED FREQUENCY OF PROGRAM: 5 Days/ Week    ESTIMATED LOS:  [  ] 5-7 Days  [  ] 7-10 Days  [  ] 10- 14 Days  [  ] 14- 18 Days  [x  ] 18- 21 Days    ESTIMATED DISPOSITION:  [  ] Home   [  ] Home with Outpatient Therapies  [ x ] Home with Home Therapies  [  ] Assisted Living  [  ] Nursing Home  [  ] Long Term Acute Care    INTERDISCIPLINARY FUNCTIONAL OUTCOMES/GOALS:         Gait/Mobility:5       Transfers:5       ADLs:5       Functional Transfers:5       Medication Management:7       Communication:NA       Cognitive:NA       Dysphagia:NA       Bladder7       Bowel:7     Functional Independent Measures:   7 = Independent  6 = Modified Independent  5 = Supervision  4 = Minimal Assist/ Contact Guard  3 = Moderate Assistance  2 = Maximum Assistance  1 = Total Assistance  0 = Unable to assess

## 2023-02-01 NOTE — PROGRESS NOTE ADULT - ASSESSMENT
56 YO male with PMH of CAD, s/p CABG, HTN, HLD, ETOH abuse (no alcohol use in 2 years as per pt) who presented to HNT on 1/24 with back pain and progressive lower extremity weakness. Examination with concern for cauda equina. He was admitted for cauda equina syndrome and he  underwent urgent L2-3 laminectomy/ discectomy on 1/25/23.   Patient now with gait Instability, ADL impairments and Functional impairments.    * Continue to monitor functional rehab progress * Bowel regimen *     #Cauda Equina Syndrome  - s/p L2-3 laminectomy/ discectomy on 1/25/23.  - Start Comprehensive Rehab Program: PT/OT, 3hours daily and 5 days weekly  - PT: Focused on improving strength, endurance, coordination, balance, functional mobility, and transfers  - OT: Focused on improving strength, fine motor skills, coordination, posture and ADLs.      #HTN  - Losartan 50mg daily  - Carvedilol 12.5mg BID    #CAD  -  s/p CABG  - resume ASA on 2/2    #HLD  - Zocor 40mg daily    #Pain management  - Tylenol PRN, lidocaine, Oxycodone PRN, Robaxin, gabapentin 100mg BID    #DVT ppx  - Lovenox, SCD, TEDs    #Bowel Regimen  - Senna, miralax BID    #Bladder management  - BS on admission, and q 8 hours (SC if > 400)  - Monitor UO    #FEN   - Diet: Regular  - Supplements: MVI    #Skin:  - Skin on admission:  lumbar incision looks well healed. open to air currently  - Pressure injury/Skin: Turn Q2hrs while in bed, OOB to Chair, PT/OT     #Sleep:   - Maintain quiet hours and low stim environment.  - Melatonin 3mg nightly PRN to maximize participation in therapy during the day.     #Precaution  - Fall, Aspiration, spinal    #GOC  CODE STATUS: FULL CODE   --------------------------------------------------------  Outpatient Follow-up (Specialty/Name of physician):  Zack Schaefer (MD; PhD)  Neurosurgery  [address] [place] [address], 2nd [place] [address]  Phone: [phone]  Fax: [phone]  -------------------------------------------------------- 58 YO male with PMH of CAD, s/p CABG, HTN, HLD, ETOH abuse (no alcohol use in 2 years as per pt) who presented to HNT on 1/24 with back pain and progressive lower extremity weakness. Examination with concern for cauda equina. He was admitted for cauda equina syndrome and he  underwent urgent L2-3 laminectomy/ discectomy on 1/25/23.   Patient now with gait Instability, ADL impairments and Functional impairments.    * Continue to monitor functional rehab progress * Bowel regimen *     #Cauda Equina Syndrome  - s/p L2-3 laminectomy/ discectomy on 1/25/23.  - Start Comprehensive Rehab Program: PT/OT, 3hours daily and 5 days weekly  - PT: Focused on improving strength, endurance, coordination, balance, functional mobility, and transfers  - OT: Focused on improving strength, fine motor skills, coordination, posture and ADLs.      #HTN  - Losartan 50mg daily  - Carvedilol 12.5mg BID    #CAD  -  s/p CABG  - resume ASA on 2/2    #HLD  - Zocor 40mg daily    #Pain management  - Tylenol PRN, lidocaine, Oxycodone PRN, Robaxin, gabapentin 100mg BID    #DVT ppx  - Lovenox, SCD, TEDs    #Bowel Regimen  - Senna, miralax BID    #Bladder management  - BS on admission, and q 8 hours (SC if > 400)  - Monitor UO    #FEN   - Diet: Regular  - Supplements: MVI    #Skin:  - Skin on admission:  lumbar incision looks well healed. open to air currently  - Pressure injury/Skin: Turn Q2hrs while in bed, OOB to Chair, PT/OT     #Sleep:   - Maintain quiet hours and low stim environment.  - Melatonin 3mg nightly PRN to maximize participation in therapy during the day.     #Precaution  - Fall, Aspiration, spinal    IDT conference on 2/2  TDD: 2/21 to home  Barriers: Significant lower body weakness.  Social Work: Lives in  with girlfriend and girlfriend's mother with 3 YANETH and 10 STI with 1 HR. 1st floor living if needed.   OT: Min A for ADLs and min-mod for functional transfers.  PT: Min A for popover transfer. Sit to stand transfer with Max A and 2 pr A. Ambulated 5-8ft on parallel bars with max A and WCF.   SLP: --  --------------------------------------------------------  Outpatient Follow-up (Specialty/Name of physician):  Zack Schaefer (MD; PhD)  Neurosurgery  [address] [place] [address], [address]nd [place] [address]  Phone: [phone]  Fax: [phone]  --------------------------------------------------------

## 2023-02-01 NOTE — DIETITIAN INITIAL EVALUATION ADULT - OTHER INFO
Reason for Admission: Paraparesis/Cauda Equina Syndrome    History of Present Illness:   This is a 56 YO male with PMH of CAD, s/p CABG, HTN, HLD, ETOH abuse (no alcohol use in 2 years as per pt) who presented to [facility] on 1/24 with back pain. Pt was seen in the ER on 1/22/23 and discharged home with a muscle relaxer and tylenol. He represented to the ER for worsening back pain. He stated that 2 days prior to admission, he was lifting weights in his basement and then he heard a pop. He immediately had pain in the lower back. Was taking Tylenol and the muscle relaxer.     The day prior to admission, he was able to take a shower and then later in the day he was unable to get off of the couch d/t lower extremity weakness. He also noticed pain down his legs bilaterally. He also has decreased sensation of his lower extremities b/l. He was evaluated by neurosurgery in ED and admitted to medicine. He underwent urgent L2-3 laminectomy/ discectomy on 1/25/23 for cauda equina syndrome. He did require reinsertion of  youssef for failed TOV/urinary retention. He was started on Flomax.  His BB was increased and he was started on losartan for uncontrolled HTN. ASA may be resumed 7 days post op per neurosurgery. Patient was evaluated by PM&R and therapy for functional deficits, gait/ADL impairments and acute rehabilitation was recommended. Patient was medically optimized for discharge to [facility] IRU on 1/30/23.      At this time patient w/ poor appetite, reports consuming 25% of meals. Recommend Ensure Plus High Protein Daily 8 oz (Provides 350 kcal, 20 grams of protein) to assist patient in meeting estimated energy requirements. No issues w/ chewing and swallowing. Denies N/V/C/D, however w/ persistent constipation x 9 days, now resolved, Last BM 1/31. Recommended fluid/fiber at meals to assist w/ regular BM.   LB .7 lb. Provided education on Heart-Healthy DASH/TLC nutrition therapy. Reason for Admission: Paraparesis/Cauda Equina Syndrome    History of Present Illness:   This is a 56 YO male with PMH of CAD, s/p CABG, HTN, HLD, ETOH abuse (no alcohol use in 2 years as per pt) who presented to [facility] on 1/24 with back pain. Pt was seen in the ER on 1/22/23 and discharged home with a muscle relaxer and tylenol. He represented to the ER for worsening back pain. He stated that 2 days prior to admission, he was lifting weights in his basement and then he heard a pop. He immediately had pain in the lower back. Was taking Tylenol and the muscle relaxer.     The day prior to admission, he was able to take a shower and then later in the day he was unable to get off of the couch d/t lower extremity weakness. He also noticed pain down his legs bilaterally. He also has decreased sensation of his lower extremities b/l. He was evaluated by neurosurgery in ED and admitted to medicine. He underwent urgent L2-3 laminectomy/ discectomy on 1/25/23 for cauda equina syndrome. He did require reinsertion of  youssef for failed TOV/urinary retention. He was started on Flomax.  His BB was increased and he was started on losartan for uncontrolled HTN. ASA may be resumed 7 days post op per neurosurgery. Patient was evaluated by PM&R and therapy for functional deficits, gait/ADL impairments and acute rehabilitation was recommended. Patient was medically optimized for discharge to [facility] IRU on 1/30/23.      At this time patient w/ poor appetite, reports consuming 25% of meals. Recommend Ensure Plus High Protein Daily 8 oz (Provides 350 kcal, 20 grams of protein) to assist patient in meeting estimated energy requirements. No issues w/ chewing and swallowing. Denies N/V/D, however w/ persistent constipation x 9 days, now resolved, Last BM 1/31. Recommended fluid/fiber at meals to assist w/ regular BM.   LB .7 lb. Provided education on Heart-Healthy DASH/TLC nutrition therapy.

## 2023-02-01 NOTE — DIETITIAN INITIAL EVALUATION ADULT - ORAL INTAKE PTA/DIET HISTORY
PTA patient w/ reduced appetite/intake during previous hospitalization course. Does not follow any therapeutic diet at home, reports frequenting at fast food restaurants, + Protein shakes.

## 2023-02-01 NOTE — PROGRESS NOTE ADULT - SUBJECTIVE AND OBJECTIVE BOX
Patient is a 57y old  Male who presents with a chief complaint of Cauda Equina Syndrome (2023 08:56)      Patient seen and examined at bedside. No overnight events reported.     ALLERGIES:  No Known Allergies    MEDICATIONS  (STANDING):  acetaminophen     Tablet .. 975 milliGRAM(s) Oral every 6 hours  carvedilol 12.5 milliGRAM(s) Oral every 12 hours  enoxaparin Injectable 40 milliGRAM(s) SubCutaneous <User Schedule>  gabapentin 100 milliGRAM(s) Oral two times a day  influenza   Vaccine 0.5 milliLiter(s) IntraMuscular once  lidocaine   4% Patch 1 Patch Transdermal daily  losartan 100 milliGRAM(s) Oral daily  methocarbamol 1500 milliGRAM(s) Oral three times a day  multivitamin 1 Tablet(s) Oral daily  polyethylene glycol 3350 17 Gram(s) Oral two times a day  senna 2 Tablet(s) Oral at bedtime  simvastatin 40 milliGRAM(s) Oral at bedtime    MEDICATIONS  (PRN):  magnesium hydroxide Suspension 30 milliLiter(s) Oral every 12 hours PRN Constipation  melatonin 3 milliGRAM(s) Oral at bedtime PRN Insomnia  oxyCODONE    IR 5 milliGRAM(s) Oral every 4 hours PRN Moderate Pain (4 - 6)  oxyCODONE    IR 10 milliGRAM(s) Oral every 4 hours PRN Severe Pain (7 - 10)    Vital Signs Last 24 Hrs  T(F): 97.4 (2023 09:28), Max: 97.6 (2023 21:17)  HR: 68 (2023 09:28) (56 - 68)  BP: 138/95 (2023 09:28) (138/95 - 171/96)  RR: 16 (2023 09:28) (16 - 16)  SpO2: 98% (2023 09:28) (98% - 99%)  I&O's Summary    2023 07:01  -  2023 07:00  --------------------------------------------------------  IN: 0 mL / OUT: 1900 mL / NET: -1900 mL      PHYSICAL EXAM:  NAD, A/O x 3  RRR S1S2  CTA b/l, non labored  NTTP, BS+  +Vergara  Normal mood, normal affect, interactive, good concentration        LABS:                        13.1   6.60  )-----------( 206      ( 2023 06:25 )             40.2         141  |  104  |  19  ----------------------------<  99  4.2   |  31  |  0.66    Ca    9.0      2023 06:25    TPro  7.2  /  Alb  3.6  /  TBili  0.4  /  DBili  x   /  AST  24  /  ALT  48  /  AlkPhos  56      Urinalysis Basic - ( 2023 23:30 )    Color: Yellow / Appearance: Clear / S.020 / pH: x  Gluc: x / Ketone: Negative  / Bili: Negative / Urobili: 4   Blood: x / Protein: 15 / Nitrite: Negative   Leuk Esterase: Trace / RBC: 5-10 /HPF / WBC 0-2 /HPF   Sq Epi: x / Non Sq Epi: Neg.-Few / Bacteria: Negative /HPF      COVID-19 PCR: NotDetec (23 @ 00:00)  COVID-19 PCR: NotDetec (23 @ 02:16)

## 2023-02-01 NOTE — PROGRESS NOTE ADULT - NS ATTEND AMEND GEN_ALL_CORE FT
Rehab Attending- Patient seen and examined by me - Case discussed, above note reviewed by me with modifications made  examined in PT/ in parallel bars- standing  BPs better controlled  Moved bowels X3 - sensed BMs - no need for enema  Continued increase in Motor strength LEs- Right quads still weak  For TOV in AM  to continue intensive rehab progarm

## 2023-02-01 NOTE — DIETITIAN INITIAL EVALUATION ADULT - PERTINENT LABORATORY DATA
01-31    141  |  104  |  19  ----------------------------<  99  4.2   |  31  |  0.66    Ca    9.0      31 Jan 2023 06:25    TPro  7.2  /  Alb  3.6  /  TBili  0.4  /  DBili  x   /  AST  24  /  ALT  48<H>  /  AlkPhos  56  01-31

## 2023-02-01 NOTE — PROGRESS NOTE ADULT - ASSESSMENT
57M with CAD s/p CABG, HTN, HLD, hx alcohol abuse, recently hospitalized for cauda equina syndrome requiring L2-L3 laminectomy/discectomy after injury while weight lifting, now in acute rehab    #Cauda Equina Syndrome  #Ambulatory dysfunction due to above  -PT/OT  -Pain control as needed    #CAD  -ASA (on hold, to be resumed 2/2), statin, beta blocker    #Essential HTN  -c/w Losartan, Coreg    #Constipation  -Patient had 2 BMs already.... c/w laxatives as needed    #Urinary retention  -Likely in part neurogenic from cauda equina syndrome, likely in part from constipation and decreased mobility  -Vergara in place... will d/w team re: void trial today....    #DVT ppx: Lovenox   57M with CAD s/p CABG, HTN, HLD, hx alcohol abuse, recently hospitalized for cauda equina syndrome requiring L2-L3 laminectomy/discectomy after injury while weight lifting, now in acute rehab    #Cauda Equina Syndrome  #Ambulatory dysfunction due to above  -PT/OT  -Pain control as needed    #CAD  -ASA (on hold, to be resumed 2/2), statin, beta blocker    #Essential HTN  -c/w Losartan, Coreg  -BP improved with higher dose of Losartan    #Constipation  -Patient had 2 BMs already.... c/w laxatives as needed    #Urinary retention  -Likely in part neurogenic from cauda equina syndrome, likely in part from constipation and decreased mobility  -Vergara in place... will d/w team re: void trial today....    #DVT ppx: Lovenox

## 2023-02-01 NOTE — DIETITIAN INITIAL EVALUATION ADULT - PERTINENT MEDS FT
MEDICATIONS  (STANDING):  acetaminophen     Tablet .. 975 milliGRAM(s) Oral every 6 hours  carvedilol 12.5 milliGRAM(s) Oral every 12 hours  enoxaparin Injectable 40 milliGRAM(s) SubCutaneous <User Schedule>  gabapentin 100 milliGRAM(s) Oral two times a day  influenza   Vaccine 0.5 milliLiter(s) IntraMuscular once  lidocaine   4% Patch 1 Patch Transdermal daily  losartan 100 milliGRAM(s) Oral daily  methocarbamol 1500 milliGRAM(s) Oral three times a day  multivitamin 1 Tablet(s) Oral daily  polyethylene glycol 3350 17 Gram(s) Oral two times a day  senna 2 Tablet(s) Oral at bedtime  simvastatin 40 milliGRAM(s) Oral at bedtime    MEDICATIONS  (PRN):  magnesium hydroxide Suspension 30 milliLiter(s) Oral every 12 hours PRN Constipation  melatonin 3 milliGRAM(s) Oral at bedtime PRN Insomnia  oxyCODONE    IR 5 milliGRAM(s) Oral every 4 hours PRN Moderate Pain (4 - 6)  oxyCODONE    IR 10 milliGRAM(s) Oral every 4 hours PRN Severe Pain (7 - 10)

## 2023-02-02 LAB
ALBUMIN SERPL ELPH-MCNC: 3.9 G/DL — SIGNIFICANT CHANGE UP (ref 3.3–5)
ALP SERPL-CCNC: 66 U/L — SIGNIFICANT CHANGE UP (ref 40–120)
ALT FLD-CCNC: 52 U/L — HIGH (ref 10–45)
ANION GAP SERPL CALC-SCNC: 11 MMOL/L — SIGNIFICANT CHANGE UP (ref 5–17)
AST SERPL-CCNC: 27 U/L — SIGNIFICANT CHANGE UP (ref 10–40)
BILIRUB SERPL-MCNC: 0.4 MG/DL — SIGNIFICANT CHANGE UP (ref 0.2–1.2)
BUN SERPL-MCNC: 22 MG/DL — SIGNIFICANT CHANGE UP (ref 7–23)
CALCIUM SERPL-MCNC: 9.6 MG/DL — SIGNIFICANT CHANGE UP (ref 8.4–10.5)
CHLORIDE SERPL-SCNC: 104 MMOL/L — SIGNIFICANT CHANGE UP (ref 96–108)
CO2 SERPL-SCNC: 28 MMOL/L — SIGNIFICANT CHANGE UP (ref 22–31)
CREAT SERPL-MCNC: 0.76 MG/DL — SIGNIFICANT CHANGE UP (ref 0.5–1.3)
EGFR: 105 ML/MIN/1.73M2 — SIGNIFICANT CHANGE UP
GLUCOSE SERPL-MCNC: 100 MG/DL — HIGH (ref 70–99)
HCT VFR BLD CALC: 41.4 % — SIGNIFICANT CHANGE UP (ref 39–50)
HGB BLD-MCNC: 13.3 G/DL — SIGNIFICANT CHANGE UP (ref 13–17)
MCHC RBC-ENTMCNC: 28.4 PG — SIGNIFICANT CHANGE UP (ref 27–34)
MCHC RBC-ENTMCNC: 32.1 GM/DL — SIGNIFICANT CHANGE UP (ref 32–36)
MCV RBC AUTO: 88.5 FL — SIGNIFICANT CHANGE UP (ref 80–100)
NRBC # BLD: 0 /100 WBCS — SIGNIFICANT CHANGE UP (ref 0–0)
PLATELET # BLD AUTO: 230 K/UL — SIGNIFICANT CHANGE UP (ref 150–400)
POTASSIUM SERPL-MCNC: 4.5 MMOL/L — SIGNIFICANT CHANGE UP (ref 3.5–5.3)
POTASSIUM SERPL-SCNC: 4.5 MMOL/L — SIGNIFICANT CHANGE UP (ref 3.5–5.3)
PROT SERPL-MCNC: 7.5 G/DL — SIGNIFICANT CHANGE UP (ref 6–8.3)
RBC # BLD: 4.68 M/UL — SIGNIFICANT CHANGE UP (ref 4.2–5.8)
RBC # FLD: 13.7 % — SIGNIFICANT CHANGE UP (ref 10.3–14.5)
SODIUM SERPL-SCNC: 143 MMOL/L — SIGNIFICANT CHANGE UP (ref 135–145)
WBC # BLD: 7.96 K/UL — SIGNIFICANT CHANGE UP (ref 3.8–10.5)
WBC # FLD AUTO: 7.96 K/UL — SIGNIFICANT CHANGE UP (ref 3.8–10.5)

## 2023-02-02 PROCEDURE — 99232 SBSQ HOSP IP/OBS MODERATE 35: CPT | Mod: GC

## 2023-02-02 PROCEDURE — 99232 SBSQ HOSP IP/OBS MODERATE 35: CPT

## 2023-02-02 RX ORDER — LIDOCAINE HCL 20 MG/ML
3 VIAL (ML) INJECTION EVERY 6 HOURS
Refills: 0 | Status: DISCONTINUED | OUTPATIENT
Start: 2023-02-02 | End: 2023-02-24

## 2023-02-02 RX ORDER — LIDOCAINE HCL 20 MG/ML
3 VIAL (ML) INJECTION THREE TIMES A DAY
Refills: 0 | Status: DISCONTINUED | OUTPATIENT
Start: 2023-02-02 | End: 2023-02-02

## 2023-02-02 RX ORDER — TAMSULOSIN HYDROCHLORIDE 0.4 MG/1
0.4 CAPSULE ORAL AT BEDTIME
Refills: 0 | Status: DISCONTINUED | OUTPATIENT
Start: 2023-02-02 | End: 2023-02-24

## 2023-02-02 RX ORDER — ASPIRIN/CALCIUM CARB/MAGNESIUM 324 MG
81 TABLET ORAL DAILY
Refills: 0 | Status: DISCONTINUED | OUTPATIENT
Start: 2023-02-02 | End: 2023-02-24

## 2023-02-02 RX ADMIN — Medication 975 MILLIGRAM(S): at 05:51

## 2023-02-02 RX ADMIN — ENOXAPARIN SODIUM 40 MILLIGRAM(S): 100 INJECTION SUBCUTANEOUS at 05:51

## 2023-02-02 RX ADMIN — SENNA PLUS 2 TABLET(S): 8.6 TABLET ORAL at 21:29

## 2023-02-02 RX ADMIN — TAMSULOSIN HYDROCHLORIDE 0.4 MILLIGRAM(S): 0.4 CAPSULE ORAL at 21:28

## 2023-02-02 RX ADMIN — METHOCARBAMOL 1500 MILLIGRAM(S): 500 TABLET, FILM COATED ORAL at 14:10

## 2023-02-02 RX ADMIN — LIDOCAINE 1 PATCH: 4 CREAM TOPICAL at 12:30

## 2023-02-02 RX ADMIN — Medication 1 TABLET(S): at 12:32

## 2023-02-02 RX ADMIN — Medication 975 MILLIGRAM(S): at 18:31

## 2023-02-02 RX ADMIN — METHOCARBAMOL 1500 MILLIGRAM(S): 500 TABLET, FILM COATED ORAL at 21:29

## 2023-02-02 RX ADMIN — POLYETHYLENE GLYCOL 3350 17 GRAM(S): 17 POWDER, FOR SOLUTION ORAL at 05:51

## 2023-02-02 RX ADMIN — Medication 975 MILLIGRAM(S): at 18:02

## 2023-02-02 RX ADMIN — LIDOCAINE 1 PATCH: 4 CREAM TOPICAL at 20:17

## 2023-02-02 RX ADMIN — GABAPENTIN 100 MILLIGRAM(S): 400 CAPSULE ORAL at 05:50

## 2023-02-02 RX ADMIN — Medication 975 MILLIGRAM(S): at 23:12

## 2023-02-02 RX ADMIN — LOSARTAN POTASSIUM 100 MILLIGRAM(S): 100 TABLET, FILM COATED ORAL at 05:50

## 2023-02-02 RX ADMIN — Medication 975 MILLIGRAM(S): at 13:25

## 2023-02-02 RX ADMIN — LIDOCAINE 1 PATCH: 4 CREAM TOPICAL at 19:17

## 2023-02-02 RX ADMIN — POLYETHYLENE GLYCOL 3350 17 GRAM(S): 17 POWDER, FOR SOLUTION ORAL at 18:02

## 2023-02-02 RX ADMIN — CARVEDILOL PHOSPHATE 12.5 MILLIGRAM(S): 80 CAPSULE, EXTENDED RELEASE ORAL at 05:50

## 2023-02-02 RX ADMIN — CARVEDILOL PHOSPHATE 12.5 MILLIGRAM(S): 80 CAPSULE, EXTENDED RELEASE ORAL at 18:01

## 2023-02-02 RX ADMIN — SIMVASTATIN 40 MILLIGRAM(S): 20 TABLET, FILM COATED ORAL at 21:28

## 2023-02-02 RX ADMIN — Medication 975 MILLIGRAM(S): at 12:31

## 2023-02-02 RX ADMIN — GABAPENTIN 100 MILLIGRAM(S): 400 CAPSULE ORAL at 18:00

## 2023-02-02 RX ADMIN — LIDOCAINE 1 PATCH: 4 CREAM TOPICAL at 00:00

## 2023-02-02 RX ADMIN — METHOCARBAMOL 1500 MILLIGRAM(S): 500 TABLET, FILM COATED ORAL at 05:50

## 2023-02-02 NOTE — PROGRESS NOTE ADULT - SUBJECTIVE AND OBJECTIVE BOX
CC: Cauda Equina    Today's Subjective & Objective Findings:  Patient seen and examined in PT this AM.   States that he feels well this morning and feels like his LEs are getting stronger.   Last BM on    TOV started last void 600cc/ St cathed for 700cc    Denies headache, dizziness, visual changes, chest pain, SOB/CONRAD, abdominal pain, nausea, vomiting, diarrhea, dysuria, numbness or tingling.     Vital Signs Last 24 Hrs  T(C): 37.2 (2023 08:15), Max: 37.2 (2023 08:15)  T(F): 98.9 (2023 08:15), Max: 98.9 (2023 08:15)  HR: 60 (2023 08:15) (58 - 60)  BP: 131/84 (2023 08:15) (131/84 - 144/79)  BP(mean): --  RR: 16 (2023 08:15) (16 - 16)  SpO2: 96% (2023 08:15) (96% - 96%)    Parameters below as of 2023 08:15  Patient On (Oxygen Delivery Method): room air      PHYSICAL EXAM:  Constitutional - NAD, Comfortable  HEENT - NCAT, EOMI  Neck - Supple, No limited ROM  Chest - good chest expansion, good respiratory effort, CTAB   Cardio - warm and well perfused   Abdomen -  Soft, NTND  Vergara out  Extremities - No peripheral edema, No calf tenderness   Neurologic Exam:                    Cognitive -             Orientation: Awake, Alert, AAO to self, place, date, year, situation            Thought: process, content appropriate     Speech - Fluent, Comprehensible, No dysarthria, No aphasia      Cranial Nerves - No facial asymmetry, Tongue midline, Shoulder shrug intact     Motor -                      LEFT    UE - ShAB 5/5, EF 5/5, EE 5/5, WE 5/5,  WNL                    RIGHT UE - ShAB 5/5, EF 5/5, EE 5/5, WE 5/5,  WNL                    LEFT    LE - HF 2/5, KE 3+/5, DF 1/5, PF 4/5 Strong hip extensors4+/5                    RIGHT LE - HF 2/5, KE 2/5, DF 0/5, PF 4/5        Sensory - decrease sensation below the knee. R>L     OculoVestibular -  No nystagmus  Psychiatric - Mood stable, Affect WNL  Skin on admission: lumbar incision intact/ healed      LAB                                     13.3   7.96  )-----------( 230      ( 2023 07:00 )             41.4       02    143  |  104  |  22  ----------------------------<  100<H>  4.5   |  28  |  0.76    Ca    9.6      2023 07:00    TPro  7.5  /  Alb  3.9  /  TBili  0.4  /  DBili  x   /  AST  27  /  ALT  52<H>  /  AlkPhos  66  02        Urinalysis Basic - ( 2023 23:30 )  Color: Yellow / Appearance: Clear / S.020 / pH: x  Gluc: x / Ketone: Negative  / Bili: Negative / Urobili: 4   Blood: x / Protein: 15 / Nitrite: Negative   Leuk Esterase: Trace / RBC: 5-10 /HPF / WBC 0-2 /HPF   Sq Epi: x / Non Sq Epi: Neg.-Few / Bacteria: Negative /HPF      MEDICATIONS  (STANDING):  acetaminophen     Tablet .. 975 milliGRAM(s) Oral every 6 hours  carvedilol 12.5 milliGRAM(s) Oral every 12 hours  enoxaparin Injectable 40 milliGRAM(s) SubCutaneous <User Schedule>  gabapentin 100 milliGRAM(s) Oral two times a day  influenza   Vaccine 0.5 milliLiter(s) IntraMuscular once  lidocaine   4% Patch 1 Patch Transdermal daily  losartan 100 milliGRAM(s) Oral daily  methocarbamol 1500 milliGRAM(s) Oral three times a day  multivitamin 1 Tablet(s) Oral daily  polyethylene glycol 3350 17 Gram(s) Oral two times a day  senna 2 Tablet(s) Oral at bedtime  simvastatin 40 milliGRAM(s) Oral at bedtime  tamsulosin 0.4 milliGRAM(s) Oral at bedtime    MEDICATIONS  (PRN):  lidocaine 2% Jelly 3 milliLiter(s) IntraUrethral every 6 hours PRN For straight catheterization  magnesium hydroxide Suspension 30 milliLiter(s) Oral every 12 hours PRN Constipation  melatonin 3 milliGRAM(s) Oral at bedtime PRN Insomnia  oxyCODONE    IR 5 milliGRAM(s) Oral every 4 hours PRN Moderate Pain (4 - 6)  oxyCODONE    IR 10 milliGRAM(s) Oral every 4 hours PRN Severe Pain (7 - 10)

## 2023-02-02 NOTE — PROGRESS NOTE ADULT - SUBJECTIVE AND OBJECTIVE BOX
Patient is a 57y old  Male who presents with a chief complaint of Paraparesis/Cauda Equina Syndrome (2023 10:06)      Patient seen and examined at bedside.  No overnight events  Reports right heel cramping when lying down    ALLERGIES:  No Known Allergies    MEDICATIONS  (STANDING):  acetaminophen     Tablet .. 975 milliGRAM(s) Oral every 6 hours  carvedilol 12.5 milliGRAM(s) Oral every 12 hours  enoxaparin Injectable 40 milliGRAM(s) SubCutaneous <User Schedule>  gabapentin 100 milliGRAM(s) Oral two times a day  influenza   Vaccine 0.5 milliLiter(s) IntraMuscular once  lidocaine   4% Patch 1 Patch Transdermal daily  losartan 100 milliGRAM(s) Oral daily  methocarbamol 1500 milliGRAM(s) Oral three times a day  multivitamin 1 Tablet(s) Oral daily  polyethylene glycol 3350 17 Gram(s) Oral two times a day  senna 2 Tablet(s) Oral at bedtime  simvastatin 40 milliGRAM(s) Oral at bedtime    MEDICATIONS  (PRN):  lidocaine 2% Jelly 3 milliLiter(s) IntraUrethral three times a day PRN For straight catheterization  magnesium hydroxide Suspension 30 milliLiter(s) Oral every 12 hours PRN Constipation  melatonin 3 milliGRAM(s) Oral at bedtime PRN Insomnia  oxyCODONE    IR 5 milliGRAM(s) Oral every 4 hours PRN Moderate Pain (4 - 6)  oxyCODONE    IR 10 milliGRAM(s) Oral every 4 hours PRN Severe Pain (7 - 10)    Vital Signs Last 24 Hrs  T(F): 98.9 (2023 08:15), Max: 98.9 (2023 08:15)  HR: 60 (2023 08:15) (58 - 66)  BP: 131/84 (2023 08:15) (131/84 - 145/89)  RR: 16 (2023 08:15) (16 - 16)  SpO2: 96% (2023 08:15) (96% - 96%)  I&O's Summary    2023 07:01  -  2023 07:00  --------------------------------------------------------  IN: 1 mL / OUT: 400 mL / NET: -399 mL      BMI (kg/m2): 25.3 (23 @ 19:10)    PHYSICAL EXAM:  GENERAL: NAD  HENT:  Atraumatic, Normocephalic; No tonsillar erythema, exudates, or enlargement; Moist mucous membranes;   EYES: EOMI, PERRLA, conjunctiva and sclera clear, no lid-lag  NECK: Supple, No JVD, Normal thyroid  CHEST/LUNG: Clear to percussion bilaterally; No rales, rhonchi, wheezing, or rubs; normal respiratory effort, no intercostal retractions  HEART: Regular rate and rhythm; No murmurs, rubs, or gallops; No pitting edema  ABDOMEN: Soft, Nontender, Nondistended; Bowel sounds present; No HSM  MUSCULOSKELETAL/EXTREMITIES:  2+ Peripheral Pulses, No clubbing or digital cyanosis  PSYCH: Appropriate affect, Alert & Awake; Good judgement    LABS:                        13.3   7.96  )-----------( 230      ( 2023 07:00 )             41.4       02-    143  |  104  |  22  ----------------------------<  100  4.5   |  28  |  0.76    Ca    9.6      2023 07:00    TPro  7.5  /  Alb  3.9  /  TBili  0.4  /  DBili  x   /  AST  27  /  ALT  52  /  AlkPhos  66  02-02     Urinalysis Basic - ( 2023 23:30 )    Color: Yellow / Appearance: Clear / S.020 / pH: x  Gluc: x / Ketone: Negative  / Bili: Negative / Urobili: 4   Blood: x / Protein: 15 / Nitrite: Negative   Leuk Esterase: Trace / RBC: 5-10 /HPF / WBC 0-2 /HPF   Sq Epi: x / Non Sq Epi: Neg.-Few / Bacteria: Negative /HPF    COVID-19 PCR: NotDetec (23 @ 00:00)  COVID-19 PCR: NotDetec (23 @ 02:16)      Care Discussed with Rehab Attending and Other Providers

## 2023-02-02 NOTE — PROGRESS NOTE ADULT - ASSESSMENT
56 YO male with PMH of CAD, s/p CABG, HTN, HLD, ETOH abuse (no alcohol use in 2 years as per pt) who presented to [facility] on 1/24 with back pain and progressive lower extremity weakness. Examination with concern for cauda equina. He was admitted for cauda equina syndrome and he  underwent urgent L2-3 laminectomy/ discectomy on 1/25/23.   Patient now with gait Instability, ADL impairments and Functional impairments.    * Continue to monitor functional rehab progress * Bowel regimen *     #Cauda Equina Syndrome  - s/p L2-3 laminectomy/ discectomy on 1/25/23.  - Start Comprehensive Rehab Program: PT/OT, 3hours daily and 5 days weekly  - PT: Focused on improving strength, endurance, coordination, balance, functional mobility, and transfers  - OT: Focused on improving strength, fine motor skills, coordination, posture and ADLs.      #HTN  - Losartan 100mg daily  - Carvedilol 12.5mg BID    #CAD  -  s/p CABG  - resume ASA in AM    #HLD  - Zocor 40mg daily    #Pain management  - Tylenol PRN, lidocaine, Oxycodone PRN, Robaxin, gabapentin 100mg BID    #DVT ppx  - Lovenox, SCD, TEDs    #Bowel Regimen  - Senna, miralax BID    #Bladder management  Vergara Dcd- TOV  Spont voiding 50% output  Void OOB/sitting  added flomax HS  follow residuals-ST cath if needed q 6h    #FEN   - Diet: Regular  - Supplements: MVI    #Skin:  - Skin on admission:  lumbar incision looks well healed. open to air currently  - Pressure injury/Skin: Turn Q2hrs while in bed, OOB to Chair, PT/OT     #Sleep:   - Maintain quiet hours and low stim environment.  - Melatonin 3mg nightly PRN to maximize participation in therapy during the day.     #Precaution  - Fall, Aspiration, spinal    IDT conference on 2/2  TDD: 2/21 to home  Barriers: Significant lower body weakness.  Social Work: Lives in  with girlfriend and girlfriend's mother with 3 YANETH and 10 STI with 1 HR. 1st floor living if needed.   OT: Min A for ADLs and min-mod for functional transfers.  PT: Min A for popover transfer. Sit to stand transfer with Max A and 2 pr A. Ambulated 5-8ft on parallel bars with max A and WCF.   SLP: --  --------------------------------------------------------  Outpatient Follow-up (Specialty/Name of physician):  Zack Schaefer (MD; PhD)  [address], [address] [place] [address]  Phone: [phone]  Fax: [phone]  --------------------------------------------------------

## 2023-02-02 NOTE — PROGRESS NOTE ADULT - ASSESSMENT
57M with CAD s/p CABG, HTN, HLD, hx alcohol abuse, recently hospitalized for cauda equina syndrome requiring L2-L3 laminectomy/discectomy after injury while weight lifting, now in acute rehab    #Cauda Equina Syndrome  #Ambulatory dysfunction due to above  -PT/OT  -Pain control as needed    #CAD  -ASA (on hold, to be resumed 2/2), statin, beta blocker    #Essential HTN  -c/w Losartan, Coreg    #Urinary retention  -Likely in part neurogenic from cauda equina syndrome, likely in part from constipation and decreased mobility  -Vergara removed this AM    #DVT ppx: Lovenox

## 2023-02-03 PROCEDURE — 90791 PSYCH DIAGNOSTIC EVALUATION: CPT

## 2023-02-03 PROCEDURE — 99232 SBSQ HOSP IP/OBS MODERATE 35: CPT | Mod: GC

## 2023-02-03 RX ORDER — OXYCODONE HYDROCHLORIDE 5 MG/1
5 TABLET ORAL EVERY 4 HOURS
Refills: 0 | Status: DISCONTINUED | OUTPATIENT
Start: 2023-02-03 | End: 2023-02-07

## 2023-02-03 RX ORDER — OXYCODONE HYDROCHLORIDE 5 MG/1
10 TABLET ORAL EVERY 4 HOURS
Refills: 0 | Status: DISCONTINUED | OUTPATIENT
Start: 2023-02-03 | End: 2023-02-07

## 2023-02-03 RX ADMIN — METHOCARBAMOL 1500 MILLIGRAM(S): 500 TABLET, FILM COATED ORAL at 21:20

## 2023-02-03 RX ADMIN — METHOCARBAMOL 1500 MILLIGRAM(S): 500 TABLET, FILM COATED ORAL at 13:24

## 2023-02-03 RX ADMIN — Medication 975 MILLIGRAM(S): at 17:45

## 2023-02-03 RX ADMIN — Medication 975 MILLIGRAM(S): at 05:31

## 2023-02-03 RX ADMIN — Medication 975 MILLIGRAM(S): at 12:46

## 2023-02-03 RX ADMIN — LIDOCAINE 1 PATCH: 4 CREAM TOPICAL at 19:00

## 2023-02-03 RX ADMIN — Medication 1 TABLET(S): at 12:44

## 2023-02-03 RX ADMIN — Medication 81 MILLIGRAM(S): at 12:44

## 2023-02-03 RX ADMIN — GABAPENTIN 100 MILLIGRAM(S): 400 CAPSULE ORAL at 05:31

## 2023-02-03 RX ADMIN — GABAPENTIN 100 MILLIGRAM(S): 400 CAPSULE ORAL at 17:44

## 2023-02-03 RX ADMIN — POLYETHYLENE GLYCOL 3350 17 GRAM(S): 17 POWDER, FOR SOLUTION ORAL at 05:32

## 2023-02-03 RX ADMIN — SENNA PLUS 2 TABLET(S): 8.6 TABLET ORAL at 21:21

## 2023-02-03 RX ADMIN — ENOXAPARIN SODIUM 40 MILLIGRAM(S): 100 INJECTION SUBCUTANEOUS at 05:32

## 2023-02-03 RX ADMIN — CARVEDILOL PHOSPHATE 12.5 MILLIGRAM(S): 80 CAPSULE, EXTENDED RELEASE ORAL at 05:31

## 2023-02-03 RX ADMIN — LIDOCAINE 1 PATCH: 4 CREAM TOPICAL at 12:44

## 2023-02-03 RX ADMIN — CARVEDILOL PHOSPHATE 12.5 MILLIGRAM(S): 80 CAPSULE, EXTENDED RELEASE ORAL at 17:45

## 2023-02-03 RX ADMIN — POLYETHYLENE GLYCOL 3350 17 GRAM(S): 17 POWDER, FOR SOLUTION ORAL at 17:45

## 2023-02-03 RX ADMIN — Medication 975 MILLIGRAM(S): at 06:30

## 2023-02-03 RX ADMIN — Medication 975 MILLIGRAM(S): at 00:10

## 2023-02-03 RX ADMIN — SIMVASTATIN 40 MILLIGRAM(S): 20 TABLET, FILM COATED ORAL at 21:20

## 2023-02-03 RX ADMIN — LOSARTAN POTASSIUM 100 MILLIGRAM(S): 100 TABLET, FILM COATED ORAL at 05:31

## 2023-02-03 RX ADMIN — TAMSULOSIN HYDROCHLORIDE 0.4 MILLIGRAM(S): 0.4 CAPSULE ORAL at 21:20

## 2023-02-03 RX ADMIN — METHOCARBAMOL 1500 MILLIGRAM(S): 500 TABLET, FILM COATED ORAL at 05:31

## 2023-02-03 RX ADMIN — Medication 975 MILLIGRAM(S): at 13:30

## 2023-02-03 NOTE — PROGRESS NOTE ADULT - SUBJECTIVE AND OBJECTIVE BOX
CC: Cauda Equina    Today's Subjective & Objective Findings:  Patient seen and examined in PT this AM.   States that he feels well this morning and feels like his LEs are getting stronger.   Per RN, pt emotional about current situation.  Last BM on    Last SC yesterday evening @ 16:00  No other complaints at this time.    Denies headache, dizziness, visual changes, chest pain, SOB/CONRAD, abdominal pain, nausea, vomiting, diarrhea, dysuria, numbness or tingling.     Vital Signs Last 24 Hrs  T(C): 37 (2023 08:19), Max: 37 (2023 08:19)  T(F): 98.6 (2023 08:19), Max: 98.6 (2023 08:19)  HR: 62 (2023 08:19) (62 - 68)  BP: 143/87 (2023 08:19) (142/66 - 143/87)  BP(mean): --  RR: 16 (2023 08:19) (15 - 16)  SpO2: 97% (2023 08:19) (97% - 97%)      PHYSICAL EXAM:  Constitutional - NAD, Comfortable  HEENT - NCAT, EOMI  Neck - Supple, No limited ROM  Chest - good chest expansion, good respiratory effort, CTAB   Cardio - warm and well perfused   Abdomen -  Soft, NTND  Vergara out  Extremities - No peripheral edema, No calf tenderness   Neurologic Exam:                    Cognitive -             Orientation: Awake, Alert, AAO to self, place, date, year, situation            Thought: process, content appropriate     Speech - Fluent, Comprehensible, No dysarthria, No aphasia      Cranial Nerves - No facial asymmetry, Tongue midline, Shoulder shrug intact     Motor -                      LEFT    UE - ShAB 5/5, EF 5/5, EE 5/5, WE 5/5,  WNL                    RIGHT UE - ShAB 5/5, EF 5/5, EE 5/5, WE 5/5,  WNL                    LEFT    LE - HF 2/5, KE 3+/5, DF 1/5, PF 4/5 Strong hip extensors4+/5                    RIGHT LE - HF 2/5, KE 2/5, DF 0/5, PF 4/5        Sensory - decrease sensation below the knee. R>L     OculoVestibular -  No nystagmus  Psychiatric - Mood stable, Affect WNL  Skin on admission: lumbar incision intact/ healed      LAB                                     13.3   7.96  )-----------( 230      ( 2023 07:00 )             41.4       02    143  |  104  |  22  ----------------------------<  100<H>  4.5   |  28  |  0.76    Ca    9.6      2023 07:00    TPro  7.5  /  Alb  3.9  /  TBili  0.4  /  DBili  x   /  AST  27  /  ALT  52<H>  /  AlkPhos  66  02    Urinalysis Basic - ( 2023 23:30 )  Color: Yellow / Appearance: Clear / S.020 / pH: x  Gluc: x / Ketone: Negative  / Bili: Negative / Urobili: 4   Blood: x / Protein: 15 / Nitrite: Negative   Leuk Esterase: Trace / RBC: 5-10 /HPF / WBC 0-2 /HPF   Sq Epi: x / Non Sq Epi: Neg.-Few / Bacteria: Negative /HPF      MEDICATIONS  (STANDING):  acetaminophen     Tablet .. 975 milliGRAM(s) Oral every 6 hours  carvedilol 12.5 milliGRAM(s) Oral every 12 hours  enoxaparin Injectable 40 milliGRAM(s) SubCutaneous <User Schedule>  gabapentin 100 milliGRAM(s) Oral two times a day  influenza   Vaccine 0.5 milliLiter(s) IntraMuscular once  lidocaine   4% Patch 1 Patch Transdermal daily  losartan 100 milliGRAM(s) Oral daily  methocarbamol 1500 milliGRAM(s) Oral three times a day  multivitamin 1 Tablet(s) Oral daily  polyethylene glycol 3350 17 Gram(s) Oral two times a day  senna 2 Tablet(s) Oral at bedtime  simvastatin 40 milliGRAM(s) Oral at bedtime  tamsulosin 0.4 milliGRAM(s) Oral at bedtime    MEDICATIONS  (PRN):  lidocaine 2% Jelly 3 milliLiter(s) IntraUrethral every 6 hours PRN For straight catheterization  magnesium hydroxide Suspension 30 milliLiter(s) Oral every 12 hours PRN Constipation  melatonin 3 milliGRAM(s) Oral at bedtime PRN Insomnia  oxyCODONE    IR 5 milliGRAM(s) Oral every 4 hours PRN Moderate Pain (4 - 6)  oxyCODONE    IR 10 milliGRAM(s) Oral every 4 hours PRN Severe Pain (7 - 10) CC: Cauda Equina    Today's Subjective & Objective Findings:  Patient seen and examined at bedside this AM, with Dr Larios.   States that he feels well this morning and feels like his LEs are getting stronger.   Per RN, pt emotional about current situation.  NPSY present during [facility]  Last BM on 2/3  Last SC yesterday evening @ 16:00.  Continue to monitor BS/PVRs/SCs.  No other complaints at this time.    Denies headache, dizziness, visual changes, chest pain, SOB/CONRAD, abdominal pain, nausea, vomiting, diarrhea, dysuria, numbness or tingling.     Vital Signs Last 24 Hrs  T(C): 37 (2023 08:19), Max: 37 (2023 08:19)  T(F): 98.6 (2023 08:19), Max: 98.6 (2023 08:19)  HR: 62 (2023 08:19) (62 - 68)  BP: 143/87 (2023 08:19) (142/66 - 143/87)  BP(mean): --  RR: 16 (2023 08:19) (15 - 16)  SpO2: 97% (2023 08:19) (97% - 97%)      PHYSICAL EXAM:  Constitutional - NAD, Comfortable  HEENT - NCAT, EOMI  Neck - Supple, No limited ROM  Chest - good chest expansion, good respiratory effort, CTAB   Cardio - warm and well perfused   Abdomen -  Soft, NTND  Vergara out  Extremities - No peripheral edema, No calf tenderness   Neurologic Exam:                    Cognitive -             Orientation: Awake, Alert, AAO to self, place, date, year, situation            Thought: process, content appropriate     Speech - Fluent, Comprehensible, No dysarthria, No aphasia      Cranial Nerves - No facial asymmetry, Tongue midline, Shoulder shrug intact     Motor -                      LEFT    UE - ShAB 5/5, EF 5/5, EE 5/5, WE 5/5,  WNL                    RIGHT UE - ShAB 5/5, EF 5/5, EE 5/5, WE 5/5,  WNL                    LEFT    LE - HF 2/5, KE 3+/5, DF 1/5, PF 4/5 Strong hip extensors4+/5                    RIGHT LE - HF 2/5, KE 2/5, DF 0/5, PF 4/5        Sensory - decrease sensation below the knee. R>L     OculoVestibular -  No nystagmus  Psychiatric - Mood stable, Affect WNL  Skin on admission: lumbar incision intact/ healed      LAB                                     13.3   7.96  )-----------( 230      ( 2023 07:00 )             41.4       02    143  |  104  |  22  ----------------------------<  100<H>  4.5   |  28  |  0.76    Ca    9.6      2023 07:00    TPro  7.5  /  Alb  3.9  /  TBili  0.4  /  DBili  x   /  AST  27  /  ALT  52<H>  /  AlkPhos  66  0202    Urinalysis Basic - ( 2023 23:30 )  Color: Yellow / Appearance: Clear / S.020 / pH: x  Gluc: x / Ketone: Negative  / Bili: Negative / Urobili: 4   Blood: x / Protein: 15 / Nitrite: Negative   Leuk Esterase: Trace / RBC: 5-10 /HPF / WBC 0-2 /HPF   Sq Epi: x / Non Sq Epi: Neg.-Few / Bacteria: Negative /HPF      MEDICATIONS  (STANDING):  acetaminophen     Tablet .. 975 milliGRAM(s) Oral every 6 hours  carvedilol 12.5 milliGRAM(s) Oral every 12 hours  enoxaparin Injectable 40 milliGRAM(s) SubCutaneous <User Schedule>  gabapentin 100 milliGRAM(s) Oral two times a day  influenza   Vaccine 0.5 milliLiter(s) IntraMuscular once  lidocaine   4% Patch 1 Patch Transdermal daily  losartan 100 milliGRAM(s) Oral daily  methocarbamol 1500 milliGRAM(s) Oral three times a day  multivitamin 1 Tablet(s) Oral daily  polyethylene glycol 3350 17 Gram(s) Oral two times a day  senna 2 Tablet(s) Oral at bedtime  simvastatin 40 milliGRAM(s) Oral at bedtime  tamsulosin 0.4 milliGRAM(s) Oral at bedtime    MEDICATIONS  (PRN):  lidocaine 2% Jelly 3 milliLiter(s) IntraUrethral every 6 hours PRN For straight catheterization  magnesium hydroxide Suspension 30 milliLiter(s) Oral every 12 hours PRN Constipation  melatonin 3 milliGRAM(s) Oral at bedtime PRN Insomnia  oxyCODONE    IR 5 milliGRAM(s) Oral every 4 hours PRN Moderate Pain (4 - 6)  oxyCODONE    IR 10 milliGRAM(s) Oral every 4 hours PRN Severe Pain (7 - 10)

## 2023-02-03 NOTE — PROGRESS NOTE ADULT - ASSESSMENT
56 YO male with PMH of CAD, s/p CABG, HTN, HLD, ETOH abuse (no alcohol use in 2 years as per pt) who presented to [facility] on 1/24 with back pain and progressive lower extremity weakness. Examination with concern for cauda equina. He was admitted for cauda equina syndrome and he  underwent urgent L2-3 laminectomy/ discectomy on 1/25/23.   Patient now with gait Instability, ADL impairments and Functional impairments.    * Continue to monitor functional rehab progress * Bowel regimen * Bladder scan/PVRs/SC every 6 hours- continue to monitor *  #Cauda Equina Syndrome  - s/p L2-3 laminectomy/ discectomy on 1/25/23.  - Start Comprehensive Rehab Program: PT/OT, 3hours daily and 5 days weekly  - PT: Focused on improving strength, endurance, coordination, balance, functional mobility, and transfers  - OT: Focused on improving strength, fine motor skills, coordination, posture and ADLs.      #HTN  - Losartan 100mg daily  - Carvedilol 12.5mg BID    #CAD  -  s/p CABG  - resume ASA in AM    #HLD  - Zocor 40mg daily    #Pain management  - Tylenol PRN, lidocaine, Oxycodone PRN, Robaxin, gabapentin 100mg BID    #DVT ppx  - Lovenox, SCD, TEDs    #Bowel Regimen  - Senna, miralax BID    #Bladder management  Vergara Dcd 2/2- TOV  Spont voiding 50% output  Void OOB/sitting  added flomax HS  follow residuals-ST cath if needed q 6h    #FEN   - Diet: Regular  - Supplements: MVI    #Skin:  - Skin on admission:  lumbar incision looks well healed. open to air currently  - Pressure injury/Skin: Turn Q2hrs while in bed, OOB to Chair, PT/OT     #Sleep:   - Maintain quiet hours and low stim environment.  - Melatonin 3mg nightly PRN to maximize participation in therapy during the day.     #Precaution  - Fall, Aspiration, spinal    IDT conference on 2/2  TDD: 2/21 to home  Barriers: Significant lower body weakness.  Social Work: Lives in  with girlfriend and girlfriend's mother with 3 YANETH and 10 STI with 1 HR. 1st floor living if needed.   OT: Min A for ADLs and min-mod for functional transfers.  PT: Min A for popover transfer. Sit to stand transfer with Max A and 2 pr A. Ambulated 5-8ft on parallel bars with max A and WCF.   SLP: --  --------------------------------------------------------  Outpatient Follow-up (Specialty/Name of physician):  Zack Schaefer (MD; PhD)  Neurosurgery  [address] [place] [address], [address] [place] [address]  Phone: [phone]  Fax: [phone]  -------------------------------------------------------- 58 YO male with PMH of CAD, s/p CABG, HTN, HLD, ETOH abuse (no alcohol use in 2 years as per pt) who presented to [facility] on 1/24 with back pain and progressive lower extremity weakness. Examination with concern for cauda equina. He was admitted for cauda equina syndrome and he  underwent urgent L2-3 laminectomy/ discectomy on 1/25/23.   Patient now with gait Instability, ADL impairments and Functional impairments.    * Continue to monitor functional rehab progress * Bowel regimen * Bladder scan/PVRs/SC every 6 hours- continue to monitor * NPSY for emotional support/coping strategies *   #Cauda Equina Syndrome  - s/p L2-3 laminectomy/ discectomy on 1/25/23.  - Start Comprehensive Rehab Program: PT/OT, 3hours daily and 5 days weekly  - PT: Focused on improving strength, endurance, coordination, balance, functional mobility, and transfers  - OT: Focused on improving strength, fine motor skills, coordination, posture and ADLs.      #HTN  - Losartan 100mg daily  - Carvedilol 12.5mg BID    #CAD  -  s/p CABG  - resume ASA in AM    #HLD  - Zocor 40mg daily    #Pain management  - Tylenol PRN, lidocaine, Oxycodone PRN, Robaxin, gabapentin 100mg BID    #DVT ppx  - Lovenox, SCD, TEDs    #Bowel Regimen  - Senna, miralax BID    #Bladder management  Vergara Dcd 2/2- TOV  Spont voiding 50% output  Void OOB/sitting  added flomax HS  follow residuals-ST cath if needed q 6h    #FEN   - Diet: Regular  - Supplements: MVI    #Skin:  - Skin on admission:  lumbar incision looks well healed. open to air currently  - Pressure injury/Skin: Turn Q2hrs while in bed, OOB to Chair, PT/OT     #Sleep:   - Maintain quiet hours and low stim environment.  - Melatonin 3mg nightly PRN to maximize participation in therapy during the day.     #Mood  - NPSY consult for emotional support and coping strategies    #Precaution  - Fall, Aspiration, spinal    IDT conference on 2/2  TDD: 2/21 to home  Barriers: Significant lower body weakness.  Social Work: Lives in  with girlfriend and girlfriend's mother with 3 YANETH and 10 STI with 1 HR. 1st floor living if needed.   OT: Min A for ADLs and min-mod for functional transfers.  PT: Min A for popover transfer. Sit to stand transfer with Max A and 2 pr A. Ambulated 5-8ft on parallel bars with max A and WCF.   SLP: --  --------------------------------------------------------  Outpatient Follow-up (Specialty/Name of physician):  Zack Schaefer (MD; PhD)  Neurosurgery  [address] [place] [address], [address]nd [place] [address]  Phone: [phone]  Fax: [phone]  -------------------------------------------------------- 56 YO male with PMH of CAD, s/p CABG, HTN, HLD, ETOH abuse (no alcohol use in 2 years as per pt) who presented to [facility] on 1/24 with back pain and progressive lower extremity weakness. Examination with concern for cauda equina. He was admitted for cauda equina syndrome and he  underwent urgent L2-3 laminectomy/ discectomy on 1/25/23.   Patient now with gait Instability, ADL impairments and Functional impairments.    * Continue to monitor functional rehab progress * Bowel regimen * Bladder scan/PVRs/SC every 6 hours- continue to monitor * NPSY for emotional support/coping strategies *     #Cauda Equina Syndrome  - s/p L2-3 laminectomy/ discectomy on 1/25/23.  - Start Comprehensive Rehab Program: PT/OT, 3hours daily and 5 days weekly  - PT: Focused on improving strength, endurance, coordination, balance, functional mobility, and transfers  - OT: Focused on improving strength, fine motor skills, coordination, posture and ADLs.      #HTN  - Losartan 100mg daily  - Carvedilol 12.5mg BID    #CAD  -  s/p CABG  - resume ASA in AM    #HLD  - Zocor 40mg daily    #Pain management  - Tylenol PRN, lidocaine, Oxycodone PRN, Robaxin, gabapentin 100mg BID    #DVT ppx  - Lovenox, SCD, TEDs    #Bowel Regimen  - Senna, miralax BID    #Bladder management  Vergara Dcd 2/2- TOV  Spont voiding 50% output  Void OOB/sitting  added flomax HS  follow residuals-ST cath if needed q 6h    #FEN   - Diet: Regular  - Supplements: MVI    #Skin:  - Skin on admission:  lumbar incision looks well healed. open to air currently  - Pressure injury/Skin: Turn Q2hrs while in bed, OOB to Chair, PT/OT     #Sleep:   - Maintain quiet hours and low stim environment.  - Melatonin 3mg nightly PRN to maximize participation in therapy during the day.     #Mood  - NPSY consult for emotional support and coping strategies    #Precaution  - Fall, Aspiration, spinal    IDT conference on 2/2  TDD: 2/21 to home  Barriers: Significant lower body weakness.  Social Work: Lives in  with girlfriend and girlfriend's mother with 3 YANETH and 10 STI with 1 HR. 1st floor living if needed.   OT: Min A for ADLs and min-mod for functional transfers.  PT: Min A for popover transfer. Sit to stand transfer with Max A and 2 pr A. Ambulated 5-8ft on parallel bars with max A and WCF.   SLP: --  --------------------------------------------------------  Outpatient Follow-up (Specialty/Name of physician):  Zack Schaefer (MD; PhD)  Neurosurgery  [address] [place] [address], 2nd [place] [address]  Phone: [phone]  Fax: [phone]  --------------------------------------------------------

## 2023-02-03 NOTE — CONSULT NOTE ADULT - ASSESSMENT
57M with CAD s/p CABG, HTN, HLD, hx alcohol abuse, recently hospitalized for cauda equina syndrome requiring L2-L3 laminectomy/discectomy after injury while weight lifting, now in acute rehab    #Cauda Equina Syndrome  #Ambulatory dysfunction due to above  -PT/OT  -Pain control as needed    #CAD  -ASA (on hold, to be resumed 2/2), statin, beta blocker    #Essential HTN  -c/w Losartan, Coreg    #Constipation  -Standing laxatives ordered.... by resolving constipation, this will help with the urinary retention  -Would give suppository if no BM within 24 hours    #Urinary retention  -Likely in part neurogenic from cauda equina syndrome, likely in part from constipation and decreased mobility  -Vergara in place... would do a void trial as soon as patient has a bowel movement    #DVT ppx: Lovenox    
Patient is seen at bedside, seated in wheelchair. He is alert and oriented to person, place, time, and situation. He is engaged and cooperative. He reports difficulty staying asleep (longstanding). He denies the presence of pain, presently. Prior history is notable for CABG (2012).   Mood is mixed depressed and anxious, with congruent affect (intermittently tearful). He denies SI/HI/I/P. He denies AH/VH. He expresses some frustration with his injury not being identified sooner. He is pleased with the care received at [facility] and with his progress thus far. He indicates a desire to keep his mobile phone off as much as possible, so as not to distract his focus from rehabilitation. His responses on rating measures of recent mood administered aurally indicate moderate depression (PHQ-9 = 10/27) and significant anxiety (GLORIA-7 = 16/21). Behavioral history is notable for alcohol use disorder (maintaining sobriety over 2 years). Prior to his recent surgery, he was using marijuana recreationally. He is a former cigarette smoker.     He was born in [place] and raised in [place]. He left high school prior to earning his diploma. Later on, he completed his GED. He worked various retail and service jobs over the years. He indicates inability to work full-time since his CABG; thus, he has been working part-time in a convenience store. He enjoys his work and hopes to resume at some point, if able. He resides with his girlfriend and her mother in a private home. He has two sisters and three brothers, with whom he maintains relationships. He notes that his tamra (Buddhist) is a source of strength and comfort. He was an active member of his parish prior to his recent hospitalization. He reads scripture and prays as means to cope with stress.      Impression: Patient presents with difficulty adjusting to changes in physical functioning associated with his recent health concerns. Mood is mixed depressed and anxious. When reflecting on his life choices, he recognizes patterns of maladaptive coping and acknowledges recent attempts to cope with stress via more adaptive and constructive means. Psychoeducation is provided to him regarding adjustment to injury/change in functioning and ways to mitigate stress in the moment. Cognitive-behavioral approach is introduced to begin addressing frustration tolerance. Deep breathing technique is taught to him and practiced. He is receptive to psychotherapeutic approach. Plan: Supportive psychotherapy sessions are recommended to address adjustment and mood. Patient is in agreement with plan. Neuropsychology remains available.

## 2023-02-03 NOTE — PROGRESS NOTE ADULT - NS ATTEND AMEND GEN_ALL_CORE FT
Rehab Attending- Patient seen and examined by me - Case discussed, above note reviewed by me with modifications made    Doing well  Sl increased Knee extension on right  LLE 4/5  voiding well - no st cath since yesterday afternoon  on Flomax  reports symptoms of BPH at home prior to current hospitalization  Neuropsych to see - adjustment disorder  to continue intensive rehab program

## 2023-02-03 NOTE — CONSULT NOTE ADULT - SUBJECTIVE AND OBJECTIVE BOX
Patient is a 57y old  Male who presents with a chief complaint of Cauda Equina Syndrome (2023 16:35)    HPI:  This is a 56 YO male with PMH of CAD, s/p CABG, HTN, HLD, ETOH abuse (no alcohol use in 2 years as per pt) who presented to [facility] on  with back pain. Pt was seen in the ER on 23 and discharged home with a muscle relaxer and tylenol. He represented to the ER for worsening back pain. He stated that 2 days prior to admission, he was lifting weights in his basement and then he heard a pop. He immediately had pain in the lower back. Was taking Tylenol and the muscle relaxer.     The day prior to admission, he was able to take a shower and then later in the day he was unable to get off of the couch d/t lower extremity weakness. He also noticed pain down his legs bilaterally. He also has decreased sensation of his lower extremities b/l. He was evaluated by neurosurgery in ED and admitted to medicine. He underwent urgent L2-3 laminectomy/ discectomy on 23 for cauda equina syndrome. He did require reinsertion of  youssef for failed TOV/urinary retention. He was started on Flomax.  His BB was increased and he was started on losartan for uncontrolled HTN. ASA may be resumed 7 days post op per neurosurgery. Patient was evaluated by PM&R and therapy for functional deficits, gait/ADL impairments and acute rehabilitation was recommended. Patient was medically optimized for discharge to [facility] IRU on 23.    Currently patient complains of generalized weakness, mostly in his legs. Both legs equally weak. In the context of recent back surgery. Persistent symptoms. No complaints of pain at this time. Associated with difficulty walking and transferring. Patient motivated to improve at rehab.  Currently has a youssef catheter in place - has failed void trial. In addition, endorses his last bowel movement was over  week ago.       (2023 16:35)    PAST MEDICAL & SURGICAL HISTORY:  HTN (hypertension)      HLD (hyperlipidemia)      CAD (coronary artery disease)      Alcohol abuse      S/P CABG (coronary artery bypass graft)        SOCIAL HISTORY: Former smoker, 1.5 ppd x 20 years, quit 12 years ago  FAMILY HISTORY: Mother  in 40s of MI; sister  in 50s of MI    ALLERGIES:  No Known Allergies    MEDICATIONS  (STANDING):  acetaminophen     Tablet .. 975 milliGRAM(s) Oral every 6 hours  carvedilol 12.5 milliGRAM(s) Oral every 12 hours  enoxaparin Injectable 40 milliGRAM(s) SubCutaneous <User Schedule>  gabapentin 100 milliGRAM(s) Oral two times a day  influenza   Vaccine 0.5 milliLiter(s) IntraMuscular once  lidocaine   4% Patch 1 Patch Transdermal daily  losartan 50 milliGRAM(s) Oral daily  methocarbamol 1500 milliGRAM(s) Oral three times a day  multivitamin 1 Tablet(s) Oral daily  polyethylene glycol 3350 17 Gram(s) Oral two times a day  senna 2 Tablet(s) Oral at bedtime  simvastatin 40 milliGRAM(s) Oral at bedtime    MEDICATIONS  (PRN):  magnesium hydroxide Suspension 30 milliLiter(s) Oral every 12 hours PRN Constipation  melatonin 3 milliGRAM(s) Oral at bedtime PRN Insomnia  oxyCODONE    IR 5 milliGRAM(s) Oral every 4 hours PRN Moderate Pain (4 - 6)  oxyCODONE    IR 10 milliGRAM(s) Oral every 4 hours PRN Severe Pain (7 - 10)    Review of Systems: Refer to HPI for pertinent positives and negatives. All other ROS reviewed and negative except as otherwise stated above.    Vital Signs Last 24 Hrs  T(F): 97.7 (2023 07:49), Max: 98.4 (2023 18:00)  HR: 67 (2023 07:49) (55 - 67)  BP: 158/90 (2023 07:49) (142/85 - 172/91)  RR: 16 (2023 07:49) (16 - 18)  SpO2: 98% (2023 07:49) (97% - 99%)  I&O's Summary    2023 07:01  -  2023 07:00  --------------------------------------------------------  IN: 0 mL / OUT: 550 mL / NET: -550 mL      PHYSICAL EXAM:  GENERAL: NAD, well-groomed  HEAD:  Atraumatic, Normocephalic  EYES: EOMI, PERRL, conjunctiva and sclera clear  ENMT: Moist mucous membranes, Good dentition  NECK: Supple, No JVD  CHEST/LUNG: Clear to auscultation bilaterally, non-labored breathing, good air entry  HEART: RRR; S1/S2, No murmur  ABDOMEN: Soft, Nontender, Nondistended; Bowel sounds present  VASCULAR: Normal pulses, Normal capillary refill  EXTREMITIES: No cyanosis, No edema  LYMPH: No lymphadenopathy noted  : Youssef with yellow urine  SKIN: Warm, Intact, sternal scar noted  PSYCH: Normal mood, flat affect  NERVOUS SYSTEM:  A/O x3, Good concentration; CN 2-12 intact, No focal deficits, moves all extremities    LABS:                        13.1   6.60  )-----------( 206      ( 2023 06:25 )             40.2         141  |  104  |  19  ----------------------------<  99  4.2   |  31  |  0.66    Ca    9.0      2023 06:25    TPro  7.2  /  Alb  3.6  /  TBili  0.4  /  DBili  x   /  AST  24  /  ALT  48  /  AlkPhos  56        Urinalysis Basic - ( 2023 23:30 )    Color: Yellow / Appearance: Clear / S.020 / pH: x  Gluc: x / Ketone: Negative  / Bili: Negative / Urobili: 4   Blood: x / Protein: 15 / Nitrite: Negative   Leuk Esterase: Trace / RBC: 5-10 /HPF / WBC 0-2 /HPF   Sq Epi: x / Non Sq Epi: Neg.-Few / Bacteria: Negative /HPF    COVID-19 PCR: NotDetec (23 @ 00:00)  COVID-19 PCR: NotDetec (23 @ 02:16)    
Patient participated alone in a 75-minute, initial psychology consultation at bedside. No family members are present. PM&R is present momentarily. Neuropsychologist is introduced as a member of the rehabilitation team and the purpose of the consultation is explained. Patient agrees to participate.   Medical records are reviewed. Per records: Trever Dawn is a 57-year-old, male, with PMH of CAD, s/p CABG, HTN, HLD, ETOH abuse (no alcohol use in 2 years as per pt) who presented to [facility] on 1/24/2023 with back pain. Pt was seen in the ER two days prior, on 1/22/2023, and discharged home with a muscle relaxer and Tylenol. He represented to the ER via ambulance for worsening back pain. He stated that 2 days prior to admission, he was lifting heavy objects in his basement and then he heard a pop. He immediately had pain in the lower back.   The day prior to admission, he was able to take a shower and then later in the day he was unable to get off of the couch due to lower extremity weakness. He also noticed pain down his legs bilaterally. He also has decreased sensation of his lower extremities b/l. He was evaluated by neurosurgery in ED and admitted to medicine. He underwent urgent L2-3 laminectomy/ discectomy on 1/25/2023 for cauda equina syndrome. He did require reinsertion of Vergara for failed TOV/urinary retention. He was started on Flomax. His BB was increased and he was started on losartan for uncontrolled HTN. ASA may be resumed 7 days post op per neurosurgery. Patient was medically optimized for discharge to [facility] IRU on 1/30/2023.

## 2023-02-04 DIAGNOSIS — G83.4 CAUDA EQUINA SYNDROME: ICD-10-CM

## 2023-02-04 DIAGNOSIS — E78.5 HYPERLIPIDEMIA, UNSPECIFIED: ICD-10-CM

## 2023-02-04 DIAGNOSIS — I25.10 ATHEROSCLEROTIC HEART DISEASE OF NATIVE CORONARY ARTERY WITHOUT ANGINA PECTORIS: ICD-10-CM

## 2023-02-04 DIAGNOSIS — R33.9 RETENTION OF URINE, UNSPECIFIED: ICD-10-CM

## 2023-02-04 DIAGNOSIS — I10 ESSENTIAL (PRIMARY) HYPERTENSION: ICD-10-CM

## 2023-02-04 PROCEDURE — 99232 SBSQ HOSP IP/OBS MODERATE 35: CPT

## 2023-02-04 RX ADMIN — GABAPENTIN 100 MILLIGRAM(S): 400 CAPSULE ORAL at 05:24

## 2023-02-04 RX ADMIN — CARVEDILOL PHOSPHATE 12.5 MILLIGRAM(S): 80 CAPSULE, EXTENDED RELEASE ORAL at 17:11

## 2023-02-04 RX ADMIN — LOSARTAN POTASSIUM 100 MILLIGRAM(S): 100 TABLET, FILM COATED ORAL at 05:24

## 2023-02-04 RX ADMIN — METHOCARBAMOL 1500 MILLIGRAM(S): 500 TABLET, FILM COATED ORAL at 20:26

## 2023-02-04 RX ADMIN — METHOCARBAMOL 1500 MILLIGRAM(S): 500 TABLET, FILM COATED ORAL at 14:01

## 2023-02-04 RX ADMIN — TAMSULOSIN HYDROCHLORIDE 0.4 MILLIGRAM(S): 0.4 CAPSULE ORAL at 20:26

## 2023-02-04 RX ADMIN — POLYETHYLENE GLYCOL 3350 17 GRAM(S): 17 POWDER, FOR SOLUTION ORAL at 05:24

## 2023-02-04 RX ADMIN — METHOCARBAMOL 1500 MILLIGRAM(S): 500 TABLET, FILM COATED ORAL at 05:25

## 2023-02-04 RX ADMIN — LIDOCAINE 1 PATCH: 4 CREAM TOPICAL at 11:57

## 2023-02-04 RX ADMIN — Medication 1 TABLET(S): at 11:57

## 2023-02-04 RX ADMIN — CARVEDILOL PHOSPHATE 12.5 MILLIGRAM(S): 80 CAPSULE, EXTENDED RELEASE ORAL at 05:25

## 2023-02-04 RX ADMIN — LIDOCAINE 1 PATCH: 4 CREAM TOPICAL at 00:19

## 2023-02-04 RX ADMIN — SIMVASTATIN 40 MILLIGRAM(S): 20 TABLET, FILM COATED ORAL at 20:27

## 2023-02-04 RX ADMIN — Medication 81 MILLIGRAM(S): at 11:57

## 2023-02-04 RX ADMIN — POLYETHYLENE GLYCOL 3350 17 GRAM(S): 17 POWDER, FOR SOLUTION ORAL at 17:11

## 2023-02-04 RX ADMIN — LIDOCAINE 1 PATCH: 4 CREAM TOPICAL at 19:00

## 2023-02-04 RX ADMIN — GABAPENTIN 100 MILLIGRAM(S): 400 CAPSULE ORAL at 17:11

## 2023-02-04 RX ADMIN — Medication 975 MILLIGRAM(S): at 18:11

## 2023-02-04 RX ADMIN — Medication 975 MILLIGRAM(S): at 05:55

## 2023-02-04 RX ADMIN — Medication 975 MILLIGRAM(S): at 11:57

## 2023-02-04 RX ADMIN — ENOXAPARIN SODIUM 40 MILLIGRAM(S): 100 INJECTION SUBCUTANEOUS at 05:25

## 2023-02-04 RX ADMIN — Medication 975 MILLIGRAM(S): at 05:25

## 2023-02-04 RX ADMIN — Medication 975 MILLIGRAM(S): at 12:57

## 2023-02-04 RX ADMIN — Medication 975 MILLIGRAM(S): at 17:11

## 2023-02-04 NOTE — PROGRESS NOTE ADULT - ASSESSMENT
57M with CAD s/p CABG, HTN, HLD, hx alcohol abuse, recently hospitalized for cauda equina syndrome requiring L2-L3 laminectomy/discectomy after injury while weight lifting, now in acute rehab    #Cauda Equina Syndrome  #Ambulatory dysfunction due to above  -PT/OT as per PMR team  -Pain control as needed    #CAD  -ASA (on hold, to be resumed 2/2), statin, beta blocker    #Essential HTN  -c/w Losartan, Coreg    #Urinary retention  -Likely in part neurogenic from cauda equina syndrome, likely in part from constipation and decreased mobility  -Vergara removed and pt voiding, check PVR per protocol    #DVT ppx: Lovenox

## 2023-02-04 NOTE — PROGRESS NOTE ADULT - SUBJECTIVE AND OBJECTIVE BOX
Subjective: Seen at bedside in AM. No new complaints. He reports that his legs are slowly returning and he is working hard to make it happens. Notes left leg is at 65% and right leg is at 15%.    acetaminophen     Tablet .. 975 milliGRAM(s) Oral every 6 hours  aspirin enteric coated 81 milliGRAM(s) Oral daily  carvedilol 12.5 milliGRAM(s) Oral every 12 hours  enoxaparin Injectable 40 milliGRAM(s) SubCutaneous <User Schedule>  gabapentin 100 milliGRAM(s) Oral two times a day  influenza   Vaccine 0.5 milliLiter(s) IntraMuscular once  lidocaine   4% Patch 1 Patch Transdermal daily  lidocaine 2% Jelly 3 milliLiter(s) IntraUrethral every 6 hours PRN  losartan 100 milliGRAM(s) Oral daily  magnesium hydroxide Suspension 30 milliLiter(s) Oral every 12 hours PRN  melatonin 3 milliGRAM(s) Oral at bedtime PRN  methocarbamol 1500 milliGRAM(s) Oral three times a day  multivitamin 1 Tablet(s) Oral daily  oxyCODONE    IR 5 milliGRAM(s) Oral every 4 hours PRN  oxyCODONE    IR 10 milliGRAM(s) Oral every 4 hours PRN  polyethylene glycol 3350 17 Gram(s) Oral two times a day  senna 2 Tablet(s) Oral at bedtime  simvastatin 40 milliGRAM(s) Oral at bedtime  tamsulosin 0.4 milliGRAM(s) Oral at bedtime      T(C): 37 (02-04-23 @ 07:25), Max: 37 (02-04-23 @ 07:25)  HR: 62 (02-04-23 @ 07:25) (62 - 76)  BP: 134/89 (02-04-23 @ 07:25) (129/78 - 152/93)  RR: 16 (02-04-23 @ 07:25) (16 - 16)  SpO2: 97% (02-04-23 @ 07:25) (97% - 98%)    In NAD  HEENT- EOMI  Heart- Well Perfused  Lungs- No resp distress, no use of accessory resp muscles  Abd- soft, NT  Ext- No calf pain  Neuro- Exam unchanged  Psych- Affect wnl          Imp: Patient with diagnosis of    Paraparesis/Cauda Equina Syndrome      admitted for comprehensive acute rehabilitation.    Plan:  - Continue therapies  - DVT prophylaxis  - Skin- Turn q2h, check skin daily  - Continue current medications; patient medically stable.   - Patient is stable to continue current rehabilitation program.

## 2023-02-04 NOTE — PROGRESS NOTE ADULT - NSPROGADDITIONALINFOA_GEN_ALL_CORE
I have personally interviewed and examined this patient, reviewed pertinent clinical information, and performed the evaluation and management services provided at today's visit for inpatient medical follow up    I am available to discuss any issues related to the medical care of this patient on the unit, or by phone at [phone]

## 2023-02-04 NOTE — PROGRESS NOTE ADULT - SUBJECTIVE AND OBJECTIVE BOX
Patient is a 57y old  Male who presents with a chief complaint of Paraparesis/Cauda Equina Syndrome (03 Feb 2023 11:00)      History, interim events and clinically pertinent issues reviewed; patient interviewed and examined.  He has no complaints, does admit to mild back pain when asked and concerned about being able to urinate as he does not like having straight cath  REVIEW OF SYMPTOMS: patient denies HA's, CP, palpitations, shortness of breath or upper respiratory symptoms, nausea, vomiting, diarrhea, constipation, dysuria, bruising/bleeding and all other systems were reviewed as negative      ALLERGIES:  No Known Allergies    MEDICATIONS  (STANDING):  acetaminophen     Tablet .. 975 milliGRAM(s) Oral every 6 hours  aspirin enteric coated 81 milliGRAM(s) Oral daily  carvedilol 12.5 milliGRAM(s) Oral every 12 hours  enoxaparin Injectable 40 milliGRAM(s) SubCutaneous <User Schedule>  gabapentin 100 milliGRAM(s) Oral two times a day  influenza   Vaccine 0.5 milliLiter(s) IntraMuscular once  lidocaine   4% Patch 1 Patch Transdermal daily  losartan 100 milliGRAM(s) Oral daily  methocarbamol 1500 milliGRAM(s) Oral three times a day  multivitamin 1 Tablet(s) Oral daily  polyethylene glycol 3350 17 Gram(s) Oral two times a day  senna 2 Tablet(s) Oral at bedtime  simvastatin 40 milliGRAM(s) Oral at bedtime  tamsulosin 0.4 milliGRAM(s) Oral at bedtime    MEDICATIONS  (PRN):  lidocaine 2% Jelly 3 milliLiter(s) IntraUrethral every 6 hours PRN For straight catheterization  magnesium hydroxide Suspension 30 milliLiter(s) Oral every 12 hours PRN Constipation  melatonin 3 milliGRAM(s) Oral at bedtime PRN Insomnia  oxyCODONE    IR 5 milliGRAM(s) Oral every 4 hours PRN Moderate Pain (4 - 6)  oxyCODONE    IR 10 milliGRAM(s) Oral every 4 hours PRN Severe Pain (7 - 10)    Vital Signs Last 24 Hrs  T(F): 98.6 (04 Feb 2023 07:25), Max: 98.6 (04 Feb 2023 07:25)  HR: 62 (04 Feb 2023 07:25) (62 - 76)  BP: 134/89 (04 Feb 2023 07:25) (129/78 - 152/93)  RR: 16 (04 Feb 2023 07:25) (16 - 16)  SpO2: 97% (04 Feb 2023 07:25) (97% - 98%)  I&O's Summary    03 Feb 2023 07:01  -  04 Feb 2023 07:00  --------------------------------------------------------  IN: 0 mL / OUT: 2600 mL / NET: -2600 mL      BMI (kg/m2): 25.3 (01-30-23 @ 19:10)          PHYSICAL EXAM:  General: NAD, A/O x 3  ENT: MMM  Neck: Supple, No JVD  Lungs: Clear to auscultation bilaterally  Cardio: RRR, S1/S2, No murmurs  Abdomen: Soft, Nontender, Nondistended; Bowel sounds present  Extremities: No calf tenderness, No pitting edema      LABS:                        13.3   7.96  )-----------( 230      ( 02 Feb 2023 07:00 )             41.4       02-02    143  |  104  |  22  ----------------------------<  100  4.5   |  28  |  0.76    Ca    9.6      02 Feb 2023 07:00    TPro  7.5  /  Alb  3.9  /  TBili  0.4  /  DBili  x   /  AST  27  /  ALT  52  /  AlkPhos  66  02-02       COVID-19 PCR: NotDetec (01-31-23 @ 00:00)  COVID-19 PCR: NotDetec (01-30-23 @ 02:16)

## 2023-02-04 NOTE — PROGRESS NOTE ADULT - ASSESSMENT
From primary team:  56 YO male with PMH of CAD, s/p CABG, HTN, HLD, ETOH abuse (no alcohol use in 2 years as per pt) who presented to [facility] on 1/24 with back pain and progressive lower extremity weakness. Examination with concern for cauda equina. He was admitted for cauda equina syndrome and he  underwent urgent L2-3 laminectomy/ discectomy on 1/25/23.   Patient now with gait Instability, ADL impairments and Functional impairments.    * Continue to monitor functional rehab progress * Bowel regimen * Bladder scan/PVRs/SC every 6 hours- continue to monitor * NPSY for emotional support/coping strategies *     #Cauda Equina Syndrome  - s/p L2-3 laminectomy/ discectomy on 1/25/23.  - Start Comprehensive Rehab Program: PT/OT, 3hours daily and 5 days weekly  - PT: Focused on improving strength, endurance, coordination, balance, functional mobility, and transfers  - OT: Focused on improving strength, fine motor skills, coordination, posture and ADLs.      #HTN  - Losartan 100mg daily  - Carvedilol 12.5mg BID    #CAD  -  s/p CABG  - resume ASA in AM    #HLD  - Zocor 40mg daily    #Pain management  - Tylenol PRN, lidocaine, Oxycodone PRN, Robaxin, gabapentin 100mg BID    #DVT ppx  - Lovenox, SCD, TEDs    #Bowel Regimen  - Senna, miralax BID    #Bladder management  Vergara Dcd 2/2- TOV  Spont voiding 50% output  Void OOB/sitting  added flomax HS  follow residuals-ST cath if needed q 6h    #FEN   - Diet: Regular  - Supplements: MVI    #Skin:  - Skin on admission:  lumbar incision looks well healed. open to air currently  - Pressure injury/Skin: Turn Q2hrs while in bed, OOB to Chair, PT/OT     #Sleep:   - Maintain quiet hours and low stim environment.  - Melatonin 3mg nightly PRN to maximize participation in therapy during the day.     #Mood  - NPSY consult for emotional support and coping strategies    #Precaution  - Fall, Aspiration, spinal    IDT conference on 2/2  TDD: 2/21 to home  Barriers: Significant lower body weakness.  Social Work: Lives in  with girlfriend and girlfriend's mother with 3 YANETH and 10 STI with 1 HR. 1st floor living if needed.   OT: Min A for ADLs and min-mod for functional transfers.  PT: Min A for popover transfer. Sit to stand transfer with Max A and 2 pr A. Ambulated 5-8ft on parallel bars with max A and WCF.   SLP: --  --------------------------------------------------------  Outpatient Follow-up (Specialty/Name of physician):  Zack Schaefer; PhD)  Neurosurgery  [address] [place] [address], 2nd [place] [address]  Phone: [phone]  Fax: [phone]  --------------------------------------------------------

## 2023-02-05 PROCEDURE — 99232 SBSQ HOSP IP/OBS MODERATE 35: CPT

## 2023-02-05 RX ADMIN — CARVEDILOL PHOSPHATE 12.5 MILLIGRAM(S): 80 CAPSULE, EXTENDED RELEASE ORAL at 05:16

## 2023-02-05 RX ADMIN — LIDOCAINE 1 PATCH: 4 CREAM TOPICAL at 19:00

## 2023-02-05 RX ADMIN — Medication 975 MILLIGRAM(S): at 12:36

## 2023-02-05 RX ADMIN — Medication 975 MILLIGRAM(S): at 18:15

## 2023-02-05 RX ADMIN — LIDOCAINE 1 PATCH: 4 CREAM TOPICAL at 00:00

## 2023-02-05 RX ADMIN — METHOCARBAMOL 1500 MILLIGRAM(S): 500 TABLET, FILM COATED ORAL at 13:04

## 2023-02-05 RX ADMIN — Medication 81 MILLIGRAM(S): at 12:36

## 2023-02-05 RX ADMIN — METHOCARBAMOL 1500 MILLIGRAM(S): 500 TABLET, FILM COATED ORAL at 21:19

## 2023-02-05 RX ADMIN — Medication 975 MILLIGRAM(S): at 13:20

## 2023-02-05 RX ADMIN — SIMVASTATIN 40 MILLIGRAM(S): 20 TABLET, FILM COATED ORAL at 21:20

## 2023-02-05 RX ADMIN — OXYCODONE HYDROCHLORIDE 5 MILLIGRAM(S): 5 TABLET ORAL at 03:00

## 2023-02-05 RX ADMIN — LIDOCAINE 1 PATCH: 4 CREAM TOPICAL at 12:36

## 2023-02-05 RX ADMIN — OXYCODONE HYDROCHLORIDE 5 MILLIGRAM(S): 5 TABLET ORAL at 11:30

## 2023-02-05 RX ADMIN — LOSARTAN POTASSIUM 100 MILLIGRAM(S): 100 TABLET, FILM COATED ORAL at 05:18

## 2023-02-05 RX ADMIN — SENNA PLUS 2 TABLET(S): 8.6 TABLET ORAL at 21:20

## 2023-02-05 RX ADMIN — Medication 975 MILLIGRAM(S): at 17:33

## 2023-02-05 RX ADMIN — Medication 975 MILLIGRAM(S): at 05:15

## 2023-02-05 RX ADMIN — METHOCARBAMOL 1500 MILLIGRAM(S): 500 TABLET, FILM COATED ORAL at 05:16

## 2023-02-05 RX ADMIN — OXYCODONE HYDROCHLORIDE 5 MILLIGRAM(S): 5 TABLET ORAL at 10:48

## 2023-02-05 RX ADMIN — ENOXAPARIN SODIUM 40 MILLIGRAM(S): 100 INJECTION SUBCUTANEOUS at 05:14

## 2023-02-05 RX ADMIN — Medication 975 MILLIGRAM(S): at 05:45

## 2023-02-05 RX ADMIN — TAMSULOSIN HYDROCHLORIDE 0.4 MILLIGRAM(S): 0.4 CAPSULE ORAL at 21:20

## 2023-02-05 RX ADMIN — OXYCODONE HYDROCHLORIDE 5 MILLIGRAM(S): 5 TABLET ORAL at 02:30

## 2023-02-05 RX ADMIN — Medication 1 TABLET(S): at 12:36

## 2023-02-05 RX ADMIN — GABAPENTIN 100 MILLIGRAM(S): 400 CAPSULE ORAL at 17:33

## 2023-02-05 RX ADMIN — CARVEDILOL PHOSPHATE 12.5 MILLIGRAM(S): 80 CAPSULE, EXTENDED RELEASE ORAL at 17:33

## 2023-02-05 RX ADMIN — GABAPENTIN 100 MILLIGRAM(S): 400 CAPSULE ORAL at 05:19

## 2023-02-05 NOTE — PROGRESS NOTE ADULT - NSPROGADDITIONALINFOA_GEN_ALL_CORE
I have personally interviewed and examined this patient, reviewed pertinent clinical information, and performed the evaluation and management services provided at today's visit for inpatient medical follow up    I am available to discuss any issues related to the medical care of this patient on the unit, or by phone at [phone]

## 2023-02-05 NOTE — PROGRESS NOTE ADULT - SUBJECTIVE AND OBJECTIVE BOX
Subjective: Seen at bedside in AM.   Discussed in team rounds.  No new complaints.   He reports that his legs are slowly returning and he is working hard to make it happens.   Notes left leg is at 65% and right leg is at 15%.    acetaminophen     Tablet .. 975 milliGRAM(s) Oral every 6 hours  aspirin enteric coated 81 milliGRAM(s) Oral daily  carvedilol 12.5 milliGRAM(s) Oral every 12 hours  enoxaparin Injectable 40 milliGRAM(s) SubCutaneous <User Schedule>  gabapentin 100 milliGRAM(s) Oral two times a day  influenza   Vaccine 0.5 milliLiter(s) IntraMuscular once  lidocaine   4% Patch 1 Patch Transdermal daily  lidocaine 2% Jelly 3 milliLiter(s) IntraUrethral every 6 hours PRN  losartan 100 milliGRAM(s) Oral daily  magnesium hydroxide Suspension 30 milliLiter(s) Oral every 12 hours PRN  melatonin 3 milliGRAM(s) Oral at bedtime PRN  methocarbamol 1500 milliGRAM(s) Oral three times a day  multivitamin 1 Tablet(s) Oral daily  oxyCODONE    IR 5 milliGRAM(s) Oral every 4 hours PRN  oxyCODONE    IR 10 milliGRAM(s) Oral every 4 hours PRN  polyethylene glycol 3350 17 Gram(s) Oral two times a day  senna 2 Tablet(s) Oral at bedtime  simvastatin 40 milliGRAM(s) Oral at bedtime  tamsulosin 0.4 milliGRAM(s) Oral at bedtime      T(C): 37 (02-04-23 @ 07:25), Max: 37 (02-04-23 @ 07:25)  HR: 62 (02-04-23 @ 07:25) (62 - 76)  BP: 134/89 (02-04-23 @ 07:25) (129/78 - 152/93)  RR: 16 (02-04-23 @ 07:25) (16 - 16)  SpO2: 97% (02-04-23 @ 07:25) (97% - 98%)    In NAD  HEENT- EOMI  Heart- Well Perfused  Lungs- No resp distress, no use of accessory resp muscles  Abd- soft, NT  Ext- No calf pain  Neuro- Exam unchanged  Psych- Affect wnl          Imp: Patient with diagnosis of    Paraparesis/Cauda Equina Syndrome      admitted for comprehensive acute rehabilitation.    Plan:  - Continue therapies  - DVT prophylaxis  - Skin- Turn q2h, check skin daily  - Continue current medications; patient medically stable.   - Patient is stable to continue current rehabilitation program.

## 2023-02-05 NOTE — PROGRESS NOTE ADULT - ASSESSMENT
From primary team:  56 YO male with PMH of CAD, s/p CABG, HTN, HLD, ETOH abuse (no alcohol use in 2 years as per pt) who presented to [facility] on 1/24 with back pain and progressive lower extremity weakness. Examination with concern for cauda equina. He was admitted for cauda equina syndrome and he  underwent urgent L2-3 laminectomy/ discectomy on 1/25/23.   Patient now with gait Instability, ADL impairments and Functional impairments.    * Continue to monitor functional rehab progress * Bowel regimen * Bladder scan/PVRs/SC every 6 hours- continue to monitor * NPSY for emotional support/coping strategies *     #Cauda Equina Syndrome  - s/p L2-3 laminectomy/ discectomy on 1/25/23.  - Start Comprehensive Rehab Program: PT/OT, 3hours daily and 5 days weekly  - PT: Focused on improving strength, endurance, coordination, balance, functional mobility, and transfers  - OT: Focused on improving strength, fine motor skills, coordination, posture and ADLs.      #HTN  - Losartan 100mg daily  - Carvedilol 12.5mg BID    #CAD  -  s/p CABG  - resume ASA in AM    #HLD  - Zocor 40mg daily    #Pain management  - Tylenol PRN, lidocaine, Oxycodone PRN, Robaxin, gabapentin 100mg BID    #DVT ppx  - Lovenox, SCD, TEDs    #Bowel Regimen  - Senna, miralax BID    #Bladder management  Vergara Dcd 2/2- TOV  Spont voiding 50% output  Void OOB/sitting  added flomax HS  follow residuals-ST cath if needed q 6h    #FEN   - Diet: Regular  - Supplements: MVI    #Skin:  - Skin on admission:  lumbar incision looks well healed. open to air currently  - Pressure injury/Skin: Turn Q2hrs while in bed, OOB to Chair, PT/OT     #Sleep:   - Maintain quiet hours and low stim environment.  - Melatonin 3mg nightly PRN to maximize participation in therapy during the day.     #Mood  - NPSY consult for emotional support and coping strategies    #Precaution  - Fall, Aspiration, spinal    IDT conference on 2/2  TDD: 2/21 to home  Barriers: Significant lower body weakness.  Social Work: Lives in  with girlfriend and girlfriend's mother with 3 YANETH and 10 STI with 1 HR. 1st floor living if needed.   OT: Min A for ADLs and min-mod for functional transfers.  PT: Min A for popover transfer. Sit to stand transfer with Max A and 2 pr A. Ambulated 5-8ft on parallel bars with max A and WCF.   SLP: --  --------------------------------------------------------  Outpatient Follow-up (Specialty/Name of physician):  Zack Schaefer; PhD)  Neurosurgery  [address] [place] [address], [address]nd [place] [address]  Phone: [phone]  Fax: [phone]  --------------------------------------------------------

## 2023-02-05 NOTE — PROGRESS NOTE ADULT - SUBJECTIVE AND OBJECTIVE BOX
Patient is a 57y old  Male who presents with a chief complaint of Paraparesis/Cauda Equina Syndrome (04 Feb 2023 12:01)    Patient examined and interviewed. There were no acute medical events yesterday or overnight and patient is without complaints this morning.  He did have some back and LE pain last PM relieved with pain meds  He is moving his bowels, denies HA's, CP, palpitations, shortness of breath or upper respiratory symptoms, nausea, vomiting, diarrhea,  dysuria, bruising/bleeding and all other systems were reviewed as negative  He is voiding independently and has not required straight cath      ALLERGIES:  No Known Allergies    MEDICATIONS  (STANDING):  acetaminophen     Tablet .. 975 milliGRAM(s) Oral every 6 hours  aspirin enteric coated 81 milliGRAM(s) Oral daily  carvedilol 12.5 milliGRAM(s) Oral every 12 hours  enoxaparin Injectable 40 milliGRAM(s) SubCutaneous <User Schedule>  gabapentin 100 milliGRAM(s) Oral two times a day  influenza   Vaccine 0.5 milliLiter(s) IntraMuscular once  lidocaine   4% Patch 1 Patch Transdermal daily  losartan 100 milliGRAM(s) Oral daily  methocarbamol 1500 milliGRAM(s) Oral three times a day  multivitamin 1 Tablet(s) Oral daily  polyethylene glycol 3350 17 Gram(s) Oral two times a day  senna 2 Tablet(s) Oral at bedtime  simvastatin 40 milliGRAM(s) Oral at bedtime  tamsulosin 0.4 milliGRAM(s) Oral at bedtime    MEDICATIONS  (PRN):  lidocaine 2% Jelly 3 milliLiter(s) IntraUrethral every 6 hours PRN For straight catheterization  magnesium hydroxide Suspension 30 milliLiter(s) Oral every 12 hours PRN Constipation  melatonin 3 milliGRAM(s) Oral at bedtime PRN Insomnia  oxyCODONE    IR 5 milliGRAM(s) Oral every 4 hours PRN Moderate Pain (4 - 6)  oxyCODONE    IR 10 milliGRAM(s) Oral every 4 hours PRN Severe Pain (7 - 10)    Vital Signs Last 24 Hrs  T(F): 97.9 (04 Feb 2023 19:10), Max: 97.9 (04 Feb 2023 19:10)  HR: 71 (05 Feb 2023 05:15) (66 - 71)  BP: 131/77 (05 Feb 2023 05:15) (131/77 - 165/97)  RR: 16 (04 Feb 2023 19:10) (16 - 16)  SpO2: 97% (04 Feb 2023 19:10) (97% - 97%)  I&O's Summary    04 Feb 2023 07:01  -  05 Feb 2023 07:00  --------------------------------------------------------  IN: 0 mL / OUT: 1801 mL / NET: -1801 mL                PHYSICAL EXAM:  General: NAD, A/O x 3  ENT: MMM  Neck: Supple, No JVD  Lungs: Clear to auscultation bilaterally  Cardio: RRR, S1/S2, No murmurs  Abdomen: Soft, Nontender, Nondistended; Bowel sounds present  Extremities: No calf tenderness, No pitting edema      LABS:                                        COVID-19 PCR: NotDetec (01-31-23 @ 00:00)  COVID-19 PCR: NotDetec (01-30-23 @ 02:16)

## 2023-02-06 LAB
ALBUMIN SERPL ELPH-MCNC: 3.7 G/DL — SIGNIFICANT CHANGE UP (ref 3.3–5)
ALP SERPL-CCNC: 59 U/L — SIGNIFICANT CHANGE UP (ref 40–120)
ALT FLD-CCNC: 49 U/L — HIGH (ref 10–45)
ANION GAP SERPL CALC-SCNC: 8 MMOL/L — SIGNIFICANT CHANGE UP (ref 5–17)
AST SERPL-CCNC: 27 U/L — SIGNIFICANT CHANGE UP (ref 10–40)
BILIRUB SERPL-MCNC: 0.3 MG/DL — SIGNIFICANT CHANGE UP (ref 0.2–1.2)
BUN SERPL-MCNC: 19 MG/DL — SIGNIFICANT CHANGE UP (ref 7–23)
CALCIUM SERPL-MCNC: 9 MG/DL — SIGNIFICANT CHANGE UP (ref 8.4–10.5)
CHLORIDE SERPL-SCNC: 103 MMOL/L — SIGNIFICANT CHANGE UP (ref 96–108)
CO2 SERPL-SCNC: 29 MMOL/L — SIGNIFICANT CHANGE UP (ref 22–31)
CREAT SERPL-MCNC: 0.64 MG/DL — SIGNIFICANT CHANGE UP (ref 0.5–1.3)
EGFR: 110 ML/MIN/1.73M2 — SIGNIFICANT CHANGE UP
GLUCOSE SERPL-MCNC: 108 MG/DL — HIGH (ref 70–99)
HCT VFR BLD CALC: 38.8 % — LOW (ref 39–50)
HGB BLD-MCNC: 12.9 G/DL — LOW (ref 13–17)
MCHC RBC-ENTMCNC: 28.9 PG — SIGNIFICANT CHANGE UP (ref 27–34)
MCHC RBC-ENTMCNC: 33.2 GM/DL — SIGNIFICANT CHANGE UP (ref 32–36)
MCV RBC AUTO: 87 FL — SIGNIFICANT CHANGE UP (ref 80–100)
NRBC # BLD: 0 /100 WBCS — SIGNIFICANT CHANGE UP (ref 0–0)
PLATELET # BLD AUTO: 236 K/UL — SIGNIFICANT CHANGE UP (ref 150–400)
POTASSIUM SERPL-MCNC: 4 MMOL/L — SIGNIFICANT CHANGE UP (ref 3.5–5.3)
POTASSIUM SERPL-SCNC: 4 MMOL/L — SIGNIFICANT CHANGE UP (ref 3.5–5.3)
PROT SERPL-MCNC: 7.2 G/DL — SIGNIFICANT CHANGE UP (ref 6–8.3)
RBC # BLD: 4.46 M/UL — SIGNIFICANT CHANGE UP (ref 4.2–5.8)
RBC # FLD: 13.4 % — SIGNIFICANT CHANGE UP (ref 10.3–14.5)
SODIUM SERPL-SCNC: 140 MMOL/L — SIGNIFICANT CHANGE UP (ref 135–145)
WBC # BLD: 6.48 K/UL — SIGNIFICANT CHANGE UP (ref 3.8–10.5)
WBC # FLD AUTO: 6.48 K/UL — SIGNIFICANT CHANGE UP (ref 3.8–10.5)

## 2023-02-06 PROCEDURE — 99232 SBSQ HOSP IP/OBS MODERATE 35: CPT

## 2023-02-06 PROCEDURE — 99232 SBSQ HOSP IP/OBS MODERATE 35: CPT | Mod: GC

## 2023-02-06 PROCEDURE — 90832 PSYTX W PT 30 MINUTES: CPT

## 2023-02-06 RX ORDER — GABAPENTIN 400 MG/1
200 CAPSULE ORAL THREE TIMES A DAY
Refills: 0 | Status: DISCONTINUED | OUTPATIENT
Start: 2023-02-06 | End: 2023-02-10

## 2023-02-06 RX ADMIN — Medication 81 MILLIGRAM(S): at 12:12

## 2023-02-06 RX ADMIN — OXYCODONE HYDROCHLORIDE 5 MILLIGRAM(S): 5 TABLET ORAL at 05:20

## 2023-02-06 RX ADMIN — Medication 1 TABLET(S): at 12:12

## 2023-02-06 RX ADMIN — OXYCODONE HYDROCHLORIDE 10 MILLIGRAM(S): 5 TABLET ORAL at 21:25

## 2023-02-06 RX ADMIN — GABAPENTIN 200 MILLIGRAM(S): 400 CAPSULE ORAL at 14:19

## 2023-02-06 RX ADMIN — METHOCARBAMOL 1500 MILLIGRAM(S): 500 TABLET, FILM COATED ORAL at 20:25

## 2023-02-06 RX ADMIN — OXYCODONE HYDROCHLORIDE 5 MILLIGRAM(S): 5 TABLET ORAL at 05:50

## 2023-02-06 RX ADMIN — CARVEDILOL PHOSPHATE 12.5 MILLIGRAM(S): 80 CAPSULE, EXTENDED RELEASE ORAL at 17:20

## 2023-02-06 RX ADMIN — GABAPENTIN 200 MILLIGRAM(S): 400 CAPSULE ORAL at 20:27

## 2023-02-06 RX ADMIN — Medication 975 MILLIGRAM(S): at 08:10

## 2023-02-06 RX ADMIN — LIDOCAINE 1 PATCH: 4 CREAM TOPICAL at 00:00

## 2023-02-06 RX ADMIN — OXYCODONE HYDROCHLORIDE 10 MILLIGRAM(S): 5 TABLET ORAL at 20:25

## 2023-02-06 RX ADMIN — ENOXAPARIN SODIUM 40 MILLIGRAM(S): 100 INJECTION SUBCUTANEOUS at 05:26

## 2023-02-06 RX ADMIN — LIDOCAINE 1 PATCH: 4 CREAM TOPICAL at 12:10

## 2023-02-06 RX ADMIN — LOSARTAN POTASSIUM 100 MILLIGRAM(S): 100 TABLET, FILM COATED ORAL at 05:26

## 2023-02-06 RX ADMIN — TAMSULOSIN HYDROCHLORIDE 0.4 MILLIGRAM(S): 0.4 CAPSULE ORAL at 20:25

## 2023-02-06 RX ADMIN — METHOCARBAMOL 1500 MILLIGRAM(S): 500 TABLET, FILM COATED ORAL at 05:40

## 2023-02-06 RX ADMIN — SENNA PLUS 2 TABLET(S): 8.6 TABLET ORAL at 20:26

## 2023-02-06 RX ADMIN — METHOCARBAMOL 1500 MILLIGRAM(S): 500 TABLET, FILM COATED ORAL at 15:03

## 2023-02-06 RX ADMIN — GABAPENTIN 100 MILLIGRAM(S): 400 CAPSULE ORAL at 05:27

## 2023-02-06 RX ADMIN — CARVEDILOL PHOSPHATE 12.5 MILLIGRAM(S): 80 CAPSULE, EXTENDED RELEASE ORAL at 05:26

## 2023-02-06 RX ADMIN — SIMVASTATIN 40 MILLIGRAM(S): 20 TABLET, FILM COATED ORAL at 20:25

## 2023-02-06 RX ADMIN — Medication 3 MILLIGRAM(S): at 20:25

## 2023-02-06 RX ADMIN — LIDOCAINE 1 PATCH: 4 CREAM TOPICAL at 19:06

## 2023-02-06 NOTE — PROGRESS NOTE ADULT - ASSESSMENT
57M with CAD s/p CABG, HTN, HLD, hx alcohol abuse, recently hospitalized for cauda equina syndrome requiring L2-L3 laminectomy/discectomy after injury while weight lifting, now in acute rehab    #Cauda Equina Syndrome  #Ambulatory dysfunction due to above  -PT/OT as per PMR team  -Pain control as needed    #CAD  -Continue ASA, statin, beta blocker    #Essential HTN  -c/w Losartan, Coreg    #Urinary retention  -Likely in part neurogenic from cauda equina syndrome, constipation and decreased mobility  -Vergara removed and pt voiding, continue tamsulosin    #DVT ppx: Lovenox

## 2023-02-06 NOTE — PROGRESS NOTE ADULT - NS ATTEND AMEND GEN_ALL_CORE FT
Rehab Attending- Patient seen and examined by me - Case discussed, above note reviewed by me with modifications made    Doing well  Sl increased Knee extension on right  LLE 4/5  voiding well - no st cath since yesterday afternoon  on Flomax  reports symptoms of BPH at home prior to current hospitalization  Neuropsych to see - adjustment disorder  to continue intensive rehab program Rehab Attending- Patient seen and examined by me - Case discussed, above note reviewed by me with modifications made    Doing well  Burning in Right foot- skin intact- will increase gabapentin- Swiss cheese boots in bed  LLE 4/5, RLE 2/5 prox  voiding well - no st cath since 2/2  on Flomax  labs reviewed by me- stable  to continue intensive rehab program

## 2023-02-06 NOTE — PROGRESS NOTE ADULT - ASSESSMENT
58 YO male with PMH of CAD, s/p CABG, HTN, HLD, ETOH abuse (no alcohol use in 2 years as per pt) who presented to [facility] on 1/24 with back pain and progressive lower extremity weakness. Examination with concern for cauda equina. He was admitted for cauda equina syndrome and he  underwent urgent L2-3 laminectomy/ discectomy on 1/25/23.   Patient now with gait Instability, ADL impairments and Functional impairments.    * Continue to monitor functional rehab progress * * Bladder scan/PVRs/SC every 6 hours- continue to monitor * NPSY following for emotional support/coping strategies *     #Cauda Equina Syndrome  - s/p L2-3 laminectomy/ discectomy on 1/25/23.  - Start Comprehensive Rehab Program: PT/OT, 3hours daily and 5 days weekly  - PT: Focused on improving strength, endurance, coordination, balance, functional mobility, and transfers  - OT: Focused on improving strength, fine motor skills, coordination, posture and ADLs.      #HTN  - Losartan 100mg daily  - Carvedilol 12.5mg BID    #CAD  -  s/p CABG  - resume ASA in AM    #HLD  - Zocor 40mg daily    #Pain management  - Tylenol PRN, lidocaine, Oxycodone PRN, Robaxin, gabapentin 100mg BID    #DVT ppx  - Lovenox, SCD, TEDs    #Bowel Regimen  - Senna, miralax BID    #Bladder management  - Vergara Dcd 2/2- TOV  - Spont voiding 50% output  - Flomax  - Void OOB/sitting  - Last SC evening of 2/2       #FEN   - Diet: Regular  - Supplements: MVI    #Skin:  - Skin on admission:  lumbar incision looks well healed. open to air currently  - Pressure injury/Skin: Turn Q2hrs while in bed, OOB to Chair, PT/OT     #Sleep:   - Maintain quiet hours and low stim environment.  - Melatonin 3mg nightly PRN to maximize participation in therapy during the day.     #Mood  - NPSY following for emotional support and coping strategies    #Precaution  - Fall, Aspiration, spinal    IDT conference on 2/2  TDD: 2/21 to home  Barriers: Significant lower body weakness.  Social Work: Lives in  with girlfriend and girlfriend's mother with 3 YANETH and 10 STI with 1 HR. 1st floor living if needed.   OT: Min A for ADLs and min-mod for functional transfers.  PT: Min A for popover transfer. Sit to stand transfer with Max A and 2 pr A. Ambulated 5-8ft on parallel bars with max A and WCF.   SLP: --  --------------------------------------------------------  Outpatient Follow-up (Specialty/Name of physician):  Zack Schaefer (MD; PhD)  Neurosurgery  [address] [place] [address], 2nd [place] [address]  Phone: [phone]  Fax: [phone]  -------------------------------------------------------- 56 YO male with PMH of CAD, s/p CABG, HTN, HLD, ETOH abuse (no alcohol use in 2 years as per pt) who presented to [facility] on 1/24 with back pain and progressive lower extremity weakness. Examination with concern for cauda equina. He was admitted for cauda equina syndrome and he  underwent urgent L2-3 laminectomy/ discectomy on 1/25/23.   Patient now with gait Instability, ADL impairments and Functional impairments.    * Continue to monitor functional rehab progress * * Bladder scan/PVRs/SC every 6 hours- continue to monitor * NPSY following for emotional support/coping strategies *     #Cauda Equina Syndrome  - s/p L2-3 laminectomy/ discectomy on 1/25/23.  - Start Comprehensive Rehab Program: PT/OT, 3hours daily and 5 days weekly  - PT: Focused on improving strength, endurance, coordination, balance, functional mobility, and transfers  - OT: Focused on improving strength, fine motor skills, coordination, posture and ADLs.      #HTN  - Losartan 100mg daily  - Carvedilol 12.5mg BID    #CAD  -  s/p CABG  - resume ASA in AM    #HLD  - Zocor 40mg daily    #Pain management  - Tylenol PRN, lidocaine, Oxycodone PRN, Robaxin  - Gabapentin increased to 200mg TID 2/6    #DVT ppx  - Lovenox, SCD, TEDs    #Bowel Regimen  - Senna, miralax BID    #Bladder management  - Vergara Dcd 2/2- TOV  - Spont voiding 50% output  - Flomax  - Void OOB/sitting  - Last SC evening of 2/2       #FEN   - Diet: Regular  - Supplements: MVI    #Skin:  - Skin on admission:  lumbar incision looks well healed. open to air currently  - Pressure injury/Skin: Turn Q2hrs while in bed, OOB to Chair, PT/OT     #Sleep:   - Maintain quiet hours and low stim environment.  - Melatonin 3mg nightly PRN to maximize participation in therapy during the day.     #Mood  - NPSY following for emotional support and coping strategies    #Precaution  - Fall, Aspiration, spinal    IDT conference on 2/2  TDD: 2/21 to home  Barriers: Significant lower body weakness.  Social Work: Lives in  with girlfriend and girlfriend's mother with 3 YANETH and 10 STI with 1 HR. 1st floor living if needed.   OT: Min A for ADLs and min-mod for functional transfers.  PT: Min A for popover transfer. Sit to stand transfer with Max A and 2 pr A. Ambulated 5-8ft on parallel bars with max A and WCF.   SLP: --  --------------------------------------------------------  Outpatient Follow-up (Specialty/Name of physician):  Zack Schaefer (MD; PhD)  Neurosurgery  [address] [place] [address], [address]nd [place] [address]  Phone: [phone]  Fax: [phone]  -------------------------------------------------------- 56 YO male with PMH of CAD, s/p CABG, HTN, HLD, ETOH abuse (no alcohol use in 2 years as per pt) who presented to [facility] on 1/24 with back pain and progressive lower extremity weakness. Examination with concern for cauda equina. He was admitted for cauda equina syndrome and he  underwent urgent L2-3 laminectomy/ discectomy on 1/25/23.   Patient now with gait Instability, ADL impairments and Functional impairments.    * Continue to monitor functional rehab progress * * Bladder scan/PVRs/SC every 6 hours- continue to monitor * NPSY following for emotional support/coping strategies *     #Cauda Equina Syndrome  - s/p L2-3 laminectomy/ discectomy on 1/25/23.  - Start Comprehensive Rehab Program: PT/OT, 3hours daily and 5 days weekly  - PT: Focused on improving strength, endurance, coordination, balance, functional mobility, and transfers  - OT: Focused on improving strength, fine motor skills, coordination, posture and ADLs.      #HTN  - Losartan 100mg daily  - Carvedilol 12.5mg BID    #CAD  -  s/p CABG  - resume ASA in AM    #HLD  - Zocor 40mg daily    #Pain management  - Tylenol PRN, lidocaine, Oxycodone PRN, Robaxin  - Gabapentin increased to 200mg TID 2/6  swiss cheese boots in bed    #DVT ppx  - Lovenox, SCD, TEDs    #Bowel Regimen  - Senna, miralax BID    #Bladder management  - Vergara Dcd 2/2- TOV  - Spont voiding> 50% output  - Flomax  - Void OOB/sitting  - Last SC evening of 2/2       #FEN   - Diet: Regular  - Supplements: MVI    #Skin:  - Skin on admission:  lumbar incision looks well healed. open to air currently  - Pressure injury/Skin: Turn Q2hrs while in bed, OOB to Chair, PT/OT     #Sleep:   - Maintain quiet hours and low stim environment.  - Melatonin 3mg nightly PRN to maximize participation in therapy during the day.     #Mood  - NPSY following for emotional support and coping strategies    #Precaution  - Fall, Aspiration, spinal    IDT conference on 2/2  TDD: 2/21 to home  Barriers: Significant lower body weakness.  Social Work: Lives in  with girlfriend and girlfriend's mother with 3 YANETH and 10 STI with 1 HR. 1st floor living if needed.   OT: Min A for ADLs and min-mod for functional transfers.  PT: Min A for popover transfer. Sit to stand transfer with Max A and 2 pr A. Ambulated 5-8ft on parallel bars with max A and WCF.   SLP: --  --------------------------------------------------------  Outpatient Follow-up (Specialty/Name of physician):  Zack Schaefer (MD; PhD)  Neurosurgery  [address] [place] [address], 2nd [place] [address]  Phone: [phone]  Fax: [phone]  --------------------------------------------------------

## 2023-02-06 NOTE — PROGRESS NOTE ADULT - SUBJECTIVE AND OBJECTIVE BOX
CC: Cauda Equina    Today's Subjective & Objective Findings:  Patient seen and examined at bedside this AM.   States that he feels well this morning and feels like his LEs are getting stronger.   Admits to having a quiet weekend.   Last BM on 2/5  Last SC evening of 2/2.  Continue to monitor BS/PVRs/SCs.  Admits to R ankle numbness and tingling.   R heel discomfort from mattress- elevate heels off of mattress while pt in bed.   No other complaints at this time.    Denies headache, dizziness, visual changes, chest pain, SOB/CONRAD, abdominal pain, nausea, vomiting, diarrhea, & dysuria.     Vital Signs Last 24 Hrs  T(C): 36.3 (06 Feb 2023 08:45), Max: 36.4 (05 Feb 2023 19:10)  T(F): 97.3 (06 Feb 2023 08:45), Max: 97.5 (05 Feb 2023 19:10)  HR: 67 (06 Feb 2023 08:45) (65 - 70)  BP: 125/81 (06 Feb 2023 08:45) (125/81 - 148/91)  BP(mean): --  RR: 16 (06 Feb 2023 08:45) (16 - 16)  SpO2: 98% (06 Feb 2023 08:45) (98% - 99%)      PHYSICAL EXAM:  Constitutional - NAD, Comfortable  HEENT - NCAT, EOMI  Neck - Supple, No limited ROM  Chest - good chest expansion, good respiratory effort, CTAB   Cardio - warm and well perfused   Abdomen -  Soft, NTND  Vergara out  Extremities - No peripheral edema, No calf tenderness   Neurologic Exam:                    Cognitive -             Orientation: Awake, Alert, AAO to self, place, date, year, situation            Thought: process, content appropriate     Speech - Fluent, Comprehensible, No dysarthria, No aphasia      Cranial Nerves - No facial asymmetry, Tongue midline, Shoulder shrug intact     Motor -                      LEFT    UE - ShAB 5/5, EF 5/5, EE 5/5, WE 5/5,  WNL                    RIGHT UE - ShAB 5/5, EF 5/5, EE 5/5, WE 5/5,  WNL                    LEFT    LE - HF 2/5, KE 3+/5, DF 1/5, PF 4/5 Strong hip extensors4+/5                    RIGHT LE - HF 2/5, KE 2/5, DF 0/5, PF 4/5        Sensory - decrease sensation below the knee. R>L     OculoVestibular -  No nystagmus  Psychiatric - Mood stable, Affect WNL  Skin on admission: lumbar incision intact/ healed      LAB                        12.9   6.48  )-----------( 236      ( 06 Feb 2023 06:40 )             38.8     02-06    140  |  103  |  19  ----------------------------<  108<H>  4.0   |  29  |  0.64    Ca    9.0      06 Feb 2023 06:40    TPro  7.2  /  Alb  3.7  /  TBili  0.3  /  DBili  x   /  AST  27  /  ALT  49<H>  /  AlkPhos  59  02-06    LIVER FUNCTIONS - ( 06 Feb 2023 06:40 )  Alb: 3.7 g/dL / Pro: 7.2 g/dL / ALK PHOS: 59 U/L / ALT: 49 U/L / AST: 27 U/L / GGT: x             MEDICATIONS  (STANDING):  acetaminophen     Tablet .. 975 milliGRAM(s) Oral every 6 hours  aspirin enteric coated 81 milliGRAM(s) Oral daily  carvedilol 12.5 milliGRAM(s) Oral every 12 hours  enoxaparin Injectable 40 milliGRAM(s) SubCutaneous <User Schedule>  gabapentin 100 milliGRAM(s) Oral two times a day  influenza   Vaccine 0.5 milliLiter(s) IntraMuscular once  lidocaine   4% Patch 1 Patch Transdermal daily  losartan 100 milliGRAM(s) Oral daily  methocarbamol 1500 milliGRAM(s) Oral three times a day  multivitamin 1 Tablet(s) Oral daily  polyethylene glycol 3350 17 Gram(s) Oral two times a day  senna 2 Tablet(s) Oral at bedtime  simvastatin 40 milliGRAM(s) Oral at bedtime  tamsulosin 0.4 milliGRAM(s) Oral at bedtime    MEDICATIONS  (PRN):  lidocaine 2% Jelly 3 milliLiter(s) IntraUrethral every 6 hours PRN For straight catheterization  magnesium hydroxide Suspension 30 milliLiter(s) Oral every 12 hours PRN Constipation  melatonin 3 milliGRAM(s) Oral at bedtime PRN Insomnia  oxyCODONE    IR 5 milliGRAM(s) Oral every 4 hours PRN Moderate Pain (4 - 6)  oxyCODONE    IR 10 milliGRAM(s) Oral every 4 hours PRN Severe Pain (7 - 10)

## 2023-02-06 NOTE — PROGRESS NOTE ADULT - ASSESSMENT
Mood is euthymic, with congruent affect. Patient is starting to think about eventual discharge via anticipatory planning. Psychoeducation is reinforced regarding the rehabilitation process and balancing hopefulness and realistic expectations. Discussed continued use of adaptive coping strategies for managing stress in the moment. Plan: Continue supportive psychotherapy sessions to address mood and adjustment. Neuropsychology remains available.

## 2023-02-06 NOTE — CHART NOTE - NSCHARTNOTEFT_GEN_A_CORE
Nutrition Follow Up Note  Hospital Course   (Per Electronic Medical Record)    Source:  Patient [X]  Medical Record [X]      Diet: Diet, DASH/TLC:   Sodium & Cholesterol Restricted  Supplement Feeding Modality:  Oral  Ensure Plus High Protein Cans or Servings Per Day:  1       Frequency:  Two Times a day (02-01-23 @ 09:54) [Active]    At this time patient w/ adequate appetite/intake consuming % of meals. Reviewed preferences and menu alternatives. No issues w/ chewing and swallowing. Denies N/V/C/D,  last Bm 2/4 Per nursing flowsheets.       Enteral/Parenteral Nutrition: Not Applicable    Current Weight: 162.4lb on 2/4    Pertinent Medications: MEDICATIONS  (STANDING):  acetaminophen     Tablet .. 975 milliGRAM(s) Oral every 6 hours  aspirin enteric coated 81 milliGRAM(s) Oral daily  carvedilol 12.5 milliGRAM(s) Oral every 12 hours  enoxaparin Injectable 40 milliGRAM(s) SubCutaneous <User Schedule>  gabapentin 200 milliGRAM(s) Oral three times a day  influenza   Vaccine 0.5 milliLiter(s) IntraMuscular once  lidocaine   4% Patch 1 Patch Transdermal daily  losartan 100 milliGRAM(s) Oral daily  methocarbamol 1500 milliGRAM(s) Oral three times a day  multivitamin 1 Tablet(s) Oral daily  polyethylene glycol 3350 17 Gram(s) Oral two times a day  senna 2 Tablet(s) Oral at bedtime  simvastatin 40 milliGRAM(s) Oral at bedtime  tamsulosin 0.4 milliGRAM(s) Oral at bedtime    MEDICATIONS  (PRN):  lidocaine 2% Jelly 3 milliLiter(s) IntraUrethral every 6 hours PRN For straight catheterization  magnesium hydroxide Suspension 30 milliLiter(s) Oral every 12 hours PRN Constipation  melatonin 3 milliGRAM(s) Oral at bedtime PRN Insomnia  oxyCODONE    IR 5 milliGRAM(s) Oral every 4 hours PRN Moderate Pain (4 - 6)  oxyCODONE    IR 10 milliGRAM(s) Oral every 4 hours PRN Severe Pain (7 - 10)      Pertinent Labs:  02-06 Na140 mmol/L Glu 108 mg/dL<H> K+ 4.0 mmol/L Cr  0.64 mg/dL BUN 19 mg/dL 02-06 Alb 3.7 g/dL    Skin: Surgical Incision, Site mid lower back    Edema: No edema noted per nursing flowsheet    Last Bowel Movement: on 2/4    Estimated Needs:   [X] No Change Since Previous Assessment    Previous Nutrition Diagnosis:   Inadeqate Energy Intake   related to poor appetite s/p CABG   patient report of poor intake x 1 week     [X] Resolved - addressed w/ PO intake % at meals      New Nutrition Diagnosis: [X] Not Applicable    Interventions:   1.  Recommend Continue Nutrition Plan of Care.    Monitoring & Evaluation:   [X] Weights   [X] PO Intake   [X] Skin Integrity   [X] Follow Up (Per Protocol)  [X] Tolerance to Diet Prescription   [X] Other: Labs     Registered Dietitian/Nutritionist Remains Available.  Alexandra Diaz RD, MS, CDN    Phone# [phone]

## 2023-02-06 NOTE — PROGRESS NOTE ADULT - SUBJECTIVE AND OBJECTIVE BOX
Patient seen alone at bedside for 25-minute, supportive psychotherapy session. He indicates being in good spirits, as he had a shower this morning and he is looking forward to PT to see how he is progressing. He indicates using deep breathing to mitigate stress and found it helpful.

## 2023-02-06 NOTE — PROGRESS NOTE ADULT - SUBJECTIVE AND OBJECTIVE BOX
Patient is a 57y old  Male who presents with a chief complaint of Paraparesis/Cauda Equina Syndrome (05 Feb 2023 11:41)      Patient seen and examined at bedside.  No overnight events  Still having right heel pain    ALLERGIES:  No Known Allergies    MEDICATIONS  (STANDING):  acetaminophen     Tablet .. 975 milliGRAM(s) Oral every 6 hours  aspirin enteric coated 81 milliGRAM(s) Oral daily  carvedilol 12.5 milliGRAM(s) Oral every 12 hours  enoxaparin Injectable 40 milliGRAM(s) SubCutaneous <User Schedule>  gabapentin 100 milliGRAM(s) Oral two times a day  influenza   Vaccine 0.5 milliLiter(s) IntraMuscular once  lidocaine   4% Patch 1 Patch Transdermal daily  losartan 100 milliGRAM(s) Oral daily  methocarbamol 1500 milliGRAM(s) Oral three times a day  multivitamin 1 Tablet(s) Oral daily  polyethylene glycol 3350 17 Gram(s) Oral two times a day  senna 2 Tablet(s) Oral at bedtime  simvastatin 40 milliGRAM(s) Oral at bedtime  tamsulosin 0.4 milliGRAM(s) Oral at bedtime    MEDICATIONS  (PRN):  lidocaine 2% Jelly 3 milliLiter(s) IntraUrethral every 6 hours PRN For straight catheterization  magnesium hydroxide Suspension 30 milliLiter(s) Oral every 12 hours PRN Constipation  melatonin 3 milliGRAM(s) Oral at bedtime PRN Insomnia  oxyCODONE    IR 5 milliGRAM(s) Oral every 4 hours PRN Moderate Pain (4 - 6)  oxyCODONE    IR 10 milliGRAM(s) Oral every 4 hours PRN Severe Pain (7 - 10)    Vital Signs Last 24 Hrs  T(F): 97.3 (06 Feb 2023 08:45), Max: 97.5 (05 Feb 2023 19:10)  HR: 67 (06 Feb 2023 08:45) (65 - 70)  BP: 125/81 (06 Feb 2023 08:45) (125/81 - 148/91)  RR: 16 (06 Feb 2023 08:45) (16 - 16)  SpO2: 98% (06 Feb 2023 08:45) (98% - 99%)  I&O's Summary    05 Feb 2023 07:01  -  06 Feb 2023 07:00  --------------------------------------------------------  IN: 0 mL / OUT: 1485 mL / NET: -1485 mL    PHYSICAL EXAM:  GENERAL: NAD  HENT:  Atraumatic, Normocephalic; No tonsillar erythema, exudates, or enlargement; Moist mucous membranes;   EYES: EOMI, PERRLA, conjunctiva and sclera clear, no lid-lag  NECK: Supple, No JVD, Normal thyroid  CHEST/LUNG: Clear to percussion bilaterally; No rales, rhonchi, wheezing, or rubs; normal respiratory effort, no intercostal retractions  HEART: Regular rate and rhythm; No murmurs, rubs, or gallops; No pitting edema  ABDOMEN: Soft, Nontender, Nondistended; Bowel sounds present; No HSM  MUSCULOSKELETAL/EXTREMITIES:  2+ Peripheral Pulses, No clubbing or digital cyanosis  PSYCH: Appropriate affect, Alert & Awake    LABS:                        12.9   6.48  )-----------( 236      ( 06 Feb 2023 06:40 )             38.8       02-06    140  |  103  |  19  ----------------------------<  108  4.0   |  29  |  0.64    Ca    9.0      06 Feb 2023 06:40    TPro  7.2  /  Alb  3.7  /  TBili  0.3  /  DBili  x   /  AST  27  /  ALT  49  /  AlkPhos  59  02-06     COVID-19 PCR: [facility] (01-31-23 @ 00:00)  COVID-19 PCR: [facility] (01-30-23 @ 02:16)    Care Discussed with Rehab Attending and Other Providers

## 2023-02-07 PROCEDURE — 99232 SBSQ HOSP IP/OBS MODERATE 35: CPT

## 2023-02-07 PROCEDURE — 99232 SBSQ HOSP IP/OBS MODERATE 35: CPT | Mod: GC

## 2023-02-07 RX ORDER — OXYCODONE HYDROCHLORIDE 5 MG/1
5 TABLET ORAL EVERY 4 HOURS
Refills: 0 | Status: DISCONTINUED | OUTPATIENT
Start: 2023-02-07 | End: 2023-02-14

## 2023-02-07 RX ORDER — LANOLIN ALCOHOL/MO/W.PET/CERES
6 CREAM (GRAM) TOPICAL AT BEDTIME
Refills: 0 | Status: DISCONTINUED | OUTPATIENT
Start: 2023-02-07 | End: 2023-02-24

## 2023-02-07 RX ORDER — LANOLIN ALCOHOL/MO/W.PET/CERES
3 CREAM (GRAM) TOPICAL AT BEDTIME
Refills: 0 | Status: DISCONTINUED | OUTPATIENT
Start: 2023-02-07 | End: 2023-02-07

## 2023-02-07 RX ORDER — OXYCODONE HYDROCHLORIDE 5 MG/1
10 TABLET ORAL EVERY 4 HOURS
Refills: 0 | Status: DISCONTINUED | OUTPATIENT
Start: 2023-02-07 | End: 2023-02-14

## 2023-02-07 RX ADMIN — Medication 6 MILLIGRAM(S): at 21:02

## 2023-02-07 RX ADMIN — OXYCODONE HYDROCHLORIDE 5 MILLIGRAM(S): 5 TABLET ORAL at 06:23

## 2023-02-07 RX ADMIN — SIMVASTATIN 40 MILLIGRAM(S): 20 TABLET, FILM COATED ORAL at 21:01

## 2023-02-07 RX ADMIN — LOSARTAN POTASSIUM 100 MILLIGRAM(S): 100 TABLET, FILM COATED ORAL at 05:41

## 2023-02-07 RX ADMIN — Medication 975 MILLIGRAM(S): at 06:22

## 2023-02-07 RX ADMIN — Medication 975 MILLIGRAM(S): at 00:07

## 2023-02-07 RX ADMIN — OXYCODONE HYDROCHLORIDE 5 MILLIGRAM(S): 5 TABLET ORAL at 05:40

## 2023-02-07 RX ADMIN — CARVEDILOL PHOSPHATE 12.5 MILLIGRAM(S): 80 CAPSULE, EXTENDED RELEASE ORAL at 18:01

## 2023-02-07 RX ADMIN — LIDOCAINE 1 PATCH: 4 CREAM TOPICAL at 00:09

## 2023-02-07 RX ADMIN — GABAPENTIN 200 MILLIGRAM(S): 400 CAPSULE ORAL at 05:40

## 2023-02-07 RX ADMIN — LIDOCAINE 1 PATCH: 4 CREAM TOPICAL at 14:42

## 2023-02-07 RX ADMIN — TAMSULOSIN HYDROCHLORIDE 0.4 MILLIGRAM(S): 0.4 CAPSULE ORAL at 21:01

## 2023-02-07 RX ADMIN — GABAPENTIN 200 MILLIGRAM(S): 400 CAPSULE ORAL at 14:45

## 2023-02-07 RX ADMIN — Medication 975 MILLIGRAM(S): at 01:07

## 2023-02-07 RX ADMIN — ENOXAPARIN SODIUM 40 MILLIGRAM(S): 100 INJECTION SUBCUTANEOUS at 05:41

## 2023-02-07 RX ADMIN — GABAPENTIN 200 MILLIGRAM(S): 400 CAPSULE ORAL at 21:00

## 2023-02-07 RX ADMIN — LIDOCAINE 1 PATCH: 4 CREAM TOPICAL at 19:41

## 2023-02-07 RX ADMIN — CARVEDILOL PHOSPHATE 12.5 MILLIGRAM(S): 80 CAPSULE, EXTENDED RELEASE ORAL at 05:41

## 2023-02-07 RX ADMIN — Medication 81 MILLIGRAM(S): at 14:42

## 2023-02-07 RX ADMIN — METHOCARBAMOL 1500 MILLIGRAM(S): 500 TABLET, FILM COATED ORAL at 14:41

## 2023-02-07 RX ADMIN — METHOCARBAMOL 1500 MILLIGRAM(S): 500 TABLET, FILM COATED ORAL at 21:00

## 2023-02-07 RX ADMIN — Medication 975 MILLIGRAM(S): at 05:42

## 2023-02-07 RX ADMIN — METHOCARBAMOL 1500 MILLIGRAM(S): 500 TABLET, FILM COATED ORAL at 05:41

## 2023-02-07 RX ADMIN — Medication 1 TABLET(S): at 14:42

## 2023-02-07 NOTE — PROGRESS NOTE ADULT - NS ATTEND AMEND GEN_ALL_CORE FT
Rehab Attending- Patient seen and examined by me - Case discussed, above note reviewed by me with modifications made    Doing well  Burning in Right foot- skin intact- will increase gabapentin- Swiss cheese boots in bed  LLE 4/5, RLE 2/5 prox  voiding well - no st cath since 2/2  on Flomax  labs reviewed by me- stable  to continue intensive rehab program Rehab Attending- Patient seen and examined by me - Case discussed, above note reviewed by me with modifications made    Doing well  Burning in Right foot better with gabapentin Swiss cheese boots in bed  poor sleep tired this AM- to follow - may need to decrease gabapentin dose  PVR 30cc after voiding 700cc- cont flomax  LLE 4/5, RLE 2/5 prox active dorsip[lantar flexion Right foot- quads remain weak  lite gait in PT  labs reviewed by me- stable  to continue intensive rehab program

## 2023-02-07 NOTE — PROGRESS NOTE ADULT - SUBJECTIVE AND OBJECTIVE BOX
CC: Cauda Equina    Today's Subjective & Objective Findings:  Patient seen and examined at bedside this AM.   States that he feels well this morning and feels like his LEs are getting stronger.   Experienced trouble sleeping last night- likely due to anxiety/running thoughts. Agreeable to try Melatonin  Last BM on 2/7, per patient.   Last SC evening of 2/2.  Continue to monitor BS/PVRs/SCs.  R ankle numbness and tingling with much improvement since starting Gabapentin.  Per therapy, pt can propel in WC independently in patient bedroom and hallway.   No other complaints at this time.    Denies headache, dizziness, visual changes, chest pain, SOB/CONRAD, abdominal pain, nausea, vomiting, diarrhea, & dysuria.     Vital Signs Last 24 Hrs  T(C): 36.8 (06 Feb 2023 20:29), Max: 36.8 (06 Feb 2023 20:29)  T(F): 98.2 (06 Feb 2023 20:29), Max: 98.2 (06 Feb 2023 20:29)  HR: 64 (07 Feb 2023 05:46) (64 - 76)  BP: 133/91 (07 Feb 2023 05:46) (133/82 - 135/81)  BP(mean): --  RR: 16 (06 Feb 2023 20:29) (16 - 16)  SpO2: 97% (06 Feb 2023 20:29) (97% - 98%)      PHYSICAL EXAM:  Constitutional - NAD, Comfortable  HEENT - NCAT, EOMI  Neck - Supple, No limited ROM  Chest - good chest expansion, good respiratory effort, CTAB   Cardio - warm and well perfused   Abdomen -  Soft, NTND  Vergara out  Extremities - No peripheral edema, No calf tenderness   Neurologic Exam:                    Cognitive -             Orientation: Awake, Alert, AAO to self, place, date, year, situation            Thought: process, content appropriate     Speech - Fluent, Comprehensible, No dysarthria, No aphasia      Cranial Nerves - No facial asymmetry, Tongue midline, Shoulder shrug intact     Motor -                      LEFT    UE - ShAB 5/5, EF 5/5, EE 5/5, WE 5/5,  WNL                    RIGHT UE - ShAB 5/5, EF 5/5, EE 5/5, WE 5/5,  WNL                    LEFT    LE - HF 2/5, KE 3+/5, DF 1/5, PF 4/5 Strong hip extensors4+/5                    RIGHT LE - HF 2/5, KE 2/5, DF 0/5, PF 4/5        Sensory - decrease sensation below the knee. R>L     OculoVestibular -  No nystagmus  Psychiatric - Mood stable, Affect WNL  Skin on admission: lumbar incision intact/ healed      LAB                        12.9   6.48  )-----------( 236      ( 06 Feb 2023 06:40 )             38.8     02-06    140  |  103  |  19  ----------------------------<  108<H>  4.0   |  29  |  0.64    Ca    9.0      06 Feb 2023 06:40    TPro  7.2  /  Alb  3.7  /  TBili  0.3  /  DBili  x   /  AST  27  /  ALT  49<H>  /  AlkPhos  59  02-06    LIVER FUNCTIONS - ( 06 Feb 2023 06:40 )  Alb: 3.7 g/dL / Pro: 7.2 g/dL / ALK PHOS: 59 U/L / ALT: 49 U/L / AST: 27 U/L / GGT: x             MEDICATIONS  (STANDING):  acetaminophen     Tablet .. 975 milliGRAM(s) Oral every 6 hours  aspirin enteric coated 81 milliGRAM(s) Oral daily  carvedilol 12.5 milliGRAM(s) Oral every 12 hours  enoxaparin Injectable 40 milliGRAM(s) SubCutaneous <User Schedule>  gabapentin 100 milliGRAM(s) Oral two times a day  influenza   Vaccine 0.5 milliLiter(s) IntraMuscular once  lidocaine   4% Patch 1 Patch Transdermal daily  losartan 100 milliGRAM(s) Oral daily  methocarbamol 1500 milliGRAM(s) Oral three times a day  multivitamin 1 Tablet(s) Oral daily  polyethylene glycol 3350 17 Gram(s) Oral two times a day  senna 2 Tablet(s) Oral at bedtime  simvastatin 40 milliGRAM(s) Oral at bedtime  tamsulosin 0.4 milliGRAM(s) Oral at bedtime    MEDICATIONS  (PRN):  lidocaine 2% Jelly 3 milliLiter(s) IntraUrethral every 6 hours PRN For straight catheterization  magnesium hydroxide Suspension 30 milliLiter(s) Oral every 12 hours PRN Constipation  melatonin 3 milliGRAM(s) Oral at bedtime PRN Insomnia  oxyCODONE    IR 5 milliGRAM(s) Oral every 4 hours PRN Moderate Pain (4 - 6)  oxyCODONE    IR 10 milliGRAM(s) Oral every 4 hours PRN Severe Pain (7 - 10)

## 2023-02-07 NOTE — PROGRESS NOTE ADULT - SUBJECTIVE AND OBJECTIVE BOX
Patient is a 57y old  Male who presents with a chief complaint of Paraparesis/Cauda Equina Syndrome (06 Feb 2023 09:24)      Patient seen and examined at bedside.  No overnight events  No complaints this morning    ALLERGIES:  No Known Allergies    MEDICATIONS  (STANDING):  acetaminophen     Tablet .. 975 milliGRAM(s) Oral every 6 hours  aspirin enteric coated 81 milliGRAM(s) Oral daily  carvedilol 12.5 milliGRAM(s) Oral every 12 hours  enoxaparin Injectable 40 milliGRAM(s) SubCutaneous <User Schedule>  gabapentin 200 milliGRAM(s) Oral three times a day  influenza   Vaccine 0.5 milliLiter(s) IntraMuscular once  lidocaine   4% Patch 1 Patch Transdermal daily  losartan 100 milliGRAM(s) Oral daily  methocarbamol 1500 milliGRAM(s) Oral three times a day  multivitamin 1 Tablet(s) Oral daily  polyethylene glycol 3350 17 Gram(s) Oral two times a day  senna 2 Tablet(s) Oral at bedtime  simvastatin 40 milliGRAM(s) Oral at bedtime  tamsulosin 0.4 milliGRAM(s) Oral at bedtime    MEDICATIONS  (PRN):  lidocaine 2% Jelly 3 milliLiter(s) IntraUrethral every 6 hours PRN For straight catheterization  magnesium hydroxide Suspension 30 milliLiter(s) Oral every 12 hours PRN Constipation  melatonin 3 milliGRAM(s) Oral at bedtime PRN Insomnia  oxyCODONE    IR 5 milliGRAM(s) Oral every 4 hours PRN Moderate Pain (4 - 6)  oxyCODONE    IR 10 milliGRAM(s) Oral every 4 hours PRN Severe Pain (7 - 10)    Vital Signs Last 24 Hrs  T(F): 98.2 (06 Feb 2023 20:29), Max: 98.2 (06 Feb 2023 20:29)  HR: 64 (07 Feb 2023 05:46) (64 - 76)  BP: 133/91 (07 Feb 2023 05:46) (133/82 - 135/81)  RR: 16 (06 Feb 2023 20:29) (16 - 16)  SpO2: 97% (06 Feb 2023 20:29) (97% - 98%)  I&O's Summary    06 Feb 2023 07:01  -  07 Feb 2023 07:00  --------------------------------------------------------  IN: 0 mL / OUT: 902 mL / NET: -902 mL    PHYSICAL EXAM:  GENERAL: NAD  HENT:  Atraumatic, Normocephalic; No tonsillar erythema, exudates, or enlargement; Moist mucous membranes;   EYES: EOMI, PERRLA, conjunctiva and sclera clear, no lid-lag  NECK: Supple, No JVD, Normal thyroid  CHEST/LUNG: Clear to percussion bilaterally; No rales, rhonchi, wheezing, or rubs; normal respiratory effort, no intercostal retractions  HEART: Regular rate and rhythm; No murmurs, rubs, or gallops; No pitting edema  ABDOMEN: Soft, Nontender, Nondistended; Bowel sounds present; No HSM  MUSCULOSKELETAL/EXTREMITIES:  2+ Peripheral Pulses, No clubbing or digital cyanosis  PSYCH: Appropriate affect, Alert & Awake; Good judgement    LABS:                        12.9   6.48  )-----------( 236      ( 06 Feb 2023 06:40 )             38.8       02-06    140  |  103  |  19  ----------------------------<  108  4.0   |  29  |  0.64    Ca    9.0      06 Feb 2023 06:40    TPro  7.2  /  Alb  3.7  /  TBili  0.3  /  DBili  x   /  AST  27  /  ALT  49  /  AlkPhos  59  02-06     COVID-19 PCR: NotDetec (01-31-23 @ 00:00)  COVID-19 PCR: NotDetec (01-30-23 @ 02:16)      Care Discussed with Rehab Attending and Other Providers

## 2023-02-07 NOTE — PROGRESS NOTE ADULT - ASSESSMENT
58 YO male with PMH of CAD, s/p CABG, HTN, HLD, ETOH abuse (no alcohol use in 2 years as per pt) who presented to [facility] on 1/24 with back pain and progressive lower extremity weakness. Examination with concern for cauda equina. He was admitted for cauda equina syndrome and he  underwent urgent L2-3 laminectomy/ discectomy on 1/25/23.   Patient now with gait Instability, ADL impairments and Functional impairments.    * Continue to monitor functional rehab progress * * Bladder scan/PVRs/SC every 6 hours- continue to monitor * NPSY following for emotional support/coping strategies * Melatonin 3 mg *     #Cauda Equina Syndrome  - s/p L2-3 laminectomy/ discectomy on 1/25/23.  - Start Comprehensive Rehab Program: PT/OT, 3hours daily and 5 days weekly  - PT: Focused on improving strength, endurance, coordination, balance, functional mobility, and transfers  - OT: Focused on improving strength, fine motor skills, coordination, posture and ADLs.      #HTN  - Losartan 100mg daily  - Carvedilol 12.5mg BID    #CAD  -  s/p CABG  - Resume ASA 2/7    #HLD  - Zocor 40mg daily    #Pain management  - Tylenol PRN, lidocaine, Oxycodone PRN, Robaxin  - Gabapentin increased to 200mg TID 2/6  - Swiss cheese boots in bed 2/6    #DVT ppx  - Lovenox, SCD, TEDs    #Bowel Regimen  - Senna, miralax BID    #Bladder management  - Vergara Dcd 2/2- TOV  - Flomax  - Void OOB/sitting  - Last SC evening of 2/2     #FEN   - Diet: Regular  - Supplements: MVI    #Skin:  - Skin on admission:  lumbar incision looks well healed. open to air currently  - Pressure injury/Skin: Turn Q2hrs while in bed, OOB to Chair, PT/OT     #Sleep:   - Maintain quiet hours and low stim environment.  - Melatonin 3mg nightly 2/7    #Mood  - NPSY following for emotional support and coping strategies    #Precaution  - Fall, Aspiration, spinal    IDT conference on 2/2  TDD: 2/21 to home  Barriers: Significant lower body weakness.  Social Work: Lives in  with girlfriend and girlfriend's mother with 3 YANETH and 10 STI with 1 HR. 1st floor living if needed.   OT: Min A for ADLs and min-mod for functional transfers.  PT: Min A for popover transfer. Sit to stand transfer with Max A and 2 pr A. Ambulated 5-8ft on parallel bars with max A and WCF.   SLP: --  --------------------------------------------------------  Outpatient Follow-up (Specialty/Name of physician):  Zack Schaefer (MD; PhD)  Neurosurgery  [address] [place] [address], 2nd [place] [address]  Phone: [phone]  Fax: [phone]  -------------------------------------------------------- 58 YO male with PMH of CAD, s/p CABG, HTN, HLD, ETOH abuse (no alcohol use in 2 years as per pt) who presented to [facility] on 1/24 with back pain and progressive lower extremity weakness. Examination with concern for cauda equina. He was admitted for cauda equina syndrome and he  underwent urgent L2-3 laminectomy/ discectomy on 1/25/23.   Patient now with gait Instability, ADL impairments and Functional impairments.    * Continue to monitor functional rehab progress * * Bladder scan/PVRs/SC every 6 hours- continue to monitor * NPSY following for emotional support/coping strategies * Melatonin 3 mg *     #Cauda Equina Syndrome  - s/p L2-3 laminectomy/ discectomy on 1/25/23.  - Comprehensive Rehab Program: PT/OT, 3hours daily and 5 days weekly  - PT: Focused on improving strength, endurance, coordination, balance, functional mobility, and transfers  - OT: Focused on improving strength, fine motor skills, coordination, posture and ADLs.      #HTN  - Losartan 100mg daily  - Carvedilol 12.5mg BID    #CAD  -  s/p CABG  - Resume ASA 2/7    #HLD  - Zocor 40mg daily    #Pain management  - Tylenol PRN, lidocaine, Oxycodone PRN, Robaxin  - Gabapentin increased to 200mg TID 2/6  - Swiss cheese boots in bed 2/6    #DVT ppx  - Lovenox, SCD, TEDs    #Bowel Regimen  - Senna, miralax BID    #Bladder management  - Vergara Dcd 2/2- TOV  - Flomax  - Void OOB/sitting  - Last SC evening of 2/2   last PVR <30cc after voiding 700cc    #FEN   - Diet: Regular  - Supplements: MVI    #Skin:  - Skin on admission:  lumbar incision looks well healed. open to air currently  - Pressure injury/Skin: Turn Q2hrs while in bed, OOB to Chair, PT/OT     #Sleep:   - Maintain quiet hours and low stim environment.  - Melatonin 3mg nightly 2/7    #Mood  - NPSY following for emotional support and coping strategies    #Precaution  - Fall, Aspiration, spinal    IDT conference on 2/2  TDD: 2/21 to home  Barriers: Significant lower body weakness.  Social Work: Lives in  with girlfriend and girlfriend's mother with 3 YANETH and 10 STI with 1 HR. 1st floor living if needed.   OT: Min A for ADLs and min-mod for functional transfers.  PT: Min A for popover transfer. Sit to stand transfer with Max A and 2 pr A. Ambulated 5-8ft on parallel bars with max A and WCF.   SLP: --  --------------------------------------------------------  Outpatient Follow-up (Specialty/Name of physician):  Zack Schaefer (MD; PhD)  Neurosurgery  [address] [place] [address], [address]nd [place] [address]  Phone: [phone]  Fax: [phone]  --------------------------------------------------------

## 2023-02-08 PROCEDURE — 99232 SBSQ HOSP IP/OBS MODERATE 35: CPT

## 2023-02-08 RX ADMIN — Medication 975 MILLIGRAM(S): at 07:49

## 2023-02-08 RX ADMIN — Medication 975 MILLIGRAM(S): at 18:06

## 2023-02-08 RX ADMIN — Medication 975 MILLIGRAM(S): at 11:55

## 2023-02-08 RX ADMIN — GABAPENTIN 200 MILLIGRAM(S): 400 CAPSULE ORAL at 07:38

## 2023-02-08 RX ADMIN — TAMSULOSIN HYDROCHLORIDE 0.4 MILLIGRAM(S): 0.4 CAPSULE ORAL at 21:00

## 2023-02-08 RX ADMIN — Medication 6 MILLIGRAM(S): at 21:00

## 2023-02-08 RX ADMIN — Medication 1 TABLET(S): at 11:55

## 2023-02-08 RX ADMIN — LIDOCAINE 1 PATCH: 4 CREAM TOPICAL at 02:00

## 2023-02-08 RX ADMIN — CARVEDILOL PHOSPHATE 12.5 MILLIGRAM(S): 80 CAPSULE, EXTENDED RELEASE ORAL at 07:39

## 2023-02-08 RX ADMIN — POLYETHYLENE GLYCOL 3350 17 GRAM(S): 17 POWDER, FOR SOLUTION ORAL at 18:05

## 2023-02-08 RX ADMIN — LIDOCAINE 1 PATCH: 4 CREAM TOPICAL at 19:00

## 2023-02-08 RX ADMIN — LIDOCAINE 1 PATCH: 4 CREAM TOPICAL at 18:06

## 2023-02-08 RX ADMIN — ENOXAPARIN SODIUM 40 MILLIGRAM(S): 100 INJECTION SUBCUTANEOUS at 07:39

## 2023-02-08 RX ADMIN — Medication 975 MILLIGRAM(S): at 07:38

## 2023-02-08 RX ADMIN — LOSARTAN POTASSIUM 100 MILLIGRAM(S): 100 TABLET, FILM COATED ORAL at 07:38

## 2023-02-08 RX ADMIN — Medication 81 MILLIGRAM(S): at 11:55

## 2023-02-08 RX ADMIN — METHOCARBAMOL 1500 MILLIGRAM(S): 500 TABLET, FILM COATED ORAL at 21:01

## 2023-02-08 RX ADMIN — CARVEDILOL PHOSPHATE 12.5 MILLIGRAM(S): 80 CAPSULE, EXTENDED RELEASE ORAL at 18:05

## 2023-02-08 RX ADMIN — Medication 975 MILLIGRAM(S): at 18:09

## 2023-02-08 RX ADMIN — GABAPENTIN 200 MILLIGRAM(S): 400 CAPSULE ORAL at 21:00

## 2023-02-08 RX ADMIN — METHOCARBAMOL 1500 MILLIGRAM(S): 500 TABLET, FILM COATED ORAL at 07:38

## 2023-02-08 RX ADMIN — SIMVASTATIN 40 MILLIGRAM(S): 20 TABLET, FILM COATED ORAL at 21:01

## 2023-02-08 RX ADMIN — GABAPENTIN 200 MILLIGRAM(S): 400 CAPSULE ORAL at 14:02

## 2023-02-08 RX ADMIN — METHOCARBAMOL 1500 MILLIGRAM(S): 500 TABLET, FILM COATED ORAL at 14:02

## 2023-02-08 NOTE — PROGRESS NOTE ADULT - SUBJECTIVE AND OBJECTIVE BOX
CC: Cauda Equina    Today's Subjective & Objective Findings:  ******    Patient seen and examined at bedside this AM.   States that he feels well this morning and feels like his LEs are getting stronger.   Experienced trouble sleeping last night- likely due to anxiety/running thoughts. Agreeable to try Melatonin  Last BM on 2/7, per patient.   Last SC evening of 2/2.  Continue to monitor BS/PVRs/SCs.  R ankle numbness and tingling with much improvement since starting Gabapentin.  Per therapy, pt can propel in WC independently in patient bedroom and hallway.   No other complaints at this time.    Denies headache, dizziness, visual changes, chest pain, SOB/CONRAD, abdominal pain, nausea, vomiting, diarrhea, & dysuria.     Vital Signs Last 24 Hrs  T(C): 36.4 (08 Feb 2023 07:32), Max: 36.6 (07 Feb 2023 20:30)  T(F): 97.6 (08 Feb 2023 07:32), Max: 97.9 (07 Feb 2023 20:30)  HR: 65 (08 Feb 2023 07:32) (63 - 71)  BP: 177/104 (08 Feb 2023 07:32) (127/82 - 177/104)  BP(mean): --  RR: 16 (08 Feb 2023 07:32) (16 - 16)  SpO2: 99% (08 Feb 2023 07:32) (98% - 99%)      PHYSICAL EXAM:  Constitutional - NAD, Comfortable  HEENT - NCAT, EOMI  Neck - Supple, No limited ROM  Chest - good chest expansion, good respiratory effort, CTAB   Cardio - warm and well perfused   Abdomen -  Soft, NTND  Vergara out  Extremities - No peripheral edema, No calf tenderness   Neurologic Exam:                    Cognitive -             Orientation: Awake, Alert, AAO to self, place, date, year, situation            Thought: process, content appropriate     Speech - Fluent, Comprehensible, No dysarthria, No aphasia      Cranial Nerves - No facial asymmetry, Tongue midline, Shoulder shrug intact     Motor -                      LEFT    UE - ShAB 5/5, EF 5/5, EE 5/5, WE 5/5,  WNL                    RIGHT UE - ShAB 5/5, EF 5/5, EE 5/5, WE 5/5,  WNL                    LEFT    LE - HF 2/5, KE 3+/5, DF 1/5, PF 4/5 Strong hip extensors4+/5                    RIGHT LE - HF 2/5, KE 2/5, DF 0/5, PF 4/5        Sensory - decrease sensation below the knee. R>L     OculoVestibular -  No nystagmus  Psychiatric - Mood stable, Affect WNL  Skin on admission: lumbar incision intact/ healed      LAB                        12.9   6.48  )-----------( 236      ( 06 Feb 2023 06:40 )             38.8     02-06    140  |  103  |  19  ----------------------------<  108<H>  4.0   |  29  |  0.64    Ca    9.0      06 Feb 2023 06:40    TPro  7.2  /  Alb  3.7  /  TBili  0.3  /  DBili  x   /  AST  27  /  ALT  49<H>  /  AlkPhos  59  02-06    LIVER FUNCTIONS - ( 06 Feb 2023 06:40 )  Alb: 3.7 g/dL / Pro: 7.2 g/dL / ALK PHOS: 59 U/L / ALT: 49 U/L / AST: 27 U/L / GGT: x             MEDICATIONS  (STANDING):  acetaminophen     Tablet .. 975 milliGRAM(s) Oral every 6 hours  aspirin enteric coated 81 milliGRAM(s) Oral daily  carvedilol 12.5 milliGRAM(s) Oral every 12 hours  enoxaparin Injectable 40 milliGRAM(s) SubCutaneous <User Schedule>  gabapentin 100 milliGRAM(s) Oral two times a day  influenza   Vaccine 0.5 milliLiter(s) IntraMuscular once  lidocaine   4% Patch 1 Patch Transdermal daily  losartan 100 milliGRAM(s) Oral daily  methocarbamol 1500 milliGRAM(s) Oral three times a day  multivitamin 1 Tablet(s) Oral daily  polyethylene glycol 3350 17 Gram(s) Oral two times a day  senna 2 Tablet(s) Oral at bedtime  simvastatin 40 milliGRAM(s) Oral at bedtime  tamsulosin 0.4 milliGRAM(s) Oral at bedtime    MEDICATIONS  (PRN):  lidocaine 2% Jelly 3 milliLiter(s) IntraUrethral every 6 hours PRN For straight catheterization  magnesium hydroxide Suspension 30 milliLiter(s) Oral every 12 hours PRN Constipation  melatonin 3 milliGRAM(s) Oral at bedtime PRN Insomnia  oxyCODONE    IR 5 milliGRAM(s) Oral every 4 hours PRN Moderate Pain (4 - 6)  oxyCODONE    IR 10 milliGRAM(s) Oral every 4 hours PRN Severe Pain (7 - 10) CC: Cauda Equina    Today's Subjective & Objective Findings:  Patient seen and examined at bedside this AM with Dr. Herring.  States that he feels well this morning and feels like his LEs are getting stronger.   Poor sleep last night due to pain. Frustrated about RN overnight and pain management.  Tearful during encounter, doesn't want staff to feel like he is a "druggie."   Encouraged that this is not the case.   Last BM on 2/7, per patient.   Last SC evening of 2/2.  Continue to monitor BS/PVRs/SCs.  Experiencing some numbness and tingling in RLE overnight and this AM, despite Gabapentin regimen.   No other complaints at this time.    Denies headache, dizziness, visual changes, chest pain, SOB/CONRAD, abdominal pain, nausea, vomiting, diarrhea, & dysuria.     Vital Signs Last 24 Hrs  T(C): 36.4 (08 Feb 2023 07:32), Max: 36.6 (07 Feb 2023 20:30)  T(F): 97.6 (08 Feb 2023 07:32), Max: 97.9 (07 Feb 2023 20:30)  HR: 65 (08 Feb 2023 07:32) (63 - 71)  BP: 177/104 (08 Feb 2023 07:32) (127/82 - 177/104)  BP(mean): --  RR: 16 (08 Feb 2023 07:32) (16 - 16)  SpO2: 99% (08 Feb 2023 07:32) (98% - 99%)      PHYSICAL EXAM:  Constitutional - NAD, Comfortable  HEENT - NCAT, EOMI  Neck - Supple, No limited ROM  Chest - good chest expansion, good respiratory effort, CTAB   Cardio - warm and well perfused   Abdomen -  Soft, NTND  Vergara out  Extremities - No peripheral edema, No calf tenderness   Neurologic Exam:                    Cognitive -             Orientation: Awake, Alert, AAO to self, place, date, year, situation            Thought: process, content appropriate     Speech - Fluent, Comprehensible, No dysarthria, No aphasia      Cranial Nerves - No facial asymmetry, Tongue midline, Shoulder shrug intact     Motor -                      LEFT    UE - ShAB 5/5, EF 5/5, EE 5/5, WE 5/5,  WNL                    RIGHT UE - ShAB 5/5, EF 5/5, EE 5/5, WE 5/5,  WNL                    LEFT    LE - HF 2/5, KE 3+/5, DF 1/5, PF 4/5 Strong hip extensors4+/5                    RIGHT LE - HF 2/5, KE 2/5, DF 0/5, PF 4/5        Sensory - decrease sensation below the knee. R>L     OculoVestibular -  No nystagmus  Psychiatric - Mood stable, Affect WNL  Skin on admission: lumbar incision intact/ healed      LAB                        12.9   6.48  )-----------( 236      ( 06 Feb 2023 06:40 )             38.8     02-06    140  |  103  |  19  ----------------------------<  108<H>  4.0   |  29  |  0.64    Ca    9.0      06 Feb 2023 06:40    TPro  7.2  /  Alb  3.7  /  TBili  0.3  /  DBili  x   /  AST  27  /  ALT  49<H>  /  AlkPhos  59  02-06    LIVER FUNCTIONS - ( 06 Feb 2023 06:40 )  Alb: 3.7 g/dL / Pro: 7.2 g/dL / ALK PHOS: 59 U/L / ALT: 49 U/L / AST: 27 U/L / GGT: x             MEDICATIONS  (STANDING):  acetaminophen     Tablet .. 975 milliGRAM(s) Oral every 6 hours  aspirin enteric coated 81 milliGRAM(s) Oral daily  carvedilol 12.5 milliGRAM(s) Oral every 12 hours  enoxaparin Injectable 40 milliGRAM(s) SubCutaneous <User Schedule>  gabapentin 100 milliGRAM(s) Oral two times a day  influenza   Vaccine 0.5 milliLiter(s) IntraMuscular once  lidocaine   4% Patch 1 Patch Transdermal daily  losartan 100 milliGRAM(s) Oral daily  methocarbamol 1500 milliGRAM(s) Oral three times a day  multivitamin 1 Tablet(s) Oral daily  polyethylene glycol 3350 17 Gram(s) Oral two times a day  senna 2 Tablet(s) Oral at bedtime  simvastatin 40 milliGRAM(s) Oral at bedtime  tamsulosin 0.4 milliGRAM(s) Oral at bedtime    MEDICATIONS  (PRN):  lidocaine 2% Jelly 3 milliLiter(s) IntraUrethral every 6 hours PRN For straight catheterization  magnesium hydroxide Suspension 30 milliLiter(s) Oral every 12 hours PRN Constipation  melatonin 3 milliGRAM(s) Oral at bedtime PRN Insomnia  oxyCODONE    IR 5 milliGRAM(s) Oral every 4 hours PRN Moderate Pain (4 - 6)  oxyCODONE    IR 10 milliGRAM(s) Oral every 4 hours PRN Severe Pain (7 - 10) CC: Cauda Equina    Today's Subjective & Objective Findings:  Patient seen and examined at bedside this AM with Dr. Herring.  States that he feels well this morning and feels like his LEs are getting stronger.   Poor sleep last night due to pain. Frustrated about RN overnight and pain management.  Tearful during encounter, doesn't want staff to feel like he is a "druggie."   Encouraged that this is not the case.   Last BM on 2/7, per patient.   Last SC evening of 2/2.  Continue to monitor BS/PVRs/SCs.  Experiencing some numbness and tingling in RLE overnight and this AM, despite Gabapentin regimen.   No other complaints at this time.    Denies headache, dizziness, visual changes, chest pain, SOB/CONRAD, abdominal pain, nausea, vomiting, diarrhea, & dysuria.     Vital Signs Last 24 Hrs  T(C): 36.4 (08 Feb 2023 07:32), Max: 36.6 (07 Feb 2023 20:30)  T(F): 97.6 (08 Feb 2023 07:32), Max: 97.9 (07 Feb 2023 20:30)  HR: 65 (08 Feb 2023 07:32) (63 - 71)  BP: 177/104 (08 Feb 2023 07:32) (127/82 - 177/104)  RR: 16 (08 Feb 2023 07:32) (16 - 16)  SpO2: 99% (08 Feb 2023 07:32) (98% - 99%)      PHYSICAL EXAM:  Constitutional - NAD, Comfortable  HEENT - NCAT, EOMI  Neck - Supple, No limited ROM  Chest - good chest expansion, good respiratory effort, CTAB   Cardio - warm and well perfused   Abdomen -  Soft, NTND  Vergara out  Extremities - No peripheral edema, No calf tenderness     Neurologic Exam:                 Oriented x 3     Cranial Nerves - No facial asymmetry, Tongue midline, Shoulder shrug intact     Motor -                      LEFT    UE - ShAB 5/5, EF 5/5, EE 5/5, WE 5/5,  WNL                    RIGHT UE - ShAB 5/5, EF 5/5, EE 5/5, WE 5/5,  WNL                    LEFT    LE - HF 2/5, KE 3+/5, DF 1/5, PF 4/5 Strong hip extensors4+/5                    RIGHT LE - HF 2/5, KE 2/5, DF 0/5, PF 4/5        Sensory - decrease sensation below the knee. R>L     OculoVestibular -  No nystagmus  Psychiatric - Mood stable, Affect WNL  Skin lumbar incision intact/ healed      LAB                        12.9   6.48  )-----------( 236      ( 06 Feb 2023 06:40 )             38.8     02-06    140  |  103  |  19  ----------------------------<  108<H>  4.0   |  29  |  0.64    Ca    9.0      06 Feb 2023 06:40    TPro  7.2  /  Alb  3.7  /  TBili  0.3  /  DBili  x   /  AST  27  /  ALT  49<H>  /  AlkPhos  59  02-06    LIVER FUNCTIONS - ( 06 Feb 2023 06:40 )  Alb: 3.7 g/dL / Pro: 7.2 g/dL / ALK PHOS: 59 U/L / ALT: 49 U/L / AST: 27 U/L / GGT: x             MEDICATIONS  (STANDING):  acetaminophen     Tablet .. 975 milliGRAM(s) Oral every 6 hours  aspirin enteric coated 81 milliGRAM(s) Oral daily  carvedilol 12.5 milliGRAM(s) Oral every 12 hours  enoxaparin Injectable 40 milliGRAM(s) SubCutaneous <User Schedule>  gabapentin 100 milliGRAM(s) Oral two times a day  influenza   Vaccine 0.5 milliLiter(s) IntraMuscular once  lidocaine   4% Patch 1 Patch Transdermal daily  losartan 100 milliGRAM(s) Oral daily  methocarbamol 1500 milliGRAM(s) Oral three times a day  multivitamin 1 Tablet(s) Oral daily  polyethylene glycol 3350 17 Gram(s) Oral two times a day  senna 2 Tablet(s) Oral at bedtime  simvastatin 40 milliGRAM(s) Oral at bedtime  tamsulosin 0.4 milliGRAM(s) Oral at bedtime    MEDICATIONS  (PRN):  lidocaine 2% Jelly 3 milliLiter(s) IntraUrethral every 6 hours PRN For straight catheterization  magnesium hydroxide Suspension 30 milliLiter(s) Oral every 12 hours PRN Constipation  melatonin 3 milliGRAM(s) Oral at bedtime PRN Insomnia  oxyCODONE    IR 5 milliGRAM(s) Oral every 4 hours PRN Moderate Pain (4 - 6)  oxyCODONE    IR 10 milliGRAM(s) Oral every 4 hours PRN Severe Pain (7 - 10)

## 2023-02-08 NOTE — PROGRESS NOTE ADULT - ASSESSMENT
58 YO male with PMH of CAD, s/p CABG, HTN, HLD, ETOH abuse (no alcohol use in 2 years as per pt) who presented to [facility] on 1/24 with back pain and progressive lower extremity weakness. Examination with concern for cauda equina. He was admitted for cauda equina syndrome and he  underwent urgent L2-3 laminectomy/ discectomy on 1/25/23.   Patient now with gait Instability, ADL impairments and Functional impairments.    * Continue to monitor functional rehab progress * * Bladder scan/PVRs/SC every 6 hours- continue to monitor * NPSY following for emotional support/coping strategies * Melatonin 3 mg *     #Cauda Equina Syndrome  - s/p L2-3 laminectomy/ discectomy on 1/25/23.  - Comprehensive Rehab Program: PT/OT, 3hours daily and 5 days weekly  - PT: Focused on improving strength, endurance, coordination, balance, functional mobility, and transfers  - OT: Focused on improving strength, fine motor skills, coordination, posture and ADLs.      #HTN  - Losartan 100mg daily  - Carvedilol 12.5mg BID    #CAD  -  s/p CABG  - Resume ASA 2/7    #HLD  - Zocor 40mg daily    #Pain management  - Tylenol PRN, lidocaine, Oxycodone PRN, Robaxin  - Gabapentin increased to 200mg TID 2/6  - Swiss cheese boots in bed 2/6    #DVT ppx  - Lovenox, SCD, TEDs    #Bowel Regimen  - Senna, miralax BID    #Bladder management  - Vergara Dcd 2/2- TOV  - Flomax  - Void OOB/sitting  - Last SC evening of 2/2   last PVR <30cc after voiding 700cc    #FEN   - Diet: Regular  - Supplements: MVI    #Skin:  - Skin on admission:  lumbar incision looks well healed. open to air currently  - Pressure injury/Skin: Turn Q2hrs while in bed, OOB to Chair, PT/OT     #Sleep:   - Maintain quiet hours and low stim environment.  - Melatonin 3mg nightly 2/7    #Mood  - NPSY following for emotional support and coping strategies    #Precaution  - Fall, Aspiration, spinal    IDT conference on 2/2  TDD: 2/21 to home  Barriers: Significant lower body weakness.  Social Work: Lives in  with girlfriend and girlfriend's mother with 3 YANETH and 10 STI with 1 HR. 1st floor living if needed.   OT: Min A for ADLs and min-mod for functional transfers.  PT: Min A for popover transfer. Sit to stand transfer with Max A and 2 pr A. Ambulated 5-8ft on parallel bars with max A and WCF.   SLP: --  --------------------------------------------------------  Outpatient Follow-up (Specialty/Name of physician):  Zack Schaefer (MD; PhD)  Neurosurgery  [address] [place] [address], 2nd [place] [address]  Phone: [phone]  Fax: [phone]  -------------------------------------------------------- 58 YO male with PMH of CAD, s/p CABG, HTN, HLD, ETOH abuse (no alcohol use in 2 years as per pt) who presented to [facility] on 1/24 with back pain and progressive lower extremity weakness. Examination with concern for cauda equina. He was admitted for cauda equina syndrome and he  underwent urgent L2-3 laminectomy/ discectomy on 1/25/23.   Patient now with gait Instability, ADL impairments and Functional impairments.    * Tolerating Gabapentin without drowsiness - consider increasing * Consider DC BS&PVRs * NPSY following for emotional support/coping strategies * Continue Melatonin * Continue to monitor functional rehab progress     #Cauda Equina Syndrome  - s/p L2-3 laminectomy/ discectomy on 1/25/23.  - Comprehensive Rehab Program: PT/OT, 3hours daily and 5 days weekly  - PT: Focused on improving strength, endurance, coordination, balance, functional mobility, and transfers  - OT: Focused on improving strength, fine motor skills, coordination, posture and ADLs.      #HTN  - Losartan 100mg daily  - Carvedilol 12.5mg BID    #CAD  -  s/p CABG  - Resume ASA 2/7    #HLD  - Zocor 40mg daily    #Pain management  - Tylenol PRN, lidocaine, Oxycodone PRN, Robaxin  - Gabapentin increased to 200mg TID 2/6  - Swiss cheese boots in bed 2/6    #DVT ppx  - Lovenox, SCD, TEDs    #Bowel Regimen  - Senna, miralax BID    #Bladder management  - Vergara Dcd 2/2- TOV  - Flomax  - Void OOB/sitting  - DC PVRs and BS 2/8    #FEN   - Diet: Regular  - Supplements: MVI    #Skin:  - Skin on admission:  lumbar incision looks well healed. open to air currently  - Pressure injury/Skin: Turn Q2hrs while in bed, OOB to Chair, PT/OT     #Sleep:   - Maintain quiet hours and low stim environment.  - Melatonin 6mg nightly 2/7    #Mood  - NPSY following for emotional support and coping strategies    #Precaution  - Fall, Aspiration, spinal    IDT conference on 2/2  TDD: 2/21 to home  Barriers: Significant lower body weakness.  Social Work: Lives in  with girlfriend and girlfriend's mother with 3 YANETH and 10 STI with 1 HR. 1st floor living if needed.   OT: Min A for ADLs and min-mod for functional transfers.  PT: Min A for popover transfer. Sit to stand transfer with Max A and 2 pr A. Ambulated 5-8ft on parallel bars with max A and WCF.   SLP: --  --------------------------------------------------------  Outpatient Follow-up (Specialty/Name of physician):  Zack Schaefer (MD; PhD)  Neurosurgery  [address] [place] [address], 2nd [place] [address]  Phone: [phone]  Fax: [phone]  -------------------------------------------------------- 56 YO male with PMH of CAD, s/p CABG, HTN, HLD, ETOH abuse (no alcohol use in 2 years as per pt) who presented to [facility] on 1/24 with back pain and progressive lower extremity weakness. Examination with concern for cauda equina. He was admitted for cauda equina syndrome and he  underwent urgent L2-3 laminectomy/ discectomy on 1/25/23.   Patient now with gait Instability, ADL impairments and Functional impairments.    * Tolerating Gabapentin without drowsiness - consider increasing * Consider DC BS&PVRs * NPSY following for emotional support/coping strategies * Continue Melatonin * Orthotist eval for BL AFO * Continue to monitor functional rehab progress     #Cauda Equina Syndrome  - s/p L2-3 laminectomy/ discectomy on 1/25/23.  - Comprehensive Rehab Program: PT/OT, 3hours daily and 5 days weekly  - PT: Focused on improving strength, endurance, coordination, balance, functional mobility, and transfers  - OT: Focused on improving strength, fine motor skills, coordination, posture and ADLs.      #HTN  - Losartan 100mg daily  - Carvedilol 12.5mg BID    #CAD  -  s/p CABG  - Resume ASA 2/7    #HLD  - Zocor 40mg daily    #Pain management  - Tylenol PRN, lidocaine, Oxycodone PRN, Robaxin  - Gabapentin increased to 200mg TID 2/6  - Swiss cheese boots in bed 2/6    #DVT ppx  - Lovenox, SCD, TEDs    #Bowel Regimen  - Senna, miralax BID    #Bladder management  - Vergara Dcd 2/2- TOV  - Flomax  - Void OOB/sitting  - DC PVRs and BS 2/8    #FEN   - Diet: Regular  - Supplements: MVI    #Skin:  - Skin on admission:  lumbar incision looks well healed. open to air currently  - Pressure injury/Skin: Turn Q2hrs while in bed, OOB to Chair, PT/OT     #Sleep:   - Maintain quiet hours and low stim environment.  - Melatonin 6mg nightly 2/7    #Mood  - NPSY following for emotional support and coping strategies    #Precaution  - Fall, Aspiration, spinal    IDT conference on 2/2  TDD: 2/21 to home  Barriers: Significant lower body weakness.  Social Work: Lives in  with girlfriend and girlfriend's mother with 3 YANETH and 10 STI with 1 HR. 1st floor living if needed.   OT: Min A for ADLs and min-mod for functional transfers.  PT: Min A for popover transfer. Sit to stand transfer with Max A and 2 pr A. Ambulated 5-8ft on parallel bars with max A and WCF.   SLP: --  --------------------------------------------------------  Outpatient Follow-up (Specialty/Name of physician):  Zack Schaefer (MD; PhD)  Neurosurgery  [address] [place] [address], [address] [place] [address]  Phone: [phone]  Fax: [phone]  --------------------------------------------------------

## 2023-02-08 NOTE — PROGRESS NOTE ADULT - NS PANP COMMENT GEN_ALL_CORE FT
Seen and examined  Findings as noted  Note revised    Sudden back pain with LE weakness preceeded by lifting of a heavy load    LE weakness mostly with knee ext, and DF b/l  LE neuropathy--tingling/numbness worse on RLE, on increasing dose gabapentin  and oxycodone PRN    Voiding reasonable urine volumes but bladder scan vol still slightly elevated,  Discussed patient's concern for suboptimal pain mgt, during hoddle today    Lumbar spine surgical scar unremarkable    Plan  Continue PT/OT  Orthotics referral for b/l foot drop  Pain management as discussed, prn opioids and increasing dose gabapentin

## 2023-02-09 LAB
ALBUMIN SERPL ELPH-MCNC: 3.8 G/DL — SIGNIFICANT CHANGE UP (ref 3.3–5)
ALP SERPL-CCNC: 66 U/L — SIGNIFICANT CHANGE UP (ref 40–120)
ALT FLD-CCNC: 54 U/L — HIGH (ref 10–45)
ANION GAP SERPL CALC-SCNC: 10 MMOL/L — SIGNIFICANT CHANGE UP (ref 5–17)
AST SERPL-CCNC: 30 U/L — SIGNIFICANT CHANGE UP (ref 10–40)
BILIRUB SERPL-MCNC: 0.4 MG/DL — SIGNIFICANT CHANGE UP (ref 0.2–1.2)
BUN SERPL-MCNC: 20 MG/DL — SIGNIFICANT CHANGE UP (ref 7–23)
CALCIUM SERPL-MCNC: 9.3 MG/DL — SIGNIFICANT CHANGE UP (ref 8.4–10.5)
CHLORIDE SERPL-SCNC: 103 MMOL/L — SIGNIFICANT CHANGE UP (ref 96–108)
CO2 SERPL-SCNC: 29 MMOL/L — SIGNIFICANT CHANGE UP (ref 22–31)
CREAT SERPL-MCNC: 0.67 MG/DL — SIGNIFICANT CHANGE UP (ref 0.5–1.3)
EGFR: 109 ML/MIN/1.73M2 — SIGNIFICANT CHANGE UP
GLUCOSE SERPL-MCNC: 109 MG/DL — HIGH (ref 70–99)
HCT VFR BLD CALC: 39.3 % — SIGNIFICANT CHANGE UP (ref 39–50)
HGB BLD-MCNC: 12.9 G/DL — LOW (ref 13–17)
MCHC RBC-ENTMCNC: 28.4 PG — SIGNIFICANT CHANGE UP (ref 27–34)
MCHC RBC-ENTMCNC: 32.8 GM/DL — SIGNIFICANT CHANGE UP (ref 32–36)
MCV RBC AUTO: 86.4 FL — SIGNIFICANT CHANGE UP (ref 80–100)
NRBC # BLD: 0 /100 WBCS — SIGNIFICANT CHANGE UP (ref 0–0)
PLATELET # BLD AUTO: 238 K/UL — SIGNIFICANT CHANGE UP (ref 150–400)
POTASSIUM SERPL-MCNC: 3.9 MMOL/L — SIGNIFICANT CHANGE UP (ref 3.5–5.3)
POTASSIUM SERPL-SCNC: 3.9 MMOL/L — SIGNIFICANT CHANGE UP (ref 3.5–5.3)
PROT SERPL-MCNC: 7.3 G/DL — SIGNIFICANT CHANGE UP (ref 6–8.3)
RBC # BLD: 4.55 M/UL — SIGNIFICANT CHANGE UP (ref 4.2–5.8)
RBC # FLD: 13.4 % — SIGNIFICANT CHANGE UP (ref 10.3–14.5)
SODIUM SERPL-SCNC: 142 MMOL/L — SIGNIFICANT CHANGE UP (ref 135–145)
WBC # BLD: 8.1 K/UL — SIGNIFICANT CHANGE UP (ref 3.8–10.5)
WBC # FLD AUTO: 8.1 K/UL — SIGNIFICANT CHANGE UP (ref 3.8–10.5)

## 2023-02-09 PROCEDURE — 90832 PSYTX W PT 30 MINUTES: CPT

## 2023-02-09 PROCEDURE — 99232 SBSQ HOSP IP/OBS MODERATE 35: CPT

## 2023-02-09 RX ORDER — LIDOCAINE 4 G/100G
1 CREAM TOPICAL EVERY 24 HOURS
Refills: 0 | Status: DISCONTINUED | OUTPATIENT
Start: 2023-02-09 | End: 2023-02-24

## 2023-02-09 RX ADMIN — LOSARTAN POTASSIUM 100 MILLIGRAM(S): 100 TABLET, FILM COATED ORAL at 05:47

## 2023-02-09 RX ADMIN — Medication 975 MILLIGRAM(S): at 18:23

## 2023-02-09 RX ADMIN — LIDOCAINE 1 PATCH: 4 CREAM TOPICAL at 06:00

## 2023-02-09 RX ADMIN — METHOCARBAMOL 1500 MILLIGRAM(S): 500 TABLET, FILM COATED ORAL at 05:48

## 2023-02-09 RX ADMIN — GABAPENTIN 200 MILLIGRAM(S): 400 CAPSULE ORAL at 21:10

## 2023-02-09 RX ADMIN — Medication 6 MILLIGRAM(S): at 21:10

## 2023-02-09 RX ADMIN — CARVEDILOL PHOSPHATE 12.5 MILLIGRAM(S): 80 CAPSULE, EXTENDED RELEASE ORAL at 18:23

## 2023-02-09 RX ADMIN — CARVEDILOL PHOSPHATE 12.5 MILLIGRAM(S): 80 CAPSULE, EXTENDED RELEASE ORAL at 05:46

## 2023-02-09 RX ADMIN — TAMSULOSIN HYDROCHLORIDE 0.4 MILLIGRAM(S): 0.4 CAPSULE ORAL at 21:11

## 2023-02-09 RX ADMIN — Medication 975 MILLIGRAM(S): at 13:11

## 2023-02-09 RX ADMIN — SIMVASTATIN 40 MILLIGRAM(S): 20 TABLET, FILM COATED ORAL at 21:11

## 2023-02-09 RX ADMIN — LIDOCAINE 1 PATCH: 4 CREAM TOPICAL at 18:23

## 2023-02-09 RX ADMIN — LIDOCAINE 1 PATCH: 4 CREAM TOPICAL at 21:04

## 2023-02-09 RX ADMIN — OXYCODONE HYDROCHLORIDE 10 MILLIGRAM(S): 5 TABLET ORAL at 02:10

## 2023-02-09 RX ADMIN — METHOCARBAMOL 1500 MILLIGRAM(S): 500 TABLET, FILM COATED ORAL at 13:15

## 2023-02-09 RX ADMIN — Medication 81 MILLIGRAM(S): at 12:12

## 2023-02-09 RX ADMIN — Medication 975 MILLIGRAM(S): at 05:47

## 2023-02-09 RX ADMIN — OXYCODONE HYDROCHLORIDE 10 MILLIGRAM(S): 5 TABLET ORAL at 03:10

## 2023-02-09 RX ADMIN — LIDOCAINE 1 PATCH: 4 CREAM TOPICAL at 18:47

## 2023-02-09 RX ADMIN — GABAPENTIN 200 MILLIGRAM(S): 400 CAPSULE ORAL at 05:46

## 2023-02-09 RX ADMIN — SENNA PLUS 2 TABLET(S): 8.6 TABLET ORAL at 21:10

## 2023-02-09 RX ADMIN — Medication 975 MILLIGRAM(S): at 12:13

## 2023-02-09 RX ADMIN — Medication 1 TABLET(S): at 12:13

## 2023-02-09 RX ADMIN — METHOCARBAMOL 1500 MILLIGRAM(S): 500 TABLET, FILM COATED ORAL at 21:11

## 2023-02-09 RX ADMIN — GABAPENTIN 200 MILLIGRAM(S): 400 CAPSULE ORAL at 13:14

## 2023-02-09 RX ADMIN — Medication 975 MILLIGRAM(S): at 06:47

## 2023-02-09 RX ADMIN — ENOXAPARIN SODIUM 40 MILLIGRAM(S): 100 INJECTION SUBCUTANEOUS at 05:48

## 2023-02-09 NOTE — PROGRESS NOTE ADULT - SUBJECTIVE AND OBJECTIVE BOX
Patient is a 57y old  Male who presents with a chief complaint of Paraparesis/Cauda Equina Syndrome (09 Feb 2023 08:05)      Patient seen and examined at bedside.  No overnight events  No complaints this morning    ALLERGIES:  No Known Allergies    MEDICATIONS  (STANDING):  acetaminophen     Tablet .. 975 milliGRAM(s) Oral every 6 hours  aspirin enteric coated 81 milliGRAM(s) Oral daily  carvedilol 12.5 milliGRAM(s) Oral every 12 hours  enoxaparin Injectable 40 milliGRAM(s) SubCutaneous <User Schedule>  gabapentin 200 milliGRAM(s) Oral three times a day  influenza   Vaccine 0.5 milliLiter(s) IntraMuscular once  lidocaine   4% Patch 1 Patch Transdermal daily  losartan 100 milliGRAM(s) Oral daily  melatonin 6 milliGRAM(s) Oral at bedtime  methocarbamol 1500 milliGRAM(s) Oral three times a day  multivitamin 1 Tablet(s) Oral daily  polyethylene glycol 3350 17 Gram(s) Oral two times a day  senna 2 Tablet(s) Oral at bedtime  simvastatin 40 milliGRAM(s) Oral at bedtime  tamsulosin 0.4 milliGRAM(s) Oral at bedtime    MEDICATIONS  (PRN):  lidocaine 2% Jelly 3 milliLiter(s) IntraUrethral every 6 hours PRN For straight catheterization  magnesium hydroxide Suspension 30 milliLiter(s) Oral every 12 hours PRN Constipation  oxyCODONE    IR 5 milliGRAM(s) Oral every 4 hours PRN Moderate Pain (4 - 6)  oxyCODONE    IR 10 milliGRAM(s) Oral every 4 hours PRN Severe Pain (7 - 10)    Vital Signs Last 24 Hrs  T(F): 98.1 (08 Feb 2023 20:55), Max: 98.1 (08 Feb 2023 20:55)  HR: 66 (09 Feb 2023 05:44) (66 - 80)  BP: 153/88 (09 Feb 2023 05:44) (122/76 - 153/88)  RR: 16 (08 Feb 2023 20:55) (16 - 16)  SpO2: 98% (08 Feb 2023 20:55) (98% - 98%)  I&O's Summary    PHYSICAL EXAM:  GENERAL: NAD  HENT:  Atraumatic, Normocephalic; No tonsillar erythema, exudates, or enlargement; Moist mucous membranes;   EYES: EOMI, PERRLA, conjunctiva and sclera clear, no lid-lag  NECK: Supple, No JVD, Normal thyroid  CHEST/LUNG: Clear to percussion bilaterally; No rales, rhonchi, wheezing, or rubs; normal respiratory effort, no intercostal retractions  HEART: Regular rate and rhythm; No murmurs, rubs, or gallops; No pitting edema  ABDOMEN: Soft, Nontender, Nondistended; Bowel sounds present; No HSM  MUSCULOSKELETAL/EXTREMITIES:  2+ Peripheral Pulses, No clubbing or digital cyanosis  PSYCH: Appropriate affect, Alert & Awake; Good judgement    LABS:      02-09    142  |  103  |  20  ----------------------------<  109  3.9   |  29  |  0.67    Ca    9.3      09 Feb 2023 06:30    TPro  7.3  /  Alb  3.8  /  TBili  0.4  /  DBili  x   /  AST  30  /  ALT  54  /  AlkPhos  66  02-09     COVID-19 PCR: NotDetec (01-31-23 @ 00:00)  COVID-19 PCR: NotDetec (01-30-23 @ 02:16)    Care Discussed with Rehab Attending and Other Providers

## 2023-02-09 NOTE — PROGRESS NOTE ADULT - SUBJECTIVE AND OBJECTIVE BOX
Patient seen alone at bedside for 30-minute, supportive psychotherapy session. He reports recent progress in movement in his right leg since yesterday. He demonstrates ability to move his lower right leg and foot. Patient indicates he is encouraged by continued gains and has no complaints. He has been using deep breathing technique to help with relaxation during moments of stress.

## 2023-02-09 NOTE — PROGRESS NOTE ADULT - ASSESSMENT
56 YO male with PMH of CAD, s/p CABG, HTN, HLD, ETOH abuse (no alcohol use in 2 years as per pt) who presented to [facility] on 1/24 with back pain and progressive lower extremity weakness. Examination with concern for cauda equina. He was admitted for cauda equina syndrome and he  underwent urgent L2-3 laminectomy/ discectomy on 1/25/23.   Patient now with gait Instability, ADL impairments and Functional impairments.    * Tolerating Gabapentin without drowsiness - consider increasing * Consider DC BS&PVRs * NPSY following for emotional support/coping strategies * Continue Melatonin * Orthotist eval for BL AFO * Continue to monitor functional rehab progress     #Cauda Equina Syndrome  - s/p L2-3 laminectomy/ discectomy on 1/25/23.  - Comprehensive Rehab Program: PT/OT, 3hours daily and 5 days weekly  - PT: Focused on improving strength, endurance, coordination, balance, functional mobility, and transfers  - OT: Focused on improving strength, fine motor skills, coordination, posture and ADLs.      #HTN  - Losartan 100mg daily  - Carvedilol 12.5mg BID    #CAD  -  s/p CABG  - Resume ASA 2/7    #HLD  - Zocor 40mg daily    #Pain management  - Tylenol PRN, lidocaine, Oxycodone PRN, Robaxin  - Gabapentin increased to 200mg TID 2/6  - Swiss cheese boots in bed 2/6    #DVT ppx  - Lovenox, SCD, TEDs    #Bowel Regimen  - Senna, miralax BID    #Bladder management  - Vergara Dcd 2/2- TOV  - Flomax  - Void OOB/sitting  - DC PVRs and BS 2/8    #FEN   - Diet: Regular  - Supplements: MVI    #Skin:  - Skin on admission:  lumbar incision looks well healed. open to air currently  - Pressure injury/Skin: Turn Q2hrs while in bed, OOB to Chair, PT/OT     #Sleep:   - Maintain quiet hours and low stim environment.  - Melatonin 6mg nightly 2/7    #Mood  - NPSY following for emotional support and coping strategies    #Precaution  - Fall, Aspiration, spinal    IDT conference on 2/2  TDD: 2/21 to home  Barriers: Significant lower body weakness.  Social Work: Lives in  with girlfriend and girlfriend's mother with 3 YANETH and 10 STI with 1 HR. 1st floor living if needed.   OT: Min A for ADLs and min-mod for functional transfers.  PT: Min A for popover transfer. Sit to stand transfer with Max A and 2 pr A. Ambulated 5-8ft on parallel bars with max A and WCF.   SLP: --  --------------------------------------------------------  Outpatient Follow-up (Specialty/Name of physician):  Zack Schaefer (MD; PhD)  Neurosurgery  [address] [place] [address], [address] [place] [address]  Phone: [phone]  Fax: [phone]  -------------------------------------------------------- 56 YO male with PMH of CAD, s/p CABG, HTN, HLD, ETOH abuse (no alcohol use in 2 years as per pt) who presented to [facility] on 1/24 with back pain and progressive lower extremity weakness. Examination with concern for cauda equina. He was admitted for cauda equina syndrome and he  underwent urgent L2-3 laminectomy/ discectomy on 1/25/23.   Patient now with gait Instability, ADL impairments and Functional impairments.    * Tolerating Gabapentin * NPSY following for emotional support/coping strategies * Orthotist eval for BL AFO * Continue to monitor rehab progress *    #Cauda Equina Syndrome  - s/p L2-3 laminectomy/ discectomy on 1/25/23.  - Comprehensive Rehab Program: PT/OT, 3hours daily and 5 days weekly  - PT: Focused on improving strength, endurance, coordination, balance, functional mobility, and transfers  - OT: Focused on improving strength, fine motor skills, coordination, posture and ADLs.      #HTN  - Losartan 100mg daily  - Carvedilol 12.5mg BID    #CAD  -  s/p CABG  - Resume ASA 2/7    #HLD  - Zocor 40mg daily    #Pain management  - Tylenol PRN, lidocaine, Oxycodone PRN, Robaxin  - Gabapentin increased to 200mg TID 2/6  - Swiss cheese boots in bed 2/6    #DVT ppx  - Lovenox, SCD, TEDs    #Bowel Regimen  - Senna, miralax BID    #Bladder management  - Vergara Dcd 2/2- TOV  - Flomax  - Void OOB/sitting  - DC PVRs and BS 2/8    #FEN   - Diet: Regular  - Supplements: MVI    #Skin:  - Skin on admission:  lumbar incision looks well healed. open to air currently  - Pressure injury/Skin: Turn Q2hrs while in bed, OOB to Chair, PT/OT     #Sleep:   - Maintain quiet hours and low stim environment.  - Melatonin 6mg nightly 2/7    #Mood  - NPSY following for emotional support and coping strategies    #Precaution  - Fall, Aspiration, spinal    IDT conference on 2/2  TDD: 2/21 to home  Barriers: Significant lower body weakness.  Social Work: Lives in  with girlfriend and girlfriend's mother with 3 YANETH and 10 STI with 1 HR. 1st floor living if needed.   OT: Min A for ADLs and min-mod for functional transfers.  PT: Min A for popover transfer. Sit to stand transfer with Max A and 2 pr A. Ambulated 5-8ft on parallel bars with max A and WCF.   SLP: --  --------------------------------------------------------  Outpatient Follow-up (Specialty/Name of physician):  Zack Schaefer (MD; PhD)  Neurosurgery  [address] [place] [address], [facility] [place] [address]  Phone: [phone]  Fax: [phone]  -------------------------------------------------------- 58 YO male with PMH of CAD, s/p CABG, HTN, HLD, ETOH abuse (no alcohol use in 2 years as per pt) who presented to HNT on 1/24 with back pain and progressive lower extremity weakness. Examination with concern for cauda equina. He was admitted for cauda equina syndrome and he  underwent urgent L2-3 laminectomy/ discectomy on 1/25/23.   Patient now with gait Instability, ADL impairments and Functional impairments.    * Tolerating Gabapentin * NPSY following for emotional support/coping strategies * Orthotist eval for BL AFO * Continue to monitor rehab progress *    #Cauda Equina Syndrome  - s/p L2-3 laminectomy/ discectomy on 1/25/23.  - Comprehensive Rehab Program: PT/OT, 3hours daily and 5 days weekly  - PT: Focused on improving strength, endurance, coordination, balance, functional mobility, and transfers  - OT: Focused on improving strength, fine motor skills, coordination, posture and ADLs.      #HTN  - Losartan 100mg daily  - Carvedilol 12.5mg BID    #CAD  -  s/p CABG  - Resume ASA 2/7    #HLD  - Zocor 40mg daily    #Pain management  - Tylenol PRN, lidocaine, Oxycodone PRN, Robaxin  - Gabapentin increased to 200mg TID 2/6  - Swiss cheese boots in bed 2/6    #DVT ppx  - Lovenox, SCD, TEDs    #Bowel Regimen  - Senna, miralax BID    #Bladder management  - Vergara Dcd 2/2- TOV  - Flomax  - Void OOB/sitting  - DC PVRs and BS 2/8    #FEN   - Diet: Regular  - Supplements: MVI    #Skin:  - Skin on admission:  lumbar incision looks well healed. open to air currently  - Pressure injury/Skin: Turn Q2hrs while in bed, OOB to Chair, PT/OT     #Sleep:   - Maintain quiet hours and low stim environment.  - Melatonin 6mg nightly 2/7    #Mood  - NPSY following for emotional support and coping strategies    #Precaution  - Fall, Aspiration, spinal    IDT conference on 2/9  TDD: 2/21 to home  Barriers: Lower body weakness.  Social Work: Lives in  with girlfriend and girlfriend's mother with 3 YANETH and 10 STI with 1 HR. 1st floor living if needed.   OT: CG for dressing. Min A for transfers and toileting.  PT: Min A for transfers. Ambulated 20ft (x2) with RW and mod A. WC propel 100ft independently.   SLP: --  --------------------------------------------------------  Outpatient Follow-up (Specialty/Name of physician):  Zack Schaefer (MD; PhD)  Neurosurgery  [address] [place] [address], 2nd [place] [address]  Phone: [phone]  Fax: [phone]  -------------------------------------------------------- 56 YO male with PMH of CAD, s/p CABG, HTN, HLD, ETOH abuse (no alcohol use in 2 years as per pt) who presented to HNT on 1/24 with back pain and progressive lower extremity weakness. Examination with concern for cauda equina. He was admitted for cauda equina syndrome and he  underwent urgent L2-3 laminectomy/ discectomy on 1/25/23.   Patient now with gait Instability, ADL impairments and Functional impairments.     * NPSY following for emotional support/coping strategies * Orthotist eval for BL AFO * Functional progress noted    #Cauda Equina Syndrome  - s/p L2-3 laminectomy/ discectomy on 1/25/23.  - Comprehensive Rehab Program: PT/OT, 3hours daily and 5 days weekly    #HTN  - Losartan 100mg daily  - Carvedilol 12.5mg BID    #CAD  -  s/p CABG  - Resume ASA 2/7    #HLD  - Zocor 40mg daily    #Pain management  - Tylenol PRN, lidocaine, Oxycodone PRN, Robaxin  - Gabapentin increased to 200mg TID 2/6  - Swiss cheese boots in bed 2/6    #DVT ppx  - Lovenox,     #Bowel Regimen  - Senna, miralax BID    #Bladder management  - Vergara Dcd 2/2- TOV  - Flomax  - Void OOB/sitting  - DC PVRs and BS 2/8    #FEN   - Diet: Regular  - Supplements: MVI    #Skin:  - Skin on admission:  lumbar incision looks well healed. open to air currently  - Pressure injury/Skin: Turn Q2hrs while in bed, OOB to Chair, PT/OT     #Sleep:   - Maintain quiet hours and low stim environment.  - Melatonin 6mg nightly 2/7    #Mood  - NPSY following for emotional support and coping strategies    #Precaution  - Fall, Aspiration, spinal    IDT conference on 2/9  TDD: 2/21 to home  Barriers: Lower body weakness.  Social Work: Lives in  with girlfriend and girlfriend's mother with 3 YANETH and 10 STI with 1 HR. 1st floor living if needed.   OT: CG for dressing. Min A for transfers and toileting.  PT: Min A for transfers. Ambulated 20ft (x2) with RW and mod A. WC propel 100ft independently.   SLP: --  --------------------------------------------------------  Outpatient Follow-up (Specialty/Name of physician):  Zack Schaefer (MD; PhD)  Neurosurgery  [address] [place] [address], 2nd [place] [address]  Phone: [phone]  Fax: [phone]  --------------------------------------------------------

## 2023-02-09 NOTE — PROGRESS NOTE ADULT - SUBJECTIVE AND OBJECTIVE BOX
CC: Cauda Equina    Today's Subjective & Objective Findings:************  Patient seen and examined at bedside this AM with Dr. Herring.  States that he feels well this morning and feels like his LEs are getting stronger.   Poor sleep last night due to pain. Frustrated about RN overnight and pain management.  Tearful during encounter, doesn't want staff to feel like he is a "druggie."   Encouraged that this is not the case.   Last BM on 2/7, per patient.   Last SC evening of 2/2.  Continue to monitor BS/PVRs/SCs.  Experiencing some numbness and tingling in RLE overnight and this AM, despite Gabapentin regimen.   No other complaints at this time.    Denies headache, dizziness, visual changes, chest pain, SOB/CONRAD, abdominal pain, nausea, vomiting, diarrhea, & dysuria.     Vital Signs Last 24 Hrs  T(C): 36.4 (08 Feb 2023 07:32), Max: 36.6 (07 Feb 2023 20:30)  T(F): 97.6 (08 Feb 2023 07:32), Max: 97.9 (07 Feb 2023 20:30)  HR: 65 (08 Feb 2023 07:32) (63 - 71)  BP: 177/104 (08 Feb 2023 07:32) (127/82 - 177/104)  RR: 16 (08 Feb 2023 07:32) (16 - 16)  SpO2: 99% (08 Feb 2023 07:32) (98% - 99%)      PHYSICAL EXAM:  Constitutional - NAD, Comfortable  HEENT - NCAT, EOMI  Neck - Supple, No limited ROM  Chest - good chest expansion, good respiratory effort, CTAB   Cardio - warm and well perfused   Abdomen -  Soft, NTND  Vergara out  Extremities - No peripheral edema, No calf tenderness     Neurologic Exam:                 Oriented x 3     Cranial Nerves - No facial asymmetry, Tongue midline, Shoulder shrug intact     Motor -                      LEFT    UE - ShAB 5/5, EF 5/5, EE 5/5, WE 5/5,  WNL                    RIGHT UE - ShAB 5/5, EF 5/5, EE 5/5, WE 5/5,  WNL                    LEFT    LE - HF 2/5, KE 3+/5, DF 1/5, PF 4/5 Strong hip extensors4+/5                    RIGHT LE - HF 2/5, KE 2/5, DF 0/5, PF 4/5        Sensory - decrease sensation below the knee. R>L     OculoVestibular -  No nystagmus  Psychiatric - Mood stable, Affect WNL  Skin lumbar incision intact/ healed      LAB                        12.9   6.48  )-----------( 236      ( 06 Feb 2023 06:40 )             38.8     02-06    140  |  103  |  19  ----------------------------<  108<H>  4.0   |  29  |  0.64    Ca    9.0      06 Feb 2023 06:40    TPro  7.2  /  Alb  3.7  /  TBili  0.3  /  DBili  x   /  AST  27  /  ALT  49<H>  /  AlkPhos  59  02-06    LIVER FUNCTIONS - ( 06 Feb 2023 06:40 )  Alb: 3.7 g/dL / Pro: 7.2 g/dL / ALK PHOS: 59 U/L / ALT: 49 U/L / AST: 27 U/L / GGT: x             MEDICATIONS  (STANDING):  acetaminophen     Tablet .. 975 milliGRAM(s) Oral every 6 hours  aspirin enteric coated 81 milliGRAM(s) Oral daily  carvedilol 12.5 milliGRAM(s) Oral every 12 hours  enoxaparin Injectable 40 milliGRAM(s) SubCutaneous <User Schedule>  gabapentin 100 milliGRAM(s) Oral two times a day  influenza   Vaccine 0.5 milliLiter(s) IntraMuscular once  lidocaine   4% Patch 1 Patch Transdermal daily  losartan 100 milliGRAM(s) Oral daily  methocarbamol 1500 milliGRAM(s) Oral three times a day  multivitamin 1 Tablet(s) Oral daily  polyethylene glycol 3350 17 Gram(s) Oral two times a day  senna 2 Tablet(s) Oral at bedtime  simvastatin 40 milliGRAM(s) Oral at bedtime  tamsulosin 0.4 milliGRAM(s) Oral at bedtime    MEDICATIONS  (PRN):  lidocaine 2% Jelly 3 milliLiter(s) IntraUrethral every 6 hours PRN For straight catheterization  magnesium hydroxide Suspension 30 milliLiter(s) Oral every 12 hours PRN Constipation  melatonin 3 milliGRAM(s) Oral at bedtime PRN Insomnia  oxyCODONE    IR 5 milliGRAM(s) Oral every 4 hours PRN Moderate Pain (4 - 6)  oxyCODONE    IR 10 milliGRAM(s) Oral every 4 hours PRN Severe Pain (7 - 10) CC: Cauda Equina    Today's Subjective & Objective Findings:  Patient seen and examined in therapy this AM with Dr. Herring.  Slept better than the night before.  RLE discomfort overnight- resolved with pain medication.   No pain at this time.  Last BM on 2/8, per patient.   Last SC evening of 2/2.  Tolerating gabapentin.   No other complaints at this time.    Denies headache, dizziness, visual changes, chest pain, SOB/CONRAD, abdominal pain, nausea, vomiting, diarrhea, & dysuria.     Vital Signs Last 24 Hrs  T(C): 36.2 (09 Feb 2023 09:43), Max: 36.7 (08 Feb 2023 20:55)  T(F): 97.2 (09 Feb 2023 09:43), Max: 98.1 (08 Feb 2023 20:55)  HR: 71 (09 Feb 2023 09:43) (66 - 80)  BP: 115/82 (09 Feb 2023 09:43) (115/82 - 153/88)  BP(mean): --  RR: 16 (09 Feb 2023 09:43) (16 - 16)  SpO2: 97% (09 Feb 2023 09:43) (97% - 98%)      PHYSICAL EXAM:  Constitutional - NAD, Comfortable  HEENT - NCAT, EOMI  Neck - Supple, No limited ROM  Chest - good chest expansion, good respiratory effort, CTAB   Cardio - warm and well perfused   Abdomen -  Soft, NTND  Vergara out  Extremities - No peripheral edema, No calf tenderness     Neurologic Exam:                 Oriented x 3     Cranial Nerves - No facial asymmetry, Tongue midline, Shoulder shrug intact     Motor -                      LEFT    UE - ShAB 5/5, EF 5/5, EE 5/5, WE 5/5,  WNL                    RIGHT UE - ShAB 5/5, EF 5/5, EE 5/5, WE 5/5,  WNL                    LEFT    LE - HF 2/5, KE 3+/5, DF 1/5, PF 4/5 Strong hip extensors4+/5                    RIGHT LE - HF 2/5, KE 2/5, DF 0/5, PF 4/5        Sensory - decrease sensation below the knee. R>L     OculoVestibular -  No nystagmus  Psychiatric - Mood stable, Affect WNL  Skin lumbar incision intact/ healed      LAB                        12.9   8.10  )-----------( 238      ( 09 Feb 2023 06:30 )             39.3     02-09    142  |  103  |  20  ----------------------------<  109<H>  3.9   |  29  |  0.67    Ca    9.3      09 Feb 2023 06:30    TPro  7.3  /  Alb  3.8  /  TBili  0.4  /  DBili  x   /  AST  30  /  ALT  54<H>  /  AlkPhos  66  02-09    LIVER FUNCTIONS - ( 09 Feb 2023 06:30 )  Alb: 3.8 g/dL / Pro: 7.3 g/dL / ALK PHOS: 66 U/L / ALT: 54 U/L / AST: 30 U/L / GGT: x             MEDICATIONS  (STANDING):  acetaminophen     Tablet .. 975 milliGRAM(s) Oral every 6 hours  aspirin enteric coated 81 milliGRAM(s) Oral daily  carvedilol 12.5 milliGRAM(s) Oral every 12 hours  enoxaparin Injectable 40 milliGRAM(s) SubCutaneous <User Schedule>  gabapentin 100 milliGRAM(s) Oral two times a day  influenza   Vaccine 0.5 milliLiter(s) IntraMuscular once  lidocaine   4% Patch 1 Patch Transdermal daily  losartan 100 milliGRAM(s) Oral daily  methocarbamol 1500 milliGRAM(s) Oral three times a day  multivitamin 1 Tablet(s) Oral daily  polyethylene glycol 3350 17 Gram(s) Oral two times a day  senna 2 Tablet(s) Oral at bedtime  simvastatin 40 milliGRAM(s) Oral at bedtime  tamsulosin 0.4 milliGRAM(s) Oral at bedtime    MEDICATIONS  (PRN):  lidocaine 2% Jelly 3 milliLiter(s) IntraUrethral every 6 hours PRN For straight catheterization  magnesium hydroxide Suspension 30 milliLiter(s) Oral every 12 hours PRN Constipation  melatonin 3 milliGRAM(s) Oral at bedtime PRN Insomnia  oxyCODONE    IR 5 milliGRAM(s) Oral every 4 hours PRN Moderate Pain (4 - 6)  oxyCODONE    IR 10 milliGRAM(s) Oral every 4 hours PRN Severe Pain (7 - 10) CC: Cauda Equina    Today's Subjective & Objective Findings  Patient seen and examined in therapy this AM with Dr. Herring.  Slept better than the night before.  RLE discomfort overnight- resolved with pain medication.   No pain at this time.  Last BM on 2/8, per patient.   Last SC evening of 2/2.  Tolerating gabapentin.   No other complaints at this time.    Therapy--observed in PT ambulating minimal distance  Details as noted in IDT today    Denies headache, dizziness, visual changes, chest pain, SOB/CONRAD, abdominal pain, nausea, vomiting, diarrhea, & dysuria.   labs unremarkable    Vital Signs Last 24 Hrs  T(C): 36.2 (09 Feb 2023 09:43), Max: 36.7 (08 Feb 2023 20:55)  T(F): 97.2 (09 Feb 2023 09:43), Max: 98.1 (08 Feb 2023 20:55)  HR: 71 (09 Feb 2023 09:43) (66 - 80)  BP: 115/82 (09 Feb 2023 09:43) (115/82 - 153/88)  RR: 16 (09 Feb 2023 09:43) (16 - 16)  SpO2: 97% (09 Feb 2023 09:43) (97% - 98%)    PHYSICAL EXAM:  Constitutional - NAD, Comfortable  HEENT - NCAT, EOMI  Neck - Supple, No limited ROM  Chest - good chest expansion, good respiratory effort, CTAB   Cardio - warm and well perfused   Abdomen -  Soft, NTND  Vergara out  Extremities - No peripheral edema, No calf tenderness     Neurologic Exam:                 Oriented x 3  Cranial Nerves - No facial asymmetry, Tongue midline, Shoulder shrug intact  Motor -                      LEFT    UE - ShAB 5/5, EF 5/5, EE 5/5, WE 5/5,  WNL                    RIGHT UE - ShAB 5/5, EF 5/5, EE 5/5, WE 5/5,  WNL                    LEFT    LE - HF 2/5, KE 3+/5, DF 1/5, PF 4/5 Strong hip extensors4+/5                    RIGHT LE - HF 2/5, KE 2/5, DF 0/5, PF 4/5     Sensory - decrease sensation below the knee. R>L  OculoVestibular -  No nystagmus  Psychiatric - Mood stable, Affect WNL  Skin lumbar incision intact/ healed                     12.9   8.10  )-----------( 238      ( 09 Feb 2023 06:30 )             39.3     02-09    142  |  103  |  20  ----------------------------<  109<H>  3.9   |  29  |  0.67    Ca    9.3      09 Feb 2023 06:30    TPro  7.3  /  Alb  3.8  /  TBili  0.4  /  DBili  x   /  AST  30  /  ALT  54<H>  /  AlkPhos  66  02-09    LIVER FUNCTIONS - ( 09 Feb 2023 06:30 )  Alb: 3.8 g/dL / Pro: 7.3 g/dL / ALK PHOS: 66 U/L / ALT: 54 U/L / AST: 30 U/L / GGT: x             MEDICATIONS  (STANDING):  acetaminophen     Tablet .. 975 milliGRAM(s) Oral every 6 hours  aspirin enteric coated 81 milliGRAM(s) Oral daily  carvedilol 12.5 milliGRAM(s) Oral every 12 hours  enoxaparin Injectable 40 milliGRAM(s) SubCutaneous <User Schedule>  gabapentin 100 milliGRAM(s) Oral two times a day  influenza   Vaccine 0.5 milliLiter(s) IntraMuscular once  lidocaine   4% Patch 1 Patch Transdermal daily  losartan 100 milliGRAM(s) Oral daily  methocarbamol 1500 milliGRAM(s) Oral three times a day  multivitamin 1 Tablet(s) Oral daily  polyethylene glycol 3350 17 Gram(s) Oral two times a day  senna 2 Tablet(s) Oral at bedtime  simvastatin 40 milliGRAM(s) Oral at bedtime  tamsulosin 0.4 milliGRAM(s) Oral at bedtime    MEDICATIONS  (PRN):  lidocaine 2% Jelly 3 milliLiter(s) IntraUrethral every 6 hours PRN For straight catheterization  magnesium hydroxide Suspension 30 milliLiter(s) Oral every 12 hours PRN Constipation  melatonin 3 milliGRAM(s) Oral at bedtime PRN Insomnia  oxyCODONE    IR 5 milliGRAM(s) Oral every 4 hours PRN Moderate Pain (4 - 6)  oxyCODONE    IR 10 milliGRAM(s) Oral every 4 hours PRN Severe Pain (7 - 10)

## 2023-02-09 NOTE — PROGRESS NOTE ADULT - ASSESSMENT
57M with CAD s/p CABG, HTN, HLD, hx alcohol abuse, recently hospitalized for cauda equina syndrome requiring L2-L3 laminectomy/discectomy after injury while weight lifting, now in acute rehab    #Cauda Equina Syndrome  #Ambulatory dysfunction due to above  -PT/OT as per PMR team  -Pain control as needed    #CAD  -Continue ASA, statin, beta blocker    #Essential HTN  -c/w Losartan, Coreg    #Urinary retention  -Likely in part neurogenic from cauda equina syndrome, constipation and decreased mobility  -Vergara removed and pt voiding   -Continue tamsulosin    #DVT ppx: Lovenox

## 2023-02-09 NOTE — PROGRESS NOTE ADULT - NS ATTEND AMEND GEN_ALL_CORE FT
Seen and examined  Observed in PT    Making functional progress  Pain conrolled  Voiding appropriately  Bladder scan PVR low volumes    Labs unremarkable    Continue PT/OT  Await neuropsych review for psychological issues/adjustment difficulties   Orthotist review for b/l foot drop

## 2023-02-09 NOTE — PROGRESS NOTE ADULT - ASSESSMENT
Mood is euthymic, with congruent affect. Reviewed resiliency and continued used of adaptive coping strategies during times of stress. Discussed gradual reintegration in effort to reinforce psychoeducation regarding importance of managing expectations. Patient is motivated to engage in therapy and encouraged by progress. Plan: Continue supportive psychotherapy sessions to address mood and adjustment. Neuropsychology remains available.

## 2023-02-10 PROCEDURE — 99232 SBSQ HOSP IP/OBS MODERATE 35: CPT

## 2023-02-10 RX ORDER — GABAPENTIN 400 MG/1
300 CAPSULE ORAL THREE TIMES A DAY
Refills: 0 | Status: DISCONTINUED | OUTPATIENT
Start: 2023-02-10 | End: 2023-02-24

## 2023-02-10 RX ADMIN — OXYCODONE HYDROCHLORIDE 10 MILLIGRAM(S): 5 TABLET ORAL at 08:55

## 2023-02-10 RX ADMIN — LIDOCAINE 1 PATCH: 4 CREAM TOPICAL at 18:35

## 2023-02-10 RX ADMIN — OXYCODONE HYDROCHLORIDE 5 MILLIGRAM(S): 5 TABLET ORAL at 03:40

## 2023-02-10 RX ADMIN — Medication 6 MILLIGRAM(S): at 21:26

## 2023-02-10 RX ADMIN — METHOCARBAMOL 1500 MILLIGRAM(S): 500 TABLET, FILM COATED ORAL at 05:07

## 2023-02-10 RX ADMIN — LIDOCAINE 1 PATCH: 4 CREAM TOPICAL at 06:30

## 2023-02-10 RX ADMIN — OXYCODONE HYDROCHLORIDE 10 MILLIGRAM(S): 5 TABLET ORAL at 09:50

## 2023-02-10 RX ADMIN — SIMVASTATIN 40 MILLIGRAM(S): 20 TABLET, FILM COATED ORAL at 21:28

## 2023-02-10 RX ADMIN — Medication 975 MILLIGRAM(S): at 19:30

## 2023-02-10 RX ADMIN — GABAPENTIN 300 MILLIGRAM(S): 400 CAPSULE ORAL at 15:01

## 2023-02-10 RX ADMIN — GABAPENTIN 200 MILLIGRAM(S): 400 CAPSULE ORAL at 05:06

## 2023-02-10 RX ADMIN — Medication 975 MILLIGRAM(S): at 05:07

## 2023-02-10 RX ADMIN — Medication 975 MILLIGRAM(S): at 12:50

## 2023-02-10 RX ADMIN — OXYCODONE HYDROCHLORIDE 10 MILLIGRAM(S): 5 TABLET ORAL at 23:32

## 2023-02-10 RX ADMIN — POLYETHYLENE GLYCOL 3350 17 GRAM(S): 17 POWDER, FOR SOLUTION ORAL at 05:07

## 2023-02-10 RX ADMIN — SENNA PLUS 2 TABLET(S): 8.6 TABLET ORAL at 21:27

## 2023-02-10 RX ADMIN — METHOCARBAMOL 1500 MILLIGRAM(S): 500 TABLET, FILM COATED ORAL at 21:27

## 2023-02-10 RX ADMIN — LIDOCAINE 1 PATCH: 4 CREAM TOPICAL at 11:55

## 2023-02-10 RX ADMIN — Medication 1 TABLET(S): at 11:55

## 2023-02-10 RX ADMIN — TAMSULOSIN HYDROCHLORIDE 0.4 MILLIGRAM(S): 0.4 CAPSULE ORAL at 21:27

## 2023-02-10 RX ADMIN — Medication 975 MILLIGRAM(S): at 06:07

## 2023-02-10 RX ADMIN — METHOCARBAMOL 1500 MILLIGRAM(S): 500 TABLET, FILM COATED ORAL at 15:02

## 2023-02-10 RX ADMIN — CARVEDILOL PHOSPHATE 12.5 MILLIGRAM(S): 80 CAPSULE, EXTENDED RELEASE ORAL at 05:06

## 2023-02-10 RX ADMIN — LOSARTAN POTASSIUM 100 MILLIGRAM(S): 100 TABLET, FILM COATED ORAL at 05:07

## 2023-02-10 RX ADMIN — Medication 975 MILLIGRAM(S): at 11:55

## 2023-02-10 RX ADMIN — CARVEDILOL PHOSPHATE 12.5 MILLIGRAM(S): 80 CAPSULE, EXTENDED RELEASE ORAL at 18:35

## 2023-02-10 RX ADMIN — Medication 81 MILLIGRAM(S): at 11:56

## 2023-02-10 RX ADMIN — OXYCODONE HYDROCHLORIDE 5 MILLIGRAM(S): 5 TABLET ORAL at 04:40

## 2023-02-10 RX ADMIN — GABAPENTIN 300 MILLIGRAM(S): 400 CAPSULE ORAL at 21:27

## 2023-02-10 RX ADMIN — ENOXAPARIN SODIUM 40 MILLIGRAM(S): 100 INJECTION SUBCUTANEOUS at 05:07

## 2023-02-10 RX ADMIN — Medication 975 MILLIGRAM(S): at 18:34

## 2023-02-10 NOTE — PROGRESS NOTE ADULT - SUBJECTIVE AND OBJECTIVE BOX
CC: Cauda Equina    Today's Subjective & Objective Findings  Patient seen and examined   No new med complaint   Happy with improved strength Rt knee ext  Still has numbness both feet    No drowziness\  Had orthotic eval yesterday, orthotist recs AFO Rt foot    Therapy--observed in PT ambulating minimal distance      Denies headache, dizziness, visual changes, chest pain, SOB/CONRAD, abdominal pain, nausea, vomiting, diarrhea, & dysuria.   labs unremarkable  LBM 2/10    I Vital Signs Last 24 Hrs  T(C): 36.5 (10 Feb 2023 08:52), Max: 36.5 (10 Feb 2023 08:52)  T(F): 97.7 (10 Feb 2023 08:52), Max: 97.7 (10 Feb 2023 08:52)  HR: 78 (10 Feb 2023 08:52) (69 - 78)  BP: 128/82 (10 Feb 2023 08:52) (121/86 - 140/89)  RR: 16 (10 Feb 2023 08:52) (16 - 16)  SpO2: 96% (10 Feb 2023 08:52) (96% - 99%)  O2 Parameters below as of 10 Feb 2023 08:52  Patient On (Oxygen Delivery Method): room air      PHYSICAL EXAM:  Constitutional -Comfortable  HEENT - NCAT, EOMI  Neck - Supple, No limited ROM  Chest - good chest expansion, good respiratory effort, CTAB   Cardio - warm and well perfused   Abdomen -  Soft, NTND  Vergara out  Extremities - No peripheral edema, No calf tenderness     Neurologic Exam:                 Oriented x 3  Cranial Nerves - No facial asymmetry, Tongue midline, Shoulder shrug intact  Motor -                      LEFT    UE - ShAB 5/5, EF 5/5, EE 5/5, WE 5/5,  WNL                    RIGHT UE - ShAB 5/5, EF 5/5, EE 5/5, WE 5/5,  WNL                    LEFT    LE - HF 2/5, KE 3+/5, DF 1/5, PF 4/5 Strong hip extensors4+/5                    RIGHT LE - HF 2/5, KE 2+/5, DF 0/5, PF 4/5     Sensory - decrease sensation below the knee. R>L  OculoVestibular -  No nystagmus  Psychiatric - Mood stable, Affect WNL  Skin lumbar incision intact/ healed                     12.9   8.10  )-----------( 238      ( 09 Feb 2023 06:30 )             39.3     02-09    142  |  103  |  20  ----------------------------<  109<H>  3.9   |  29  |  0.67    Ca    9.3      09 Feb 2023 06:30    TPro  7.3  /  Alb  3.8  /  TBili  0.4  /  DBili  x   /  AST  30  /  ALT  54<H>  /  AlkPhos  66  02-09    LIVER FUNCTIONS - ( 09 Feb 2023 06:30 )  Alb: 3.8 g/dL / Pro: 7.3 g/dL / ALK PHOS: 66 U/L / ALT: 54 U/L / AST: 30 U/L / GGT: x           MEDICATIONS  (STANDING):  acetaminophen     Tablet .. 975 milliGRAM(s) Oral every 6 hours  aspirin enteric coated 81 milliGRAM(s) Oral daily  carvedilol 12.5 milliGRAM(s) Oral every 12 hours  enoxaparin Injectable 40 milliGRAM(s) SubCutaneous <User Schedule>  gabapentin 200 milliGRAM(s) Oral three times a day  lidocaine   4% Patch 1 Patch Transdermal daily  lidocaine   4% Patch 1 Patch Transdermal every 24 hours  losartan 100 milliGRAM(s) Oral daily  melatonin 6 milliGRAM(s) Oral at bedtime  methocarbamol 1500 milliGRAM(s) Oral three times a day  multivitamin 1 Tablet(s) Oral daily  polyethylene glycol 3350 17 Gram(s) Oral two times a day  senna 2 Tablet(s) Oral at bedtime  simvastatin 40 milliGRAM(s) Oral at bedtime  tamsulosin 0.4 milliGRAM(s) Oral at bedtime    MEDICATIONS  (PRN):  lidocaine 2% Jelly 3 milliLiter(s) IntraUrethral every 6 hours PRN For straight catheterization  magnesium hydroxide Suspension 30 milliLiter(s) Oral every 12 hours PRN Constipation  oxyCODONE    IR 5 milliGRAM(s) Oral every 4 hours PRN Moderate Pain (4 - 6)  oxyCODONE    IR 10 milliGRAM(s) Oral every 4 hours PRN Severe Pain (7 - 10)

## 2023-02-10 NOTE — PROGRESS NOTE ADULT - ASSESSMENT
56 YO male with PMH of CAD, s/p CABG, HTN, HLD, ETOH abuse (no alcohol use in 2 years as per pt) who presented to [facility] on 1/24 with back pain and progressive lower extremity weakness. Examination with concern for cauda equina. He was admitted for cauda equina syndrome and he  underwent urgent L2-3 laminectomy/ discectomy on 1/25/23.   Patient now with gait Instability, ADL impairments and Functional impairments.     * Gabapentin dose increased to 300mg TID    #Cauda Equina Syndrome  - s/p L2-3 laminectomy/ discectomy on 1/25/23.  - Comprehensive Rehab Program: PT/OT, 3hours daily and 5 days weekly  -- Gabapentin dose increased to 300mg TID    Foot drop--Orthotic eval 2/9--await Rt AFO    #HTN  - Losartan 100mg daily  - Carvedilol 12.5mg BID    #CAD  -  s/p CABG  - Resume ASA 2/7    #HLD  - Zocor 40mg daily    #Pain management  - Tylenol PRN, lidocaine, Oxycodone PRN, Robaxin  - Gabapentin increased to 200mg TID 2/6  - Swiss cheese boots in bed 2/6    #DVT ppx  - Lovenox,     #Bowel Regimen  - Senna, miralax BID    #Bladder management  - Vergara Dcd 2/2- TOV  - Flomax  - Void OOB/sitting  - DC PVRs and BS 2/8    #FEN   - Diet: Regular  - Supplements: MVI    #Skin:  - Skin on admission:  lumbar incision looks well healed. open to air currently  - Pressure injury/Skin: Turn Q2hrs while in bed, OOB to Chair, PT/OT     #Sleep:   - Maintain quiet hours and low stim environment.  - Melatonin 6mg nightly 2/7    #Mood  - NPSY following for emotional support and coping strategies    #Precaution  - Fall, Aspiration, spinal    IDT conference on 2/9  TDD: 2/21 to home  Barriers: Lower body weakness.  Social Work: Lives in  with girlfriend and girlfriend's mother with 3 YANETH and 10 STI with 1 HR. 1st floor living if needed.   OT: CG for dressing. Min A for transfers and toileting.  PT: Min A for transfers. Ambulated 20ft (x2) with RW and mod A. WC propel 100ft independently.   SLP: --  --------------------------------------------------------  Outpatient Follow-up (Specialty/Name of physician):  Zack Schaefer (MD; PhD)  Neurosurgery  [address] [place] [address], [address]nd [place] [address]  Phone: [phone]  Fax: [phone]  --------------------------------------------------------

## 2023-02-11 PROCEDURE — 99232 SBSQ HOSP IP/OBS MODERATE 35: CPT

## 2023-02-11 RX ADMIN — METHOCARBAMOL 1500 MILLIGRAM(S): 500 TABLET, FILM COATED ORAL at 05:25

## 2023-02-11 RX ADMIN — GABAPENTIN 300 MILLIGRAM(S): 400 CAPSULE ORAL at 22:00

## 2023-02-11 RX ADMIN — CARVEDILOL PHOSPHATE 12.5 MILLIGRAM(S): 80 CAPSULE, EXTENDED RELEASE ORAL at 17:24

## 2023-02-11 RX ADMIN — POLYETHYLENE GLYCOL 3350 17 GRAM(S): 17 POWDER, FOR SOLUTION ORAL at 05:27

## 2023-02-11 RX ADMIN — Medication 975 MILLIGRAM(S): at 12:28

## 2023-02-11 RX ADMIN — Medication 81 MILLIGRAM(S): at 11:32

## 2023-02-11 RX ADMIN — SIMVASTATIN 40 MILLIGRAM(S): 20 TABLET, FILM COATED ORAL at 22:00

## 2023-02-11 RX ADMIN — Medication 975 MILLIGRAM(S): at 11:33

## 2023-02-11 RX ADMIN — Medication 975 MILLIGRAM(S): at 23:29

## 2023-02-11 RX ADMIN — Medication 6 MILLIGRAM(S): at 22:00

## 2023-02-11 RX ADMIN — Medication 975 MILLIGRAM(S): at 05:25

## 2023-02-11 RX ADMIN — METHOCARBAMOL 1500 MILLIGRAM(S): 500 TABLET, FILM COATED ORAL at 13:23

## 2023-02-11 RX ADMIN — SENNA PLUS 2 TABLET(S): 8.6 TABLET ORAL at 22:00

## 2023-02-11 RX ADMIN — OXYCODONE HYDROCHLORIDE 10 MILLIGRAM(S): 5 TABLET ORAL at 05:28

## 2023-02-11 RX ADMIN — Medication 975 MILLIGRAM(S): at 17:24

## 2023-02-11 RX ADMIN — Medication 975 MILLIGRAM(S): at 17:25

## 2023-02-11 RX ADMIN — Medication 1 TABLET(S): at 11:32

## 2023-02-11 RX ADMIN — GABAPENTIN 300 MILLIGRAM(S): 400 CAPSULE ORAL at 05:26

## 2023-02-11 RX ADMIN — METHOCARBAMOL 1500 MILLIGRAM(S): 500 TABLET, FILM COATED ORAL at 22:00

## 2023-02-11 RX ADMIN — TAMSULOSIN HYDROCHLORIDE 0.4 MILLIGRAM(S): 0.4 CAPSULE ORAL at 22:00

## 2023-02-11 RX ADMIN — GABAPENTIN 300 MILLIGRAM(S): 400 CAPSULE ORAL at 13:23

## 2023-02-11 RX ADMIN — ENOXAPARIN SODIUM 40 MILLIGRAM(S): 100 INJECTION SUBCUTANEOUS at 05:26

## 2023-02-11 RX ADMIN — POLYETHYLENE GLYCOL 3350 17 GRAM(S): 17 POWDER, FOR SOLUTION ORAL at 17:24

## 2023-02-11 RX ADMIN — CARVEDILOL PHOSPHATE 12.5 MILLIGRAM(S): 80 CAPSULE, EXTENDED RELEASE ORAL at 05:25

## 2023-02-11 RX ADMIN — LOSARTAN POTASSIUM 100 MILLIGRAM(S): 100 TABLET, FILM COATED ORAL at 05:25

## 2023-02-11 NOTE — PROGRESS NOTE ADULT - SUBJECTIVE AND OBJECTIVE BOX
Patient is a 57y old  Male who presents with a chief complaint of Paraparesis/Cauda Equina Syndrome (11 Feb 2023 08:53)      Patient seen and examined at bedside.  Denies chest pain, dyspnea, abd pain.    ALLERGIES:  No Known Allergies    MEDICATIONS  (STANDING):  acetaminophen     Tablet .. 975 milliGRAM(s) Oral every 6 hours  aspirin enteric coated 81 milliGRAM(s) Oral daily  carvedilol 12.5 milliGRAM(s) Oral every 12 hours  enoxaparin Injectable 40 milliGRAM(s) SubCutaneous <User Schedule>  gabapentin 300 milliGRAM(s) Oral three times a day  lidocaine   4% Patch 1 Patch Transdermal daily  lidocaine   4% Patch 1 Patch Transdermal every 24 hours  losartan 100 milliGRAM(s) Oral daily  melatonin 6 milliGRAM(s) Oral at bedtime  methocarbamol 1500 milliGRAM(s) Oral three times a day  multivitamin 1 Tablet(s) Oral daily  polyethylene glycol 3350 17 Gram(s) Oral two times a day  senna 2 Tablet(s) Oral at bedtime  simvastatin 40 milliGRAM(s) Oral at bedtime  tamsulosin 0.4 milliGRAM(s) Oral at bedtime    MEDICATIONS  (PRN):  lidocaine 2% Jelly 3 milliLiter(s) IntraUrethral every 6 hours PRN For straight catheterization  magnesium hydroxide Suspension 30 milliLiter(s) Oral every 12 hours PRN Constipation  oxyCODONE    IR 5 milliGRAM(s) Oral every 4 hours PRN Moderate Pain (4 - 6)  oxyCODONE    IR 10 milliGRAM(s) Oral every 4 hours PRN Severe Pain (7 - 10)    Vital Signs Last 24 Hrs  T(F): 97.7 (11 Feb 2023 08:53), Max: 98 (10 Feb 2023 18:32)  HR: 67 (11 Feb 2023 08:53) (67 - 80)  BP: 129/81 (11 Feb 2023 08:53) (119/77 - 136/83)  RR: 16 (11 Feb 2023 08:53) (16 - 17)  SpO2: 97% (11 Feb 2023 08:53) (97% - 98%)  I&O's Summary      PHYSICAL EXAM:  General: NAD, A/O x 3  ENT: MMM  Neck: Supple, No JVD  Lungs: Clear to auscultation bilaterally  Cardio: RRR, S1/S2, No murmurs  Abdomen: Soft, Nontender, Nondistended; Bowel sounds present  Extremities: No calf tenderness, No pitting edema    LABS:                        12.9   8.10  )-----------( 238      ( 09 Feb 2023 06:30 )             39.3       02-09    142  |  103  |  20  ----------------------------<  109  3.9   |  29  |  0.67    Ca    9.3      09 Feb 2023 06:30    TPro  7.3  /  Alb  3.8  /  TBili  0.4  /  DBili  x   /  AST  30  /  ALT  54  /  AlkPhos  66  02-09                                      COVID-19 PCR: NotDetec (01-31-23 @ 00:00)  COVID-19 PCR: NotDetec (01-30-23 @ 02:16)      RADIOLOGY & ADDITIONAL TESTS:    Care Discussed with Consultants/Other Providers:

## 2023-02-11 NOTE — PROGRESS NOTE ADULT - SUBJECTIVE AND OBJECTIVE BOX
Cc: Paraparesis    HPI: Patient with no new medical issues today.  Pain controlled, no chest pain, no N/V, no Fevers/Chills. No other new ROS  Has been tolerating rehabilitation program.    acetaminophen     Tablet .. 975 milliGRAM(s) Oral every 6 hours  aspirin enteric coated 81 milliGRAM(s) Oral daily  carvedilol 12.5 milliGRAM(s) Oral every 12 hours  enoxaparin Injectable 40 milliGRAM(s) SubCutaneous <User Schedule>  gabapentin 300 milliGRAM(s) Oral three times a day  lidocaine   4% Patch 1 Patch Transdermal daily  lidocaine   4% Patch 1 Patch Transdermal every 24 hours  lidocaine 2% Jelly 3 milliLiter(s) IntraUrethral every 6 hours PRN  losartan 100 milliGRAM(s) Oral daily  magnesium hydroxide Suspension 30 milliLiter(s) Oral every 12 hours PRN  melatonin 6 milliGRAM(s) Oral at bedtime  methocarbamol 1500 milliGRAM(s) Oral three times a day  multivitamin 1 Tablet(s) Oral daily  oxyCODONE    IR 5 milliGRAM(s) Oral every 4 hours PRN  oxyCODONE    IR 10 milliGRAM(s) Oral every 4 hours PRN  polyethylene glycol 3350 17 Gram(s) Oral two times a day  senna 2 Tablet(s) Oral at bedtime  simvastatin 40 milliGRAM(s) Oral at bedtime  tamsulosin 0.4 milliGRAM(s) Oral at bedtime    T(C): 36.7 (02-10-23 @ 20:53), Max: 36.7 (02-10-23 @ 18:32)  HR: 72 (02-11-23 @ 05:23) (69 - 80)  BP: 127/80 (02-11-23 @ 05:23) (119/77 - 136/83)  RR: 17 (02-10-23 @ 20:53) (16 - 17)  SpO2: 98% (02-10-23 @ 20:53) (97% - 98%)    In NAD  HEENT- EOMI  Heart- Well Perfused  Lungs- No resp distress, no use of accessory resp muscles  Abd- + BS, NT  Ext- No calf pain  Neuro- Exam unchanged  Psych- Affect wnl    Imp: Patient with diagnosis of paraparesis admitted for comprehensive acute rehabilitation.    Plan:  - Continue therapies  - DVT prophylaxis- Lovenox  - Skin- Turn q2h, check skin daily  - Continue current medications; patient medically stable.   - Patient is stable to continue current rehabilitation program.

## 2023-02-12 PROCEDURE — 99232 SBSQ HOSP IP/OBS MODERATE 35: CPT

## 2023-02-12 RX ADMIN — Medication 1 TABLET(S): at 11:57

## 2023-02-12 RX ADMIN — METHOCARBAMOL 1500 MILLIGRAM(S): 500 TABLET, FILM COATED ORAL at 05:35

## 2023-02-12 RX ADMIN — Medication 975 MILLIGRAM(S): at 11:57

## 2023-02-12 RX ADMIN — LOSARTAN POTASSIUM 100 MILLIGRAM(S): 100 TABLET, FILM COATED ORAL at 05:34

## 2023-02-12 RX ADMIN — GABAPENTIN 300 MILLIGRAM(S): 400 CAPSULE ORAL at 13:33

## 2023-02-12 RX ADMIN — POLYETHYLENE GLYCOL 3350 17 GRAM(S): 17 POWDER, FOR SOLUTION ORAL at 05:36

## 2023-02-12 RX ADMIN — GABAPENTIN 300 MILLIGRAM(S): 400 CAPSULE ORAL at 05:36

## 2023-02-12 RX ADMIN — Medication 1 ENEMA: at 08:45

## 2023-02-12 RX ADMIN — Medication 6 MILLIGRAM(S): at 21:45

## 2023-02-12 RX ADMIN — ENOXAPARIN SODIUM 40 MILLIGRAM(S): 100 INJECTION SUBCUTANEOUS at 05:35

## 2023-02-12 RX ADMIN — OXYCODONE HYDROCHLORIDE 10 MILLIGRAM(S): 5 TABLET ORAL at 06:35

## 2023-02-12 RX ADMIN — Medication 975 MILLIGRAM(S): at 23:17

## 2023-02-12 RX ADMIN — METHOCARBAMOL 1500 MILLIGRAM(S): 500 TABLET, FILM COATED ORAL at 13:33

## 2023-02-12 RX ADMIN — CARVEDILOL PHOSPHATE 12.5 MILLIGRAM(S): 80 CAPSULE, EXTENDED RELEASE ORAL at 17:05

## 2023-02-12 RX ADMIN — Medication 975 MILLIGRAM(S): at 17:05

## 2023-02-12 RX ADMIN — METHOCARBAMOL 1500 MILLIGRAM(S): 500 TABLET, FILM COATED ORAL at 21:45

## 2023-02-12 RX ADMIN — OXYCODONE HYDROCHLORIDE 10 MILLIGRAM(S): 5 TABLET ORAL at 05:35

## 2023-02-12 RX ADMIN — OXYCODONE HYDROCHLORIDE 5 MILLIGRAM(S): 5 TABLET ORAL at 20:24

## 2023-02-12 RX ADMIN — TAMSULOSIN HYDROCHLORIDE 0.4 MILLIGRAM(S): 0.4 CAPSULE ORAL at 21:46

## 2023-02-12 RX ADMIN — Medication 81 MILLIGRAM(S): at 11:57

## 2023-02-12 RX ADMIN — GABAPENTIN 300 MILLIGRAM(S): 400 CAPSULE ORAL at 21:45

## 2023-02-12 RX ADMIN — SIMVASTATIN 40 MILLIGRAM(S): 20 TABLET, FILM COATED ORAL at 21:45

## 2023-02-12 RX ADMIN — CARVEDILOL PHOSPHATE 12.5 MILLIGRAM(S): 80 CAPSULE, EXTENDED RELEASE ORAL at 05:35

## 2023-02-12 RX ADMIN — Medication 975 MILLIGRAM(S): at 12:57

## 2023-02-12 RX ADMIN — Medication 975 MILLIGRAM(S): at 00:29

## 2023-02-12 RX ADMIN — Medication 975 MILLIGRAM(S): at 18:05

## 2023-02-12 RX ADMIN — OXYCODONE HYDROCHLORIDE 5 MILLIGRAM(S): 5 TABLET ORAL at 21:24

## 2023-02-12 NOTE — PROGRESS NOTE ADULT - SUBJECTIVE AND OBJECTIVE BOX
Patient is a 57y old  Male who presents with a chief complaint of Paraparesis/Cauda Equina Syndrome (11 Feb 2023 12:14)      Patient seen and examined at bedside.  Denies chest pain, dyspnea, abd pain.    ALLERGIES:  No Known Allergies    MEDICATIONS  (STANDING):  acetaminophen     Tablet .. 975 milliGRAM(s) Oral every 6 hours  aspirin enteric coated 81 milliGRAM(s) Oral daily  carvedilol 12.5 milliGRAM(s) Oral every 12 hours  enoxaparin Injectable 40 milliGRAM(s) SubCutaneous <User Schedule>  gabapentin 300 milliGRAM(s) Oral three times a day  lidocaine   4% Patch 1 Patch Transdermal daily  lidocaine   4% Patch 1 Patch Transdermal every 24 hours  losartan 100 milliGRAM(s) Oral daily  melatonin 6 milliGRAM(s) Oral at bedtime  methocarbamol 1500 milliGRAM(s) Oral three times a day  multivitamin 1 Tablet(s) Oral daily  polyethylene glycol 3350 17 Gram(s) Oral two times a day  senna 2 Tablet(s) Oral at bedtime  simvastatin 40 milliGRAM(s) Oral at bedtime  tamsulosin 0.4 milliGRAM(s) Oral at bedtime    MEDICATIONS  (PRN):  lidocaine 2% Jelly 3 milliLiter(s) IntraUrethral every 6 hours PRN For straight catheterization  magnesium hydroxide Suspension 30 milliLiter(s) Oral every 12 hours PRN Constipation  oxyCODONE    IR 5 milliGRAM(s) Oral every 4 hours PRN Moderate Pain (4 - 6)  oxyCODONE    IR 10 milliGRAM(s) Oral every 4 hours PRN Severe Pain (7 - 10)    Vital Signs Last 24 Hrs  T(F): 97.6 (12 Feb 2023 07:53), Max: 97.8 (11 Feb 2023 21:57)  HR: 66 (12 Feb 2023 07:53) (63 - 78)  BP: 135/87 (12 Feb 2023 07:53) (117/85 - 147/88)  RR: 16 (12 Feb 2023 07:53) (16 - 16)  SpO2: 97% (12 Feb 2023 07:53) (97% - 97%)  I&O's Summary    11 Feb 2023 07:01  -  12 Feb 2023 07:00  --------------------------------------------------------  IN: 0 mL / OUT: 550 mL / NET: -550 mL        PHYSICAL EXAM:  General: NAD, A/O x 3  ENT: MMM  Neck: Supple, No JVD  Lungs: Clear to auscultation bilaterally  Cardio: RRR, S1/S2, No murmurs  Abdomen: Soft, Nontender, Nondistended; Bowel sounds present  Extremities: No calf tenderness, No pitting edema    LABS:                                              COVID-19 PCR: NotDetec (01-31-23 @ 00:00)  COVID-19 PCR: NotDetec (01-30-23 @ 02:16)      RADIOLOGY & ADDITIONAL TESTS:    Care Discussed with Consultants/Other Providers:

## 2023-02-13 LAB
ALBUMIN SERPL ELPH-MCNC: 3.6 G/DL — SIGNIFICANT CHANGE UP (ref 3.3–5)
ALP SERPL-CCNC: 68 U/L — SIGNIFICANT CHANGE UP (ref 40–120)
ALT FLD-CCNC: 58 U/L — HIGH (ref 10–45)
ANION GAP SERPL CALC-SCNC: 10 MMOL/L — SIGNIFICANT CHANGE UP (ref 5–17)
AST SERPL-CCNC: 31 U/L — SIGNIFICANT CHANGE UP (ref 10–40)
BILIRUB SERPL-MCNC: 0.4 MG/DL — SIGNIFICANT CHANGE UP (ref 0.2–1.2)
BUN SERPL-MCNC: 18 MG/DL — SIGNIFICANT CHANGE UP (ref 7–23)
CALCIUM SERPL-MCNC: 9 MG/DL — SIGNIFICANT CHANGE UP (ref 8.4–10.5)
CHLORIDE SERPL-SCNC: 102 MMOL/L — SIGNIFICANT CHANGE UP (ref 96–108)
CO2 SERPL-SCNC: 28 MMOL/L — SIGNIFICANT CHANGE UP (ref 22–31)
CREAT SERPL-MCNC: 0.58 MG/DL — SIGNIFICANT CHANGE UP (ref 0.5–1.3)
EGFR: 114 ML/MIN/1.73M2 — SIGNIFICANT CHANGE UP
GLUCOSE SERPL-MCNC: 111 MG/DL — HIGH (ref 70–99)
HCT VFR BLD CALC: 39.5 % — SIGNIFICANT CHANGE UP (ref 39–50)
HGB BLD-MCNC: 12.9 G/DL — LOW (ref 13–17)
MCHC RBC-ENTMCNC: 28.2 PG — SIGNIFICANT CHANGE UP (ref 27–34)
MCHC RBC-ENTMCNC: 32.7 GM/DL — SIGNIFICANT CHANGE UP (ref 32–36)
MCV RBC AUTO: 86.4 FL — SIGNIFICANT CHANGE UP (ref 80–100)
NRBC # BLD: 0 /100 WBCS — SIGNIFICANT CHANGE UP (ref 0–0)
PLATELET # BLD AUTO: 240 K/UL — SIGNIFICANT CHANGE UP (ref 150–400)
POTASSIUM SERPL-MCNC: 4 MMOL/L — SIGNIFICANT CHANGE UP (ref 3.5–5.3)
POTASSIUM SERPL-SCNC: 4 MMOL/L — SIGNIFICANT CHANGE UP (ref 3.5–5.3)
PROT SERPL-MCNC: 7.3 G/DL — SIGNIFICANT CHANGE UP (ref 6–8.3)
RBC # BLD: 4.57 M/UL — SIGNIFICANT CHANGE UP (ref 4.2–5.8)
RBC # FLD: 13.5 % — SIGNIFICANT CHANGE UP (ref 10.3–14.5)
SODIUM SERPL-SCNC: 140 MMOL/L — SIGNIFICANT CHANGE UP (ref 135–145)
WBC # BLD: 6.35 K/UL — SIGNIFICANT CHANGE UP (ref 3.8–10.5)
WBC # FLD AUTO: 6.35 K/UL — SIGNIFICANT CHANGE UP (ref 3.8–10.5)

## 2023-02-13 PROCEDURE — 99233 SBSQ HOSP IP/OBS HIGH 50: CPT

## 2023-02-13 RX ORDER — OXYCODONE HYDROCHLORIDE 5 MG/1
10 TABLET ORAL EVERY 4 HOURS
Refills: 0 | Status: DISCONTINUED | OUTPATIENT
Start: 2023-02-15 | End: 2023-02-22

## 2023-02-13 RX ORDER — SIMETHICONE 80 MG/1
80 TABLET, CHEWABLE ORAL THREE TIMES A DAY
Refills: 0 | Status: COMPLETED | OUTPATIENT
Start: 2023-02-13 | End: 2023-02-16

## 2023-02-13 RX ORDER — OXYCODONE HYDROCHLORIDE 5 MG/1
5 TABLET ORAL EVERY 4 HOURS
Refills: 0 | Status: DISCONTINUED | OUTPATIENT
Start: 2023-02-15 | End: 2023-02-22

## 2023-02-13 RX ORDER — MINERAL OIL
133 OIL (ML) MISCELLANEOUS DAILY
Refills: 0 | Status: DISCONTINUED | OUTPATIENT
Start: 2023-02-13 | End: 2023-02-24

## 2023-02-13 RX ADMIN — Medication 81 MILLIGRAM(S): at 11:26

## 2023-02-13 RX ADMIN — Medication 975 MILLIGRAM(S): at 17:34

## 2023-02-13 RX ADMIN — Medication 975 MILLIGRAM(S): at 00:17

## 2023-02-13 RX ADMIN — CARVEDILOL PHOSPHATE 12.5 MILLIGRAM(S): 80 CAPSULE, EXTENDED RELEASE ORAL at 17:34

## 2023-02-13 RX ADMIN — ENOXAPARIN SODIUM 40 MILLIGRAM(S): 100 INJECTION SUBCUTANEOUS at 05:12

## 2023-02-13 RX ADMIN — Medication 975 MILLIGRAM(S): at 11:28

## 2023-02-13 RX ADMIN — SIMETHICONE 80 MILLIGRAM(S): 80 TABLET, CHEWABLE ORAL at 13:23

## 2023-02-13 RX ADMIN — SIMETHICONE 80 MILLIGRAM(S): 80 TABLET, CHEWABLE ORAL at 21:30

## 2023-02-13 RX ADMIN — SIMVASTATIN 40 MILLIGRAM(S): 20 TABLET, FILM COATED ORAL at 21:30

## 2023-02-13 RX ADMIN — OXYCODONE HYDROCHLORIDE 10 MILLIGRAM(S): 5 TABLET ORAL at 06:11

## 2023-02-13 RX ADMIN — Medication 1 TABLET(S): at 11:26

## 2023-02-13 RX ADMIN — GABAPENTIN 300 MILLIGRAM(S): 400 CAPSULE ORAL at 21:30

## 2023-02-13 RX ADMIN — OXYCODONE HYDROCHLORIDE 10 MILLIGRAM(S): 5 TABLET ORAL at 14:24

## 2023-02-13 RX ADMIN — LOSARTAN POTASSIUM 100 MILLIGRAM(S): 100 TABLET, FILM COATED ORAL at 05:11

## 2023-02-13 RX ADMIN — GABAPENTIN 300 MILLIGRAM(S): 400 CAPSULE ORAL at 05:11

## 2023-02-13 RX ADMIN — CARVEDILOL PHOSPHATE 12.5 MILLIGRAM(S): 80 CAPSULE, EXTENDED RELEASE ORAL at 05:11

## 2023-02-13 RX ADMIN — METHOCARBAMOL 1500 MILLIGRAM(S): 500 TABLET, FILM COATED ORAL at 05:11

## 2023-02-13 RX ADMIN — TAMSULOSIN HYDROCHLORIDE 0.4 MILLIGRAM(S): 0.4 CAPSULE ORAL at 21:30

## 2023-02-13 RX ADMIN — OXYCODONE HYDROCHLORIDE 10 MILLIGRAM(S): 5 TABLET ORAL at 05:11

## 2023-02-13 RX ADMIN — Medication 6 MILLIGRAM(S): at 21:30

## 2023-02-13 RX ADMIN — Medication 975 MILLIGRAM(S): at 17:35

## 2023-02-13 RX ADMIN — OXYCODONE HYDROCHLORIDE 10 MILLIGRAM(S): 5 TABLET ORAL at 13:24

## 2023-02-13 RX ADMIN — GABAPENTIN 300 MILLIGRAM(S): 400 CAPSULE ORAL at 13:23

## 2023-02-13 RX ADMIN — METHOCARBAMOL 1500 MILLIGRAM(S): 500 TABLET, FILM COATED ORAL at 13:24

## 2023-02-13 RX ADMIN — METHOCARBAMOL 1500 MILLIGRAM(S): 500 TABLET, FILM COATED ORAL at 21:30

## 2023-02-13 RX ADMIN — Medication 975 MILLIGRAM(S): at 11:26

## 2023-02-13 NOTE — PROGRESS NOTE ADULT - NS ATTEND AMEND GEN_ALL_CORE FT
Seen and examined  Findings as noted    Happy with reduced intensity of neuropathic pain with gabapentin    Tolerating diet  Awaiting Rt foot AFO    Engaging well in therapy--LE knee ext 2+/5  Left leg motor function > Rt side    LE Weakness gradually improving  Happy with fact that he will be getting home PT post DC    Continue PT/OT  DC plan for home,

## 2023-02-13 NOTE — CHART NOTE - NSCHARTNOTEFT_GEN_A_CORE
Nutrition Follow Up Note  Hospital Course   (Per Electronic Medical Record)    Source:  Patient [X]  Medical Record [X]      Diet: Diet, DASH/TLC:   Sodium & Cholesterol Restricted  Supplement Feeding Modality:  Oral  Ensure Plus High Protein Cans or Servings Per Day:  1       Frequency:  Two Times a day (02-01-23 @ 09:54) [Active]    At this time patient w/ adequate appetite/intake consuming % of meals. Reviewed preferences and menu alternatives; likes Ensure Plus High Protein Daily 8 oz (Provides 350 kcal, 20 grams of protein) BID, dislikes hot cereals at breakfast, noted in cardex. No issues w/ chewing and swallowing. Denies N/V/C/D, last BM   2/12 Per nursing flowsheets.     Enteral/Parenteral Nutrition: Not Applicable    Current Weight: 165.7lb on 2/11  Weight history: 162.4lb on 2/4      Pertinent Medications: MEDICATIONS  (STANDING):  acetaminophen     Tablet .. 975 milliGRAM(s) Oral every 6 hours  aspirin enteric coated 81 milliGRAM(s) Oral daily  carvedilol 12.5 milliGRAM(s) Oral every 12 hours  enoxaparin Injectable 40 milliGRAM(s) SubCutaneous <User Schedule>  gabapentin 300 milliGRAM(s) Oral three times a day  lidocaine   4% Patch 1 Patch Transdermal daily  lidocaine   4% Patch 1 Patch Transdermal every 24 hours  losartan 100 milliGRAM(s) Oral daily  melatonin 6 milliGRAM(s) Oral at bedtime  methocarbamol 1500 milliGRAM(s) Oral three times a day  multivitamin 1 Tablet(s) Oral daily  polyethylene glycol 3350 17 Gram(s) Oral two times a day  senna 2 Tablet(s) Oral at bedtime  simethicone 80 milliGRAM(s) Chew three times a day  simvastatin 40 milliGRAM(s) Oral at bedtime  tamsulosin 0.4 milliGRAM(s) Oral at bedtime    MEDICATIONS  (PRN):  lidocaine 2% Jelly 3 milliLiter(s) IntraUrethral every 6 hours PRN For straight catheterization  magnesium hydroxide Suspension 30 milliLiter(s) Oral every 12 hours PRN Constipation  mineral oil enema 133 milliLiter(s) Rectal daily PRN constipation  oxyCODONE    IR 5 milliGRAM(s) Oral every 4 hours PRN Moderate Pain (4 - 6)  oxyCODONE    IR 10 milliGRAM(s) Oral every 4 hours PRN Severe Pain (7 - 10)      Pertinent Labs:  02-13 Na140 mmol/L Glu 111 mg/dL<H> K+ 4.0 mmol/L Cr  0.58 mg/dL BUN 18 mg/dL 02-13 Alb 3.6 g/dL      Skin: Surgical Incision, Site mid lower back    Edema: No edema noted per nursing flowsheet    Last Bowel Movement: on 2/12 Per nursing flowsheets     Estimated Needs:   [X] No Change Since Previous Assessment    Previous Nutrition Diagnosis:   Inadeqate Energy Intake   related to poor appetite s/p CABG   patient report of poor intake x 1 week     [X] Resolved - addressed w/ PO intake % at meals  + Ensure Plus High Protein Daily 8 oz (Provides 350 kcal, 20 grams of protein) BID    New Nutrition Diagnosis: [X] Not Applicable    Interventions:   1. Recommend Continue Nutrition Plan of Care.    Monitoring & Evaluation:   [X] Weights   [X] PO Intake   [X] Skin Integrity   [X] Follow Up (Per Protocol)  [X] Tolerance to Diet Prescription   [X] Other: Labs    Registered Dietitian/Nutritionist Remains Available.  Alexandra Diaz RD, MS, CDN    Phone# [phone]

## 2023-02-13 NOTE — PROGRESS NOTE ADULT - ASSESSMENT
56 YO male with PMH of CAD, s/p CABG, HTN, HLD, ETOH abuse (no alcohol use in 2 years as per pt) who presented to [facility] on 1/24 with back pain and progressive lower extremity weakness. Examination with concern for cauda equina. He was admitted for cauda equina syndrome and he  underwent urgent L2-3 laminectomy/ discectomy on 1/25/23.   Patient now with gait Instability, ADL impairments and Functional impairments.    #Cauda Equina Syndrome  - s/p L2-3 laminectomy/ discectomy on 1/25/23.  - Comprehensive Rehab Program: PT/OT, 3hours daily and 5 days weekly  -- Gabapentin 300mg TID  # Foot drop  --Orthotic eval 2/9--measured for Rt AFO    #HTN  - Losartan 100mg daily  - Carvedilol 12.5mg BID    #CAD  -  s/p CABG  - Resume ASA 2/7    #HLD  - Zocor 40mg daily    #Pain management  - Tylenol PRN, lidocaine, Oxycodone PRN, Robaxin  - Gabapentin increased to 200mg TID 2/6  - Swiss cheese boots in bed 2/6    #DVT ppx  - Lovenox,     #Bowel Regimen  - Senna, miralax BID  - fleet enema PRN    #Bladder management  - Vergara Dcd 2/2- TOV  - Flomax  - Void OOB/sitting  - DC PVRs and BS 2/8    #Diet:  - Regular  - Supplements: MVI    #Skin:  - Skin on admission:  lumbar incision looks well healed. open to air currently  - Pressure injury/Skin: Turn Q2hrs while in bed, OOB to Chair, PT/OT     #Sleep:   - Maintain quiet hours and low stim environment.  - Melatonin 6mg nightly 2/7    #Mood  - NPSY following for emotional support and coping strategies    #Precaution  - Fall, Aspiration, spinal    IDT  2/9  TDD: 2/21 to home  Barriers: Lower body weakness.  Social Work: Lives in  with girlfriend and girlfriend's mother with 3 YANETH and 10 STI with 1 HR. 1st floor living if needed.   OT: CG for dressing. Min A for transfers and toileting.  PT: Min A for transfers. Ambulated 20ft (x2) with RW and mod A. WC propel 100ft independently.   SLP: --  --------------------------------------------------------  Outpatient Follow-up (Specialty/Name of physician):  Zack Schaefer (MD; PhD)  Neurosurgery  [address] [place] [address], 2nd [place] [address]  Phone: [phone]  Fax: [phone]  -------------------------------------------------------- 58 YO male with PMH of CAD, s/p CABG, HTN, HLD, ETOH abuse (no alcohol use in 2 years as per pt) who presented to [facility] on 1/24 with back pain and progressive lower extremity weakness. Examination with concern for cauda equina. He was admitted for cauda equina syndrome and he  underwent urgent L2-3 laminectomy/ discectomy on 1/25/23.   Patient now with gait Instability, ADL impairments and Functional impairments.    * Monitor Neuropathic pain both feet, c/w gabapentin dose increased 2/10  * DC planning for home    #Cauda Equina Syndrome  - s/p L2-3 laminectomy/ discectomy on 1/25/23.  - Comprehensive Rehab Program: PT/OT, 3hours daily and 5 days weekly  -- Gabapentin 300mg TID  # Foot drop  --Orthotic eval 2/9--measured for Rt AFO    #HTN  - Losartan 100mg daily  - Carvedilol 12.5mg BID    #CAD  -  s/p CABG  - Resume ASA 2/7    #HLD  - Zocor 40mg daily    #Pain management  - Tylenol PRN, lidocaine, Oxycodone PRN, Robaxin  - Gabapentin increased to 200mg TID 2/6  - Swiss cheese boots in bed 2/6    #DVT ppx  - Lovenox,     #Bowel Regimen  - Senna, miralax BID  - fleet enema PRN    #Bladder management  - Vergara Dcd 2/2- TOV  - Flomax  - Void OOB/sitting  - DC PVRs and BS 2/8    #Diet:  - Regular  - Supplements: MVI    #Skin:  - Skin  lumbar incision looks well healed. open to air currently  - Pressure injury/Skin: Turn Q2hrs while in bed, OOB to Chair, PT/OT    #Sleep:   - Maintain quiet hours and low stim environment.  - Melatonin 6mg nightly 2/7    #Mood  - NPSY following for emotional support and coping strategies    #Precaution  - Fall, Aspiration, spinal    IDT  2/9  TDD: 2/21 to home  Barriers: Lower body weakness.  Social Work: Lives in  with girlfriend and girlfriend's mother with 3 YANETH and 10 STI with 1 HR. 1st floor living if needed.   OT: CG for dressing. Min A for transfers and toileting.  PT: Min A for transfers. Ambulated 20ft (x2) with RW and mod A. WC propel 100ft independently.   SLP: --N/A  --------------------------------------------------------  Outpatient Follow-up (Specialty/Name of physician):  Zack Schaefer (MD; PhD)  [address], [address]nd [place] [address]  Phone: [phone]  Fax: [phone]  -------------------------------------------------------- 56 YO male with PMH of CAD, s/p CABG, HTN, HLD, ETOH abuse (no alcohol use in 2 years as per pt) who presented to [facility] on 1/24 with back pain and progressive lower extremity weakness. Examination with concern for cauda equina. He was admitted for cauda equina syndrome and he  underwent urgent L2-3 laminectomy/ discectomy on 1/25/23.   Patient now with gait Instability, ADL impairments and Functional impairments.    * Monitor Neuropathic pain both feet, c/w gabapentin dose increased 2/10  * DC planning for home    #Cauda Equina Syndrome  - s/p L2-3 laminectomy/ discectomy on 1/25/23.  - Comprehensive Rehab Program: PT/OT, 3hours daily and 5 days weekly  -- Gabapentin 300mg TID    # Foot drop  Orthotic eval 2/9--measured for Rt AFO    NEED FOR RIGHT FOOT ORTHOTIC DEVICE  Dx: Cauda Equina syndrome    Patient presents with Rt foot drop with inversion and lower extremity weakness due to Cauda Equina syndrome    He requires a right custom AFO for support and stability during ambulation and while participating in physical therapy  Manual Muscle Testing showed Right hip 3/5 Knee flexion 3/5 Knee extension 2/5 Ankle Dorsiflexion 1/5, Plantar flexion 3/5,   Left hip flexion 4/5, Knee extension 4+/5, 4+/5 knee flexion, 4-/5 ankle dorsiflexion, 4+/5, Plantar flexion 4+/5  He is unable to use pre-fabricated brace as use of the orthotist will be for longer than six months and because the foot must be controlled in more than one plane  He remains at risk for fall without a custom AFO    #HTN  - Losartan 100mg daily  - Carvedilol 12.5mg BID    #CAD  -  s/p CABG  - Resume ASA 2/7    #HLD  - Zocor 40mg daily    #Pain management  - Tylenol PRN, lidocaine, Oxycodone PRN, Robaxin  - Gabapentin increased to 200mg TID 2/6  - Swiss cheese boots in bed 2/6    #DVT ppx  - Lovenox,     #Bowel Regimen  - Senna, miralax BID  - fleet enema PRN    #Bladder management  - Vergara Dcd 2/2- TOV  - Flomax  - Void OOB/sitting  - DC PVRs and BS 2/8    #Diet:  - Regular  - Supplements: MVI    #Skin:  - Skin  lumbar incision looks well healed. open to air currently  - Pressure injury/Skin: Turn Q2hrs while in bed, OOB to Chair, PT/OT    #Sleep:   - Maintain quiet hours and low stim environment.  - Melatonin 6mg nightly 2/7    #Mood  - NPSY following for emotional support and coping strategies    #Precaution  - Fall, Aspiration, spinal    IDT  2/9  TDD: 2/21 to home  Barriers: Lower body weakness.  Social Work: Lives in  with girlfriend and girlfriend's mother with 3 YANETH and 10 STI with 1 HR. 1st floor living if needed.   OT: CG for dressing. Min A for transfers and toileting.  PT: Min A for transfers. Ambulated 20ft (x2) with RW and mod A. WC propel 100ft independently.   SLP: --N/A  --------------------------------------------------------  Outpatient Follow-up (Specialty/Name of physician):  Zack Schaefer (MD; PhD)  Neurosurgery  [address] [place] [address], 2nd [place] [address]  Phone: [phone]  Fax: [phone]  --------------------------------------------------------          Dx: Cauda Equina syndrome    Patient presents with Rt foot drop with inversion and lower extremity weakness due to   Cauda Equina syndrome    He requires a right custom AFO for support and stability during ambulation and while participating in physical therapy  Manual Muscle Testing showed Right hip 3/5 Knee flexion 3/5 Knee extension 2/5 Ankle Dorsiflexion 1/5, Plantar flexion 3/5,   Left hip flexion 4/5, Knee extension 4+/5, 4+/5 knee flexion, 4-/5 ankle dorsiflexion, 4+/5, Plantar flexion 4+/5  He is unable to use pre-fabricated brace as use of the orthotist will be for longer than six months and because the foot must be controlled in more than one plane  He remains at risk for fall without a custom AFO

## 2023-02-13 NOTE — PROGRESS NOTE ADULT - SUBJECTIVE AND OBJECTIVE BOX
CC: Cauda Equina    Today's Subjective & Objective Findings/ROS:  Patient seen and evaluated while sitting in WC returning from therapy.  Wanted to discussed extension of rehab stay -> made aware that he's going to also have home care upon discharge, he feels more comfortable to original plan for 2/21  Tolerating therapy - R>L LE weakness.  Was measured for RLE AFO  Denies HA, dizziness, visual changes, CP, SOB, cough, nausea, abd pain, or urinary symptoms.  LBM yesterday (2/12) with assistance of fleet enema  reports having increased flatulence requesting for medication    Vital Signs Last 24 Hrs  T(C): 36.4 (02-13-23 @ 07:20), Max: 36.8 (02-12-23 @ 20:27)  T(F): 97.5 (02-13-23 @ 07:20), Max: 98.2 (02-12-23 @ 20:27)  HR: 72 (02-13-23 @ 07:20) (69 - 77)  BP: 144/79 (02-13-23 @ 07:20) (126/85 - 144/79)  RR: 16 (02-13-23 @ 07:20) (16 - 16)  SpO2: 97% (02-13-23 @ 07:20) (97% - 99%)      PHYSICAL EXAM:  Constitutional -Comfortable  HEENT - NCAT, EOMI  Neck - Supple, No limited ROM  Pulm - resp nonlabored  Cardio - warm and well perfused   Abdomen -  Soft, NTND  Extremities - No peripheral edema, No calf tenderness     Neurologic Exam:                 Cognition - AOx4  Motor -                      LEFT    UE - ShAB 5/5, EF 5/5, EE 5/5, WE 5/5,  WNL                    RIGHT UE - ShAB 5/5, EF 5/5, EE 5/5, WE 5/5,  WNL                    LEFT    LE - HF 2/5, KE 3+/5, DF 1/5, PF 4/5 Strong hip extensors4+/5                    RIGHT LE - HF 2/5, KE 2+/5, DF 0/5, PF 4/5     Sensory - decrease sensation below the knee. R>L  Psychiatric - Mood stable, Affect WNL  Skin lumbar incision intact - healed                     12.9   8.10  )-----------( 238      ( 09 Feb 2023 06:30 )             39.3     02-09    142  |  103  |  20  ----------------------------<  109<H>  3.9   |  29  |  0.67    Ca    9.3      09 Feb 2023 06:30    TPro  7.3  /  Alb  3.8  /  TBili  0.4  /  DBili  x   /  AST  30  /  ALT  54<H>  /  AlkPhos  66  02-09    LIVER FUNCTIONS - ( 09 Feb 2023 06:30 )  Alb: 3.8 g/dL / Pro: 7.3 g/dL / ALK PHOS: 66 U/L / ALT: 54 U/L / AST: 30 U/L / GGT: x           MEDICATIONS  (STANDING):  acetaminophen     Tablet .. 975 milliGRAM(s) Oral every 6 hours  aspirin enteric coated 81 milliGRAM(s) Oral daily  carvedilol 12.5 milliGRAM(s) Oral every 12 hours  enoxaparin Injectable 40 milliGRAM(s) SubCutaneous <User Schedule>  gabapentin 200 milliGRAM(s) Oral three times a day  lidocaine   4% Patch 1 Patch Transdermal daily  lidocaine   4% Patch 1 Patch Transdermal every 24 hours  losartan 100 milliGRAM(s) Oral daily  melatonin 6 milliGRAM(s) Oral at bedtime  methocarbamol 1500 milliGRAM(s) Oral three times a day  multivitamin 1 Tablet(s) Oral daily  polyethylene glycol 3350 17 Gram(s) Oral two times a day  senna 2 Tablet(s) Oral at bedtime  simvastatin 40 milliGRAM(s) Oral at bedtime  tamsulosin 0.4 milliGRAM(s) Oral at bedtime    MEDICATIONS  (PRN):  lidocaine 2% Jelly 3 milliLiter(s) IntraUrethral every 6 hours PRN For straight catheterization  magnesium hydroxide Suspension 30 milliLiter(s) Oral every 12 hours PRN Constipation  oxyCODONE    IR 5 milliGRAM(s) Oral every 4 hours PRN Moderate Pain (4 - 6)  oxyCODONE    IR 10 milliGRAM(s) Oral every 4 hours PRN Severe Pain (7 - 10)   CC: Cauda Equina    Subjective and objective  Patient seen and evaluated while sitting in WC returning from therapy.  Reports marked reduction of foot pain with increased dose gabapentin, no sedation  Wanted to discuss  extension of rehab stay -> made aware that he's going to also have home care upon discharge, he feels more comfortable to original plan for 2/21  Tolerating therapy - R>L LE weakness.  Was measured for RLE AFO    Denies HA, dizziness, visual changes, CP, SOB, cough, nausea, abd pain, or urinary symptoms.  LBM yesterday (2/12) with assistance of fleet enema  reports having increased flatulence requesting for medication    Vital Signs Last 24 Hrs  T(C): 36.4 (02-13-23 @ 07:20), Max: 36.8 (02-12-23 @ 20:27)  T(F): 97.5 (02-13-23 @ 07:20), Max: 98.2 (02-12-23 @ 20:27)  HR: 72 (02-13-23 @ 07:20) (69 - 77)  BP: 144/79 (02-13-23 @ 07:20) (126/85 - 144/79)  RR: 16 (02-13-23 @ 07:20) (16 - 16)  SpO2: 97% (02-13-23 @ 07:20) (97% - 99%)    PHYSICAL EXAM:  Constitutional -Comfortable  HEENT - NAD  Neck - Supple, No limited ROM  Pulm - resp nonlabored  Cardio - warm and well perfused   Abdomen -  Soft, NTND  Extremities - No peripheral edema, No calf tenderness     Neurologic Exam:                 Cognition - AOx4  Motor -                      LEFT    UE - ShAB 5/5, EF 5/5, EE 5/5, WE 5/5,  WNL                    RIGHT UE - ShAB 5/5, EF 5/5, EE 5/5, WE 5/5,  WNL                    LEFT    LE - HF 2/5, KE 3+/5, DF 1/5, PF 4/5 Strong hip extensors4+/5                    RIGHT LE - HF 2/5, KE 2+/5, DF 0/5, PF 4/5     Sensory - decrease sensation below the knee. R>L  Psychiatric - Mood stable, Affect WNL  Skin lumbar incision intact - healed                     12.9   8.10  )-----------( 238      ( 09 Feb 2023 06:30 )             39.3     02-09    142  |  103  |  20  ----------------------------<  109<H>  3.9   |  29  |  0.67    Ca    9.3      09 Feb 2023 06:30    TPro  7.3  /  Alb  3.8  /  TBili  0.4  /  DBili  x   /  AST  30  /  ALT  54<H>  /  AlkPhos  66  02-09    LIVER FUNCTIONS - ( 09 Feb 2023 06:30 )  Alb: 3.8 g/dL / Pro: 7.3 g/dL / ALK PHOS: 66 U/L / ALT: 54 U/L / AST: 30 U/L / GGT: x           MEDICATIONS  (STANDING):  acetaminophen     Tablet .. 975 milliGRAM(s) Oral every 6 hours  aspirin enteric coated 81 milliGRAM(s) Oral daily  carvedilol 12.5 milliGRAM(s) Oral every 12 hours  enoxaparin Injectable 40 milliGRAM(s) SubCutaneous <User Schedule>  gabapentin 200 milliGRAM(s) Oral three times a day  lidocaine   4% Patch 1 Patch Transdermal daily  lidocaine   4% Patch 1 Patch Transdermal every 24 hours  losartan 100 milliGRAM(s) Oral daily  melatonin 6 milliGRAM(s) Oral at bedtime  methocarbamol 1500 milliGRAM(s) Oral three times a day  multivitamin 1 Tablet(s) Oral daily  polyethylene glycol 3350 17 Gram(s) Oral two times a day  senna 2 Tablet(s) Oral at bedtime  simvastatin 40 milliGRAM(s) Oral at bedtime  tamsulosin 0.4 milliGRAM(s) Oral at bedtime    MEDICATIONS  (PRN):  lidocaine 2% Jelly 3 milliLiter(s) IntraUrethral every 6 hours PRN For straight catheterization  magnesium hydroxide Suspension 30 milliLiter(s) Oral every 12 hours PRN Constipation  oxyCODONE    IR 5 milliGRAM(s) Oral every 4 hours PRN Moderate Pain (4 - 6)  oxyCODONE    IR 10 milliGRAM(s) Oral every 4 hours PRN Severe Pain (7 - 10)

## 2023-02-14 ENCOUNTER — TRANSCRIPTION ENCOUNTER (OUTPATIENT)
Age: 58
End: 2023-02-14

## 2023-02-14 PROCEDURE — 72132 CT LUMBAR SPINE W/DYE: CPT | Mod: 26

## 2023-02-14 PROCEDURE — 99232 SBSQ HOSP IP/OBS MODERATE 35: CPT

## 2023-02-14 PROCEDURE — 90832 PSYTX W PT 30 MINUTES: CPT

## 2023-02-14 RX ORDER — TAMSULOSIN HYDROCHLORIDE 0.4 MG/1
1 CAPSULE ORAL
Qty: 0 | Refills: 0 | DISCHARGE
Start: 2023-02-14

## 2023-02-14 RX ORDER — SENNA PLUS 8.6 MG/1
2 TABLET ORAL
Qty: 0 | Refills: 0 | DISCHARGE
Start: 2023-02-14

## 2023-02-14 RX ORDER — OXYCODONE HYDROCHLORIDE 5 MG/1
1 TABLET ORAL
Qty: 0 | Refills: 0 | DISCHARGE
Start: 2023-02-14

## 2023-02-14 RX ORDER — SIMETHICONE 80 MG/1
1 TABLET, CHEWABLE ORAL
Qty: 0 | Refills: 0 | DISCHARGE
Start: 2023-02-14

## 2023-02-14 RX ORDER — LIDOCAINE 4 G/100G
2 CREAM TOPICAL
Qty: 0 | Refills: 0 | DISCHARGE
Start: 2023-02-14

## 2023-02-14 RX ORDER — LOSARTAN POTASSIUM 100 MG/1
1 TABLET, FILM COATED ORAL
Qty: 0 | Refills: 0 | DISCHARGE
Start: 2023-02-14

## 2023-02-14 RX ORDER — LIDOCAINE 4 G/100G
1 CREAM TOPICAL DAILY
Refills: 0 | Status: DISCONTINUED | OUTPATIENT
Start: 2023-02-14 | End: 2023-02-24

## 2023-02-14 RX ORDER — CARVEDILOL PHOSPHATE 80 MG/1
1 CAPSULE, EXTENDED RELEASE ORAL
Qty: 0 | Refills: 0 | DISCHARGE
Start: 2023-02-14

## 2023-02-14 RX ORDER — MAGNESIUM HYDROXIDE 400 MG/1
30 TABLET, CHEWABLE ORAL
Qty: 0 | Refills: 0 | DISCHARGE
Start: 2023-02-14

## 2023-02-14 RX ORDER — LANOLIN ALCOHOL/MO/W.PET/CERES
2 CREAM (GRAM) TOPICAL
Qty: 0 | Refills: 0 | DISCHARGE
Start: 2023-02-14

## 2023-02-14 RX ORDER — TAMSULOSIN HYDROCHLORIDE 0.4 MG/1
1 CAPSULE ORAL
Qty: 0 | Refills: 0 | DISCHARGE

## 2023-02-14 RX ORDER — METHOCARBAMOL 500 MG/1
2 TABLET, FILM COATED ORAL
Qty: 0 | Refills: 0 | DISCHARGE
Start: 2023-02-14

## 2023-02-14 RX ORDER — SIMVASTATIN 20 MG/1
1 TABLET, FILM COATED ORAL
Qty: 0 | Refills: 0 | DISCHARGE
Start: 2023-02-14

## 2023-02-14 RX ORDER — ACETAMINOPHEN 500 MG
2 TABLET ORAL
Qty: 0 | Refills: 0 | DISCHARGE

## 2023-02-14 RX ORDER — POLYETHYLENE GLYCOL 3350 17 G/17G
17 POWDER, FOR SOLUTION ORAL
Qty: 0 | Refills: 0 | DISCHARGE
Start: 2023-02-14

## 2023-02-14 RX ORDER — ASPIRIN/CALCIUM CARB/MAGNESIUM 324 MG
1 TABLET ORAL
Qty: 0 | Refills: 0 | DISCHARGE
Start: 2023-02-14

## 2023-02-14 RX ORDER — GABAPENTIN 400 MG/1
1 CAPSULE ORAL
Qty: 0 | Refills: 0 | DISCHARGE
Start: 2023-02-14

## 2023-02-14 RX ORDER — ACETAMINOPHEN 500 MG
3 TABLET ORAL
Qty: 0 | Refills: 0 | DISCHARGE
Start: 2023-02-14

## 2023-02-14 RX ORDER — ASPIRIN/CALCIUM CARB/MAGNESIUM 324 MG
0 TABLET ORAL
Qty: 0 | Refills: 0 | DISCHARGE

## 2023-02-14 RX ADMIN — POLYETHYLENE GLYCOL 3350 17 GRAM(S): 17 POWDER, FOR SOLUTION ORAL at 17:50

## 2023-02-14 RX ADMIN — Medication 975 MILLIGRAM(S): at 06:01

## 2023-02-14 RX ADMIN — CARVEDILOL PHOSPHATE 12.5 MILLIGRAM(S): 80 CAPSULE, EXTENDED RELEASE ORAL at 06:01

## 2023-02-14 RX ADMIN — GABAPENTIN 300 MILLIGRAM(S): 400 CAPSULE ORAL at 21:50

## 2023-02-14 RX ADMIN — LIDOCAINE 1 PATCH: 4 CREAM TOPICAL at 19:00

## 2023-02-14 RX ADMIN — METHOCARBAMOL 1500 MILLIGRAM(S): 500 TABLET, FILM COATED ORAL at 06:01

## 2023-02-14 RX ADMIN — METHOCARBAMOL 1500 MILLIGRAM(S): 500 TABLET, FILM COATED ORAL at 21:51

## 2023-02-14 RX ADMIN — SENNA PLUS 2 TABLET(S): 8.6 TABLET ORAL at 21:52

## 2023-02-14 RX ADMIN — LIDOCAINE 1 PATCH: 4 CREAM TOPICAL at 10:02

## 2023-02-14 RX ADMIN — OXYCODONE HYDROCHLORIDE 10 MILLIGRAM(S): 5 TABLET ORAL at 00:11

## 2023-02-14 RX ADMIN — SIMETHICONE 80 MILLIGRAM(S): 80 TABLET, CHEWABLE ORAL at 06:01

## 2023-02-14 RX ADMIN — SIMVASTATIN 40 MILLIGRAM(S): 20 TABLET, FILM COATED ORAL at 21:51

## 2023-02-14 RX ADMIN — Medication 975 MILLIGRAM(S): at 12:24

## 2023-02-14 RX ADMIN — Medication 975 MILLIGRAM(S): at 11:24

## 2023-02-14 RX ADMIN — SIMETHICONE 80 MILLIGRAM(S): 80 TABLET, CHEWABLE ORAL at 21:51

## 2023-02-14 RX ADMIN — OXYCODONE HYDROCHLORIDE 10 MILLIGRAM(S): 5 TABLET ORAL at 06:31

## 2023-02-14 RX ADMIN — OXYCODONE HYDROCHLORIDE 5 MILLIGRAM(S): 5 TABLET ORAL at 17:50

## 2023-02-14 RX ADMIN — CARVEDILOL PHOSPHATE 12.5 MILLIGRAM(S): 80 CAPSULE, EXTENDED RELEASE ORAL at 17:53

## 2023-02-14 RX ADMIN — LOSARTAN POTASSIUM 100 MILLIGRAM(S): 100 TABLET, FILM COATED ORAL at 06:01

## 2023-02-14 RX ADMIN — SIMETHICONE 80 MILLIGRAM(S): 80 TABLET, CHEWABLE ORAL at 15:01

## 2023-02-14 RX ADMIN — OXYCODONE HYDROCHLORIDE 10 MILLIGRAM(S): 5 TABLET ORAL at 06:01

## 2023-02-14 RX ADMIN — Medication 1 TABLET(S): at 11:25

## 2023-02-14 RX ADMIN — Medication 975 MILLIGRAM(S): at 06:31

## 2023-02-14 RX ADMIN — Medication 6 MILLIGRAM(S): at 21:50

## 2023-02-14 RX ADMIN — OXYCODONE HYDROCHLORIDE 10 MILLIGRAM(S): 5 TABLET ORAL at 00:41

## 2023-02-14 RX ADMIN — LIDOCAINE 1 PATCH: 4 CREAM TOPICAL at 22:00

## 2023-02-14 RX ADMIN — ENOXAPARIN SODIUM 40 MILLIGRAM(S): 100 INJECTION SUBCUTANEOUS at 06:01

## 2023-02-14 RX ADMIN — Medication 81 MILLIGRAM(S): at 11:25

## 2023-02-14 RX ADMIN — GABAPENTIN 300 MILLIGRAM(S): 400 CAPSULE ORAL at 06:01

## 2023-02-14 RX ADMIN — GABAPENTIN 300 MILLIGRAM(S): 400 CAPSULE ORAL at 15:01

## 2023-02-14 RX ADMIN — METHOCARBAMOL 1500 MILLIGRAM(S): 500 TABLET, FILM COATED ORAL at 15:00

## 2023-02-14 RX ADMIN — TAMSULOSIN HYDROCHLORIDE 0.4 MILLIGRAM(S): 0.4 CAPSULE ORAL at 21:52

## 2023-02-14 RX ADMIN — OXYCODONE HYDROCHLORIDE 5 MILLIGRAM(S): 5 TABLET ORAL at 18:50

## 2023-02-14 NOTE — DISCHARGE NOTE PROVIDER - CARE PROVIDER_API CALL
Adan Herring (MD; MPH)  Surgery  Phys Medicine  [facility] [address] [place] [address]  Phone: [phone]  Fax: [phone]  Follow Up Time: 1 month    Zack Schaefer; PhD)  Neurosurgery  [address] [facility], 2nd [place] [address]  Phone: [phone]  Fax: [phone]  Follow Up Time: 1 week

## 2023-02-14 NOTE — PROGRESS NOTE ADULT - ASSESSMENT
Mood is largely euthymic, with congruent affect. Reinforced adaptive coping with stress and self-advocating for needs/concerns. Discussed expectations and adjustment to changes in physical functioning (i.e., possibility of continued rehab services post-discharge). Patient continued to express motivation due to his progress. Plan: Continue supportive psychotherapy sessions to address mood and adjustment. Neuropsychology remains available.

## 2023-02-14 NOTE — PROVIDER CONTACT NOTE (OTHER) - ASSESSMENT
On assessment swelling around surgical incision (SAI) was present. Patient also reported back pain and numbness of feet that is intermittent. On assessment swelling around surgical incision (SAI) was present. Patient also reported back pain and numbness of feet that feels worse at times and is intermittent.

## 2023-02-14 NOTE — PROGRESS NOTE ADULT - SUBJECTIVE AND OBJECTIVE BOX
CC: Cauda Equina    Subjective and objective: ********************  Patient seen and evaluated while sitting in WC returning from therapy.  Reports marked reduction of foot pain with increased dose gabapentin, no sedation  Wanted to discuss  extension of rehab stay -> made aware that he's going to also have home care upon discharge, he feels more comfortable to original plan for 2/21  Tolerating therapy - R>L LE weakness.  Was measured for RLE AFO    Denies HA, dizziness, visual changes, CP, SOB, cough, nausea, abd pain, or urinary symptoms.  LBM yesterday (2/12) with assistance of fleet enema  reports having increased flatulence requesting for medication    Vital Signs Last 24 Hrs  T(C): 36.4 (02-13-23 @ 07:20), Max: 36.8 (02-12-23 @ 20:27)  T(F): 97.5 (02-13-23 @ 07:20), Max: 98.2 (02-12-23 @ 20:27)  HR: 72 (02-13-23 @ 07:20) (69 - 77)  BP: 144/79 (02-13-23 @ 07:20) (126/85 - 144/79)  RR: 16 (02-13-23 @ 07:20) (16 - 16)  SpO2: 97% (02-13-23 @ 07:20) (97% - 99%)    PHYSICAL EXAM:  Constitutional -Comfortable  HEENT - NAD  Neck - Supple, No limited ROM  Pulm - resp nonlabored  Cardio - warm and well perfused   Abdomen -  Soft, NTND  Extremities - No peripheral edema, No calf tenderness     Neurologic Exam:                 Cognition - AOx4  Motor -                      LEFT    UE - ShAB 5/5, EF 5/5, EE 5/5, WE 5/5,  WNL                    RIGHT UE - ShAB 5/5, EF 5/5, EE 5/5, WE 5/5,  WNL                    LEFT    LE - HF 2/5, KE 3+/5, DF 1/5, PF 4/5 Strong hip extensors4+/5                    RIGHT LE - HF 2/5, KE 2+/5, DF 0/5, PF 4/5     Sensory - decrease sensation below the knee. R>L  Psychiatric - Mood stable, Affect WNL  Skin lumbar incision intact - healed                     12.9   8.10  )-----------( 238      ( 09 Feb 2023 06:30 )             39.3     02-09    142  |  103  |  20  ----------------------------<  109<H>  3.9   |  29  |  0.67    Ca    9.3      09 Feb 2023 06:30    TPro  7.3  /  Alb  3.8  /  TBili  0.4  /  DBili  x   /  AST  30  /  ALT  54<H>  /  AlkPhos  66  02-09    LIVER FUNCTIONS - ( 09 Feb 2023 06:30 )  Alb: 3.8 g/dL / Pro: 7.3 g/dL / ALK PHOS: 66 U/L / ALT: 54 U/L / AST: 30 U/L / GGT: x           MEDICATIONS  (STANDING):  acetaminophen     Tablet .. 975 milliGRAM(s) Oral every 6 hours  aspirin enteric coated 81 milliGRAM(s) Oral daily  carvedilol 12.5 milliGRAM(s) Oral every 12 hours  enoxaparin Injectable 40 milliGRAM(s) SubCutaneous <User Schedule>  gabapentin 200 milliGRAM(s) Oral three times a day  lidocaine   4% Patch 1 Patch Transdermal daily  lidocaine   4% Patch 1 Patch Transdermal every 24 hours  losartan 100 milliGRAM(s) Oral daily  melatonin 6 milliGRAM(s) Oral at bedtime  methocarbamol 1500 milliGRAM(s) Oral three times a day  multivitamin 1 Tablet(s) Oral daily  polyethylene glycol 3350 17 Gram(s) Oral two times a day  senna 2 Tablet(s) Oral at bedtime  simvastatin 40 milliGRAM(s) Oral at bedtime  tamsulosin 0.4 milliGRAM(s) Oral at bedtime    MEDICATIONS  (PRN):  lidocaine 2% Jelly 3 milliLiter(s) IntraUrethral every 6 hours PRN For straight catheterization  magnesium hydroxide Suspension 30 milliLiter(s) Oral every 12 hours PRN Constipation  oxyCODONE    IR 5 milliGRAM(s) Oral every 4 hours PRN Moderate Pain (4 - 6)  oxyCODONE    IR 10 milliGRAM(s) Oral every 4 hours PRN Severe Pain (7 - 10)   CC: Cauda Equina    Subjective and objective:   Patient seen and evaluated during therapy this AM with Dr. Herring.  Admits to lower back pain this AM.  Last BM on 2/13, per chart review.   No other complaints at this time.     Denies HA, dizziness, visual changes, CP, SOB, cough, nausea, abd pain, or urinary symptoms.      Vital Signs Last 24 Hrs  T(C): 36.9 (14 Feb 2023 08:00), Max: 36.9 (14 Feb 2023 08:00)  T(F): 98.5 (14 Feb 2023 08:00), Max: 98.5 (14 Feb 2023 08:00)  HR: 66 (14 Feb 2023 08:00) (66 - 90)  BP: 116/75 (14 Feb 2023 08:00) (112/69 - 146/88)  BP(mean): --  RR: 16 (14 Feb 2023 08:00) (16 - 18)  SpO2: 96% (14 Feb 2023 08:00) (96% - 98%)      PHYSICAL EXAM:  Constitutional -Comfortable  HEENT - NAD  Neck - Supple, No limited ROM  Pulm - resp nonlabored  Cardio - warm and well perfused   Abdomen -  Soft, NTND  Extremities - No peripheral edema, No calf tenderness     Neurologic Exam:                 Cognition - AOx4  Motor -                      LEFT    UE - ShAB 5/5, EF 5/5, EE 5/5, WE 5/5,  WNL                    RIGHT UE - ShAB 5/5, EF 5/5, EE 5/5, WE 5/5,  WNL                    LEFT    LE - HF 2/5, KE 3+/5, DF 1/5, PF 4/5 Strong hip extensors4+/5                    RIGHT LE - HF 2/5, KE 2+/5, DF 0/5, PF 4/5     Sensory - decrease sensation below the knee. R>L  Psychiatric - Mood stable, Affect WNL  Skin lumbar incision intact - healed                     12.9   8.10  )-----------( 238      ( 09 Feb 2023 06:30 )             39.3     02-09    142  |  103  |  20  ----------------------------<  109<H>  3.9   |  29  |  0.67    Ca    9.3      09 Feb 2023 06:30    TPro  7.3  /  Alb  3.8  /  TBili  0.4  /  DBili  x   /  AST  30  /  ALT  54<H>  /  AlkPhos  66  02-09    LIVER FUNCTIONS - ( 09 Feb 2023 06:30 )  Alb: 3.8 g/dL / Pro: 7.3 g/dL / ALK PHOS: 66 U/L / ALT: 54 U/L / AST: 30 U/L / GGT: x           MEDICATIONS  (STANDING):  acetaminophen     Tablet .. 975 milliGRAM(s) Oral every 6 hours  aspirin enteric coated 81 milliGRAM(s) Oral daily  carvedilol 12.5 milliGRAM(s) Oral every 12 hours  enoxaparin Injectable 40 milliGRAM(s) SubCutaneous <User Schedule>  gabapentin 200 milliGRAM(s) Oral three times a day  lidocaine   4% Patch 1 Patch Transdermal daily  lidocaine   4% Patch 1 Patch Transdermal every 24 hours  losartan 100 milliGRAM(s) Oral daily  melatonin 6 milliGRAM(s) Oral at bedtime  methocarbamol 1500 milliGRAM(s) Oral three times a day  multivitamin 1 Tablet(s) Oral daily  polyethylene glycol 3350 17 Gram(s) Oral two times a day  senna 2 Tablet(s) Oral at bedtime  simvastatin 40 milliGRAM(s) Oral at bedtime  tamsulosin 0.4 milliGRAM(s) Oral at bedtime    MEDICATIONS  (PRN):  lidocaine 2% Jelly 3 milliLiter(s) IntraUrethral every 6 hours PRN For straight catheterization  magnesium hydroxide Suspension 30 milliLiter(s) Oral every 12 hours PRN Constipation  oxyCODONE    IR 5 milliGRAM(s) Oral every 4 hours PRN Moderate Pain (4 - 6)  oxyCODONE    IR 10 milliGRAM(s) Oral every 4 hours PRN Severe Pain (7 - 10)   CC: Cauda Equina    Subjective and objective:   Patient seen and evaluated during therapy this AM with Dr. Herring.  Admits to lower back pain this AM.  Last BM on 2/13, per chart review.   No other complaints at this time.     Denies HA, dizziness, visual changes, CP, SOB, cough, nausea, abd pain, or urinary symptoms.      Therapy--observed ambulating with walker, ace wrap to right foot, > 100 ft, CGA   Vital Signs Last 24 Hrs  T(C): 36.9 (14 Feb 2023 08:00), Max: 36.9 (14 Feb 2023 08:00)  T(F): 98.5 (14 Feb 2023 08:00), Max: 98.5 (14 Feb 2023 08:00)  HR: 66 (14 Feb 2023 08:00) (66 - 90)  BP: 116/75 (14 Feb 2023 08:00) (112/69 - 146/88)  RR: 16 (14 Feb 2023 08:00) (16 - 18)  SpO2: 96% (14 Feb 2023 08:00) (96% - 98%)    PHYSICAL EXAM:  Constitutional -Comfortable  HEENT - NAD  Neck - Supple, No limited ROM  Pulm - resp nonlabored  Cardio - warm and well perfused   Abdomen -  Soft, NTND  Extremities - No peripheral edema, No calf tenderness     Neurologic Exam:                 Cognition - AOx4  Motor -                      LEFT    UE - ShAB 5/5, EF 5/5, EE 5/5, WE 5/5,  WNL                    RIGHT UE - ShAB 5/5, EF 5/5, EE 5/5, WE 5/5,  WNL                    LEFT    LE - HF 2/5, KE 3+/5, DF 1/5, PF 4/5 Strong hip extensors4+/5                    RIGHT LE - HF 2/5, KE 2+/5, DF 0/5, PF 4/5     Sensory - decrease sensation below the knee. R>L  Psychiatric - Mood stable, Affect WNL  Skin lumbar incision intact - healed                     12.9   8.10  )-----------( 238      ( 09 Feb 2023 06:30 )             39.3     02-09    142  |  103  |  20  ----------------------------<  109<H>  3.9   |  29  |  0.67    Ca    9.3      09 Feb 2023 06:30    TPro  7.3  /  Alb  3.8  /  TBili  0.4  /  DBili  x   /  AST  30  /  ALT  54<H>  /  AlkPhos  66  02-09    LIVER FUNCTIONS - ( 09 Feb 2023 06:30 )  Alb: 3.8 g/dL / Pro: 7.3 g/dL / ALK PHOS: 66 U/L / ALT: 54 U/L / AST: 30 U/L / GGT: x           MEDICATIONS  (STANDING):  acetaminophen     Tablet .. 975 milliGRAM(s) Oral every 6 hours  aspirin enteric coated 81 milliGRAM(s) Oral daily  carvedilol 12.5 milliGRAM(s) Oral every 12 hours  enoxaparin Injectable 40 milliGRAM(s) SubCutaneous <User Schedule>  gabapentin 200 milliGRAM(s) Oral three times a day  lidocaine   4% Patch 1 Patch Transdermal daily  lidocaine   4% Patch 1 Patch Transdermal every 24 hours  losartan 100 milliGRAM(s) Oral daily  melatonin 6 milliGRAM(s) Oral at bedtime  methocarbamol 1500 milliGRAM(s) Oral three times a day  multivitamin 1 Tablet(s) Oral daily  polyethylene glycol 3350 17 Gram(s) Oral two times a day  senna 2 Tablet(s) Oral at bedtime  simvastatin 40 milliGRAM(s) Oral at bedtime  tamsulosin 0.4 milliGRAM(s) Oral at bedtime    MEDICATIONS  (PRN):  lidocaine 2% Jelly 3 milliLiter(s) IntraUrethral every 6 hours PRN For straight catheterization  magnesium hydroxide Suspension 30 milliLiter(s) Oral every 12 hours PRN Constipation  oxyCODONE    IR 5 milliGRAM(s) Oral every 4 hours PRN Moderate Pain (4 - 6)  oxyCODONE    IR 10 milliGRAM(s) Oral every 4 hours PRN Severe Pain (7 - 10)

## 2023-02-14 NOTE — PROGRESS NOTE ADULT - NS ATTEND AMEND GEN_ALL_CORE FT
Seen and examined   Findings as noted  Note revised    Reports low back pain, non radicular   Foot pain improved  Tolerating gabapentin     Ambulating with walker as observed    Continue PT/OT  Lidocaine to lower back   Continue current analgesic regimen

## 2023-02-14 NOTE — DISCHARGE NOTE PROVIDER - NSDCACTIVITY_GEN_ALL_CORE
No restrictions/Do not drive or operate machinery/Showering allowed/Do not make important decisions/Stairs allowed/Walking - Indoors allowed/No heavy lifting/straining/Walking - Outdoors allowed/Follow Instructions Provided by your Surgical Team

## 2023-02-14 NOTE — DISCHARGE NOTE PROVIDER - CARE PROVIDERS DIRECT ADDRESSES
,DirectAddress_Unknown,[email or web address]

## 2023-02-14 NOTE — DISCHARGE NOTE PROVIDER - NSDCFUADDAPPT_GEN_ALL_CORE_FT
Please follow up with your PCP as soon as possible.    If you are in need of a PCP or a specialist in your area: contact the [facility] Physician Referral Service at [phone].   2023

## 2023-02-14 NOTE — DISCHARGE NOTE PROVIDER - NSDCCPCAREPLAN_GEN_ALL_CORE_FT
PRINCIPAL DISCHARGE DIAGNOSIS  Diagnosis: Cauda equina syndrome  Assessment and Plan of Treatment: You were found to have a spinal cord injury that affected your ability to walk. This injury required spinal surgery to relieve pressure from your spinal cord. Please follow up with your Neurosurgeon for outpatient follow up and further management.      SECONDARY DISCHARGE DIAGNOSES  Diagnosis: Urinary retention  Assessment and Plan of Treatment: After your injury you were having trouble voiding on your own and a Vergara catheter was placed. The Vergara catheter was removed and you were eventually able to urinate on your own. Please follow up with your PCP or your surgeon for further management if you begin to have difficulty voiding again.

## 2023-02-14 NOTE — DISCHARGE NOTE PROVIDER - PROVIDER TOKENS
PROVIDER:[TOKEN:[[id number]],FOLLOWUP:[1 month]],PROVIDER:[TOKEN:[[id number]],FOLLOWUP:[1 week]]

## 2023-02-14 NOTE — CHART NOTE - NSCHARTNOTEFT_GEN_A_CORE
RN noticed a new lump over lumbar incision this evening that was not present in the AM. Patient endorsed some increase in back pain and increased numbness in bilateral feet, but no new weakness.     On exam:  Vitals: T98, HR 78, /79, RR 16, SpO2 100% on RA  Gen: NAD  CV: RRR  Pulm: breathing comfortably on RA  Skin: palpable ?fluid collection/fluctuance under lumbar incision scar. Skin is intact, no erythema.   Ext: moving B/L UE and LE against gravity and with resistance. No focal neurological deficits  Neuro: A&O x3    A/P:  56 YO male with PMH of CAD, s/p CABG, HTN, HLD, ETOH abuse (no alcohol use in 2 years as per pt) who presented to [facility] on 1/24 with back pain and progressive lower extremity weakness. Examination with concern for cauda equina. He was admitted for cauda equina syndrome and he  underwent urgent L2-3 laminectomy/ discectomy on 1/25/23. Now with new fluctuance on lumbar spine.  - Photos of lumbar spine sent to Dr. Schaefer, neurosurgeon, via HIPAA-compliant messaging.  - CT lumbar spine w/IV contrast: reviewed with radiologist who noted fluid collection with peripheral enhancement which could be infection, liquified hematoma, or seroma. Recommend aspiration of fluid.  - Reached out to on-call neurosurgery at [place], awaiting callback regarding management of fluid.  - Continue to monitor patient closely. RN noticed a new lump over lumbar incision this evening that was not present in the AM. Patient endorsed some increase in back pain and increased numbness in bilateral feet, but no new weakness.     On exam:  Vitals: T98, HR 78, /79, RR 16, SpO2 100% on RA  Gen: NAD  CV: RRR  Pulm: breathing comfortably on RA  Skin: palpable ?fluid collection/fluctuance under lumbar incision scar. Skin is intact, no erythema.   Ext: moving B/L UE and LE against gravity and with resistance. No focal neurological deficits  Neuro: A&O x3    A/P:  56 YO male with PMH of CAD, s/p CABG, HTN, HLD, ETOH abuse (no alcohol use in 2 years as per pt) who presented to [facility] on 1/24 with back pain and progressive lower extremity weakness. Examination with concern for cauda equina. He was admitted for cauda equina syndrome and he  underwent urgent L2-3 laminectomy/ discectomy on 1/25/23. Now with new fluctuance on lumbar spine.  - Photos of lumbar spine sent to Dr. Schaefer, neurosurgeon, via HIPAA-compliant messaging.  - CT lumbar spine w/IV contrast: reviewed with radiologist who noted fluid collection with peripheral enhancement which could be infection, liquified hematoma, or seroma. Recommend aspiration of fluid.  - Reached out to on-call neurosurgery at [place], awaiting callback regarding management of fluid.  - Continue to monitor patient closely.    UPDATE:  Attempted to reach NSGY on-call twice through answering service. Discussed case with RASHMI Lee at [facility], who will forward message to NSGY on-call. RN noticed a new lump over lumbar incision this evening that was not present in the AM. Patient endorsed some increase in back pain and increased numbness in bilateral feet, but no new weakness.     On exam:  Vitals: T98, HR 78, /79, RR 16, SpO2 100% on RA  Gen: NAD  CV: RRR  Pulm: breathing comfortably on RA  Skin: palpable ?fluid collection/fluctuance under lumbar incision scar. Skin is intact, no erythema.   Ext: moving B/L UE and LE against gravity and with resistance. No focal neurological deficits  Neuro: A&O x3    A/P:  56 YO male with PMH of CAD, s/p CABG, HTN, HLD, ETOH abuse (no alcohol use in 2 years as per pt) who presented to [facility] on 1/24 with back pain and progressive lower extremity weakness. Examination with concern for cauda equina. He was admitted for cauda equina syndrome and he  underwent urgent L2-3 laminectomy/ discectomy on 1/25/23. Now with new fluctuance on lumbar spine.  - Photos of lumbar spine sent to Dr. Schaefer, neurosurgeon, via HIPAA-compliant messaging.  - CT lumbar spine w/IV contrast: reviewed with radiologist who noted fluid collection with peripheral enhancement which could be infection, liquified hematoma, or seroma. Recommend aspiration of fluid.  - Reached out to on-call neurosurgery at [place], awaiting callback regarding management of fluid.  - Continue to monitor patient closely.    UPDATE:  Attempted to reach NSGY on-call twice through answering service. Discussed case with RASHMI Lee at [facility], who will forward message to NSGY on-call. Also left message on TEAMS for Dr. Schaefer. RN noticed a new lump over lumbar incision this evening that was not present in the AM. Patient endorsed some increase in back pain and increased numbness in bilateral feet, but no new weakness.     On exam:  Vitals: T98, HR 78, /79, RR 16, SpO2 100% on RA  Gen: NAD  CV: RRR  Pulm: breathing comfortably on RA  Skin: palpable ?fluid collection/fluctuance under lumbar incision scar. Skin is intact, no erythema.   Ext: moving B/L UE and LE against gravity and with resistance. No focal neurological deficits  Neuro: A&O x3    A/P:  56 YO male with PMH of CAD, s/p CABG, HTN, HLD, ETOH abuse (no alcohol use in 2 years as per pt) who presented to \[facility]\"" on 1/24 with back pain and progressive lower extremity weakness. Examination with concern for cauda equina. He was admitted for cauda equina syndrome and he  underwent urgent L2-3 laminectomy/ discectomy on 1/25/23. Now with new fluctuance on lumbar spine.  - Photos of lumbar spine sent to Dr. Schaefer, neurosurgeon, via HIPAA-compliant messaging.  - CT lumbar spine w/IV contrast: reviewed with radiologist who noted fluid collection with peripheral enhancement which could be infection, liquified hematoma, or seroma. Recommend aspiration of fluid.  - Reached out to on-call neurosurgery at [place], awaiting callback regarding management of fluid.  - Continue to monitor patient closely.    UPDATE:  Attempted to reach NS on-call twice through answering service. Discussed case with RASHMI Lee at \[facility]\"", who will forward message to NSGY on-call. Also left message on TEAMS for Dr. Schaefer.    No urgent intervention per Dr. Schaefer. Patient may need aspiration of fluid for analysis, which can be arranged during normal business hours.

## 2023-02-14 NOTE — DISCHARGE NOTE PROVIDER - HOSPITAL COURSE
HPI:  This is a 58 YO male with PMH of CAD, s/p CABG, HTN, HLD, ETOH abuse (no alcohol use in 2 years as per pt) who presented to [facility] on 1/24 with back pain. Pt was seen in the ER on 1/22/23 and discharged home with a muscle relaxer and tylenol. He represented to the ER for worsening back pain. He stated that 2 days prior to admission, he was lifting weights in his basement and then he heard a pop. He immediately had pain in the lower back. Was taking Tylenol and the muscle relaxer.     The day prior to admission, he was able to take a shower and then later in the day he was unable to get off of the couch d/t lower extremity weakness. He also noticed pain down his legs bilaterally. He also has decreased sensation of his lower extremities b/l. He was evaluated by neurosurgery in ED and admitted to medicine. He underwent urgent L2-3 laminectomy/ discectomy on 1/25/23 for cauda equina syndrome. He did require reinsertion of  youssef for failed TOV/urinary retention. He was started on Flomax.  His BB was increased and he was started on losartan for uncontrolled HTN. ASA may be resumed 7 days post op per neurosurgery. Patient was evaluated by PM&R and therapy for functional deficits, gait/ADL impairments and acute rehabilitation was recommended. Patient was medically optimized for discharge to [facility]U on 1/30/23.           (30 Jan 2023 16:35)      Patient was evaluated by PM&R and therapy for gait/ADL impairments and recommended acute rehabilitation. Patient was medically optimized for discharge to [facility] on 1/30/23. Admitted with gait instability, ADL, and functional impairments.     Rehab course significant for:  **********    All other medical co-morbidities were stable. Patient tolerated course of inpatient PT/OT/SLP rehab with significant improvements and met rehab goals prior to discharge. Patient was medically cleared on 2/21/23 for discharge to home with home care. HPI:  This is a 56 YO male with PMH of CAD, s/p CABG, HTN, HLD, ETOH abuse (no alcohol use in 2 years as per pt) who presented to [facility] on 1/24 with back pain. Pt was seen in the ER on 1/22/23 and discharged home with a muscle relaxer and tylenol. He represented to the ER for worsening back pain. He stated that 2 days prior to admission, he was lifting weights in his basement and then he heard a pop. He immediately had pain in the lower back. Was taking Tylenol and the muscle relaxer.     The day prior to admission, he was able to take a shower and then later in the day he was unable to get off of the couch d/t lower extremity weakness. He also noticed pain down his legs bilaterally. He also has decreased sensation of his lower extremities b/l. He was evaluated by neurosurgery in ED and admitted to medicine. He underwent urgent L2-3 laminectomy/ discectomy on 1/25/23 for cauda equina syndrome. He did require reinsertion of  youssef for failed TOV/urinary retention. He was started on Flomax.  His BB was increased and he was started on losartan for uncontrolled HTN. ASA may be resumed 7 days post op per neurosurgery. Patient was evaluated by PM&R and therapy for functional deficits, gait/ADL impairments and acute rehabilitation was recommended. Patient was medically optimized for discharge to [facility] IRU on 1/30/23.           (30 Jan 2023 16:35)      Patient was evaluated by PM&R and therapy for gait/ADL impairments and recommended acute rehabilitation. Patient was medically optimized for discharge to [facility] on 1/30/23. Admitted with gait instability, ADL, and functional impairments.     Rehab course significant for:  1/30: had 2 large BM and 1 loose BM. Fleet enema D/Eddie  2/1: LE strength improving. Bowel regimen. TOV 2/2 6 AM  2/2: Voiding but still with high PVRs. Lidocaine gel prior to SC. Flomax started.   2/3: NPSY for emotional support and coping strategies.   2/6: Elevate heels off mattress. Provide warm compress. Gabapentin to 200mg TID.   2/7: Melatonin at HS.   2/8: Poor sleep. DC PVR&BS  2/14: new ?fluctuance under lumbar incision with increased pain and numbness of feet; photos sent to nsgy and CT lumbar w/IV contrast shows fluid collection, ?infection vs liquified hematoma vs seroma  2/15: NSGY- NTD for possible fluid collection. F/u outpatient for possible aspiration/sampling/drainage. R knee buckling. Await AFO   2/17: Sister cannot provide assistance at home for patient. May need [facility].       All other medical co-morbidities were stable. Patient tolerated course of inpatient PT/OT/SLP rehab with significant improvements and met rehab goals prior to discharge. Patient was medically cleared on 2/21/23 for discharge to [facility] vs home with home care. HPI:  This is a 58 YO male with PMH of CAD, s/p CABG, HTN, HLD, ETOH abuse (no alcohol use in 2 years as per pt) who presented to [facility] on 1/24 with back pain. Pt was seen in the ER on 1/22/23 and discharged home with a muscle relaxer and tylenol. He represented to the ER for worsening back pain. He stated that 2 days prior to admission, he was lifting weights in his basement and then he heard a pop. He immediately had pain in the lower back. Was taking Tylenol and the muscle relaxer.     The day prior to admission, he was able to take a shower and then later in the day he was unable to get off of the couch d/t lower extremity weakness. He also noticed pain down his legs bilaterally. He also has decreased sensation of his lower extremities b/l. He was evaluated by neurosurgery in ED and admitted to medicine. He underwent urgent L2-3 laminectomy/ discectomy on 1/25/23 for cauda equina syndrome. He did require reinsertion of  youssef for failed TOV/urinary retention. He was started on Flomax.  His BB was increased and he was started on losartan for uncontrolled HTN. ASA may be resumed 7 days post op per neurosurgery. Patient was evaluated by PM&R and therapy for functional deficits, gait/ADL impairments and acute rehabilitation was recommended. Patient was medically optimized for discharge to [facility]U on 1/30/23.     (30 Jan 2023 16:35)    Patient was evaluated by PM&R and therapy for gait/ADL impairments and recommended acute rehabilitation. Patient was medically optimized for discharge to [facility] on 1/30/23. Admitted with gait instability, ADL, and functional impairments.     Reha1/30: had 2 large BM and 1 loose BM. Fleet enema D/Eddie  2/1: LE strength improving. Bowel regimen. TOV 2/2 6 AM  2/2: Voiding but still with high PVRs. Lidocaine gel prior to SC. Flomax started.   2/3: NPSY for emotional support and coping strategies.   2/6: Elevate heels off mattress. Provide warm compress. Gabapentin to 200mg TID.   2/7: Melatonin at HS.   2/8: Poor sleep. DC PVR&BS  2/14: new ?fluctuance under lumbar incision with increased pain and numbness of feet; photos sent to nsgy and CT lumbar w/IV contrast shows fluid collection, ?infection vs liquified hematoma vs seroma  2/15: NSGY- NTD for possible fluid collection. F/u outpatient for possible aspiration/sampling/drainage. R knee buckling. Await AFO   2/17: Sister cannot provide assistance at home for patient. May need [facility].   2/22: Await WINDY.  b course significant for:        All other medical co-morbidities were stable. Patient tolerated course of inpatient PT/OT/SLP rehab with significant improvements and met rehab goals prior to discharge. Patient was medically cleared on 2/21/23 for discharge to [facility]. HPI:  This is a 58 YO male with PMH of CAD, s/p CABG, HTN, HLD, ETOH abuse (no alcohol use in 2 years as per pt) who presented to [facility] on 1/24 with back pain. Pt was seen in the ER on 1/22/23 and discharged home with a muscle relaxer and tylenol. He represented to the ER for worsening back pain. He stated that 2 days prior to admission, he was lifting weights in his basement and then he heard a pop. He immediately had pain in the lower back. Was taking Tylenol and the muscle relaxer.     The day prior to admission, he was able to take a shower and then later in the day he was unable to get off of the couch d/t lower extremity weakness. He also noticed pain down his legs bilaterally. He also has decreased sensation of his lower extremities b/l. He was evaluated by neurosurgery in ED and admitted to medicine. He underwent urgent L2-3 laminectomy/ discectomy on 1/25/23 for cauda equina syndrome. He did require reinsertion of  youssef for failed TOV/urinary retention. He was started on Flomax.  His BB was increased and he was started on losartan for uncontrolled HTN. ASA may be resumed 7 days post op per neurosurgery. Patient was evaluated by PM&R and therapy for functional deficits, gait/ADL impairments and acute rehabilitation was recommended. Patient was medically optimized for discharge to [facility]U on 1/30/23.     (30 Jan 2023 16:35)    Patient was evaluated by PM&R and therapy for gait/ADL impairments and recommended acute rehabilitation. Patient was medically optimized for discharge to [facility] on 1/30/23. Admitted with gait instability, ADL, and functional impairments.     Rehab course significant for:  1/30: had 2 large BM and 1 loose BM. Fleet enema D/Eddie  2/1: LE strength improving. Bowel regimen. TOV 2/2 6 AM  2/2: Voiding but still with high PVRs. Lidocaine gel prior to SC. Flomax started.   2/3: NPSY for emotional support and coping strategies.   2/6: Elevate heels off mattress. Provide warm compress. Gabapentin to 200mg TID.   2/7: Melatonin at HS.   2/8: Poor sleep. DC PVR&BS  2/14: new ?fluctuance under lumbar incision with increased pain and numbness of feet; photos sent to nsgy and CT lumbar w/IV contrast shows fluid collection, ?infection vs liquified hematoma vs seroma  2/15: NSGY- NTD for possible fluid collection. F/u outpatient for possible aspiration/sampling/drainage. R knee buckling. Await AFO   2/17: Sister cannot provide assistance at home for patient. May need [facility].   2/22: Await WINDY.      All other medical co-morbidities were stable. Patient tolerated course of inpatient PT/OT/SLP rehab with significant improvements and met rehab goals prior to discharge. Patient was medically cleared on 2/23/23 for discharge to [facility]. HPI:  This is a 56 YO male with PMH of CAD, s/p CABG, HTN, HLD, ETOH abuse (no alcohol use in 2 years as per pt) who presented to [facility] on 1/24 with back pain. Pt was seen in the ER on 1/22/23 and discharged home with a muscle relaxer and tylenol. He represented to the ER for worsening back pain. He stated that 2 days prior to admission, he was lifting weights in his basement and then he heard a pop. He immediately had pain in the lower back. Was taking Tylenol and the muscle relaxer.     The day prior to admission, he was able to take a shower and then later in the day he was unable to get off of the couch d/t lower extremity weakness. He also noticed pain down his legs bilaterally. He also has decreased sensation of his lower extremities b/l. He was evaluated by neurosurgery in ED and admitted to medicine. He underwent urgent L2-3 laminectomy/ discectomy on 1/25/23 for cauda equina syndrome. He did require reinsertion of  youssef for failed TOV/urinary retention. He was started on Flomax.  His BB was increased and he was started on losartan for uncontrolled HTN. ASA may be resumed 7 days post op per neurosurgery. Patient was evaluated by PM&R and therapy for functional deficits, gait/ADL impairments and acute rehabilitation was recommended. Patient was medically optimized for discharge to [facility] IRU on 1/30/23.     (30 Jan 2023 16:35)    Patient was evaluated by PM&R and therapy for gait/ADL impairments and recommended acute rehabilitation. Patient was medically optimized for discharge to [facility] on 1/30/23. Admitted with gait instability, ADL, and functional impairments.     Rehab course significant for:  1/30: had 2 large BM and 1 loose BM. Fleet enema D/Eddie  2/1: LE strength improving. Bowel regimen. TOV 2/2 6 AM  2/2: Voiding but still with high PVRs. Lidocaine gel prior to SC. Flomax started.   2/3: NPSY for emotional support and coping strategies.   2/6: Elevate heels off mattress. Provide warm compress. Gabapentin to 200mg TID.   2/7: Melatonin at HS.   2/8: Poor sleep. DC PVR&BS  2/14: new ?fluctuance under lumbar incision with increased pain and numbness of feet; photos sent to nsgy and CT lumbar w/IV contrast shows fluid collection, ?infection vs liquified hematoma vs seroma  2/15: NSGY- NTD for possible fluid collection. F/u outpatient for possible aspiration/sampling/drainage. R knee buckling. Await AFO   2/17: Sister cannot provide assistance at home for patient. May need [facility].   2/22: Await WINDY.      All other medical co-morbidities were stable. Patient tolerated course of inpatient PT/OT/SLP rehab with significant improvements and met rehab goals prior to discharge. Patient was medically cleared on 2/24/23 for discharge to [facility].

## 2023-02-14 NOTE — PROGRESS NOTE ADULT - SUBJECTIVE AND OBJECTIVE BOX
Patient seen alone at bedside for 30-minute, supportive psychotherapy session. He reports continued gains in rehabilitation session. He indicates trying to figure out the possible options for discharge including going home or to his sister's home initially.

## 2023-02-14 NOTE — DISCHARGE NOTE PROVIDER - NSDCMRMEDTOKEN_GEN_ALL_CORE_FT
acetaminophen 325 mg oral tablet: 3 tab(s) orally every 6 hours  aspirin 81 mg oral delayed release tablet: 1 tab(s) orally once a day  carvedilol 12.5 mg oral tablet: 1 tab(s) orally every 12 hours  Evaluation by Orthotist for BL AFOs: 2 application intramuscular once a day     Evaluation by Orthotist for BL AFOs    For Cauda Equina Syndrome  ICD: G83. 4  gabapentin 300 mg oral capsule: 1 cap(s) orally 3 times a day  lidocaine 4% topical film: Apply topically to affected area once a day  lidocaine 4% topical film: Apply topically to affected area once a day  losartan 100 mg oral tablet: 1 tab(s) orally once a day  magnesium hydroxide 8% oral suspension: 30 milliliter(s) orally every 12 hours, As needed, Constipation  melatonin 3 mg oral tablet: 2 tab(s) orally once a day (at bedtime)  methocarbamol 750 mg oral tablet: 2 tab(s) orally 3 times a day  Multiple Vitamins oral tablet: 1 tab(s) orally once a day  oxyCODONE 10 mg oral tablet: 1 tab(s) orally every 4 hours, As needed, Severe Pain (7 - 10)  oxyCODONE 10 mg oral tablet: 1 tab(s) orally every 4 hours, As needed, Severe Pain (7 - 10)  oxyCODONE 5 mg oral tablet: 1 tab(s) orally every 4 hours, As needed, Moderate Pain (4 - 6)  oxyCODONE 5 mg oral tablet: 1 tab(s) orally every 4 hours, As needed, Moderate Pain (4 - 6)  polyethylene glycol 3350 oral powder for reconstitution: 17 gram(s) orally 2 times a day  senna leaf extract oral tablet: 2 tab(s) orally once a day (at bedtime)  simethicone 80 mg oral tablet, chewable: 1 tab(s) orally 3 times a day  simvastatin 40 mg oral tablet: 1 tab(s) orally once a day (at bedtime)  tamsulosin 0.4 mg oral capsule: 1 cap(s) orally once a day (at bedtime)   acetaminophen 325 mg oral tablet: 3 tab(s) orally every 6 hours  aspirin 81 mg oral delayed release tablet: 1 tab(s) orally once a day  carvedilol 12.5 mg oral tablet: 1 tab(s) orally every 12 hours  Evaluation by Orthotist for BL AFOs: 2 application intramuscular once a day     Evaluation by Orthotist for BL AFOs    For Cauda Equina Syndrome  ICD: G83. 4  gabapentin 300 mg oral capsule: 1 cap(s) orally 3 times a day  lidocaine 4% topical film: Apply topically to affected area once a day  lidocaine 4% topical film: Apply topically to affected area once a day  losartan 100 mg oral tablet: 1 tab(s) orally once a day  magnesium hydroxide 8% oral suspension: 30 milliliter(s) orally every 12 hours, As needed, Constipation  melatonin 3 mg oral tablet: 2 tab(s) orally once a day (at bedtime)  methocarbamol 750 mg oral tablet: 2 tab(s) orally 3 times a day  Multiple Vitamins oral tablet: 1 tab(s) orally once a day  oxyCODONE 10 mg oral tablet: 1 tab(s) orally every 4 hours, As needed, Severe Pain (7 - 10)  oxyCODONE 5 mg oral tablet: 1 tab(s) orally every 4 hours, As needed, Moderate Pain (4 - 6)  polyethylene glycol 3350 oral powder for reconstitution: 17 gram(s) orally 2 times a day  senna leaf extract oral tablet: 2 tab(s) orally once a day (at bedtime)  simethicone 80 mg oral tablet, chewable: 1 tab(s) orally 3 times a day  simvastatin 40 mg oral tablet: 1 tab(s) orally once a day (at bedtime)  tamsulosin 0.4 mg oral capsule: 1 cap(s) orally once a day (at bedtime)

## 2023-02-14 NOTE — PROVIDER CONTACT NOTE (OTHER) - SITUATION
Pt reported pain on back (location of surgical incision). Swelling around site is present. Pt reported pain on back. Swelling around site is present.

## 2023-02-14 NOTE — PROGRESS NOTE ADULT - ASSESSMENT
58 YO male with PMH of CAD, s/p CABG, HTN, HLD, ETOH abuse (no alcohol use in 2 years as per pt) who presented to HNT on 1/24 with back pain and progressive lower extremity weakness. Examination with concern for cauda equina. He was admitted for cauda equina syndrome and he  underwent urgent L2-3 laminectomy/ discectomy on 1/25/23.   Patient now with gait Instability, ADL impairments and Functional impairments.    * Monitor Neuropathic pain both feet, c/w gabapentin dose increased 2/10  * DC planning for home    #Cauda Equina Syndrome  - s/p L2-3 laminectomy/ discectomy on 1/25/23.  - Comprehensive Rehab Program: PT/OT, 3hours daily and 5 days weekly  -- Gabapentin 300mg TID    # Foot drop  Orthotic eval 2/9--measured for Rt AFO    NEED FOR RIGHT FOOT ORTHOTIC DEVICE  Dx: Cauda Equina syndrome    Patient presents with Rt foot drop with inversion and lower extremity weakness due to Cauda Equina syndrome    He requires a right custom AFO for support and stability during ambulation and while participating in physical therapy  Manual Muscle Testing showed Right hip 3/5 Knee flexion 3/5 Knee extension 2/5 Ankle Dorsiflexion 1/5, Plantar flexion 3/5,   Left hip flexion 4/5, Knee extension 4+/5, 4+/5 knee flexion, 4-/5 ankle dorsiflexion, 4+/5, Plantar flexion 4+/5  He is unable to use pre-fabricated brace as use of the orthotist will be for longer than six months and because the foot must be controlled in more than one plane  He remains at risk for fall without a custom AFO    #HTN  - Losartan 100mg daily  - Carvedilol 12.5mg BID    #CAD  -  s/p CABG  - Resume ASA 2/7    #HLD  - Zocor 40mg daily    #Pain management  - Tylenol PRN, lidocaine, Oxycodone PRN, Robaxin  - Gabapentin increased to 200mg TID 2/6  - Swiss cheese boots in bed 2/6    #DVT ppx  - Lovenox,     #Bowel Regimen  - Senna, miralax BID  - fleet enema PRN    #Bladder management  - Vergara Dcd 2/2- TOV  - Flomax  - Void OOB/sitting  - DC PVRs and BS 2/8    #Diet:  - Regular  - Supplements: MVI    #Skin:  - Skin  lumbar incision looks well healed. open to air currently  - Pressure injury/Skin: Turn Q2hrs while in bed, OOB to Chair, PT/OT    #Sleep:   - Maintain quiet hours and low stim environment.  - Melatonin 6mg nightly 2/7    #Mood  - NPSY following for emotional support and coping strategies    #Precaution  - Fall, Aspiration, spinal    IDT  2/9  TDD: 2/21 to home  Barriers: Lower body weakness.  Social Work: Lives in  with girlfriend and girlfriend's mother with 3 YANETH and 10 STI with 1 HR. 1st floor living if needed.   OT: CG for dressing. Min A for transfers and toileting.  PT: Min A for transfers. Ambulated 20ft (x2) with RW and mod A. WC propel 100ft independently.   SLP: --N/A  --------------------------------------------------------  Outpatient Follow-up (Specialty/Name of physician):  Zack Schaefer (MD; PhD)  Neurosurgery  [address] [place] [address], [address] [place] [address]  Phone: [phone]  Fax: [phone]  --------------------------------------------------------          Dx: Cauda Equina syndrome    Patient presents with Rt foot drop with inversion and lower extremity weakness due to   Cauda Equina syndrome    He requires a right custom AFO for support and stability during ambulation and while participating in physical therapy  Manual Muscle Testing showed Right hip 3/5 Knee flexion 3/5 Knee extension 2/5 Ankle Dorsiflexion 1/5, Plantar flexion 3/5,   Left hip flexion 4/5, Knee extension 4+/5, 4+/5 knee flexion, 4-/5 ankle dorsiflexion, 4+/5, Plantar flexion 4+/5  He is unable to use pre-fabricated brace as use of the orthotist will be for longer than six months and because the foot must be controlled in more than one plane  He remains at risk for fall without a custom AFO

## 2023-02-15 ENCOUNTER — NON-APPOINTMENT (OUTPATIENT)
Age: 58
End: 2023-02-15

## 2023-02-15 LAB
ALBUMIN SERPL ELPH-MCNC: 3.6 G/DL — SIGNIFICANT CHANGE UP (ref 3.3–5)
ALP SERPL-CCNC: 66 U/L — SIGNIFICANT CHANGE UP (ref 40–120)
ALT FLD-CCNC: 70 U/L — HIGH (ref 10–45)
ANION GAP SERPL CALC-SCNC: 8 MMOL/L — SIGNIFICANT CHANGE UP (ref 5–17)
AST SERPL-CCNC: 35 U/L — SIGNIFICANT CHANGE UP (ref 10–40)
BASOPHILS # BLD AUTO: 0.05 K/UL — SIGNIFICANT CHANGE UP (ref 0–0.2)
BASOPHILS NFR BLD AUTO: 0.7 % — SIGNIFICANT CHANGE UP (ref 0–2)
BILIRUB SERPL-MCNC: 0.3 MG/DL — SIGNIFICANT CHANGE UP (ref 0.2–1.2)
BUN SERPL-MCNC: 22 MG/DL — SIGNIFICANT CHANGE UP (ref 7–23)
CALCIUM SERPL-MCNC: 9.5 MG/DL — SIGNIFICANT CHANGE UP (ref 8.4–10.5)
CHLORIDE SERPL-SCNC: 102 MMOL/L — SIGNIFICANT CHANGE UP (ref 96–108)
CO2 SERPL-SCNC: 31 MMOL/L — SIGNIFICANT CHANGE UP (ref 22–31)
CREAT SERPL-MCNC: 0.61 MG/DL — SIGNIFICANT CHANGE UP (ref 0.5–1.3)
EGFR: 112 ML/MIN/1.73M2 — SIGNIFICANT CHANGE UP
EOSINOPHIL # BLD AUTO: 0.2 K/UL — SIGNIFICANT CHANGE UP (ref 0–0.5)
EOSINOPHIL NFR BLD AUTO: 2.8 % — SIGNIFICANT CHANGE UP (ref 0–6)
GLUCOSE SERPL-MCNC: 103 MG/DL — HIGH (ref 70–99)
HCT VFR BLD CALC: 39.4 % — SIGNIFICANT CHANGE UP (ref 39–50)
HGB BLD-MCNC: 13 G/DL — SIGNIFICANT CHANGE UP (ref 13–17)
IMM GRANULOCYTES NFR BLD AUTO: 0.3 % — SIGNIFICANT CHANGE UP (ref 0–0.9)
LYMPHOCYTES # BLD AUTO: 2.88 K/UL — SIGNIFICANT CHANGE UP (ref 1–3.3)
LYMPHOCYTES # BLD AUTO: 40.2 % — SIGNIFICANT CHANGE UP (ref 13–44)
MCHC RBC-ENTMCNC: 28.7 PG — SIGNIFICANT CHANGE UP (ref 27–34)
MCHC RBC-ENTMCNC: 33 GM/DL — SIGNIFICANT CHANGE UP (ref 32–36)
MCV RBC AUTO: 87 FL — SIGNIFICANT CHANGE UP (ref 80–100)
MONOCYTES # BLD AUTO: 0.81 K/UL — SIGNIFICANT CHANGE UP (ref 0–0.9)
MONOCYTES NFR BLD AUTO: 11.3 % — SIGNIFICANT CHANGE UP (ref 2–14)
NEUTROPHILS # BLD AUTO: 3.2 K/UL — SIGNIFICANT CHANGE UP (ref 1.8–7.4)
NEUTROPHILS NFR BLD AUTO: 44.7 % — SIGNIFICANT CHANGE UP (ref 43–77)
NRBC # BLD: 0 /100 WBCS — SIGNIFICANT CHANGE UP (ref 0–0)
PLATELET # BLD AUTO: 238 K/UL — SIGNIFICANT CHANGE UP (ref 150–400)
POTASSIUM SERPL-MCNC: 4.2 MMOL/L — SIGNIFICANT CHANGE UP (ref 3.5–5.3)
POTASSIUM SERPL-SCNC: 4.2 MMOL/L — SIGNIFICANT CHANGE UP (ref 3.5–5.3)
PROT SERPL-MCNC: 7.2 G/DL — SIGNIFICANT CHANGE UP (ref 6–8.3)
RBC # BLD: 4.53 M/UL — SIGNIFICANT CHANGE UP (ref 4.2–5.8)
RBC # FLD: 13.6 % — SIGNIFICANT CHANGE UP (ref 10.3–14.5)
SODIUM SERPL-SCNC: 141 MMOL/L — SIGNIFICANT CHANGE UP (ref 135–145)
WBC # BLD: 7.16 K/UL — SIGNIFICANT CHANGE UP (ref 3.8–10.5)
WBC # FLD AUTO: 7.16 K/UL — SIGNIFICANT CHANGE UP (ref 3.8–10.5)

## 2023-02-15 PROCEDURE — 99232 SBSQ HOSP IP/OBS MODERATE 35: CPT

## 2023-02-15 RX ADMIN — Medication 6 MILLIGRAM(S): at 21:03

## 2023-02-15 RX ADMIN — SIMETHICONE 80 MILLIGRAM(S): 80 TABLET, CHEWABLE ORAL at 06:39

## 2023-02-15 RX ADMIN — Medication 975 MILLIGRAM(S): at 11:48

## 2023-02-15 RX ADMIN — METHOCARBAMOL 1500 MILLIGRAM(S): 500 TABLET, FILM COATED ORAL at 15:10

## 2023-02-15 RX ADMIN — OXYCODONE HYDROCHLORIDE 5 MILLIGRAM(S): 5 TABLET ORAL at 02:16

## 2023-02-15 RX ADMIN — METHOCARBAMOL 1500 MILLIGRAM(S): 500 TABLET, FILM COATED ORAL at 21:02

## 2023-02-15 RX ADMIN — GABAPENTIN 300 MILLIGRAM(S): 400 CAPSULE ORAL at 21:04

## 2023-02-15 RX ADMIN — SIMETHICONE 80 MILLIGRAM(S): 80 TABLET, CHEWABLE ORAL at 14:28

## 2023-02-15 RX ADMIN — LOSARTAN POTASSIUM 100 MILLIGRAM(S): 100 TABLET, FILM COATED ORAL at 06:40

## 2023-02-15 RX ADMIN — Medication 975 MILLIGRAM(S): at 06:40

## 2023-02-15 RX ADMIN — GABAPENTIN 300 MILLIGRAM(S): 400 CAPSULE ORAL at 06:40

## 2023-02-15 RX ADMIN — CARVEDILOL PHOSPHATE 12.5 MILLIGRAM(S): 80 CAPSULE, EXTENDED RELEASE ORAL at 06:39

## 2023-02-15 RX ADMIN — Medication 81 MILLIGRAM(S): at 11:48

## 2023-02-15 RX ADMIN — TAMSULOSIN HYDROCHLORIDE 0.4 MILLIGRAM(S): 0.4 CAPSULE ORAL at 21:02

## 2023-02-15 RX ADMIN — Medication 1 TABLET(S): at 11:48

## 2023-02-15 RX ADMIN — METHOCARBAMOL 1500 MILLIGRAM(S): 500 TABLET, FILM COATED ORAL at 06:39

## 2023-02-15 RX ADMIN — ENOXAPARIN SODIUM 40 MILLIGRAM(S): 100 INJECTION SUBCUTANEOUS at 06:42

## 2023-02-15 RX ADMIN — SIMVASTATIN 40 MILLIGRAM(S): 20 TABLET, FILM COATED ORAL at 21:02

## 2023-02-15 RX ADMIN — GABAPENTIN 300 MILLIGRAM(S): 400 CAPSULE ORAL at 14:28

## 2023-02-15 RX ADMIN — Medication 975 MILLIGRAM(S): at 12:48

## 2023-02-15 RX ADMIN — OXYCODONE HYDROCHLORIDE 5 MILLIGRAM(S): 5 TABLET ORAL at 01:16

## 2023-02-15 RX ADMIN — POLYETHYLENE GLYCOL 3350 17 GRAM(S): 17 POWDER, FOR SOLUTION ORAL at 06:41

## 2023-02-15 RX ADMIN — LIDOCAINE 1 PATCH: 4 CREAM TOPICAL at 11:52

## 2023-02-15 RX ADMIN — CARVEDILOL PHOSPHATE 12.5 MILLIGRAM(S): 80 CAPSULE, EXTENDED RELEASE ORAL at 17:19

## 2023-02-15 NOTE — PROGRESS NOTE ADULT - SUBJECTIVE AND OBJECTIVE BOX
Patient is a 57y old  Male who presents with a chief complaint of Paraparesis/Cauda Equina Syndrome (15 Feb 2023 10:29)      24 HOUR EVENTS:  No overnight events reported.     SUBJECTIVE:  Patient seen and examined at bedside.     ALLERGIES:  No Known Allergies    MEDICATIONS  (STANDING):  acetaminophen     Tablet .. 975 milliGRAM(s) Oral every 6 hours  aspirin enteric coated 81 milliGRAM(s) Oral daily  carvedilol 12.5 milliGRAM(s) Oral every 12 hours  enoxaparin Injectable 40 milliGRAM(s) SubCutaneous <User Schedule>  gabapentin 300 milliGRAM(s) Oral three times a day  lidocaine   4% Patch 1 Patch Transdermal every 24 hours  lidocaine   4% Patch 1 Patch Transdermal daily  losartan 100 milliGRAM(s) Oral daily  melatonin 6 milliGRAM(s) Oral at bedtime  methocarbamol 1500 milliGRAM(s) Oral three times a day  multivitamin 1 Tablet(s) Oral daily  polyethylene glycol 3350 17 Gram(s) Oral two times a day  senna 2 Tablet(s) Oral at bedtime  simethicone 80 milliGRAM(s) Chew three times a day  simvastatin 40 milliGRAM(s) Oral at bedtime  tamsulosin 0.4 milliGRAM(s) Oral at bedtime    MEDICATIONS  (PRN):  lidocaine 2% Jelly 3 milliLiter(s) IntraUrethral every 6 hours PRN For straight catheterization  magnesium hydroxide Suspension 30 milliLiter(s) Oral every 12 hours PRN Constipation  mineral oil enema 133 milliLiter(s) Rectal daily PRN constipation  oxyCODONE    IR 5 milliGRAM(s) Oral every 4 hours PRN Moderate Pain (4 - 6)  oxyCODONE    IR 10 milliGRAM(s) Oral every 4 hours PRN Severe Pain (7 - 10)    Vital Signs Last 24 Hrs  T(F): 98 (14 Feb 2023 20:53), Max: 98 (14 Feb 2023 20:53)  HR: 79 (15 Feb 2023 06:49) (73 - 79)  BP: 119/77 (15 Feb 2023 06:49) (110/77 - 144/79)  RR: 16 (14 Feb 2023 20:53) (16 - 16)  SpO2: 100% (14 Feb 2023 20:53) (100% - 100%)  I&O's Summary    PHYSICAL EXAM:  General: NAD, Awake and alert  ENT: Moist mucous membranes, no thrush  Neck: Supple, No JVD  Lungs: Clear to auscultation bilaterally, good air entry, non-labored breathing  Cardio: RRR, S1/S2, No murmur  Abdomen: Soft, Nontender, Nondistended; Bowel sounds present  Extremities: No calf tenderness, No pitting edema, no contractures.    LABS:                        13.0   7.16  )-----------( 238      ( 15 Feb 2023 06:12 )             39.4     02-15    141  |  102  |  22  ----------------------------<  103  4.2   |  31  |  0.61    Ca    9.5      15 Feb 2023 06:12    TPro  7.2  /  Alb  3.6  /  TBili  0.3  /  DBili  x   /  AST  35  /  ALT  70  /  AlkPhos  66  02-15    COVID-19 PCR: NotDetec (01-31-23 @ 00:00)  COVID-19 PCR: NotDetec (01-30-23 @ 02:16)    RADIOLOGY & ADDITIONAL TESTS:    < from: CT Lumbar Spine w/ IV Cont (02.14.23 @ 21:20) >  Impression: Subcutaneous fluid collection at the L2-3 level new since   1/26/2023 likely represents a contained leak or seroma cannotexclude   infection. All    < end of copied text >    < from: MR Lumbar Spine w/wo IV Cont (01.26.23 @ 10:01) >  IMPRESSION:    MRI THORACIC SPINE:  No spinal cord compression or abnormal intrinsic cord signal.    No abnormal enhancement in the thoracic region.    Mild multilevel degenerative changes without significant spinal canal   stenosis or neural foraminal narrowing.    MRI LUMBAR SPINE:  Interval total L2 laminectomy.    Previously seen ventral disc material at L2-L3 has been largely resected.   Residual moderate to severe high-grade stenosis due residual disc bulge,   thin enhancing ventral granulation tissue, and persistent posterior   facet/ligamentous hypertrophy. Mild right and mild to moderate left   neural foraminal narrowing.    Other degenerative changes similar to the prior MRI.    < end of copied text >      Care Discussed with Consultants/Other Providers:    Patient is a 57y old  Male who presents with a chief complaint of Paraparesis/Cauda Equina Syndrome (15 Feb 2023 10:29)      24 HOUR EVENTS:  No overnight events reported.     SUBJECTIVE:  Patient seen and examined at bedside. Notes swelling in midline of back, but denies associated pain, fevers/chills/ erythema and drainage.    ALLERGIES:  No Known Allergies    MEDICATIONS  (STANDING):  acetaminophen     Tablet .. 975 milliGRAM(s) Oral every 6 hours  aspirin enteric coated 81 milliGRAM(s) Oral daily  carvedilol 12.5 milliGRAM(s) Oral every 12 hours  enoxaparin Injectable 40 milliGRAM(s) SubCutaneous <User Schedule>  gabapentin 300 milliGRAM(s) Oral three times a day  lidocaine   4% Patch 1 Patch Transdermal every 24 hours  lidocaine   4% Patch 1 Patch Transdermal daily  losartan 100 milliGRAM(s) Oral daily  melatonin 6 milliGRAM(s) Oral at bedtime  methocarbamol 1500 milliGRAM(s) Oral three times a day  multivitamin 1 Tablet(s) Oral daily  polyethylene glycol 3350 17 Gram(s) Oral two times a day  senna 2 Tablet(s) Oral at bedtime  simethicone 80 milliGRAM(s) Chew three times a day  simvastatin 40 milliGRAM(s) Oral at bedtime  tamsulosin 0.4 milliGRAM(s) Oral at bedtime    MEDICATIONS  (PRN):  lidocaine 2% Jelly 3 milliLiter(s) IntraUrethral every 6 hours PRN For straight catheterization  magnesium hydroxide Suspension 30 milliLiter(s) Oral every 12 hours PRN Constipation  mineral oil enema 133 milliLiter(s) Rectal daily PRN constipation  oxyCODONE    IR 5 milliGRAM(s) Oral every 4 hours PRN Moderate Pain (4 - 6)  oxyCODONE    IR 10 milliGRAM(s) Oral every 4 hours PRN Severe Pain (7 - 10)    Vital Signs Last 24 Hrs  T(F): 98 (14 Feb 2023 20:53), Max: 98 (14 Feb 2023 20:53)  HR: 79 (15 Feb 2023 06:49) (73 - 79)  BP: 119/77 (15 Feb 2023 06:49) (110/77 - 144/79)  RR: 16 (14 Feb 2023 20:53) (16 - 16)  SpO2: 100% (14 Feb 2023 20:53) (100% - 100%)  I&O's Summary    PHYSICAL EXAM:  General: NAD, Awake and alert  ENT: Moist mucous membranes, no thrush  Neck: Supple, No JVD  Lungs: Clear to auscultation bilaterally, good air entry, non-labored breathing  Cardio: RRR, S1/S2, No murmur  Abdomen: Soft, Nontender, Nondistended; Bowel sounds present  Extremities: No calf tenderness, No pitting edema, no contractures.  Skin: midline of back with swelling, no erythema or warmth.    LABS:                        13.0   7.16  )-----------( 238      ( 15 Feb 2023 06:12 )             39.4     02-15    141  |  102  |  22  ----------------------------<  103  4.2   |  31  |  0.61    Ca    9.5      15 Feb 2023 06:12    TPro  7.2  /  Alb  3.6  /  TBili  0.3  /  DBili  x   /  AST  35  /  ALT  70  /  AlkPhos  66  02-15    COVID-19 PCR: NotDetec (01-31-23 @ 00:00)  COVID-19 PCR: NotDetec (01-30-23 @ 02:16)    RADIOLOGY & ADDITIONAL TESTS:    < from: CT Lumbar Spine w/ IV Cont (02.14.23 @ 21:20) >  Impression: Subcutaneous fluid collection at the L2-3 level new since   1/26/2023 likely represents a contained leak or seroma cannotexclude   infection. All        < from: MR Lumbar Spine w/wo IV Cont (01.26.23 @ 10:01) >  IMPRESSION:  MRI THORACIC SPINE:  No spinal cord compression or abnormal intrinsic cord signal.  No abnormal enhancement in the thoracic region.  Mild multilevel degenerative changes without significant spinal canal   stenosis or neural foraminal narrowing.  MRI LUMBAR SPINE:  Interval total L2 laminectomy.  Previously seen ventral disc material at L2-L3 has been largely resected.   Residual moderate to severe high-grade stenosis due residual disc bulge,   thin enhancing ventral granulation tissue, and persistent posterior   facet/ligamentous hypertrophy. Mild right and mild to moderate left   neural foraminal narrowing.    Other degenerative changes similar to the prior MRI.        Care Discussed with Consultants/Other Providers:

## 2023-02-15 NOTE — PROGRESS NOTE ADULT - SUBJECTIVE AND OBJECTIVE BOX
CC: Cauda Equina    Subjective and objective:   Patient seen and evaluated during therapy this AM with Dr. Herring.  Admits to poor sleep as he was worried about surgical site swelling overnight.  Emotional during encounter.   Discussed CT imaging findings and collaboration with NSGY.  Reassured nothing to do at this time, and he will follow up outpatient.   Last BM on 2/12, per chart review. Pt denies any bowel issues.   No other complaints at this time.     Denies HA, dizziness, visual changes, CP, SOB, cough, nausea, abd pain, or urinary symptoms.      Therapy--observed ambulating with walker, ace wrap to right foot, > 100 ft, CGA  R knee buckling this morning with OT.      Vital Signs Last 24 Hrs  T(C): 36.7 (14 Feb 2023 20:53), Max: 36.7 (14 Feb 2023 20:53)  T(F): 98 (14 Feb 2023 20:53), Max: 98 (14 Feb 2023 20:53)  HR: 79 (15 Feb 2023 06:49) (73 - 79)  BP: 119/77 (15 Feb 2023 06:49) (110/77 - 144/79)  BP(mean): --  RR: 16 (14 Feb 2023 20:53) (16 - 16)  SpO2: 100% (14 Feb 2023 20:53) (100% - 100%)      PHYSICAL EXAM:  Constitutional -Comfortable  HEENT - NAD  Neck - Supple, No limited ROM  Pulm - resp nonlabored  Cardio - warm and well perfused   Abdomen -  Soft, NTND  Extremities - No peripheral edema, No calf tenderness     Neurologic Exam:                 Cognition - AOx4  Motor -                      LEFT    UE - ShAB 5/5, EF 5/5, EE 5/5, WE 5/5,  WNL                    RIGHT UE - ShAB 5/5, EF 5/5, EE 5/5, WE 5/5,  WNL                    LEFT    LE - HF 2/5, KE 3+/5, DF 1/5, PF 4/5 Strong hip extensors4+/5                    RIGHT LE - HF 2/5, KE 2+/5, DF 0/5, PF 4/5     Sensory - decrease sensation below the knee. R>L  Psychiatric - Mood stable, Affect WNL  Skin lumbar incision intact - healed, fluctuance/edema noted. No erythema or drainage.             LAB                        13.0   7.16  )-----------( 238      ( 15 Feb 2023 06:12 )             39.4     02-15    141  |  102  |  22  ----------------------------<  103<H>  4.2   |  31  |  0.61    Ca    9.5      15 Feb 2023 06:12    TPro  7.2  /  Alb  3.6  /  TBili  0.3  /  DBili  x   /  AST  35  /  ALT  70<H>  /  AlkPhos  66  02-15    LIVER FUNCTIONS - ( 15 Feb 2023 06:12 )  Alb: 3.6 g/dL / Pro: 7.2 g/dL / ALK PHOS: 66 U/L / ALT: 70 U/L / AST: 35 U/L / GGT: x             MEDICATIONS  (STANDING):  acetaminophen     Tablet .. 975 milliGRAM(s) Oral every 6 hours  aspirin enteric coated 81 milliGRAM(s) Oral daily  carvedilol 12.5 milliGRAM(s) Oral every 12 hours  enoxaparin Injectable 40 milliGRAM(s) SubCutaneous <User Schedule>  gabapentin 200 milliGRAM(s) Oral three times a day  lidocaine   4% Patch 1 Patch Transdermal daily  lidocaine   4% Patch 1 Patch Transdermal every 24 hours  losartan 100 milliGRAM(s) Oral daily  melatonin 6 milliGRAM(s) Oral at bedtime  methocarbamol 1500 milliGRAM(s) Oral three times a day  multivitamin 1 Tablet(s) Oral daily  polyethylene glycol 3350 17 Gram(s) Oral two times a day  senna 2 Tablet(s) Oral at bedtime  simvastatin 40 milliGRAM(s) Oral at bedtime  tamsulosin 0.4 milliGRAM(s) Oral at bedtime    MEDICATIONS  (PRN):  lidocaine 2% Jelly 3 milliLiter(s) IntraUrethral every 6 hours PRN For straight catheterization  magnesium hydroxide Suspension 30 milliLiter(s) Oral every 12 hours PRN Constipation  oxyCODONE    IR 5 milliGRAM(s) Oral every 4 hours PRN Moderate Pain (4 - 6)  oxyCODONE    IR 10 milliGRAM(s) Oral every 4 hours PRN Severe Pain (7 - 10)   CC: Cauda Equina    Subjective and objective:   Patient seen and evaluated during therapy this AM with Dr. Herring.  Admits to poor sleep as he was worried about surgical site swelling overnight.  Emotional during encounter.   Discussed CT imaging findings and collaboration with NSGY.  Reassured nothing to do at this time, and he will follow up outpatient.   Last BM on 2/12, per chart review. Pt denies any bowel issues.   No other complaints at this time.     Denies HA, dizziness, visual changes, CP, SOB, cough, nausea, abd pain, or urinary symptoms.      Therapy--observed ambulating with walker, ace wrap to right foot, > 100 ft, CGA  R knee buckling this morning with OT.      Vital Signs Last 24 Hrs  T(C): 36.7 (14 Feb 2023 20:53), Max: 36.7 (14 Feb 2023 20:53)  T(F): 98 (14 Feb 2023 20:53), Max: 98 (14 Feb 2023 20:53)  HR: 79 (15 Feb 2023 06:49) (73 - 79)  BP: 119/77 (15 Feb 2023 06:49) (110/77 - 144/79)  BP(mean): --  RR: 16 (14 Feb 2023 20:53) (16 - 16)  SpO2: 100% (14 Feb 2023 20:53) (100% - 100%)      PHYSICAL EXAM:  Constitutional -Comfortable  HEENT - NAD  Neck - Supple, No limited ROM  Pulm - resp nonlabored  Cardio - warm and well perfused   Abdomen -  Soft, NTND  Extremities - No peripheral edema, No calf tenderness     Neurologic Exam:                 Cognition - AOx4  Motor -                      LEFT    UE - ShAB 5/5, EF 5/5, EE 5/5, WE 5/5,  WNL                    RIGHT UE - ShAB 5/5, EF 5/5, EE 5/5, WE 5/5,  WNL                    LEFT    LE - HF 2/5, KE 3+/5, DF 1/5, PF 4/5 Strong hip extensors4+/5                    RIGHT LE - HF 2/5, KE 2+/5, DF 0/5, PF 4/5     Sensory - decrease sensation below the knee. R>L  Psychiatric - Mood stable, Affect WNL  Skin lumbar incision intact - healed, mild edema noted. No erythema or drainage or fluctuance            LAB                        13.0   7.16  )-----------( 238      ( 15 Feb 2023 06:12 )             39.4     02-15    141  |  102  |  22  ----------------------------<  103<H>  4.2   |  31  |  0.61    Ca    9.5      15 Feb 2023 06:12    TPro  7.2  /  Alb  3.6  /  TBili  0.3  /  DBili  x   /  AST  35  /  ALT  70<H>  /  AlkPhos  66  02-15    LIVER FUNCTIONS - ( 15 Feb 2023 06:12 )  Alb: 3.6 g/dL / Pro: 7.2 g/dL / ALK PHOS: 66 U/L / ALT: 70 U/L / AST: 35 U/L / GGT: x             MEDICATIONS  (STANDING):  acetaminophen     Tablet .. 975 milliGRAM(s) Oral every 6 hours  aspirin enteric coated 81 milliGRAM(s) Oral daily  carvedilol 12.5 milliGRAM(s) Oral every 12 hours  enoxaparin Injectable 40 milliGRAM(s) SubCutaneous <User Schedule>  gabapentin 200 milliGRAM(s) Oral three times a day  lidocaine   4% Patch 1 Patch Transdermal daily  lidocaine   4% Patch 1 Patch Transdermal every 24 hours  losartan 100 milliGRAM(s) Oral daily  melatonin 6 milliGRAM(s) Oral at bedtime  methocarbamol 1500 milliGRAM(s) Oral three times a day  multivitamin 1 Tablet(s) Oral daily  polyethylene glycol 3350 17 Gram(s) Oral two times a day  senna 2 Tablet(s) Oral at bedtime  simvastatin 40 milliGRAM(s) Oral at bedtime  tamsulosin 0.4 milliGRAM(s) Oral at bedtime    MEDICATIONS  (PRN):  lidocaine 2% Jelly 3 milliLiter(s) IntraUrethral every 6 hours PRN For straight catheterization  magnesium hydroxide Suspension 30 milliLiter(s) Oral every 12 hours PRN Constipation  oxyCODONE    IR 5 milliGRAM(s) Oral every 4 hours PRN Moderate Pain (4 - 6)  oxyCODONE    IR 10 milliGRAM(s) Oral every 4 hours PRN Severe Pain (7 - 10)   CC: Cauda Equina    Subjective and objective:   Patient seen and evaluated during therapy this AM with Dr. Herring.  Admits to poor sleep as he was worried about surgical site swelling overnight.  Emotional during encounter.   Discussed CT imaging findings and collaboration with NSGY.  Reassured nothing to do at this time, and he will follow up outpatient.   Last BM on 2/12, per chart review. Pt denies any bowel issues.   No other complaints at this time.     Denies HA, dizziness, visual changes, CP, SOB, cough, nausea, abd pain, or urinary symptoms.      Therapy--observed ambulating with walker, ace wrap to right foot, > 100 ft, CGA  R knee buckling this morning with OT.      Interval discomfort over lower back--surgical area,  Patient reports intense personal exercise, stretching of LE and back a day prior  No acute back pain  CT Lumbar spine showed possible seroma/leak  Neurosurgery phone consult by night team recommended observation and possible out patient fluid tap    No signs of infection    Will get neurology review of surgical area and CT findings  Will inform operating surgeon afterwards--CT findings and neuro recs  Monitor for signs of infection    Vital Signs Last 24 Hrs  T(C): 36.7 (14 Feb 2023 20:53), Max: 36.7 (14 Feb 2023 20:53)  T(F): 98 (14 Feb 2023 20:53), Max: 98 (14 Feb 2023 20:53)  HR: 79 (15 Feb 2023 06:49) (73 - 79)  BP: 119/77 (15 Feb 2023 06:49) (110/77 - 144/79)  BP(mean): --  RR: 16 (14 Feb 2023 20:53) (16 - 16)  SpO2: 100% (14 Feb 2023 20:53) (100% - 100%)      PHYSICAL EXAM:  Constitutional -Comfortable  HEENT - NAD  Neck - Supple, No limited ROM  Pulm - resp nonlabored  Cardio - warm and well perfused   Abdomen -  Soft, NTND  Extremities - No peripheral edema, No calf tenderness     Neurologic Exam:                 Cognition - AOx4  Motor -                      LEFT    UE - ShAB 5/5, EF 5/5, EE 5/5, WE 5/5,  WNL                    RIGHT UE - ShAB 5/5, EF 5/5, EE 5/5, WE 5/5,  WNL                    LEFT    LE - HF 2/5, KE 3+/5, DF 1/5, PF 4/5 Strong hip extensors4+/5                    RIGHT LE - HF 2/5, KE 2+/5, DF 0/5, PF 4/5     Sensory - decrease sensation below the knee. R>L  Psychiatric - Mood stable, Affect WNL  Skin lumbar incision intact - healed, mild edema noted. No erythema or drainage or fluctuance            LAB                        13.0   7.16  )-----------( 238      ( 15 Feb 2023 06:12 )             39.4     02-15    141  |  102  |  22  ----------------------------<  103<H>  4.2   |  31  |  0.61    Ca    9.5      15 Feb 2023 06:12    TPro  7.2  /  Alb  3.6  /  TBili  0.3  /  DBili  x   /  AST  35  /  ALT  70<H>  /  AlkPhos  66  02-15    LIVER FUNCTIONS - ( 15 Feb 2023 06:12 )  Alb: 3.6 g/dL / Pro: 7.2 g/dL / ALK PHOS: 66 U/L / ALT: 70 U/L / AST: 35 U/L / GGT: x             MEDICATIONS  (STANDING):  acetaminophen     Tablet .. 975 milliGRAM(s) Oral every 6 hours  aspirin enteric coated 81 milliGRAM(s) Oral daily  carvedilol 12.5 milliGRAM(s) Oral every 12 hours  enoxaparin Injectable 40 milliGRAM(s) SubCutaneous <User Schedule>  gabapentin 200 milliGRAM(s) Oral three times a day  lidocaine   4% Patch 1 Patch Transdermal daily  lidocaine   4% Patch 1 Patch Transdermal every 24 hours  losartan 100 milliGRAM(s) Oral daily  melatonin 6 milliGRAM(s) Oral at bedtime  methocarbamol 1500 milliGRAM(s) Oral three times a day  multivitamin 1 Tablet(s) Oral daily  polyethylene glycol 3350 17 Gram(s) Oral two times a day  senna 2 Tablet(s) Oral at bedtime  simvastatin 40 milliGRAM(s) Oral at bedtime  tamsulosin 0.4 milliGRAM(s) Oral at bedtime    MEDICATIONS  (PRN):  lidocaine 2% Jelly 3 milliLiter(s) IntraUrethral every 6 hours PRN For straight catheterization  magnesium hydroxide Suspension 30 milliLiter(s) Oral every 12 hours PRN Constipation  oxyCODONE    IR 5 milliGRAM(s) Oral every 4 hours PRN Moderate Pain (4 - 6)  oxyCODONE    IR 10 milliGRAM(s) Oral every 4 hours PRN Severe Pain (7 - 10)

## 2023-02-15 NOTE — PROGRESS NOTE ADULT - NS ATTEND AMEND GEN_ALL_CORE FT
Seen and examined  Findings as noted  Note revised    Patient worried about seroma on surgical area and recent Rt knee buckling  Discussed update as noted from surgeon  No signs of acute infection,   Surgical scar unremarkable, no erythema or fluctuance    Rt knee buckling is due to LE weakness, awaiting Rt foot AFO already measured    Clinically stable    Continue PT/OT  Monitor surgical area--lumbar spine for skin break, warmth or erythema, on increasing swelling  Await AFO  Therapy to f/u with patient regarding multiple stairs at home  Continue DC plan as stated Seen and examined  Findings as noted  Note revised    Patient worried about seroma on surgical area and recent Rt knee buckling  Discussed update as noted from surgeon  No signs of acute infection,   Surgical scar unremarkable, no erythema or fluctuance    Rt knee buckling is due to LE weakness, awaiting Rt foot AFO already measured    Clinically stable    Continue PT/OT  Monitor surgical area--lumbar spine for skin break, warmth or erythema, on increasing swelling  Await Neurology review, will update operating surgeon with CT and neuro recs afterwards, f/u labs with inflammatory markers tomorrow  Await AFO  Therapy to f/u with patient regarding multiple stairs at home  Continue DC plan as stated

## 2023-02-15 NOTE — PROGRESS NOTE ADULT - ASSESSMENT
58 YO male with PMH of CAD, s/p CABG, HTN, HLD, ETOH abuse (no alcohol use in 2 years as per pt) who presented to [facility] on 1/24 with back pain and progressive lower extremity weakness. Examination with concern for cauda equina. He was admitted for cauda equina syndrome and he  underwent urgent L2-3 laminectomy/ discectomy on 1/25/23.   Patient now with gait Instability, ADL impairments and Functional impairments.    * Fluctuance noted at surgical site- CT imaging revealed possible infection, liquified hematoma or seroma- NSGY recs: non-urgent, pt to follow up outpatient * Continue to monitor rehab progress * Await RLE AFO *     #Cauda Equina Syndrome  - s/p L2-3 laminectomy/ discectomy on 1/25/23.  - Comprehensive Rehab Program: PT/OT, 3hours daily and 5 days weekly  -- Gabapentin 300mg TID  - 2/14 overnight: fluctuance noted at surgical incision site- no erythema or drainage  -- CT LS revealed possible infection, liquified hematoma or seroma  -- (No fever, or s/s of infection at this time)   -- NSGY: Dr. Schaefer recs- non-urgent. Follow up outpatient for possible aspiration/sampling/drainage    # Foot drop  Orthotic eval 2/9--measured for Rt AFO    NEED FOR RIGHT FOOT ORTHOTIC DEVICE  Dx: Cauda Equina syndrome    Patient presents with Rt foot drop with inversion and lower extremity weakness due to Cauda Equina syndrome    He requires a right custom AFO for support and stability during ambulation and while participating in physical therapy  Manual Muscle Testing showed Right hip 3/5 Knee flexion 3/5 Knee extension 2/5 Ankle Dorsiflexion 1/5, Plantar flexion 3/5,   Left hip flexion 4/5, Knee extension 4+/5, 4+/5 knee flexion, 4-/5 ankle dorsiflexion, 4+/5, Plantar flexion 4+/5  He is unable to use pre-fabricated brace as use of the orthotist will be for longer than six months and because the foot must be controlled in more than one plane  He remains at risk for fall without a custom AFO    #HTN  - Losartan 100mg daily  - Carvedilol 12.5mg BID    #CAD  -  s/p CABG  - Resume ASA 2/7    #HLD  - Zocor 40mg daily    #Pain management  - Tylenol PRN, lidocaine, Oxycodone PRN, Robaxin  - Gabapentin increased to 200mg TID 2/6  - Swiss cheese boots in bed 2/6    #DVT ppx  - Lovenox    #Bowel Regimen  - Senna, miralax BID  - fleet enema PRN    #Bladder management  - Vergara Dcd 2/2- TOV  - Flomax  - Void OOB/sitting  - DC PVRs and BS 2/8    #Diet:  - Regular  - Supplements: MVI    #Skin:  - Skin  lumbar incision looks well healed. open to air currently  - Pressure injury/Skin: Turn Q2hrs while in bed, OOB to Chair, PT/OT    #Sleep:   - Maintain quiet hours and low stim environment.  - Melatonin 6mg nightly 2/7    #Mood  - NPSY following for emotional support and coping strategies    #Precaution  - Fall, Aspiration, spinal    IDT  2/9  TDD: 2/21 to home  Barriers: Lower body weakness.  Social Work: Lives in  with girlfriend and girlfriend's mother with 3 YANETH and 10 STI with 1 HR. 1st floor living if needed.   OT: CG for dressing. Min A for transfers and toileting.  PT: Min A for transfers. Ambulated 20ft (x2) with RW and mod A. WC propel 100ft independently.   SLP: --N/A  --------------------------------------------------------  Outpatient Follow-up (Specialty/Name of physician):  Zack Schaefer (MD; PhD)  Neurosurgery  [address] [place] [address], [address]nd [place] [address]  Phone: [phone]  Fax: [phone]  -------------------------------------------------------- 56 YO male with PMH of CAD, s/p CABG, HTN, HLD, ETOH abuse (no alcohol use in 2 years as per pt) who presented to [facility] on 1/24 with back pain and progressive lower extremity weakness. Examination with concern for cauda equina. He was admitted for cauda equina syndrome and he  underwent urgent L2-3 laminectomy/ discectomy on 1/25/23.   Patient now with gait Instability, ADL impairments and Functional impairments.    * Fluctuance noted at surgical site- CT imaging revealed possible infection, liquified hematoma or seroma- NSGY recs: non-urgent, pt to follow up outpatient * Continue to monitor rehab progress * Await RLE AFO *   * Family (Girlfriend report that patient has multiple stairs at home---therapy to f/u on this)     #Cauda Equina Syndrome  - s/p L2-3 laminectomy/ discectomy on 1/25/23.  - Comprehensive Rehab Program: PT/OT, 3hours daily and 5 days weekly  -- Gabapentin 300mg TID  - 2/14 overnight: fluctuance noted at surgical incision site- no erythema or drainage  -- CT LS revealed possible infection, liquified hematoma or seroma  -- (No fever, or s/s of infection at this time)   -- NSGY: Dr. Olive garcia- non-urgent. Follow up outpatient for possible aspiration/sampling/drainage    # Foot drop  Orthotic eval 2/9--measured for Rt AFO    NEED FOR RIGHT FOOT ORTHOTIC DEVICE  Dx: Cauda Equina syndrome    Patient presents with Rt foot drop with inversion and lower extremity weakness due to Cauda Equina syndrome    He requires a right custom AFO for support and stability during ambulation and while participating in physical therapy  Manual Muscle Testing showed Right hip 3/5 Knee flexion 3/5 Knee extension 2/5 Ankle Dorsiflexion 1/5, Plantar flexion 3/5,   Left hip flexion 4/5, Knee extension 4+/5, 4+/5 knee flexion, 4-/5 ankle dorsiflexion, 4+/5, Plantar flexion 4+/5  He is unable to use pre-fabricated brace as use of the orthotist will be for longer than six months and because the foot must be controlled in more than one plane  He remains at risk for fall without a custom AFO    #HTN  - Losartan 100mg daily  - Carvedilol 12.5mg BID    #CAD  -  s/p CABG  - Resume ASA 2/7    #HLD  - Zocor 40mg daily    #Pain management  - Tylenol PRN, lidocaine, Oxycodone PRN, Robaxin  - Gabapentin increased to 200mg TID 2/6  - Swiss cheese boots in bed 2/6    #DVT ppx  - Lovenox    #Bowel Regimen  - Senna, miralax BID  - fleet enema PRN    #Bladder management  - Vergara Dcd 2/2- TOV  - Flomax  - Void OOB/sitting  - DC PVRs and BS 2/8    #Diet:  - Regular  - Supplements: MVI    #Skin:  - Skin  lumbar incision looks well healed. open to air currently  - Pressure injury/Skin: Turn Q2hrs while in bed, OOB to Chair, PT/OT    #Sleep:   - Maintain quiet hours and low stim environment.  - Melatonin 6mg nightly 2/7    #Mood  - NPSY following for emotional support and coping strategies    #Precaution  - Fall, Aspiration, spinal    IDT  2/9  TDD: 2/21 to home  Barriers: Lower body weakness.  Social Work: Lives in  with girlfriend and girlfriend's mother with 3 YANETH and 10 STI with 1 HR. 1st floor living if needed.   OT: CG for dressing. Min A for transfers and toileting.  PT: Min A for transfers. Ambulated 20ft (x2) with RW and mod A. WC propel 100ft independently.   SLP: --N/A  --------------------------------------------------------  Outpatient Follow-up (Specialty/Name of physician):  Zack Schaefer (MD; PhD)  Neurosurgery  [address] [facility], [address]nd [place] [address]  Phone: [phone]  Fax: [phone]  -------------------------------------------------------- 56 YO male with PMH of CAD, s/p CABG, HTN, HLD, ETOH abuse (no alcohol use in 2 years as per pt) who presented to [facility] on 1/24 with back pain and progressive lower extremity weakness. Examination with concern for cauda equina. He was admitted for cauda equina syndrome and he  underwent urgent L2-3 laminectomy/ discectomy on 1/25/23.   Patient now with gait Instability, ADL impairments and Functional impairments.    * Fluctuance noted at surgical site- CT imaging revealed possible infection, liquified hematoma or seroma- NSGY recs: non-urgent, pt to follow up outpatient  * Await Neurology review, will update operating surgeon with CT and neuro recs afterwards, f/u labs with inflammatory markers tomorrow    * Continue to monitor rehab progress * Await RLE AFO *   * Family (Girlfriend report that patient has multiple stairs at home---therapy to f/u on this)     #Cauda Equina Syndrome  - s/p L2-3 laminectomy/ discectomy on 1/25/23.  - Comprehensive Rehab Program: PT/OT, 3hours daily and 5 days weekly  -- Gabapentin 300mg TID  - 2/14 overnight: fluctuance noted at surgical incision site- no erythema or drainage  -- CT LS revealed possible infection, liquified hematoma or seroma  -- (No fever, or s/s of infection at this time)   -- NSGY: Dr. Olive garcia- non-urgent. Follow up outpatient for possible aspiration/sampling/drainage  --* Await Neurology review, will update operating surgeon with CT and neuro recs afterwards, f/u labs with inflammatory markers tomorrow  # Foot drop  Orthotic eval 2/9--measured for Rt AFO    NEED FOR RIGHT FOOT ORTHOTIC DEVICE  Dx: Cauda Equina syndrome    Patient presents with Rt foot drop with inversion and lower extremity weakness due to Cauda Equina syndrome    He requires a right custom AFO for support and stability during ambulation and while participating in physical therapy  Manual Muscle Testing showed Right hip 3/5 Knee flexion 3/5 Knee extension 2/5 Ankle Dorsiflexion 1/5, Plantar flexion 3/5,   Left hip flexion 4/5, Knee extension 4+/5, 4+/5 knee flexion, 4-/5 ankle dorsiflexion, 4+/5, Plantar flexion 4+/5  He is unable to use pre-fabricated brace as use of the orthotist will be for longer than six months and because the foot must be controlled in more than one plane  He remains at risk for fall without a custom AFO    #HTN  - Losartan 100mg daily  - Carvedilol 12.5mg BID    #CAD  -  s/p CABG  - Resume ASA 2/7    #HLD  - Zocor 40mg daily    #Pain management  - Tylenol PRN, lidocaine, Oxycodone PRN, Robaxin  - Gabapentin increased to 200mg TID 2/6  - Swiss cheese boots in bed 2/6    #DVT ppx  - Lovenox    #Bowel Regimen  - Senna, miralax BID  - fleet enema PRN    #Bladder management  - Vergara Dcd 2/2- TOV  - Flomax  - Void OOB/sitting  - DC PVRs and BS 2/8    #Diet:  - Regular  - Supplements: MVI    #Skin:  - Skin  lumbar incision looks well healed. open to air currently  - Pressure injury/Skin: Turn Q2hrs while in bed, OOB to Chair, PT/OT    #Sleep:   - Maintain quiet hours and low stim environment.  - Melatonin 6mg nightly 2/7    #Mood  - NPSY following for emotional support and coping strategies    #Precaution  - Fall, Aspiration, spinal    IDT  2/9  TDD: 2/21 to home  Barriers: Lower body weakness.  Social Work: Lives in  with girlfriend and girlfriend's mother with 3 YANETH and 10 STI with 1 HR. 1st floor living if needed.   OT: CG for dressing. Min A for transfers and toileting.  PT: Min A for transfers. Ambulated 20ft (x2) with RW and mod A. WC propel 100ft independently.   SLP: --N/A  --------------------------------------------------------  Outpatient Follow-up (Specialty/Name of physician):  Zack Schaefer (MD; PhD)  Neurosurgery  [address] [facility], [address]nd [place] [address]  Phone: [phone]  Fax: [phone]  -------------------------------------------------------- 56 YO male with PMH of CAD, s/p CABG, HTN, HLD, ETOH abuse (no alcohol use in 2 years as per pt) who presented to [facility] on 1/24 with back pain and progressive lower extremity weakness. Examination with concern for cauda equina. He was admitted for cauda equina syndrome and he  underwent urgent L2-3 laminectomy/ discectomy on 1/25/23.   Patient now with gait Instability, ADL impairments and Functional impairments.    * Fluctuance noted at surgical site- CT imaging revealed possible infection, liquified hematoma or seroma- NSGY recs: non-urgent, pt to follow up outpatient  * Await Neurology review, will update operating surgeon with CT and neuro recs afterwards, f/u labs with inflammatory markers tomorrow    * Continue to monitor rehab progress * Await RLE AFO *   * Family (Girlfriend report that patient has multiple stairs at home---therapy to f/u on this)     #Cauda Equina Syndrome  - s/p L2-3 laminectomy/ discectomy on 1/25/23.  - Comprehensive Rehab Program: PT/OT, 3hours daily and 5 days weekly  -- Gabapentin 300mg TID  - 2/14 overnight: fluctuance noted at surgical incision site- no erythema or drainage  -- CT LS revealed possible infection, liquified hematoma or seroma  -- (No fever, or s/s of infection at this time)   -- NSGY: Dr. Olive garcia- non-urgent. Follow up outpatient for possible aspiration/sampling/drainage  --* Await Neurology review, will update operating surgeon with CT and neuro recs afterwards, f/u labs with inflammatory markers tomorrow  # Foot drop  Orthotic eval 2/9--measured for Rt AFO    NEED FOR RIGHT FOOT ORTHOTIC DEVICE  Dx: Cauda Equina syndrome    Patient presents with Rt foot drop with inversion and lower extremity weakness due to Cauda Equina syndrome    He requires a right custom AFO for support and stability during ambulation and while participating in physical therapy  Manual Muscle Testing showed Right hip 3/5 Knee flexion 3/5 Knee extension 2/5 Ankle Dorsiflexion 1/5, Plantar flexion 3/5,   Left hip flexion 4/5, Knee extension 4+/5, 4+/5 knee flexion, 4-/5 ankle dorsiflexion, 4+/5, Plantar flexion 4+/5  He is unable to use pre-fabricated brace as use of the orthotist will be for longer than six months and because the foot must be controlled in more than one plane  He remains at risk for fall without a custom AFO    #HTN  - Losartan 100mg daily  - Carvedilol 12.5mg BID    #CAD  -  s/p CABG  - Resume ASA 2/7    #HLD  - Zocor 40mg daily    #Pain management  - Tylenol PRN, lidocaine, Oxycodone PRN, Robaxin  - Gabapentin increased to 200mg TID 2/6  - Swiss cheese boots in bed 2/6    #DVT ppx  - Lovenox    #Bowel Regimen  - Senna, miralax BID  - fleet enema PRN    #Bladder management  - Vergara Dcd 2/2- TOV  - Flomax  - Void OOB/sitting  - DC PVRs and BS 2/8    #Diet:  - Regular  - Supplements: MVI    #Skin:  - Skin  lumbar incision looks well healed. open to air currently  - Pressure injury/Skin: Turn Q2hrs while in bed, OOB to Chair, PT/OT    #Sleep:   - Maintain quiet hours and low stim environment.  - Melatonin 6mg nightly 2/7    #Mood  - NPSY following for emotional support and coping strategies    #Precaution  - Fall, Aspiration, spinal    IDT  2/9  TDD: 2/21 to home  Barriers: Lower body weakness.  Social Work: Lives in  with girlfriend and girlfriend's mother with 3 YANETH and 10 STI with 1 HR. 1st floor living if needed.   OT: CG for dressing. Min A for transfers and toileting.  PT: Min A for transfers. Ambulated 20ft (x2) with RW and mod A. WC propel 100ft independently.   SLP: --N/A    Liaison with family 2/15--I called and spoke with patient's girlfriend, Ms Rosy Gonzalez to update on patient's treatment   As she recently told therapists, she stated that patient has multiple stairs at home, not 4 as patient reports.  She stated that there are few stairs to entrace, but a flight of stairs to basement where patient will be residing post DC  The ground floor area is not available for habitation at this point  A family meeting is being organized for tomorrow at 1pm to discuss issue regarding stairs and make an appropriate dc plan    --------------------------------------------------------  Outpatient Follow-up (Specialty/Name of physician):  Zack Schaefer (MD; PhD)  [address], [address]nd [place] [address]  Phone: [phone]  Fax: [phone]  --------------------------------------------------------

## 2023-02-15 NOTE — PROGRESS NOTE ADULT - ASSESSMENT
57M with CAD s/p CABG, HTN, HLD, hx alcohol abuse, recently hospitalized for cauda equina syndrome requiring L2-L3 laminectomy/discectomy after injury while weight lifting, now in acute rehab    #Cauda Equina Syndrome  #Ambulatory dysfunction due to above  -PT/OT as per PMR team  -Pain control as needed    #CAD  -Continue ASA, statin, beta blocker    #Essential HTN  -c/w Losartan, Coreg    #Urinary retention  -Likely in part neurogenic from cauda equina syndrome, constipation and decreased mobility  -Vergara removed and pt voiding   -Continue tamsulosin    #DVT ppx: Lovenox   57M with CAD s/p CABG, HTN, HLD, hx alcohol abuse, recently hospitalized for cauda equina syndrome requiring L2-L3 laminectomy/discectomy after injury while weight lifting, now in acute rehab    #Cauda Equina Syndrome s/p L2/L3 lami/discectomy  #Ambulatory dysfunction due to above  -PT/OT as per PMR team  -Pain control as needed  -subcutaneous fluid collection at L2-L3 - contained leak or seroma. Resident overnight photos of lumbar spine to Neurosurgeon, Dr. Schaefer, who did not recommend urgent intervention- pt may need aspiration of fluid for analysis. Neurology consulted today to take a look and weigh in.    #CAD  -Continue ASA, statin, beta blocker    #Essential HTN  -c/w Losartan, Coreg    #Urinary retention  -Likely in part neurogenic from cauda equina syndrome, constipation and decreased mobility  -Vergara removed and pt voiding   -Continue tamsulosin    #DVT ppx: Lovenox

## 2023-02-16 ENCOUNTER — NON-APPOINTMENT (OUTPATIENT)
Age: 58
End: 2023-02-16

## 2023-02-16 LAB
ALBUMIN SERPL ELPH-MCNC: 3.5 G/DL — SIGNIFICANT CHANGE UP (ref 3.3–5)
ALP SERPL-CCNC: 66 U/L — SIGNIFICANT CHANGE UP (ref 40–120)
ALT FLD-CCNC: 71 U/L — HIGH (ref 10–45)
ANION GAP SERPL CALC-SCNC: 6 MMOL/L — SIGNIFICANT CHANGE UP (ref 5–17)
AST SERPL-CCNC: 36 U/L — SIGNIFICANT CHANGE UP (ref 10–40)
BILIRUB SERPL-MCNC: 0.3 MG/DL — SIGNIFICANT CHANGE UP (ref 0.2–1.2)
BUN SERPL-MCNC: 19 MG/DL — SIGNIFICANT CHANGE UP (ref 7–23)
CALCIUM SERPL-MCNC: 9.2 MG/DL — SIGNIFICANT CHANGE UP (ref 8.4–10.5)
CHLORIDE SERPL-SCNC: 100 MMOL/L — SIGNIFICANT CHANGE UP (ref 96–108)
CO2 SERPL-SCNC: 32 MMOL/L — HIGH (ref 22–31)
CREAT SERPL-MCNC: 0.64 MG/DL — SIGNIFICANT CHANGE UP (ref 0.5–1.3)
CRP SERPL-MCNC: 12 MG/L — HIGH
EGFR: 111 ML/MIN/1.73M2 — SIGNIFICANT CHANGE UP
ERYTHROCYTE [SEDIMENTATION RATE] IN BLOOD: 53 MM/HR — HIGH (ref 0–20)
GLUCOSE SERPL-MCNC: 109 MG/DL — HIGH (ref 70–99)
HCT VFR BLD CALC: 38.5 % — LOW (ref 39–50)
HGB BLD-MCNC: 12.8 G/DL — LOW (ref 13–17)
MCHC RBC-ENTMCNC: 28.8 PG — SIGNIFICANT CHANGE UP (ref 27–34)
MCHC RBC-ENTMCNC: 33.2 GM/DL — SIGNIFICANT CHANGE UP (ref 32–36)
MCV RBC AUTO: 86.5 FL — SIGNIFICANT CHANGE UP (ref 80–100)
NRBC # BLD: 0 /100 WBCS — SIGNIFICANT CHANGE UP (ref 0–0)
PLATELET # BLD AUTO: 235 K/UL — SIGNIFICANT CHANGE UP (ref 150–400)
POTASSIUM SERPL-MCNC: 3.8 MMOL/L — SIGNIFICANT CHANGE UP (ref 3.5–5.3)
POTASSIUM SERPL-SCNC: 3.8 MMOL/L — SIGNIFICANT CHANGE UP (ref 3.5–5.3)
PROT SERPL-MCNC: 7.2 G/DL — SIGNIFICANT CHANGE UP (ref 6–8.3)
RBC # BLD: 4.45 M/UL — SIGNIFICANT CHANGE UP (ref 4.2–5.8)
RBC # FLD: 13.5 % — SIGNIFICANT CHANGE UP (ref 10.3–14.5)
SODIUM SERPL-SCNC: 138 MMOL/L — SIGNIFICANT CHANGE UP (ref 135–145)
WBC # BLD: 7.13 K/UL — SIGNIFICANT CHANGE UP (ref 3.8–10.5)
WBC # FLD AUTO: 7.13 K/UL — SIGNIFICANT CHANGE UP (ref 3.8–10.5)

## 2023-02-16 PROCEDURE — 99232 SBSQ HOSP IP/OBS MODERATE 35: CPT

## 2023-02-16 RX ADMIN — Medication 1 TABLET(S): at 12:35

## 2023-02-16 RX ADMIN — OXYCODONE HYDROCHLORIDE 10 MILLIGRAM(S): 5 TABLET ORAL at 06:29

## 2023-02-16 RX ADMIN — Medication 975 MILLIGRAM(S): at 13:35

## 2023-02-16 RX ADMIN — GABAPENTIN 300 MILLIGRAM(S): 400 CAPSULE ORAL at 14:33

## 2023-02-16 RX ADMIN — Medication 975 MILLIGRAM(S): at 18:25

## 2023-02-16 RX ADMIN — Medication 975 MILLIGRAM(S): at 12:35

## 2023-02-16 RX ADMIN — SIMETHICONE 80 MILLIGRAM(S): 80 TABLET, CHEWABLE ORAL at 05:28

## 2023-02-16 RX ADMIN — LOSARTAN POTASSIUM 100 MILLIGRAM(S): 100 TABLET, FILM COATED ORAL at 05:27

## 2023-02-16 RX ADMIN — LIDOCAINE 1 PATCH: 4 CREAM TOPICAL at 04:57

## 2023-02-16 RX ADMIN — POLYETHYLENE GLYCOL 3350 17 GRAM(S): 17 POWDER, FOR SOLUTION ORAL at 17:25

## 2023-02-16 RX ADMIN — SIMVASTATIN 40 MILLIGRAM(S): 20 TABLET, FILM COATED ORAL at 21:22

## 2023-02-16 RX ADMIN — LIDOCAINE 1 PATCH: 4 CREAM TOPICAL at 12:35

## 2023-02-16 RX ADMIN — ENOXAPARIN SODIUM 40 MILLIGRAM(S): 100 INJECTION SUBCUTANEOUS at 05:27

## 2023-02-16 RX ADMIN — OXYCODONE HYDROCHLORIDE 10 MILLIGRAM(S): 5 TABLET ORAL at 15:34

## 2023-02-16 RX ADMIN — Medication 6 MILLIGRAM(S): at 21:21

## 2023-02-16 RX ADMIN — SENNA PLUS 2 TABLET(S): 8.6 TABLET ORAL at 21:22

## 2023-02-16 RX ADMIN — METHOCARBAMOL 1500 MILLIGRAM(S): 500 TABLET, FILM COATED ORAL at 05:27

## 2023-02-16 RX ADMIN — CARVEDILOL PHOSPHATE 12.5 MILLIGRAM(S): 80 CAPSULE, EXTENDED RELEASE ORAL at 05:27

## 2023-02-16 RX ADMIN — OXYCODONE HYDROCHLORIDE 10 MILLIGRAM(S): 5 TABLET ORAL at 14:34

## 2023-02-16 RX ADMIN — GABAPENTIN 300 MILLIGRAM(S): 400 CAPSULE ORAL at 21:21

## 2023-02-16 RX ADMIN — Medication 81 MILLIGRAM(S): at 12:35

## 2023-02-16 RX ADMIN — OXYCODONE HYDROCHLORIDE 10 MILLIGRAM(S): 5 TABLET ORAL at 05:29

## 2023-02-16 RX ADMIN — TAMSULOSIN HYDROCHLORIDE 0.4 MILLIGRAM(S): 0.4 CAPSULE ORAL at 21:22

## 2023-02-16 RX ADMIN — METHOCARBAMOL 1500 MILLIGRAM(S): 500 TABLET, FILM COATED ORAL at 21:22

## 2023-02-16 RX ADMIN — METHOCARBAMOL 1500 MILLIGRAM(S): 500 TABLET, FILM COATED ORAL at 14:33

## 2023-02-16 RX ADMIN — Medication 975 MILLIGRAM(S): at 17:25

## 2023-02-16 RX ADMIN — GABAPENTIN 300 MILLIGRAM(S): 400 CAPSULE ORAL at 05:29

## 2023-02-16 RX ADMIN — CARVEDILOL PHOSPHATE 12.5 MILLIGRAM(S): 80 CAPSULE, EXTENDED RELEASE ORAL at 17:24

## 2023-02-16 NOTE — PROGRESS NOTE ADULT - ASSESSMENT
56 YO male with PMH of CAD, s/p CABG, HTN, HLD, ETOH abuse (no alcohol use in 2 years as per pt) who presented to [facility] on 1/24 with back pain and progressive lower extremity weakness. Examination with concern for cauda equina. He was admitted for cauda equina syndrome and he  underwent urgent L2-3 laminectomy/ discectomy on 1/25/23.   Patient now with gait Instability, ADL impairments and Functional impairments.    * Fluctuance noted at surgical site- CT imaging revealed possible infection, liquified hematoma or seroma- NSGY recs: non-urgent, pt to follow up outpatient  * Await Neurology review, will update operating surgeon with CT and neuro recs afterwards, f/u labs with inflammatory markers tomorrow    * Continue to monitor rehab progress * Await RLE AFO *   * Family (Girlfriend report that patient has multiple stairs at home---therapy to f/u on this)     #Cauda Equina Syndrome  - s/p L2-3 laminectomy/ discectomy on 1/25/23.  - Comprehensive Rehab Program: PT/OT, 3hours daily and 5 days weekly  -- Gabapentin 300mg TID  - 2/14 overnight: fluctuance noted at surgical incision site- no erythema or drainage  -- CT LS revealed possible infection, liquified hematoma or seroma  -- (No fever, or s/s of infection at this time)   -- NSGY: Dr. Olive garcia- non-urgent. Follow up outpatient for possible aspiration/sampling/drainage  --* Await Neurology review, will update operating surgeon with CT and neuro recs afterwards, f/u labs with inflammatory markers tomorrow  # Foot drop  Orthotic eval 2/9--measured for Rt AFO    NEED FOR RIGHT FOOT ORTHOTIC DEVICE  Dx: Cauda Equina syndrome    Patient presents with Rt foot drop with inversion and lower extremity weakness due to Cauda Equina syndrome    He requires a right custom AFO for support and stability during ambulation and while participating in physical therapy  Manual Muscle Testing showed Right hip 3/5 Knee flexion 3/5 Knee extension 2/5 Ankle Dorsiflexion 1/5, Plantar flexion 3/5,   Left hip flexion 4/5, Knee extension 4+/5, 4+/5 knee flexion, 4-/5 ankle dorsiflexion, 4+/5, Plantar flexion 4+/5  He is unable to use pre-fabricated brace as use of the orthotist will be for longer than six months and because the foot must be controlled in more than one plane  He remains at risk for fall without a custom AFO    #HTN  - Losartan 100mg daily  - Carvedilol 12.5mg BID    #CAD  -  s/p CABG  - Resume ASA 2/7    #HLD  - Zocor 40mg daily    #Pain management  - Tylenol PRN, lidocaine, Oxycodone PRN, Robaxin  - Gabapentin increased to 200mg TID 2/6  - Swiss cheese boots in bed 2/6    #DVT ppx  - Lovenox    #Bowel Regimen  - Senna, miralax BID  - fleet enema PRN    #Bladder management  - Vergara Dcd 2/2- TOV  - Flomax  - Void OOB/sitting  - DC PVRs and BS 2/8    #Diet:  - Regular  - Supplements: MVI    #Skin:  - Skin  lumbar incision looks well healed. open to air currently  - Pressure injury/Skin: Turn Q2hrs while in bed, OOB to Chair, PT/OT    #Sleep:   - Maintain quiet hours and low stim environment.  - Melatonin 6mg nightly 2/7    #Mood  - NPSY following for emotional support and coping strategies    #Precaution  - Fall, Aspiration, spinal    IDT  2/9  TDD: 2/21 to home  Barriers: Lower body weakness.  Social Work: Lives in  with girlfriend and girlfriend's mother with 3 YANETH and 10 STI with 1 HR. 1st floor living if needed.   OT: CG for dressing. Min A for transfers and toileting.  PT: Min A for transfers. Ambulated 20ft (x2) with RW and mod A. WC propel 100ft independently.   SLP: --N/A    Liaison with family 2/15--I called and spoke with patient's girlfriend, Ms Rosy Gonzalez to update on patient's treatment   As she recently told therapists, she stated that patient has multiple stairs at home, not 4 as patient reports.  She stated that there are few stairs to entrace, but a flight of stairs to basement where patient will be residing post DC  The ground floor area is not available for habitation at this point  A family meeting is being organized for tomorrow at 1pm to discuss issue regarding stairs and make an appropriate dc plan    --------------------------------------------------------  Outpatient Follow-up (Specialty/Name of physician):  Zack Schaefer (MD; PhD)  [address], [address]nd [place] [address]  Phone: [phone]  Fax: [phone]  -------------------------------------------------------- 58 YO male with PMH of CAD, s/p CABG, HTN, HLD, ETOH abuse (no alcohol use in 2 years as per pt) who presented to [facility] on 1/24 with back pain and progressive lower extremity weakness. Examination with concern for cauda equina. He was admitted for cauda equina syndrome and he  underwent urgent L2-3 laminectomy/ discectomy on 1/25/23.   Patient now with gait Instability, ADL impairments and Functional impairments.    * Surgical site without s/s of infection, no fever- Await ESR/CRP * Continue to monitor * Continue to monitor rehab progress * Await RLE AFO * Family meeting today regarding dispo *     #Cauda Equina Syndrome  - s/p L2-3 laminectomy/ discectomy on 1/25/23.  - Comprehensive Rehab Program: PT/OT, 3hours daily and 5 days weekly  -- Gabapentin 300mg TID  - 2/14 overnight: fluctuance noted at surgical incision site- no erythema or drainage  -- CT LS revealed possible infection, liquified hematoma or seroma  -- (No fever, or s/s of infection at this time)   -- NSGY: Dr. Olive garcia- non-urgent. Follow up outpatient for possible aspiration/sampling/drainage  --* Await Neurology review, will update operating surgeon with CT and neuro recs afterwards, f/u labs with inflammatory markers tomorrow  # Foot drop  Orthotic eval 2/9--measured for Rt AFO    NEED FOR RIGHT FOOT ORTHOTIC DEVICE  Dx: Cauda Equina syndrome    Patient presents with Rt foot drop with inversion and lower extremity weakness due to Cauda Equina syndrome    He requires a right custom AFO for support and stability during ambulation and while participating in physical therapy  Manual Muscle Testing showed Right hip 3/5 Knee flexion 3/5 Knee extension 2/5 Ankle Dorsiflexion 1/5, Plantar flexion 3/5,   Left hip flexion 4/5, Knee extension 4+/5, 4+/5 knee flexion, 4-/5 ankle dorsiflexion, 4+/5, Plantar flexion 4+/5  He is unable to use pre-fabricated brace as use of the orthotist will be for longer than six months and because the foot must be controlled in more than one plane  He remains at risk for fall without a custom AFO    #HTN  - Losartan 100mg daily  - Carvedilol 12.5mg BID    #CAD  -  s/p CABG  - Resume ASA 2/7    #HLD  - Zocor 40mg daily    #Pain management  - Tylenol PRN, lidocaine, Oxycodone PRN, Robaxin  - Gabapentin increased to 200mg TID 2/6  - Swiss cheese boots in bed 2/6    #DVT ppx  - Lovenox    #Bowel Regimen  - Senna, miralax BID  - fleet enema PRN    #Bladder management  - Vergara Dcd 2/2- TOV  - Flomax  - Void OOB/sitting  - DC PVRs and BS 2/8    #Diet:  - Regular  - Supplements: MVI    #Skin:  - Skin  lumbar incision looks well healed. open to air currently  - Pressure injury/Skin: Turn Q2hrs while in bed, OOB to Chair, PT/OT    #Sleep:   - Maintain quiet hours and low stim environment.  - Melatonin 6mg nightly 2/7    #Mood  - NPSY following for emotional support and coping strategies    #Precaution  - Fall, Aspiration, spinal    IDT  2/9  TDD: 2/21 to home  Barriers: Lower body weakness.  Social Work: Lives in  with girlfriend and girlfriend's mother with 3 YANETH and 10 STI with 1 HR. 1st floor living if needed.   OT: CG for dressing. Min A for transfers and toileting.  PT: Min A for transfers. Ambulated 20ft (x2) with RW and mod A. WC propel 100ft independently.   SLP: --N/A    Liaison with family 2/15--I called and spoke with patient's girlfriend, Ms Rosy Gonzalez to update on patient's treatment   As she recently told therapists, she stated that patient has multiple stairs at home, not 4 as patient reports.  She stated that there are few stairs to entrace, but a flight of stairs to basement where patient will be residing post DC  The ground floor area is not available for habitation at this point  A family meeting is being organized for tomorrow at 1pm to discuss issue regarding stairs and make an appropriate dc plan    --------------------------------------------------------  Outpatient Follow-up (Specialty/Name of physician):  Zack Schaefer (MD; PhD)  Neurosurgery  [address] [facility], [address]nd [place] [address]  Phone: [phone]  Fax: [phone]  -------------------------------------------------------- 56 YO male with PMH of CAD, s/p CABG, HTN, HLD, ETOH abuse (no alcohol use in 2 years as per pt) who presented to [facility] on 1/24 with back pain and progressive lower extremity weakness. Examination with concern for cauda equina. He was admitted for cauda equina syndrome and he  underwent urgent L2-3 laminectomy/ discectomy on 1/25/23.   Patient now with gait Instability, ADL impairments and Functional impairments.    * Surgical site without s/s of infection, no fever- Await ESR/CRP * Continue to monitor * Continue to monitor rehab progress * Await RLE AFO * Family meeting today regarding dispo *     #Cauda Equina Syndrome  - s/p L2-3 laminectomy/ discectomy on 1/25/23.  - Comprehensive Rehab Program: PT/OT, 3hours daily and 5 days weekly  -- Gabapentin 300mg TID  - 2/14 overnight: fluctuance noted at surgical incision site- no erythema or drainage  -- CT LS revealed possible infection, liquified hematoma or seroma  -- (No fever, or s/s of infection at this time)   -- NSGY: Dr. Olive garcia- non-urgent. Follow up outpatient for possible aspiration/sampling/drainage  --* Await Neurology review, will update operating surgeon with CT and neuro recs afterwards, f/u labs with inflammatory markers tomorrow  # Foot drop  Orthotic eval 2/9--measured for Rt AFO    NEED FOR RIGHT FOOT ORTHOTIC DEVICE  Dx: Cauda Equina syndrome    Patient presents with Rt foot drop with inversion and lower extremity weakness due to Cauda Equina syndrome    He requires a right custom AFO for support and stability during ambulation and while participating in physical therapy  Manual Muscle Testing showed Right hip 3/5 Knee flexion 3/5 Knee extension 2/5 Ankle Dorsiflexion 1/5, Plantar flexion 3/5,   Left hip flexion 4/5, Knee extension 4+/5, 4+/5 knee flexion, 4-/5 ankle dorsiflexion, 4+/5, Plantar flexion 4+/5  He is unable to use pre-fabricated brace as use of the orthotist will be for longer than six months and because the foot must be controlled in more than one plane  He remains at risk for fall without a custom AFO    #HTN  - Losartan 100mg daily  - Carvedilol 12.5mg BID    #CAD  -  s/p CABG  - Resume ASA 2/7    #HLD  - Zocor 40mg daily    #Pain management  - Tylenol PRN, lidocaine, Oxycodone PRN, Robaxin  - Gabapentin increased to 200mg TID 2/6  - Swiss cheese boots in bed 2/6    #DVT ppx  - Lovenox    #Bowel Regimen  - Senna, miralax BID  - fleet enema PRN    #Bladder management  - Vergara Dcd 2/2- TOV  - Flomax  - Void OOB/sitting  - DC PVRs and BS 2/8    #Diet:  - Regular  - Supplements: MVI    #Skin:  - Skin  lumbar incision looks well healed. open to air currently  - Pressure injury/Skin: Turn Q2hrs while in bed, OOB to Chair, PT/OT    #Sleep:   - Maintain quiet hours and low stim environment.  - Melatonin 6mg nightly 2/7    #Mood  - NPSY following for emotional support and coping strategies    #Precaution  - Fall, Aspiration, spinal    IDT  2/16  TDD: 2/21 to home  Barriers: Lower body weakness.  Social Work: Lives in  with girlfriend and girlfriend's mother with 3 YANETH and 10 STI with 1 HR. 1st floor living if needed.   Pt plans on renting a hospital bed and will need popfly commode.  OT: Independent/supervision for ADLs. Min A for iADLS.   PT: Min A for transfers. Ambulated 75ft with RW with CG/min A. WC propel 100ft independently.   SLP: --N/A  Plan for family meeting 2/16.     Liaison with family 2/15--I called and spoke with patient's girlfriend, Ms Rosy Gonzalez to update on patient's treatment   As she recently told therapists, she stated that patient has multiple stairs at home, not 4 as patient reports.  She stated that there are few stairs to entrace, but a flight of stairs to basement where patient will be residing post DC  The ground floor area is not available for habitation at this point  A family meeting is being organized for tomorrow at 1pm to discuss issue regarding stairs and make an appropriate dc plan    --------------------------------------------------------  Outpatient Follow-up (Specialty/Name of physician):  Zack Schaefer (MD; PhD)  Neurosurgery  [address] [place] [address], [address] [place] [address]  Phone: [phone]  Fax: [phone]  --------------------------------------------------------

## 2023-02-16 NOTE — CHART NOTE - NSCHARTNOTEFT_GEN_A_CORE
Family meeting for discharge plan  Duration 1hr (1-2pm)  Attendance: Jose Lehman () Ms Magda Emily,&  Ms Hannah Hughes, (PT)  Ms Darline Colby ( OT) and Dr Herring  Ms Hess (patient's girl friend) and Ms Washington (patient's sister)    Aim--Discharge planning and to discussed issues with home (structural) situation    Mr Garcia introduced the subject for the meeting  Dr Herring have clinical update  Therapists gave functional update.   Ms Washington, reports recent back surgery, for which she is getting on going therapy. She has functional limitations, unable to assist patient physically  Her house has walk in entrance, ground floor bedroom, and shower (bath tub with glass door)  She is renting and unsure if the house owner will allow any modification such as removing the glass at bath tub and installing a shower pole    Ms Gallegos--Patient had been living in the basement apartment, with shower on first floor (12 stairs to basement). No bedroom on ground floor  She is happy to come over physically help patient with shower and all personal care, while patient resides at his sister's house    Need for structural renovation at either his girlfriend's or sister's house to ensure safe DC discussed and understood    Option of [facility] rehab on d/c from acute rehab discussed to ensure additional recovery time    Patient expressed displeasure for [facility] placement, as he feels confined for prolonged time in hospital  He reports willingness to continue body wiping instead of showers, while he resides in his sisters house ( if shower renovation is not possible)  His sister reports adequate room for a WC to her home  She was informed that a patient would require a hosp bed post discharge    Patient, girlfriend and sister agreed to think about the three options --d/c to either home or [facility] and update team in few days time  This is to enable team arrange for [facility] placement, if no progress with neither of the home dc options   The agreed to family training while d/c plan is in progress    DC plan for 2/22 remains valid for now

## 2023-02-16 NOTE — PROGRESS NOTE ADULT - NS ATTEND AMEND GEN_ALL_CORE FT
Seen and examined   Findings as noted  Note revised    Noted to have declined some of his meds last night, but compliant with his meds today    Tolerating diet  Reports that he will be going to live with his sister post dc  Sister will be attending family meeting with patient's girlfriend today    Observed in therapy, ambulating increasing distance    Continue PT/OT  Family meeting today  DC planning

## 2023-02-17 PROCEDURE — 99232 SBSQ HOSP IP/OBS MODERATE 35: CPT

## 2023-02-17 PROCEDURE — 90832 PSYTX W PT 30 MINUTES: CPT

## 2023-02-17 RX ADMIN — MAGNESIUM HYDROXIDE 30 MILLILITER(S): 400 TABLET, CHEWABLE ORAL at 15:20

## 2023-02-17 RX ADMIN — Medication 975 MILLIGRAM(S): at 12:51

## 2023-02-17 RX ADMIN — TAMSULOSIN HYDROCHLORIDE 0.4 MILLIGRAM(S): 0.4 CAPSULE ORAL at 21:14

## 2023-02-17 RX ADMIN — LIDOCAINE 1 PATCH: 4 CREAM TOPICAL at 12:51

## 2023-02-17 RX ADMIN — Medication 1 TABLET(S): at 12:51

## 2023-02-17 RX ADMIN — CARVEDILOL PHOSPHATE 12.5 MILLIGRAM(S): 80 CAPSULE, EXTENDED RELEASE ORAL at 05:07

## 2023-02-17 RX ADMIN — CARVEDILOL PHOSPHATE 12.5 MILLIGRAM(S): 80 CAPSULE, EXTENDED RELEASE ORAL at 18:43

## 2023-02-17 RX ADMIN — Medication 975 MILLIGRAM(S): at 23:58

## 2023-02-17 RX ADMIN — OXYCODONE HYDROCHLORIDE 10 MILLIGRAM(S): 5 TABLET ORAL at 19:23

## 2023-02-17 RX ADMIN — SENNA PLUS 2 TABLET(S): 8.6 TABLET ORAL at 21:14

## 2023-02-17 RX ADMIN — OXYCODONE HYDROCHLORIDE 5 MILLIGRAM(S): 5 TABLET ORAL at 03:15

## 2023-02-17 RX ADMIN — METHOCARBAMOL 1500 MILLIGRAM(S): 500 TABLET, FILM COATED ORAL at 21:14

## 2023-02-17 RX ADMIN — GABAPENTIN 300 MILLIGRAM(S): 400 CAPSULE ORAL at 15:20

## 2023-02-17 RX ADMIN — LOSARTAN POTASSIUM 100 MILLIGRAM(S): 100 TABLET, FILM COATED ORAL at 05:06

## 2023-02-17 RX ADMIN — LIDOCAINE 1 PATCH: 4 CREAM TOPICAL at 19:10

## 2023-02-17 RX ADMIN — LIDOCAINE 1 PATCH: 4 CREAM TOPICAL at 06:51

## 2023-02-17 RX ADMIN — Medication 975 MILLIGRAM(S): at 13:40

## 2023-02-17 RX ADMIN — Medication 6 MILLIGRAM(S): at 21:14

## 2023-02-17 RX ADMIN — OXYCODONE HYDROCHLORIDE 10 MILLIGRAM(S): 5 TABLET ORAL at 18:43

## 2023-02-17 RX ADMIN — ENOXAPARIN SODIUM 40 MILLIGRAM(S): 100 INJECTION SUBCUTANEOUS at 05:07

## 2023-02-17 RX ADMIN — METHOCARBAMOL 1500 MILLIGRAM(S): 500 TABLET, FILM COATED ORAL at 05:06

## 2023-02-17 RX ADMIN — Medication 81 MILLIGRAM(S): at 12:52

## 2023-02-17 RX ADMIN — GABAPENTIN 300 MILLIGRAM(S): 400 CAPSULE ORAL at 21:14

## 2023-02-17 RX ADMIN — Medication 975 MILLIGRAM(S): at 23:28

## 2023-02-17 RX ADMIN — POLYETHYLENE GLYCOL 3350 17 GRAM(S): 17 POWDER, FOR SOLUTION ORAL at 18:43

## 2023-02-17 RX ADMIN — METHOCARBAMOL 1500 MILLIGRAM(S): 500 TABLET, FILM COATED ORAL at 15:20

## 2023-02-17 RX ADMIN — SIMVASTATIN 40 MILLIGRAM(S): 20 TABLET, FILM COATED ORAL at 21:14

## 2023-02-17 RX ADMIN — GABAPENTIN 300 MILLIGRAM(S): 400 CAPSULE ORAL at 05:08

## 2023-02-17 NOTE — PROGRESS NOTE ADULT - SUBJECTIVE AND OBJECTIVE BOX
Patient is a 57y old  Male who presents with a chief complaint of Paraparesis/Cauda Equina Syndrome (16 Feb 2023 08:16)      24 HOUR EVENTS:  No overnight events reported.     SUBJECTIVE:  Patient seen and examined at bedside. He reports improvement in "swelling" of the back.  Denies fevers/chills/nausea/vomiting/diarrhea/nausea/shortness of breath.    ALLERGIES:  No Known Allergies    MEDICATIONS  (STANDING):  acetaminophen     Tablet .. 975 milliGRAM(s) Oral every 6 hours  aspirin enteric coated 81 milliGRAM(s) Oral daily  carvedilol 12.5 milliGRAM(s) Oral every 12 hours  enoxaparin Injectable 40 milliGRAM(s) SubCutaneous <User Schedule>  gabapentin 300 milliGRAM(s) Oral three times a day  lidocaine   4% Patch 1 Patch Transdermal every 24 hours  lidocaine   4% Patch 1 Patch Transdermal daily  losartan 100 milliGRAM(s) Oral daily  melatonin 6 milliGRAM(s) Oral at bedtime  methocarbamol 1500 milliGRAM(s) Oral three times a day  multivitamin 1 Tablet(s) Oral daily  polyethylene glycol 3350 17 Gram(s) Oral two times a day  senna 2 Tablet(s) Oral at bedtime  simvastatin 40 milliGRAM(s) Oral at bedtime  tamsulosin 0.4 milliGRAM(s) Oral at bedtime    MEDICATIONS  (PRN):  lidocaine 2% Jelly 3 milliLiter(s) IntraUrethral every 6 hours PRN For straight catheterization  magnesium hydroxide Suspension 30 milliLiter(s) Oral every 12 hours PRN Constipation  mineral oil enema 133 milliLiter(s) Rectal daily PRN constipation  oxyCODONE    IR 5 milliGRAM(s) Oral every 4 hours PRN Moderate Pain (4 - 6)  oxyCODONE    IR 10 milliGRAM(s) Oral every 4 hours PRN Severe Pain (7 - 10)    Vital Signs Last 24 Hrs  T(F): 97.4 (17 Feb 2023 08:33), Max: 98.6 (16 Feb 2023 21:06)  HR: 66 (17 Feb 2023 08:33) (66 - 79)  BP: 117/74 (17 Feb 2023 08:33) (112/79 - 118/77)  RR: 16 (17 Feb 2023 08:33) (16 - 16)  SpO2: 96% (17 Feb 2023 08:33) (96% - 98%)  I&O's Summary    16 Feb 2023 07:01  -  17 Feb 2023 07:00  --------------------------------------------------------  IN: 0 mL / OUT: 300 mL / NET: -300 mL      PHYSICAL EXAM:  General: NAD, Awake and alert  ENT: Moist mucous membranes, no thrush  Neck: Supple, No JVD  Lungs: Clear to auscultation bilaterally, good air entry, non-labored breathing  Cardio: RRR, S1/S2, No murmur  Abdomen: Soft, Nontender, Nondistended; Bowel sounds present  Extremities: No calf tenderness, No pitting edema, no contractures.  skin: minor swelling    LABS:                        12.8   7.13  )-----------( 235      ( 16 Feb 2023 06:30 )             38.5     02-16    138  |  100  |  19  ----------------------------<  109  3.8   |  32  |  0.64    Ca    9.2      16 Feb 2023 06:30    TPro  7.2  /  Alb  3.5  /  TBili  0.3  /  DBili  x   /  AST  36  /  ALT  71  /  AlkPhos  66  02-16      COVID-19 PCR: NotDetec (01-31-23 @ 00:00)  COVID-19 PCR: NotDetec (01-30-23 @ 02:16)    RADIOLOGY & ADDITIONAL TESTS:    < from: CT Lumbar Spine w/ IV Cont (02.14.23 @ 21:20) >  Impression: Subcutaneous fluid collection at the L2-3 level new since   1/26/2023 likely represents a contained leak or seroma cannotexclude   infection. All    < end of copied text >    Care Discussed with Consultants/Other Providers:

## 2023-02-17 NOTE — PROGRESS NOTE ADULT - ASSESSMENT
58 YO male with PMH of CAD, s/p CABG, HTN, HLD, ETOH abuse (no alcohol use in 2 years as per pt) who presented to [facility] on 1/24 with back pain and progressive lower extremity weakness. Examination with concern for cauda equina. He was admitted for cauda equina syndrome and he  underwent urgent L2-3 laminectomy/ discectomy on 1/25/23.   Patient now with gait Instability, ADL impairments and Functional impairments.    No signs of acute inflammation  * Seroma at lumbar surgical site, non progressive, no ulcer, Operating surgeon aware and plans to f/u as out patient    * Continue to monitor rehab progress * Await RLE AFO * Family meeting 2/16--details as noted  * Patient wants to live with sister on dc    #Cauda Equina Syndrome  - s/p L2-3 laminectomy/ discectomy on 1/25/23.  - Comprehensive Rehab Program: PT/OT, 3hours daily and 5 days weekly  -- Gabapentin 300mg TID  - 2/14 --Seroma at lumbar surgical site    -- CT LS revealed possible infection, liquified hematoma or seroma   -- NSGY: Dr. Olive garcia- non-urgent. Follow up outpatient for possible aspiration/sampling/drainage  --Neurology team recs no acute intervention f/u with operating surgeon as out patient  --Inflammatory markers, unremarkable  # Foot drop  Orthotic eval 2/9--measured for Rt AFO    NEED FOR RIGHT FOOT ORTHOTIC DEVICE  Dx: Cauda Equina syndrome    Patient presents with Rt foot drop with inversion and lower extremity weakness due to Cauda Equina syndrome    He requires a right custom AFO for support and stability during ambulation and while participating in physical therapy  Manual Muscle Testing showed Right hip 3/5 Knee flexion 3/5 Knee extension 2/5 Ankle Dorsiflexion 1/5, Plantar flexion 3/5,   Left hip flexion 4/5, Knee extension 4+/5, 4+/5 knee flexion, 4-/5 ankle dorsiflexion, 4+/5, Plantar flexion 4+/5  He is unable to use pre-fabricated brace as use of the orthotist will be for longer than six months and because the foot must be controlled in more than one plane  He remains at risk for fall without a custom AFO    #HTN  - Losartan 100mg daily  - Carvedilol 12.5mg BID    #CAD  -  s/p CABG  - Resume ASA 2/7    #HLD  - Zocor 40mg daily    #Pain management  - Tylenol PRN, lidocaine, Oxycodone PRN, Robaxin  - Gabapentin increased to 200mg TID 2/6  - Swiss cheese boots in bed 2/6    #DVT ppx  - Lovenox    #Bowel Regimen  - Senna, miralax BID  - fleet enema PRN    #Bladder management  - Vergara Dcd 2/2- TOV  - Flomax  - Void OOB/sitting  - DC PVRs and BS 2/8    #Diet:  - Regular  - Supplements: MVI    #Skin:  - Skin  lumbar incision looks well healed. open to air currently  - Pressure injury/Skin: Turn Q2hrs while in bed, OOB to Chair, PT/OT    #Sleep:   - Maintain quiet hours and low stim environment.  - Melatonin 6mg nightly 2/7    #Mood  - NPSY following for emotional support and coping strategies    #Precaution  - Fall, Aspiration, spinal    SISTER--Ms Padmini---      IDT  2/16  TDD: 2/21 to home (sister's house)   Barriers: Lower body weakness.  Social Work: Lives in  with girlfriend and girlfriend's mother with 3 YANETH and 10 STI with 1 HR. 1st floor living if needed.   Pt plans on renting a hospital bed and will need popfly commode.  OT: Independent/supervision for ADLs. Min A for iADLS.   PT: Min A for transfers. Ambulated 75ft with RW with CG/min A. WC propel 100ft independently.   SLP: --N/A   family meeting 2/16. --patient plans to d/c to sisters home    Liaison with family 2/15--I called and spoke with patient's girlfriend, Ms Rosy Gonzalez to update on patient's treatment   As she recently told therapists, she stated that patient has multiple stairs at home, not 4 as patient reports.  She stated that there are few stairs to entrace, but a flight of stairs to basement where patient will be residing post DC  The ground floor area is not available for habitation at this point  A family meeting is being organized for tomorrow at 1pm to discuss issue regarding stairs and make an appropriate dc plan    2/16--alia meeting with sister and girfriend, details as noted  2/17--Patient reports that he has agreed with his sister, he will be living with her post dc   --------------------------------------------------------  Outpatient Follow-up (Specialty/Name of physician):  Zack Schaefer (MD; PhD)  Neurosurgery  [address] [place] [address], [address] [place] [address]  Phone: [phone]  Fax: [phone]  --------------------------------------------------------

## 2023-02-17 NOTE — PROGRESS NOTE ADULT - ASSESSMENT
Mood is inially frustrated, as he describes events of the morning. He indicates being happy to see undersigned, as the "mental" aspects of inpatient rehabilitation are starting to bother him. Patient is looking forward to discharge to his sister's home and indicates his nephew also resides there and has offered assistance, as his sister is recovering from back surgery and cannot perform anything physical for patient. Discussed patient's personality and how perception of control (lack of) impacts his mood. Encouraged patient to make his needs/preferences known to staff. Patient continues to express motivation to get the most out of his rehab sessions. Plan: Continue supportive psychotherapy sessions to address mood and adjustment. Neuropsychology remains available.

## 2023-02-17 NOTE — PROGRESS NOTE ADULT - SUBJECTIVE AND OBJECTIVE BOX
Patient seen alone at bedside for 30-minute, supportive psychotherapy session. He indicates frustration with a couple of events which took place earlier this morning, which he found disruptive (e.g., early wake time and food tray taken while he was still eating). He indicates updated discharge plan is to go to his sister's home, as his girlfriend's home is not possible. He does not want to consider discharge to [facility], as he doesn't think he can psychologically tolerate another rehab facility at this point.

## 2023-02-17 NOTE — PROGRESS NOTE ADULT - ASSESSMENT
57M with CAD s/p CABG, HTN, HLD, hx alcohol abuse, recently hospitalized for cauda equina syndrome requiring L2-L3 laminectomy/discectomy after injury while weight lifting, now in acute rehab    #Cauda Equina Syndrome s/p L2/L3 lami/discectomy  #Ambulatory dysfunction due to above  -PT/OT as per PMR team  -Pain control as needed  -subcutaneous fluid collection at L2-L3 - contained leak or seroma.  Neurosurgeon, Dr. Schaefer, who did not recommend urgent intervention- pt may need aspiration of fluid for analysis. outpatient follow up.    #CAD  -Continue ASA, statin, beta blocker    #Essential HTN  -c/w Losartan, Coreg    #Urinary retention  -Likely in part neurogenic from cauda equina syndrome, constipation and decreased mobility  -Vergara removed and pt voiding   -Continue tamsulosin    #DVT ppx: Lovenox

## 2023-02-17 NOTE — PROGRESS NOTE ADULT - SUBJECTIVE AND OBJECTIVE BOX
CC: Cauda Equina    Subjective and objective:   Patient seen and evaluated  Reports unhappiness with being made to wake up early for body care  Otherwise happy with his therapy  Tolerating diet    Reports that he has agreed with sister, ,Ms Washington, that he will be living with her post DC and he agrees to details as discussed at Family meeting yesterday    ROS--No head ache N/V lbm 2/16  No back pain, weakness both distal feet still apparent     Therapy--observed ambulating with walker, ace wrap to right foot, > 100 ft, CGA    Labs--no signs of acute infection    Vital Signs Last 24 Hrs  T(C): 36.3 (17 Feb 2023 08:33), Max: 37 (16 Feb 2023 21:06)  T(F): 97.4 (17 Feb 2023 08:33), Max: 98.6 (16 Feb 2023 21:06)  HR: 66 (17 Feb 2023 08:33) (66 - 79)  BP: 117/74 (17 Feb 2023 08:33) (112/79 - 118/77)  RR: 16 (17 Feb 2023 08:33) (16 - 16)  SpO2: 96% (17 Feb 2023 08:33) (96% - 98%)  O2 Parameters below as of 17 Feb 2023 08:33  Patient On (Oxygen Delivery Method): room air      PHYSICAL EXAM:  Constitutional -Comfortable  HEENT - NAD  Neck - Supple, No limited ROM  Pulm - resp nonlabored  Cardio - warm and well perfused   Abdomen -  Soft, NTND  Extremities - No peripheral edema, No calf tenderness     Neurologic Exam:                 Cognition - AOx4  Motor -                      LEFT    UE - ShAB 5/5, EF 5/5, EE 5/5, WE 5/5,  WNL                    RIGHT UE - ShAB 5/5, EF 5/5, EE 5/5, WE 5/5,  WNL                    LEFT    LE - HF 2/5, KE 3+/5, DF 1/5, PF 4/5 Strong hip extensors4+/5                    RIGHT LE - HF 2/5, KE 2+/5, DF 0/5, PF 4/5     Sensory - decrease sensation below the knee. R>L  Psychiatric - Mood stable, Affect WNL  Skin lumbar incision intact - healed, mild edema noted. No erythema or drainage or fluctuance            LAB                        12.8   7.13  )-----------( 235      ( 16 Feb 2023 06:30 )             38.5     02-16    138  |  100  |  19  ----------------------------<  109<H>  3.8   |  32<H>  |  0.64    Ca    9.2      16 Feb 2023 06:30    TPro  7.2  /  Alb  3.5  /  TBili  0.3  /  DBili  x   /  AST  36  /  ALT  71<H>  /  AlkPhos  66  02-16    LIVER FUNCTIONS - ( 16 Feb 2023 06:30 )  Alb: 3.5 g/dL / Pro: 7.2 g/dL / ALK PHOS: 66 U/L / ALT: 71 U/L / AST: 36 U/L / GGT: x           2/16--crb 12 EsR 33    MEDICATIONS  (STANDING):  acetaminophen     Tablet .. 975 milliGRAM(s) Oral every 6 hours  aspirin enteric coated 81 milliGRAM(s) Oral daily  carvedilol 12.5 milliGRAM(s) Oral every 12 hours  enoxaparin Injectable 40 milliGRAM(s) SubCutaneous <User Schedule>  gabapentin 300 milliGRAM(s) Oral three times a day  lidocaine   4% Patch 1 Patch Transdermal every 24 hours  lidocaine   4% Patch 1 Patch Transdermal daily  losartan 100 milliGRAM(s) Oral daily  melatonin 6 milliGRAM(s) Oral at bedtime  methocarbamol 1500 milliGRAM(s) Oral three times a day  multivitamin 1 Tablet(s) Oral daily  polyethylene glycol 3350 17 Gram(s) Oral two times a day  senna 2 Tablet(s) Oral at bedtime  simvastatin 40 milliGRAM(s) Oral at bedtime  tamsulosin 0.4 milliGRAM(s) Oral at bedtime    MEDICATIONS  (PRN):  lidocaine 2% Jelly 3 milliLiter(s) IntraUrethral every 6 hours PRN For straight catheterization  magnesium hydroxide Suspension 30 milliLiter(s) Oral every 12 hours PRN Constipation  mineral oil enema 133 milliLiter(s) Rectal daily PRN constipation  oxyCODONE    IR 5 milliGRAM(s) Oral every 4 hours PRN Moderate Pain (4 - 6)  oxyCODONE    IR 10 milliGRAM(s) Oral every 4 hours PRN Severe Pain (7 - 10)

## 2023-02-18 PROCEDURE — 99232 SBSQ HOSP IP/OBS MODERATE 35: CPT

## 2023-02-18 RX ADMIN — ENOXAPARIN SODIUM 40 MILLIGRAM(S): 100 INJECTION SUBCUTANEOUS at 06:07

## 2023-02-18 RX ADMIN — OXYCODONE HYDROCHLORIDE 10 MILLIGRAM(S): 5 TABLET ORAL at 00:56

## 2023-02-18 RX ADMIN — LIDOCAINE 1 PATCH: 4 CREAM TOPICAL at 00:51

## 2023-02-18 RX ADMIN — Medication 1 TABLET(S): at 12:55

## 2023-02-18 RX ADMIN — Medication 975 MILLIGRAM(S): at 06:07

## 2023-02-18 RX ADMIN — LIDOCAINE 1 PATCH: 4 CREAM TOPICAL at 19:00

## 2023-02-18 RX ADMIN — GABAPENTIN 300 MILLIGRAM(S): 400 CAPSULE ORAL at 06:07

## 2023-02-18 RX ADMIN — Medication 975 MILLIGRAM(S): at 13:40

## 2023-02-18 RX ADMIN — OXYCODONE HYDROCHLORIDE 10 MILLIGRAM(S): 5 TABLET ORAL at 19:30

## 2023-02-18 RX ADMIN — Medication 81 MILLIGRAM(S): at 12:55

## 2023-02-18 RX ADMIN — LOSARTAN POTASSIUM 100 MILLIGRAM(S): 100 TABLET, FILM COATED ORAL at 06:07

## 2023-02-18 RX ADMIN — SENNA PLUS 2 TABLET(S): 8.6 TABLET ORAL at 21:19

## 2023-02-18 RX ADMIN — CARVEDILOL PHOSPHATE 12.5 MILLIGRAM(S): 80 CAPSULE, EXTENDED RELEASE ORAL at 18:54

## 2023-02-18 RX ADMIN — CARVEDILOL PHOSPHATE 12.5 MILLIGRAM(S): 80 CAPSULE, EXTENDED RELEASE ORAL at 06:07

## 2023-02-18 RX ADMIN — LIDOCAINE 1 PATCH: 4 CREAM TOPICAL at 15:33

## 2023-02-18 RX ADMIN — GABAPENTIN 300 MILLIGRAM(S): 400 CAPSULE ORAL at 21:16

## 2023-02-18 RX ADMIN — OXYCODONE HYDROCHLORIDE 10 MILLIGRAM(S): 5 TABLET ORAL at 18:54

## 2023-02-18 RX ADMIN — POLYETHYLENE GLYCOL 3350 17 GRAM(S): 17 POWDER, FOR SOLUTION ORAL at 06:07

## 2023-02-18 RX ADMIN — POLYETHYLENE GLYCOL 3350 17 GRAM(S): 17 POWDER, FOR SOLUTION ORAL at 18:54

## 2023-02-18 RX ADMIN — METHOCARBAMOL 1500 MILLIGRAM(S): 500 TABLET, FILM COATED ORAL at 06:07

## 2023-02-18 RX ADMIN — Medication 975 MILLIGRAM(S): at 12:55

## 2023-02-18 RX ADMIN — SIMVASTATIN 40 MILLIGRAM(S): 20 TABLET, FILM COATED ORAL at 21:19

## 2023-02-18 RX ADMIN — TAMSULOSIN HYDROCHLORIDE 0.4 MILLIGRAM(S): 0.4 CAPSULE ORAL at 21:19

## 2023-02-18 RX ADMIN — OXYCODONE HYDROCHLORIDE 10 MILLIGRAM(S): 5 TABLET ORAL at 06:16

## 2023-02-18 RX ADMIN — OXYCODONE HYDROCHLORIDE 10 MILLIGRAM(S): 5 TABLET ORAL at 01:26

## 2023-02-18 RX ADMIN — OXYCODONE HYDROCHLORIDE 10 MILLIGRAM(S): 5 TABLET ORAL at 06:46

## 2023-02-18 RX ADMIN — Medication 975 MILLIGRAM(S): at 06:37

## 2023-02-18 RX ADMIN — METHOCARBAMOL 1500 MILLIGRAM(S): 500 TABLET, FILM COATED ORAL at 15:33

## 2023-02-18 RX ADMIN — GABAPENTIN 300 MILLIGRAM(S): 400 CAPSULE ORAL at 15:33

## 2023-02-18 RX ADMIN — Medication 6 MILLIGRAM(S): at 21:17

## 2023-02-18 RX ADMIN — METHOCARBAMOL 1500 MILLIGRAM(S): 500 TABLET, FILM COATED ORAL at 21:20

## 2023-02-18 NOTE — PROGRESS NOTE ADULT - SUBJECTIVE AND OBJECTIVE BOX
Pt. seen and examined at bedside.  No overnight events.      REVIEW OF SYSTEMS  Constitutional - No fever,  No fatigue  Neurological - No headaches, No loss of strength; NUMBNESS RIGHT KNEE TO FOOT IMPROVED  Musculoskeletal - No joint pain, No joint swelling, No muscle pain    VITALS  T(C): 36.3 (02-18-23 @ 08:36), Max: 36.8 (02-18-23 @ 06:07)  HR: 62 (02-18-23 @ 08:36) (62 - 79)  BP: 110/74 (02-18-23 @ 08:36) (110/74 - 118/74)  RR: 16 (02-18-23 @ 08:36) (16 - 18)  SpO2: 97% (02-18-23 @ 08:36) (97% - 97%)  Wt(kg): --       MEDICATIONS   acetaminophen     Tablet .. 975 milliGRAM(s) every 6 hours  aspirin enteric coated 81 milliGRAM(s) daily  carvedilol 12.5 milliGRAM(s) every 12 hours  enoxaparin Injectable 40 milliGRAM(s) <User Schedule>  gabapentin 300 milliGRAM(s) three times a day  lidocaine   4% Patch 1 Patch every 24 hours  lidocaine   4% Patch 1 Patch daily  lidocaine 2% Jelly 3 milliLiter(s) every 6 hours PRN  losartan 100 milliGRAM(s) daily  magnesium hydroxide Suspension 30 milliLiter(s) every 12 hours PRN  melatonin 6 milliGRAM(s) at bedtime  methocarbamol 1500 milliGRAM(s) three times a day  mineral oil enema 133 milliLiter(s) daily PRN  multivitamin 1 Tablet(s) daily  oxyCODONE    IR 5 milliGRAM(s) every 4 hours PRN  oxyCODONE    IR 10 milliGRAM(s) every 4 hours PRN  polyethylene glycol 3350 17 Gram(s) two times a day  senna 2 Tablet(s) at bedtime  simvastatin 40 milliGRAM(s) at bedtime  tamsulosin 0.4 milliGRAM(s) at bedtime      RECENT LABS/IMAGING                        ---------  PHYSICAL EXAM  Constitutional - NAD, Comfortable  Pulm - Breathing comfortably, No wheezing  Abd - Soft, NTND  SPINE - INCISION - C/D/I  Extremities - No edema, No calf tenderness  Neurologic Exam -                    Cognitive - Awake, Alert     Communication - Fluent     Motor - No focal deficits     Sensory - Intact to LT  Psychiatric - Mood WNL, Affect WNL    ASSESSMENT/PLAN  57y Male with functional deficits 2' ABHIJIT -> S/P L2-3 LAMINECTOMY/DECOMPRESSION.  Continue current medical management  Pain - Tylenol PRN; LUI; METHOCARBAMOL; OXYCODONE; LIDO PATCH  DVT PPX - enoxaparin Injectable 40 milliGRAM(s)   Active issues - NONE  Continue 3hrs a day of comprehensive rehab program.

## 2023-02-18 NOTE — PROGRESS NOTE ADULT - SUBJECTIVE AND OBJECTIVE BOX
Patient seen and examined at bedside. No overnight events. Patient notes pain in his left leg which is improving.     ALLERGIES:  No Known Allergies    MEDICATIONS  (STANDING):  acetaminophen     Tablet .. 975 milliGRAM(s) Oral every 6 hours  aspirin enteric coated 81 milliGRAM(s) Oral daily  carvedilol 12.5 milliGRAM(s) Oral every 12 hours  enoxaparin Injectable 40 milliGRAM(s) SubCutaneous <User Schedule>  gabapentin 300 milliGRAM(s) Oral three times a day  lidocaine   4% Patch 1 Patch Transdermal every 24 hours  lidocaine   4% Patch 1 Patch Transdermal daily  losartan 100 milliGRAM(s) Oral daily  melatonin 6 milliGRAM(s) Oral at bedtime  methocarbamol 1500 milliGRAM(s) Oral three times a day  multivitamin 1 Tablet(s) Oral daily  polyethylene glycol 3350 17 Gram(s) Oral two times a day  senna 2 Tablet(s) Oral at bedtime  simvastatin 40 milliGRAM(s) Oral at bedtime  tamsulosin 0.4 milliGRAM(s) Oral at bedtime    MEDICATIONS  (PRN):  lidocaine 2% Jelly 3 milliLiter(s) IntraUrethral every 6 hours PRN For straight catheterization  magnesium hydroxide Suspension 30 milliLiter(s) Oral every 12 hours PRN Constipation  mineral oil enema 133 milliLiter(s) Rectal daily PRN constipation  oxyCODONE    IR 5 milliGRAM(s) Oral every 4 hours PRN Moderate Pain (4 - 6)  oxyCODONE    IR 10 milliGRAM(s) Oral every 4 hours PRN Severe Pain (7 - 10)    Vital Signs Last 24 Hrs  T(F): 97.3 (18 Feb 2023 08:36), Max: 98.2 (18 Feb 2023 06:07)  HR: 62 (18 Feb 2023 08:36) (62 - 79)  BP: 110/74 (18 Feb 2023 08:36) (110/74 - 118/74)  RR: 16 (18 Feb 2023 08:36) (16 - 18)  SpO2: 97% (18 Feb 2023 08:36) (97% - 97%)  I&O's Summary    17 Feb 2023 07:01  -  18 Feb 2023 07:00  --------------------------------------------------------  IN: 0 mL / OUT: 1500 mL / NET: -1500 mL        PHYSICAL EXAM:  Gen: nad, resting in bed  Neuro: aaox3, no focal deficits  Heent: eomi b/l, no jvd, no oral exudates  Pulm: cta b/l, no w/r/r  CV: +s1s2, no m/r/g  Ab: soft, nt/nd, normoactive bs x 4  Extrem: no edema, pulses intact and equal      LABS:                        12.8   7.13  )-----------( 235      ( 16 Feb 2023 06:30 )             38.5       02-16    138  |  100  |  19  ----------------------------<  109  3.8   |  32  |  0.64    Ca    9.2      16 Feb 2023 06:30    TPro  7.2  /  Alb  3.5  /  TBili  0.3  /  DBili  x   /  AST  36  /  ALT  71  /  AlkPhos  66  02-16        COVID-19 PCR: Celinatesantiago (01-31-23 @ 00:00)  COVID-19 PCR: Nuria (01-30-23 @ 02:16)

## 2023-02-18 NOTE — PROGRESS NOTE ADULT - ASSESSMENT
57M with CAD s/p CABG, HTN, HLD, hx alcohol abuse, recently hospitalized for cauda equina syndrome requiring L2-L3 laminectomy/discectomy after injury while weight lifting, now in acute rehab    Neurology  Cauda Equina Syndrome s/p L2/L3 lami/discectomy with Ambulatory dysfunction   -PT/OT as per PMR team  -Pain control as needed  -subcutaneous fluid collection at L2-L3 - contained leak or seroma.  Neurosurgeon, Dr. Schaefer, who did not recommend urgent intervention- pt may need aspiration of fluid for analysis. outpatient follow up.    Cardiovascular  Coronary Artery Disease   -Continue ASA, statin, beta blocker  Essential HTN  -c/w Losartan, Coreg    Gentiourinary  Urinary retention  -Likely in part neurogenic from cauda equina syndrome, constipation and decreased mobility  -Vergara removed and pt voiding   -Continue tamsulosin    DVT ppx: Lovenox  Diet: per primary  Pain: per primary    cased discussed during MDR

## 2023-02-19 PROCEDURE — 99232 SBSQ HOSP IP/OBS MODERATE 35: CPT

## 2023-02-19 RX ADMIN — LIDOCAINE 1 PATCH: 4 CREAM TOPICAL at 13:57

## 2023-02-19 RX ADMIN — GABAPENTIN 300 MILLIGRAM(S): 400 CAPSULE ORAL at 13:56

## 2023-02-19 RX ADMIN — Medication 81 MILLIGRAM(S): at 11:58

## 2023-02-19 RX ADMIN — TAMSULOSIN HYDROCHLORIDE 0.4 MILLIGRAM(S): 0.4 CAPSULE ORAL at 21:20

## 2023-02-19 RX ADMIN — OXYCODONE HYDROCHLORIDE 5 MILLIGRAM(S): 5 TABLET ORAL at 06:11

## 2023-02-19 RX ADMIN — Medication 1 TABLET(S): at 11:58

## 2023-02-19 RX ADMIN — POLYETHYLENE GLYCOL 3350 17 GRAM(S): 17 POWDER, FOR SOLUTION ORAL at 06:05

## 2023-02-19 RX ADMIN — METHOCARBAMOL 1500 MILLIGRAM(S): 500 TABLET, FILM COATED ORAL at 06:05

## 2023-02-19 RX ADMIN — LOSARTAN POTASSIUM 100 MILLIGRAM(S): 100 TABLET, FILM COATED ORAL at 06:05

## 2023-02-19 RX ADMIN — CARVEDILOL PHOSPHATE 12.5 MILLIGRAM(S): 80 CAPSULE, EXTENDED RELEASE ORAL at 18:29

## 2023-02-19 RX ADMIN — CARVEDILOL PHOSPHATE 12.5 MILLIGRAM(S): 80 CAPSULE, EXTENDED RELEASE ORAL at 06:05

## 2023-02-19 RX ADMIN — SIMVASTATIN 40 MILLIGRAM(S): 20 TABLET, FILM COATED ORAL at 21:20

## 2023-02-19 RX ADMIN — GABAPENTIN 300 MILLIGRAM(S): 400 CAPSULE ORAL at 06:11

## 2023-02-19 RX ADMIN — POLYETHYLENE GLYCOL 3350 17 GRAM(S): 17 POWDER, FOR SOLUTION ORAL at 18:31

## 2023-02-19 RX ADMIN — OXYCODONE HYDROCHLORIDE 10 MILLIGRAM(S): 5 TABLET ORAL at 19:03

## 2023-02-19 RX ADMIN — ENOXAPARIN SODIUM 40 MILLIGRAM(S): 100 INJECTION SUBCUTANEOUS at 06:05

## 2023-02-19 RX ADMIN — Medication 6 MILLIGRAM(S): at 21:23

## 2023-02-19 RX ADMIN — Medication 975 MILLIGRAM(S): at 11:59

## 2023-02-19 RX ADMIN — GABAPENTIN 300 MILLIGRAM(S): 400 CAPSULE ORAL at 21:23

## 2023-02-19 RX ADMIN — OXYCODONE HYDROCHLORIDE 10 MILLIGRAM(S): 5 TABLET ORAL at 18:29

## 2023-02-19 RX ADMIN — LIDOCAINE 1 PATCH: 4 CREAM TOPICAL at 03:33

## 2023-02-19 RX ADMIN — METHOCARBAMOL 1500 MILLIGRAM(S): 500 TABLET, FILM COATED ORAL at 13:56

## 2023-02-19 RX ADMIN — METHOCARBAMOL 1500 MILLIGRAM(S): 500 TABLET, FILM COATED ORAL at 21:20

## 2023-02-19 NOTE — CHART NOTE - NSCHARTNOTEFT_GEN_A_CORE
Nutrition Follow Up Note  Hospital Course   (Per Electronic Medical Record)    Source:  Patient [X]  Medical Record [X]      Diet: Diet, DASH/TLC:   Sodium & Cholesterol Restricted  Supplement Feeding Modality:  Oral  Ensure Plus High Protein Cans or Servings Per Day:  1       Frequency:  Two Times a day (02-01-23 @ 09:54) [Active]    At this time patient w/ adequate appetite/intake consuming % of meals. Reviewed preferences and menu alternatives; requesting fresh Fruit Cup at dinner noted in Kardex. No issues w/ chewing and swallowing. Denies N/V/D, however persistent constipation last BM 2/17 Per nursing flowsheets. Encouraged fluid/fiber at meals to assist w/ regular BM.      Enteral/Parenteral Nutrition: Not Applicable    Current Weight: 165.7lb on 2/11    Pertinent Medications: MEDICATIONS  (STANDING):  acetaminophen     Tablet .. 975 milliGRAM(s) Oral every 6 hours  aspirin enteric coated 81 milliGRAM(s) Oral daily  carvedilol 12.5 milliGRAM(s) Oral every 12 hours  enoxaparin Injectable 40 milliGRAM(s) SubCutaneous <User Schedule>  gabapentin 300 milliGRAM(s) Oral three times a day  lidocaine   4% Patch 1 Patch Transdermal every 24 hours  lidocaine   4% Patch 1 Patch Transdermal daily  losartan 100 milliGRAM(s) Oral daily  melatonin 6 milliGRAM(s) Oral at bedtime  methocarbamol 1500 milliGRAM(s) Oral three times a day  multivitamin 1 Tablet(s) Oral daily  polyethylene glycol 3350 17 Gram(s) Oral two times a day  senna 2 Tablet(s) Oral at bedtime  simvastatin 40 milliGRAM(s) Oral at bedtime  tamsulosin 0.4 milliGRAM(s) Oral at bedtime    MEDICATIONS  (PRN):  lidocaine 2% Jelly 3 milliLiter(s) IntraUrethral every 6 hours PRN For straight catheterization  magnesium hydroxide Suspension 30 milliLiter(s) Oral every 12 hours PRN Constipation  mineral oil enema 133 milliLiter(s) Rectal daily PRN constipation  oxyCODONE    IR 5 milliGRAM(s) Oral every 4 hours PRN Moderate Pain (4 - 6)  oxyCODONE    IR 10 milliGRAM(s) Oral every 4 hours PRN Severe Pain (7 - 10)      Pertinent Labs:   02-16 Alb 3.5 g/dL    Skin: Surgical Incision, Site Mid lower back    Edema: No edema noted per nursing flowsheet    Last Bowel Movement: on 2/17 Per nursing flowsheets     Estimated Needs:   [X] No Change Since Previous Assessment    Previous Nutrition Diagnosis:   Inadequate Energy Intake   related to poor appetite s/p CABG   patient report of poor intake x 1 week     [X] Resolved - addressed w/ PO intake % at meals  + Ensure Plus High Protein Daily 8 oz (Provides 350 kcal, 20 grams of protein) BID    New Nutrition Diagnosis: [X] Not Applicable    Interventions:   1. Recommend Continue Nutrition Plan of Care.    Monitoring & Evaluation:   [X] Weights   [X] PO Intake   [X] Skin Integrity   [X] Follow Up (Per Protocol)  [X] Tolerance to Diet Prescription   [X] Other: Labs     Registered Dietitian/Nutritionist Remains Available.  Alexandra Diaz RD, MS, CDN    Phone# [phone]

## 2023-02-19 NOTE — PROGRESS NOTE ADULT - SUBJECTIVE AND OBJECTIVE BOX
Patient seen and examined at bedside. No overnight events. Patient notes continued pain in his legs    ALLERGIES:  No Known Allergies    MEDICATIONS  (STANDING):  acetaminophen     Tablet .. 975 milliGRAM(s) Oral every 6 hours  aspirin enteric coated 81 milliGRAM(s) Oral daily  carvedilol 12.5 milliGRAM(s) Oral every 12 hours  enoxaparin Injectable 40 milliGRAM(s) SubCutaneous <User Schedule>  gabapentin 300 milliGRAM(s) Oral three times a day  lidocaine   4% Patch 1 Patch Transdermal every 24 hours  lidocaine   4% Patch 1 Patch Transdermal daily  losartan 100 milliGRAM(s) Oral daily  melatonin 6 milliGRAM(s) Oral at bedtime  methocarbamol 1500 milliGRAM(s) Oral three times a day  multivitamin 1 Tablet(s) Oral daily  polyethylene glycol 3350 17 Gram(s) Oral two times a day  senna 2 Tablet(s) Oral at bedtime  simvastatin 40 milliGRAM(s) Oral at bedtime  tamsulosin 0.4 milliGRAM(s) Oral at bedtime    MEDICATIONS  (PRN):  lidocaine 2% Jelly 3 milliLiter(s) IntraUrethral every 6 hours PRN For straight catheterization  magnesium hydroxide Suspension 30 milliLiter(s) Oral every 12 hours PRN Constipation  mineral oil enema 133 milliLiter(s) Rectal daily PRN constipation  oxyCODONE    IR 5 milliGRAM(s) Oral every 4 hours PRN Moderate Pain (4 - 6)  oxyCODONE    IR 10 milliGRAM(s) Oral every 4 hours PRN Severe Pain (7 - 10)    Vital Signs Last 24 Hrs  T(F): 98.3 (19 Feb 2023 08:10), Max: 98.3 (19 Feb 2023 08:10)  HR: 64 (19 Feb 2023 08:10) (64 - 91)  BP: 124/80 (19 Feb 2023 08:10) (124/80 - 136/85)  RR: 16 (19 Feb 2023 08:10) (16 - 16)  SpO2: 97% (19 Feb 2023 08:10) (94% - 97%)  I&O's Summary    18 Feb 2023 07:01  -  19 Feb 2023 07:00  --------------------------------------------------------  IN: 0 mL / OUT: 400 mL / NET: -400 mL        PHYSICAL EXAM:  Gen: nad, resting in bed  Neuro: aaox3, unchanged  Heent: eomi b/l, no jvd, no oral exudates  Pulm: cta b/l, no w/r/r  CV: +s1s2, no m/r/g  Ab: soft, nt/nd, normoactive bs x 4  Extrem: no edema, pulses intact and equal      LABS:  COVID-19 PCR: NotDetec (01-31-23 @ 00:00)  COVID-19 PCR: NotDetec (01-30-23 @ 02:16)

## 2023-02-19 NOTE — PROGRESS NOTE ADULT - SUBJECTIVE AND OBJECTIVE BOX
Pt. seen and examined at bedside.  No overnight events.      REVIEW OF SYSTEMS  Constitutional - No fever,  No fatigue  Neurological - No headaches, No loss of strength  Musculoskeletal - No joint pain, No joint swelling, No muscle pain    VITALS  T(C): 36.8 (02-19-23 @ 08:10), Max: 36.8 (02-19-23 @ 08:10)  HR: 64 (02-19-23 @ 08:10) (64 - 91)  BP: 124/80 (02-19-23 @ 08:10) (124/80 - 136/85)  RR: 16 (02-19-23 @ 08:10) (16 - 16)  SpO2: 97% (02-19-23 @ 08:10) (94% - 97%)  Wt(kg): --       MEDICATIONS   acetaminophen     Tablet .. 975 milliGRAM(s) every 6 hours  aspirin enteric coated 81 milliGRAM(s) daily  carvedilol 12.5 milliGRAM(s) every 12 hours  enoxaparin Injectable 40 milliGRAM(s) <User Schedule>  gabapentin 300 milliGRAM(s) three times a day  lidocaine   4% Patch 1 Patch every 24 hours  lidocaine   4% Patch 1 Patch daily  lidocaine 2% Jelly 3 milliLiter(s) every 6 hours PRN  losartan 100 milliGRAM(s) daily  magnesium hydroxide Suspension 30 milliLiter(s) every 12 hours PRN  melatonin 6 milliGRAM(s) at bedtime  methocarbamol 1500 milliGRAM(s) three times a day  mineral oil enema 133 milliLiter(s) daily PRN  multivitamin 1 Tablet(s) daily  oxyCODONE    IR 5 milliGRAM(s) every 4 hours PRN  oxyCODONE    IR 10 milliGRAM(s) every 4 hours PRN  polyethylene glycol 3350 17 Gram(s) two times a day  senna 2 Tablet(s) at bedtime  simvastatin 40 milliGRAM(s) at bedtime  tamsulosin 0.4 milliGRAM(s) at bedtime      RECENT LABS/IMAGING                        ---------  PHYSICAL EXAM  Constitutional - NAD, Comfortable  Pulm - Breathing comfortably, No wheezing  Abd - Soft, NTND  Extremities - No edema, No calf tenderness  SPINE - INCISION C/D/I  Neurologic Exam -                    Cognitive - Awake, Alert     Communication - Fluent     Motor - RIGHT ANKLE WEAKNESS     Sensory - RIGHT LEG NUMB  Psychiatric - Mood WNL, Affect WNL    ASSESSMENT/PLAN  57y Male with functional deficits 2' ABHIJIT -> S/P L2-3 LAMINECTOMY/DECOMPRESSION.  Continue current medical management  Pain - Tylenol PRN; LUI; LIDO PATCH; SMR; OXYCODONE PRN  DVT PPX - enoxaparin Injectable 40 milliGRAM(s)  Active issues - NONE  Continue 3hrs a day of comprehensive rehab program.

## 2023-02-20 PROCEDURE — 99232 SBSQ HOSP IP/OBS MODERATE 35: CPT

## 2023-02-20 RX ADMIN — GABAPENTIN 300 MILLIGRAM(S): 400 CAPSULE ORAL at 20:31

## 2023-02-20 RX ADMIN — METHOCARBAMOL 1500 MILLIGRAM(S): 500 TABLET, FILM COATED ORAL at 15:07

## 2023-02-20 RX ADMIN — OXYCODONE HYDROCHLORIDE 5 MILLIGRAM(S): 5 TABLET ORAL at 12:15

## 2023-02-20 RX ADMIN — LIDOCAINE 1 PATCH: 4 CREAM TOPICAL at 12:15

## 2023-02-20 RX ADMIN — ENOXAPARIN SODIUM 40 MILLIGRAM(S): 100 INJECTION SUBCUTANEOUS at 05:24

## 2023-02-20 RX ADMIN — SENNA PLUS 2 TABLET(S): 8.6 TABLET ORAL at 20:29

## 2023-02-20 RX ADMIN — OXYCODONE HYDROCHLORIDE 10 MILLIGRAM(S): 5 TABLET ORAL at 01:35

## 2023-02-20 RX ADMIN — GABAPENTIN 300 MILLIGRAM(S): 400 CAPSULE ORAL at 05:23

## 2023-02-20 RX ADMIN — Medication 6 MILLIGRAM(S): at 20:30

## 2023-02-20 RX ADMIN — METHOCARBAMOL 1500 MILLIGRAM(S): 500 TABLET, FILM COATED ORAL at 20:28

## 2023-02-20 RX ADMIN — OXYCODONE HYDROCHLORIDE 10 MILLIGRAM(S): 5 TABLET ORAL at 21:02

## 2023-02-20 RX ADMIN — SIMVASTATIN 40 MILLIGRAM(S): 20 TABLET, FILM COATED ORAL at 20:29

## 2023-02-20 RX ADMIN — LOSARTAN POTASSIUM 100 MILLIGRAM(S): 100 TABLET, FILM COATED ORAL at 05:23

## 2023-02-20 RX ADMIN — OXYCODONE HYDROCHLORIDE 10 MILLIGRAM(S): 5 TABLET ORAL at 02:35

## 2023-02-20 RX ADMIN — OXYCODONE HYDROCHLORIDE 10 MILLIGRAM(S): 5 TABLET ORAL at 22:02

## 2023-02-20 RX ADMIN — LIDOCAINE 1 PATCH: 4 CREAM TOPICAL at 05:19

## 2023-02-20 RX ADMIN — Medication 975 MILLIGRAM(S): at 18:26

## 2023-02-20 RX ADMIN — METHOCARBAMOL 1500 MILLIGRAM(S): 500 TABLET, FILM COATED ORAL at 05:24

## 2023-02-20 RX ADMIN — Medication 1 TABLET(S): at 12:16

## 2023-02-20 RX ADMIN — CARVEDILOL PHOSPHATE 12.5 MILLIGRAM(S): 80 CAPSULE, EXTENDED RELEASE ORAL at 18:26

## 2023-02-20 RX ADMIN — CARVEDILOL PHOSPHATE 12.5 MILLIGRAM(S): 80 CAPSULE, EXTENDED RELEASE ORAL at 05:23

## 2023-02-20 RX ADMIN — LIDOCAINE 1 PATCH: 4 CREAM TOPICAL at 20:33

## 2023-02-20 RX ADMIN — GABAPENTIN 300 MILLIGRAM(S): 400 CAPSULE ORAL at 14:30

## 2023-02-20 RX ADMIN — Medication 975 MILLIGRAM(S): at 18:42

## 2023-02-20 RX ADMIN — TAMSULOSIN HYDROCHLORIDE 0.4 MILLIGRAM(S): 0.4 CAPSULE ORAL at 20:29

## 2023-02-20 RX ADMIN — Medication 81 MILLIGRAM(S): at 12:15

## 2023-02-20 NOTE — PROGRESS NOTE ADULT - SUBJECTIVE AND OBJECTIVE BOX
Pt. seen and examined at bedside.  No overnight events.      REVIEW OF SYSTEMS  Constitutional - No fever,  No fatigue  Neurological - No headaches, ++ loss of strength  Musculoskeletal - No joint pain, No joint swelling, No muscle pain    VITALS  T(C): 36.6 (02-19-23 @ 19:20), Max: 36.6 (02-19-23 @ 19:20)  HR: 67 (02-20-23 @ 05:21) (67 - 76)  BP: 126/74 (02-20-23 @ 05:21) (126/74 - 135/85)  RR: 16 (02-19-23 @ 19:20) (16 - 16)  SpO2: 98% (02-19-23 @ 19:20) (98% - 98%)  Wt(kg): --       MEDICATIONS   acetaminophen     Tablet .. 975 milliGRAM(s) every 6 hours  aspirin enteric coated 81 milliGRAM(s) daily  carvedilol 12.5 milliGRAM(s) every 12 hours  enoxaparin Injectable 40 milliGRAM(s) <User Schedule>  gabapentin 300 milliGRAM(s) three times a day  lidocaine   4% Patch 1 Patch every 24 hours  lidocaine   4% Patch 1 Patch daily  lidocaine 2% Jelly 3 milliLiter(s) every 6 hours PRN  losartan 100 milliGRAM(s) daily  magnesium hydroxide Suspension 30 milliLiter(s) every 12 hours PRN  melatonin 6 milliGRAM(s) at bedtime  methocarbamol 1500 milliGRAM(s) three times a day  mineral oil enema 133 milliLiter(s) daily PRN  multivitamin 1 Tablet(s) daily  oxyCODONE    IR 5 milliGRAM(s) every 4 hours PRN  oxyCODONE    IR 10 milliGRAM(s) every 4 hours PRN  polyethylene glycol 3350 17 Gram(s) two times a day  senna 2 Tablet(s) at bedtime  simvastatin 40 milliGRAM(s) at bedtime  tamsulosin 0.4 milliGRAM(s) at bedtime      RECENT LABS/IMAGING                        ---------  PHYSICAL EXAM  Constitutional - NAD, Comfortable  Pulm - Breathing comfortably, No wheezing  Abd - Soft, NTND  SPINE - INCISION C/D/I  Extremities - No edema, No calf tenderness  Neurologic Exam -                    Cognitive - Awake, Alert     Communication - Fluent     Motor - RIGHT LOWER LEG WEAK     Sensory - Intact to LT  Psychiatric - Mood WNL, Affect WNL    ASSESSMENT/PLAN  57y Male with functional deficits 2' ABHIJIT s/p L2-3 LAMINECTOMY/DECOMPRESSION.  Continue current medical management  Pain - Tylenol PRN; LUI; LIDO PATCH; SMR; OXYCODONE PRN.  DVT PPX - enoxaparin Injectable 40 milliGRAM(s)   Active issues - NONE  Continue 3hrs a day of comprehensive rehab program.

## 2023-02-20 NOTE — PROGRESS NOTE ADULT - SUBJECTIVE AND OBJECTIVE BOX
Patient seen and examined at bedside. No overnight events. Patient is w/o complaints.     ALLERGIES:  No Known Allergies    MEDICATIONS  (STANDING):  acetaminophen     Tablet .. 975 milliGRAM(s) Oral every 6 hours  aspirin enteric coated 81 milliGRAM(s) Oral daily  carvedilol 12.5 milliGRAM(s) Oral every 12 hours  enoxaparin Injectable 40 milliGRAM(s) SubCutaneous <User Schedule>  gabapentin 300 milliGRAM(s) Oral three times a day  lidocaine   4% Patch 1 Patch Transdermal every 24 hours  lidocaine   4% Patch 1 Patch Transdermal daily  losartan 100 milliGRAM(s) Oral daily  melatonin 6 milliGRAM(s) Oral at bedtime  methocarbamol 1500 milliGRAM(s) Oral three times a day  multivitamin 1 Tablet(s) Oral daily  polyethylene glycol 3350 17 Gram(s) Oral two times a day  senna 2 Tablet(s) Oral at bedtime  simvastatin 40 milliGRAM(s) Oral at bedtime  tamsulosin 0.4 milliGRAM(s) Oral at bedtime    MEDICATIONS  (PRN):  lidocaine 2% Jelly 3 milliLiter(s) IntraUrethral every 6 hours PRN For straight catheterization  magnesium hydroxide Suspension 30 milliLiter(s) Oral every 12 hours PRN Constipation  mineral oil enema 133 milliLiter(s) Rectal daily PRN constipation  oxyCODONE    IR 5 milliGRAM(s) Oral every 4 hours PRN Moderate Pain (4 - 6)  oxyCODONE    IR 10 milliGRAM(s) Oral every 4 hours PRN Severe Pain (7 - 10)    Vital Signs Last 24 Hrs  T(F): 97.8 (20 Feb 2023 09:37), Max: 97.9 (19 Feb 2023 19:20)  HR: 62 (20 Feb 2023 09:37) (62 - 76)  BP: 114/78 (20 Feb 2023 09:37) (114/78 - 135/85)  RR: 16 (20 Feb 2023 09:37) (16 - 16)  SpO2: 96% (20 Feb 2023 09:37) (96% - 98%)  I&O's Summary    19 Feb 2023 07:01  -  20 Feb 2023 07:00  --------------------------------------------------------  IN: 0 mL / OUT: 150 mL / NET: -150 mL    20 Feb 2023 07:01  -  20 Feb 2023 12:29  --------------------------------------------------------  IN: 0 mL / OUT: 500 mL / NET: -500 mL        PHYSICAL EXAM:  Gen: nad, resting in bed  Neuro: aaox3, no focal deficits  Heent: eomi b/l, no jvd, no oral exudates  Pulm: cta b/l, no w/r/r  CV: +s1s2, no m/r/g  Ab: soft, nt/nd, normoactive bs x 4  Extrem: no edema, pulses intact and equal      LABS:                                              COVID-19 PCR: NotDetec (01-31-23 @ 00:00)  COVID-19 PCR: NotDetec (01-30-23 @ 02:16)      RADIOLOGY & ADDITIONAL TESTS:    Care Discussed with Consultants/Other Providers:

## 2023-02-21 LAB
ALBUMIN SERPL ELPH-MCNC: 3.6 G/DL — SIGNIFICANT CHANGE UP (ref 3.3–5)
ALP SERPL-CCNC: 65 U/L — SIGNIFICANT CHANGE UP (ref 40–120)
ALT FLD-CCNC: 59 U/L — HIGH (ref 10–45)
ANION GAP SERPL CALC-SCNC: 7 MMOL/L — SIGNIFICANT CHANGE UP (ref 5–17)
AST SERPL-CCNC: 32 U/L — SIGNIFICANT CHANGE UP (ref 10–40)
BILIRUB SERPL-MCNC: 0.4 MG/DL — SIGNIFICANT CHANGE UP (ref 0.2–1.2)
BUN SERPL-MCNC: 20 MG/DL — SIGNIFICANT CHANGE UP (ref 7–23)
CALCIUM SERPL-MCNC: 9.5 MG/DL — SIGNIFICANT CHANGE UP (ref 8.4–10.5)
CHLORIDE SERPL-SCNC: 104 MMOL/L — SIGNIFICANT CHANGE UP (ref 96–108)
CO2 SERPL-SCNC: 31 MMOL/L — SIGNIFICANT CHANGE UP (ref 22–31)
CREAT SERPL-MCNC: 0.83 MG/DL — SIGNIFICANT CHANGE UP (ref 0.5–1.3)
EGFR: 102 ML/MIN/1.73M2 — SIGNIFICANT CHANGE UP
GLUCOSE SERPL-MCNC: 110 MG/DL — HIGH (ref 70–99)
HCT VFR BLD CALC: 39.1 % — SIGNIFICANT CHANGE UP (ref 39–50)
HGB BLD-MCNC: 12.9 G/DL — LOW (ref 13–17)
MCHC RBC-ENTMCNC: 29.1 PG — SIGNIFICANT CHANGE UP (ref 27–34)
MCHC RBC-ENTMCNC: 33 GM/DL — SIGNIFICANT CHANGE UP (ref 32–36)
MCV RBC AUTO: 88.1 FL — SIGNIFICANT CHANGE UP (ref 80–100)
NRBC # BLD: 0 /100 WBCS — SIGNIFICANT CHANGE UP (ref 0–0)
PLATELET # BLD AUTO: 244 K/UL — SIGNIFICANT CHANGE UP (ref 150–400)
POTASSIUM SERPL-MCNC: 4.3 MMOL/L — SIGNIFICANT CHANGE UP (ref 3.5–5.3)
POTASSIUM SERPL-SCNC: 4.3 MMOL/L — SIGNIFICANT CHANGE UP (ref 3.5–5.3)
PROT SERPL-MCNC: 7.2 G/DL — SIGNIFICANT CHANGE UP (ref 6–8.3)
RBC # BLD: 4.44 M/UL — SIGNIFICANT CHANGE UP (ref 4.2–5.8)
RBC # FLD: 13.4 % — SIGNIFICANT CHANGE UP (ref 10.3–14.5)
SODIUM SERPL-SCNC: 142 MMOL/L — SIGNIFICANT CHANGE UP (ref 135–145)
WBC # BLD: 7.24 K/UL — SIGNIFICANT CHANGE UP (ref 3.8–10.5)
WBC # FLD AUTO: 7.24 K/UL — SIGNIFICANT CHANGE UP (ref 3.8–10.5)

## 2023-02-21 PROCEDURE — 99233 SBSQ HOSP IP/OBS HIGH 50: CPT

## 2023-02-21 RX ADMIN — GABAPENTIN 300 MILLIGRAM(S): 400 CAPSULE ORAL at 05:23

## 2023-02-21 RX ADMIN — METHOCARBAMOL 1500 MILLIGRAM(S): 500 TABLET, FILM COATED ORAL at 20:37

## 2023-02-21 RX ADMIN — Medication 81 MILLIGRAM(S): at 12:55

## 2023-02-21 RX ADMIN — Medication 6 MILLIGRAM(S): at 20:39

## 2023-02-21 RX ADMIN — Medication 975 MILLIGRAM(S): at 06:23

## 2023-02-21 RX ADMIN — Medication 1 TABLET(S): at 12:34

## 2023-02-21 RX ADMIN — GABAPENTIN 300 MILLIGRAM(S): 400 CAPSULE ORAL at 15:10

## 2023-02-21 RX ADMIN — GABAPENTIN 300 MILLIGRAM(S): 400 CAPSULE ORAL at 20:39

## 2023-02-21 RX ADMIN — ENOXAPARIN SODIUM 40 MILLIGRAM(S): 100 INJECTION SUBCUTANEOUS at 05:23

## 2023-02-21 RX ADMIN — METHOCARBAMOL 1500 MILLIGRAM(S): 500 TABLET, FILM COATED ORAL at 15:10

## 2023-02-21 RX ADMIN — METHOCARBAMOL 1500 MILLIGRAM(S): 500 TABLET, FILM COATED ORAL at 05:22

## 2023-02-21 RX ADMIN — Medication 975 MILLIGRAM(S): at 13:15

## 2023-02-21 RX ADMIN — Medication 975 MILLIGRAM(S): at 12:35

## 2023-02-21 RX ADMIN — Medication 975 MILLIGRAM(S): at 17:54

## 2023-02-21 RX ADMIN — Medication 975 MILLIGRAM(S): at 18:20

## 2023-02-21 RX ADMIN — LOSARTAN POTASSIUM 100 MILLIGRAM(S): 100 TABLET, FILM COATED ORAL at 05:22

## 2023-02-21 RX ADMIN — TAMSULOSIN HYDROCHLORIDE 0.4 MILLIGRAM(S): 0.4 CAPSULE ORAL at 20:38

## 2023-02-21 RX ADMIN — SIMVASTATIN 40 MILLIGRAM(S): 20 TABLET, FILM COATED ORAL at 20:37

## 2023-02-21 RX ADMIN — CARVEDILOL PHOSPHATE 12.5 MILLIGRAM(S): 80 CAPSULE, EXTENDED RELEASE ORAL at 17:54

## 2023-02-21 RX ADMIN — CARVEDILOL PHOSPHATE 12.5 MILLIGRAM(S): 80 CAPSULE, EXTENDED RELEASE ORAL at 05:22

## 2023-02-21 RX ADMIN — Medication 975 MILLIGRAM(S): at 05:23

## 2023-02-21 RX ADMIN — OXYCODONE HYDROCHLORIDE 10 MILLIGRAM(S): 5 TABLET ORAL at 15:10

## 2023-02-21 NOTE — PROGRESS NOTE ADULT - SUBJECTIVE AND OBJECTIVE BOX
CC: Cauda Equina    Subjective and objective:   Patient seen and evaluated at bedside this AM with Dr. Herring.  Admits to having a good weekend.  Last BM on 2/20, per chart review.   Surgical site with no erythema, or drainage-- No s/s of infection.  Plan for outpt follow up with NSGY and possible fluid tap.   Discussed sister's inability to assist patient in her home upon discharge. Patient states that he was not made aware of this situation.   Pt became frustrated and emotional, as he was hoping to leave by 11AM to his sisters home.  Pt to call sister to discuss.  Await outcome. SW following and aware.   No other complaints at this time.     Denies HA, dizziness, visual changes, CP, SOB, cough, nausea, abd pain, or urinary symptoms.      Therapy--observed ambulating with walker, ace wrap to right foot, > 100 ft, CGA      Vital Signs Last 24 Hrs  T(C): 36.6 (21 Feb 2023 09:02), Max: 36.7 (20 Feb 2023 20:38)  T(F): 97.8 (21 Feb 2023 09:02), Max: 98 (20 Feb 2023 20:38)  HR: 69 (21 Feb 2023 09:02) (69 - 91)  BP: 123/76 (21 Feb 2023 09:02) (123/76 - 124/88)  BP(mean): --  RR: 16 (21 Feb 2023 09:02) (16 - 16)  SpO2: 98% (21 Feb 2023 09:02) (98% - 98%)    PHYSICAL EXAM:  Constitutional -Comfortable  HEENT - NAD  Neck - Supple, No limited ROM  Pulm - resp nonlabored  Cardio - warm and well perfused   Abdomen -  Soft, NTND  Extremities - No peripheral edema, No calf tenderness     Neurologic Exam:                 Cognition - AOx4  Motor -                      LEFT    UE - ShAB 5/5, EF 5/5, EE 5/5, WE 5/5,  WNL                    RIGHT UE - ShAB 5/5, EF 5/5, EE 5/5, WE 5/5,  WNL                    LEFT    LE - HF 3/5, KE 3+/5, DF 3+/5, PF 4/5 Strong hip extensors4+/5                    RIGHT LE - HF 3/5, KE 2+/5, DF 2/5, PF 4/5     Sensory - decrease sensation below the knee. R>L  Psychiatric - Mood stable, Affect WNL  Skin lumbar incision intact - healed, mild edema noted. No erythema or drainage or fluctuance            LAB                        12.9   7.24  )-----------( 244      ( 21 Feb 2023 07:22 )             39.1     02-21    142  |  104  |  20  ----------------------------<  110<H>  4.3   |  31  |  0.83    Ca    9.5      21 Feb 2023 07:22    TPro  7.2  /  Alb  3.6  /  TBili  0.4  /  DBili  x   /  AST  32  /  ALT  59<H>  /  AlkPhos  65  02-21    LIVER FUNCTIONS - ( 21 Feb 2023 07:22 )  Alb: 3.6 g/dL / Pro: 7.2 g/dL / ALK PHOS: 65 U/L / ALT: 59 U/L / AST: 32 U/L / GGT: x           MEDICATIONS  (STANDING):  acetaminophen     Tablet .. 975 milliGRAM(s) Oral every 6 hours  aspirin enteric coated 81 milliGRAM(s) Oral daily  carvedilol 12.5 milliGRAM(s) Oral every 12 hours  enoxaparin Injectable 40 milliGRAM(s) SubCutaneous <User Schedule>  gabapentin 300 milliGRAM(s) Oral three times a day  lidocaine   4% Patch 1 Patch Transdermal every 24 hours  lidocaine   4% Patch 1 Patch Transdermal daily  losartan 100 milliGRAM(s) Oral daily  melatonin 6 milliGRAM(s) Oral at bedtime  methocarbamol 1500 milliGRAM(s) Oral three times a day  multivitamin 1 Tablet(s) Oral daily  polyethylene glycol 3350 17 Gram(s) Oral two times a day  senna 2 Tablet(s) Oral at bedtime  simvastatin 40 milliGRAM(s) Oral at bedtime  tamsulosin 0.4 milliGRAM(s) Oral at bedtime    MEDICATIONS  (PRN):  lidocaine 2% Jelly 3 milliLiter(s) IntraUrethral every 6 hours PRN For straight catheterization  magnesium hydroxide Suspension 30 milliLiter(s) Oral every 12 hours PRN Constipation  mineral oil enema 133 milliLiter(s) Rectal daily PRN constipation  oxyCODONE    IR 5 milliGRAM(s) Oral every 4 hours PRN Moderate Pain (4 - 6)  oxyCODONE    IR 10 milliGRAM(s) Oral every 4 hours PRN Severe Pain (7 - 10)

## 2023-02-21 NOTE — PROGRESS NOTE ADULT - NS ATTEND AMEND GEN_ALL_CORE FT
Seen and examined    Not revised  No medical complaint   Reports normal sleep  Tolerating diet    Observed self propelling himself with his manual WC from bathroom to his bed side    Reports ambulating increasing distance in therapy, but still requiring assistance  Unable to transfer from WC to bath tub due to LE muscle weakness    Labs unremarkable    DC planning --Therapists report that patient's sister Ms Padmini is no longer able to accept patient to her home on dc  It was also noted that his nephew, whom he stated, that he was living with his sister, would not be able to help patient post dc, as this nephew is also awaiting surgery  Patient was unaware of this during review  He asked time to discuss with his his sister and he had been anticipating dc home today  She was given time to discuss this with his family    On f/u review, he declined todiscuss this. He stated that he will not talk to his sister again  As previously d/w patient and re affirmed during family meeting,we will go ahead with plan for [facility] placement as there is no alternative/appropriate DC destination    Continue PT/OT  DC to [facility]

## 2023-02-21 NOTE — PROGRESS NOTE ADULT - ASSESSMENT
56 YO male with PMH of CAD, s/p CABG, HTN, HLD, ETOH abuse (no alcohol use in 2 years as per pt) who presented to [facility] on 1/24 with back pain and progressive lower extremity weakness. Examination with concern for cauda equina. He was admitted for cauda equina syndrome and he  underwent urgent L2-3 laminectomy/ discectomy on 1/25/23.   Patient now with gait Instability, ADL impairments and Functional impairments.    * Continue to monitor rehab progress * Await RLE AFO * Family dynamic issues regarding disposition- most appropriate plan would be [facility] *     #Cauda Equina Syndrome  - s/p L2-3 laminectomy/ discectomy on 1/25/23.  - Comprehensive Rehab Program: PT/OT, 3hours daily and 5 days weekly  -- Gabapentin 300mg TID  - 2/14 overnight: fluctuance noted at surgical incision site- no erythema or drainage  -- CT LS revealed possible infection, liquified hematoma or seroma  -- (No fever, or s/s of infection at this time)   -- NSGY: Dr. Schaefer recs- non-urgent. Follow up outpatient for possible aspiration/sampling/drainage  --* Await Neurology review, will update operating surgeon with CT and neuro recs afterwards, f/u labs with inflammatory markers tomorrow    # Foot drop  Orthotic eval 2/9--measured for Rt AFO    NEED FOR RIGHT FOOT ORTHOTIC DEVICE  Dx: Cauda Equina syndrome    Patient presents with Rt foot drop with inversion and lower extremity weakness due to Cauda Equina syndrome    He requires a right custom AFO for support and stability during ambulation and while participating in physical therapy  Manual Muscle Testing showed Right hip 3/5 Knee flexion 3/5 Knee extension 2/5 Ankle Dorsiflexion 1/5, Plantar flexion 3/5,   Left hip flexion 4/5, Knee extension 4+/5, 4+/5 knee flexion, 4-/5 ankle dorsiflexion, 4+/5, Plantar flexion 4+/5  He is unable to use pre-fabricated brace as use of the orthotist will be for longer than six months and because the foot must be controlled in more than one plane  He remains at risk for fall without a custom AFO    #HTN  - Losartan 100mg daily  - Carvedilol 12.5mg BID    #CAD  -  s/p CABG  - Resume ASA 2/7    #HLD  - Zocor 40mg daily    #Pain management  - Tylenol PRN, lidocaine, Oxycodone PRN, Robaxin  - Gabapentin increased to 200mg TID 2/6  - Swiss cheese boots in bed 2/6    #DVT ppx  - Lovenox    #Bowel Regimen  - Senna, miralax BID  - fleet enema PRN    #Bladder management  - Vergara Dcd 2/2- TOV  - Flomax  - Void OOB/sitting  - DC PVRs and BS 2/8    #Diet:  - Regular  - Supplements: MVI    #Skin:  - Skin  lumbar incision looks well healed. open to air currently  - Pressure injury/Skin: Turn Q2hrs while in bed, OOB to Chair, PT/OT    #Sleep:   - Maintain quiet hours and low stim environment.  - Melatonin 6mg nightly 2/7    #Mood  - NPSY following for emotional support and coping strategies    #Precaution  - Fall, Aspiration, spinal    IDT  2/16  TDD: 2/21 to home  Barriers: Lower body weakness.  Social Work: Lives in  with girlfriend and girlfriend's mother with 3 YANETH and 10 STI with 1 HR. 1st floor living if needed.   Pt plans on renting a hospital bed and will need popfly commode.  OT: Independent/supervision for ADLs. Min A for iADLS.   PT: Min A for transfers. Ambulated 75ft with RW with CG/min A. WC propel 100ft independently.   SLP: --N/A  Plan for family meeting 2/16.     Liaison with family 2/15--I called and spoke with patient's girlfriend, Ms Rosy Vivarayshasven to update on patient's treatment   As she recently told therapists, she stated that patient has multiple stairs at home, not 4 as patient reports.  She stated that there are few stairs to entrace, but a flight of stairs to basement where patient will be residing post DC  The ground floor area is not available for habitation at this point  A family meeting is being organized for tomorrow at 1pm to discuss issue regarding stairs and make an appropriate dc plan    --------------------------------------------------------  Outpatient Follow-up (Specialty/Name of physician):  Zack Schaefer (MD; PhD)  Neurosurgery  [address] [facility], [address]nd [place] [address]  Phone: [phone]  Fax: [phone]  --------------------------------------------------------

## 2023-02-22 PROCEDURE — 90834 PSYTX W PT 45 MINUTES: CPT

## 2023-02-22 PROCEDURE — 99232 SBSQ HOSP IP/OBS MODERATE 35: CPT

## 2023-02-22 RX ADMIN — Medication 81 MILLIGRAM(S): at 11:59

## 2023-02-22 RX ADMIN — OXYCODONE HYDROCHLORIDE 10 MILLIGRAM(S): 5 TABLET ORAL at 21:14

## 2023-02-22 RX ADMIN — LIDOCAINE 1 PATCH: 4 CREAM TOPICAL at 19:20

## 2023-02-22 RX ADMIN — GABAPENTIN 300 MILLIGRAM(S): 400 CAPSULE ORAL at 05:25

## 2023-02-22 RX ADMIN — Medication 975 MILLIGRAM(S): at 12:00

## 2023-02-22 RX ADMIN — Medication 975 MILLIGRAM(S): at 05:25

## 2023-02-22 RX ADMIN — GABAPENTIN 300 MILLIGRAM(S): 400 CAPSULE ORAL at 21:13

## 2023-02-22 RX ADMIN — POLYETHYLENE GLYCOL 3350 17 GRAM(S): 17 POWDER, FOR SOLUTION ORAL at 18:11

## 2023-02-22 RX ADMIN — LIDOCAINE 1 PATCH: 4 CREAM TOPICAL at 21:21

## 2023-02-22 RX ADMIN — Medication 975 MILLIGRAM(S): at 13:01

## 2023-02-22 RX ADMIN — Medication 1 TABLET(S): at 11:59

## 2023-02-22 RX ADMIN — GABAPENTIN 300 MILLIGRAM(S): 400 CAPSULE ORAL at 15:02

## 2023-02-22 RX ADMIN — OXYCODONE HYDROCHLORIDE 10 MILLIGRAM(S): 5 TABLET ORAL at 22:14

## 2023-02-22 RX ADMIN — Medication 975 MILLIGRAM(S): at 06:25

## 2023-02-22 RX ADMIN — SENNA PLUS 2 TABLET(S): 8.6 TABLET ORAL at 21:15

## 2023-02-22 RX ADMIN — POLYETHYLENE GLYCOL 3350 17 GRAM(S): 17 POWDER, FOR SOLUTION ORAL at 05:26

## 2023-02-22 RX ADMIN — OXYCODONE HYDROCHLORIDE 5 MILLIGRAM(S): 5 TABLET ORAL at 03:52

## 2023-02-22 RX ADMIN — METHOCARBAMOL 1500 MILLIGRAM(S): 500 TABLET, FILM COATED ORAL at 15:02

## 2023-02-22 RX ADMIN — ENOXAPARIN SODIUM 40 MILLIGRAM(S): 100 INJECTION SUBCUTANEOUS at 05:25

## 2023-02-22 RX ADMIN — CARVEDILOL PHOSPHATE 12.5 MILLIGRAM(S): 80 CAPSULE, EXTENDED RELEASE ORAL at 18:09

## 2023-02-22 RX ADMIN — CARVEDILOL PHOSPHATE 12.5 MILLIGRAM(S): 80 CAPSULE, EXTENDED RELEASE ORAL at 05:26

## 2023-02-22 RX ADMIN — Medication 975 MILLIGRAM(S): at 19:10

## 2023-02-22 RX ADMIN — LOSARTAN POTASSIUM 100 MILLIGRAM(S): 100 TABLET, FILM COATED ORAL at 05:26

## 2023-02-22 RX ADMIN — METHOCARBAMOL 1500 MILLIGRAM(S): 500 TABLET, FILM COATED ORAL at 05:25

## 2023-02-22 RX ADMIN — METHOCARBAMOL 1500 MILLIGRAM(S): 500 TABLET, FILM COATED ORAL at 21:15

## 2023-02-22 RX ADMIN — LIDOCAINE 1 PATCH: 4 CREAM TOPICAL at 18:10

## 2023-02-22 RX ADMIN — Medication 975 MILLIGRAM(S): at 18:10

## 2023-02-22 RX ADMIN — Medication 6 MILLIGRAM(S): at 21:14

## 2023-02-22 RX ADMIN — LIDOCAINE 1 PATCH: 4 CREAM TOPICAL at 12:00

## 2023-02-22 RX ADMIN — TAMSULOSIN HYDROCHLORIDE 0.4 MILLIGRAM(S): 0.4 CAPSULE ORAL at 21:15

## 2023-02-22 NOTE — PROGRESS NOTE ADULT - SUBJECTIVE AND OBJECTIVE BOX
Patient seen alone at bedside for 45-minute, supportive psychotherapy session. He indicates frustration and disappointment with change of discharge plan to [facility], as opposed to his sister's home. He indicates a desire to begin his tome at [facility] so he can eventually return home.

## 2023-02-22 NOTE — PROGRESS NOTE ADULT - SUBJECTIVE AND OBJECTIVE BOX
Patient is a 57y old  Male who presents with a chief complaint of Paraparesis/Cauda Equina Syndrome (22 Feb 2023 09:38)      Patient seen and examined at bedside. No events overnight. Admits to lower back pain and neck pain, back pain improved with lidocaine patch, and neck pain feels like muscle strain. Denies numbness or tingling to L arm, no chest pain, sob, abd pain, loss of urinary or bowel sensation. Continues to have numbness and tingling in legs    ALLERGIES:  No Known Allergies    MEDICATIONS  (STANDING):  acetaminophen     Tablet .. 975 milliGRAM(s) Oral every 6 hours  aspirin enteric coated 81 milliGRAM(s) Oral daily  carvedilol 12.5 milliGRAM(s) Oral every 12 hours  enoxaparin Injectable 40 milliGRAM(s) SubCutaneous <User Schedule>  gabapentin 300 milliGRAM(s) Oral three times a day  lidocaine   4% Patch 1 Patch Transdermal every 24 hours  lidocaine   4% Patch 1 Patch Transdermal daily  losartan 100 milliGRAM(s) Oral daily  melatonin 6 milliGRAM(s) Oral at bedtime  methocarbamol 1500 milliGRAM(s) Oral three times a day  multivitamin 1 Tablet(s) Oral daily  polyethylene glycol 3350 17 Gram(s) Oral two times a day  senna 2 Tablet(s) Oral at bedtime  simvastatin 40 milliGRAM(s) Oral at bedtime  tamsulosin 0.4 milliGRAM(s) Oral at bedtime    MEDICATIONS  (PRN):  lidocaine 2% Jelly 3 milliLiter(s) IntraUrethral every 6 hours PRN For straight catheterization  magnesium hydroxide Suspension 30 milliLiter(s) Oral every 12 hours PRN Constipation  mineral oil enema 133 milliLiter(s) Rectal daily PRN constipation  oxyCODONE    IR 5 milliGRAM(s) Oral every 4 hours PRN Moderate Pain (4 - 6)  oxyCODONE    IR 10 milliGRAM(s) Oral every 4 hours PRN Severe Pain (7 - 10)    Vital Signs Last 24 Hrs  T(F): 97.9 (22 Feb 2023 07:20), Max: 97.9 (22 Feb 2023 07:20)  HR: 61 (22 Feb 2023 07:20) (61 - 71)  BP: 115/70 (22 Feb 2023 07:20) (115/70 - 148/90)  RR: 16 (22 Feb 2023 07:20) (16 - 16)  SpO2: 96% (22 Feb 2023 07:20) (96% - 98%)  I&O's Summary      PHYSICAL EXAM:  GENERAL: NAD, sitting in bed  HEAD:  Atraumatic, Normocephalic  ENMT: Moist mucous membranes, Good dentition, no thrush  CHEST/LUNG: Clear to auscultation bilaterally, good air entry, non-labored breathing  HEART: RRR; S1/S2, No murmur  ABDOMEN: Soft, Nontender, Nondistended; Bowel sounds present  EXTREMITIES: No calf tenderness, No cyanosis, No edema  SKIN: Warm, perfused  PSYCH: Normal mood, Normal affect  NERVOUS SYSTEM:  A/O x3, Good concentration    LABS:                        12.9   7.24  )-----------( 244      ( 21 Feb 2023 07:22 )             39.1     02-21    142  |  104  |  20  ----------------------------<  110  4.3   |  31  |  0.83    Ca    9.5      21 Feb 2023 07:22    TPro  7.2  /  Alb  3.6  /  TBili  0.4  /  DBili  x   /  AST  32  /  ALT  59  /  AlkPhos  65  02-21                                    COVID-19 PCR: NotDetec (01-31-23 @ 00:00)  COVID-19 PCR: NotDetec (01-30-23 @ 02:16)      RADIOLOGY & ADDITIONAL TESTS:    Care Discussed with Consultants/Other Providers:

## 2023-02-22 NOTE — PROGRESS NOTE ADULT - SUBJECTIVE AND OBJECTIVE BOX
CC: Cauda Equina    Subjective and objective:   Patient seen and evaluated at bedside this AM with Dr. Herring.  Admits to having a good weekend.  Last BM on 2/20, per chart review.   Surgical site with no erythema, or drainage-- No s/s of infection.  Plan for outpt follow up with NSGY and possible fluid tap.   Pt agreeable to [facility]. Await auth.  No other complaints at this time.     Denies HA, dizziness, visual changes, CP, SOB, cough, nausea, abd pain, or urinary symptoms.      Therapy--observed ambulating with walker, ace wrap to right foot, > 100 ft, CGA      Vital Signs Last 24 Hrs  T(C): 36.6 (22 Feb 2023 07:20), Max: 36.6 (22 Feb 2023 07:20)  T(F): 97.9 (22 Feb 2023 07:20), Max: 97.9 (22 Feb 2023 07:20)  HR: 61 (22 Feb 2023 07:20) (61 - 71)  BP: 115/70 (22 Feb 2023 07:20) (115/70 - 148/90)  BP(mean): --  RR: 16 (22 Feb 2023 07:20) (16 - 16)  SpO2: 96% (22 Feb 2023 07:20) (96% - 98%)      PHYSICAL EXAM:  Constitutional -Comfortable  HEENT - NAD  Neck - Supple, No limited ROM  Pulm - resp nonlabored  Cardio - warm and well perfused   Abdomen -  Soft, NTND  Extremities - No peripheral edema, No calf tenderness     Neurologic Exam:                 Cognition - AOx4  Motor -                      LEFT    UE - ShAB 5/5, EF 5/5, EE 5/5, WE 5/5,  WNL                    RIGHT UE - ShAB 5/5, EF 5/5, EE 5/5, WE 5/5,  WNL                    LEFT    LE - HF 3/5, KE 3+/5, DF 3+/5, PF 4/5 Strong hip extensors4+/5                    RIGHT LE - HF 3/5, KE 2+/5, DF 2/5, PF 4/5     Sensory - decrease sensation below the knee. R>L  Psychiatric - Mood stable, Affect WNL  Skin lumbar incision intact - healed, mild edema noted. No erythema or drainage or fluctuance            LAB                        12.9   7.24  )-----------( 244      ( 21 Feb 2023 07:22 )             39.1     02-21    142  |  104  |  20  ----------------------------<  110<H>  4.3   |  31  |  0.83    Ca    9.5      21 Feb 2023 07:22    TPro  7.2  /  Alb  3.6  /  TBili  0.4  /  DBili  x   /  AST  32  /  ALT  59<H>  /  AlkPhos  65  02-21    LIVER FUNCTIONS - ( 21 Feb 2023 07:22 )  Alb: 3.6 g/dL / Pro: 7.2 g/dL / ALK PHOS: 65 U/L / ALT: 59 U/L / AST: 32 U/L / GGT: x           MEDICATIONS  (STANDING):  acetaminophen     Tablet .. 975 milliGRAM(s) Oral every 6 hours  aspirin enteric coated 81 milliGRAM(s) Oral daily  carvedilol 12.5 milliGRAM(s) Oral every 12 hours  enoxaparin Injectable 40 milliGRAM(s) SubCutaneous <User Schedule>  gabapentin 300 milliGRAM(s) Oral three times a day  lidocaine   4% Patch 1 Patch Transdermal every 24 hours  lidocaine   4% Patch 1 Patch Transdermal daily  losartan 100 milliGRAM(s) Oral daily  melatonin 6 milliGRAM(s) Oral at bedtime  methocarbamol 1500 milliGRAM(s) Oral three times a day  multivitamin 1 Tablet(s) Oral daily  polyethylene glycol 3350 17 Gram(s) Oral two times a day  senna 2 Tablet(s) Oral at bedtime  simvastatin 40 milliGRAM(s) Oral at bedtime  tamsulosin 0.4 milliGRAM(s) Oral at bedtime    MEDICATIONS  (PRN):  lidocaine 2% Jelly 3 milliLiter(s) IntraUrethral every 6 hours PRN For straight catheterization  magnesium hydroxide Suspension 30 milliLiter(s) Oral every 12 hours PRN Constipation  mineral oil enema 133 milliLiter(s) Rectal daily PRN constipation  oxyCODONE    IR 5 milliGRAM(s) Oral every 4 hours PRN Moderate Pain (4 - 6)  oxyCODONE    IR 10 milliGRAM(s) Oral every 4 hours PRN Severe Pain (7 - 10) CC: Cauda Equina    Subjective and objective  Patient seen and examined  Reports no interval med events  Tolerating diet    Denies HA, dizziness, visual changes, CP, SOB, cough, nausea, abd pain, or urinary symptoms.      Therapy--observed ambulating with walker, engaging in therapy      Vital Signs Last 24 Hrs  T(C): 36.6 (22 Feb 2023 07:20), Max: 36.6 (22 Feb 2023 07:20)  T(F): 97.9 (22 Feb 2023 07:20), Max: 97.9 (22 Feb 2023 07:20)  HR: 61 (22 Feb 2023 07:20) (61 - 71)  BP: 115/70 (22 Feb 2023 07:20) (115/70 - 148/90)  RR: 16 (22 Feb 2023 07:20) (16 - 16)  SpO2: 96% (22 Feb 2023 07:20) (96% - 98%)    Awaiting [facility] placement     PHYSICAL EXAM:  Constitutional -Comfortable  HEENT - NAD  Neck - Supple, No limited ROM  Pulm - resp nonlabored  Cardio - warm and well perfused   Abdomen -  Soft, non tender  Extremities - No peripheral edema, No calf tenderness     Neurologic Exam:                 Cognition - AOx4  Motor -                      LEFT    UE - ShAB 5/5, EF 5/5, EE 5/5, WE 5/5,  WNL                    RIGHT UE - ShAB 5/5, EF 5/5, EE 5/5, WE 5/5,  WNL                    LEFT    LE - HF 3/5, KE 3+/5, DF 3+/5, PF 4/5 Strong hip extensors4+/5                    RIGHT LE - HF 3/5, KE 2+/5, DF 2/5, PF 4/5     Sensory - decrease sensation below the knee. R>L  Psychiatric - Mood stable, Affect WNL  Skin lumbar incision intact - healed, mild edema noted. No erythema or drainage or fluctuance            LAB                        12.9   7.24  )-----------( 244      ( 21 Feb 2023 07:22 )             39.1     02-21    142  |  104  |  20  ----------------------------<  110<H>  4.3   |  31  |  0.83    Ca    9.5      21 Feb 2023 07:22    TPro  7.2  /  Alb  3.6  /  TBili  0.4  /  DBili  x   /  AST  32  /  ALT  59<H>  /  AlkPhos  65  02-21    LIVER FUNCTIONS - ( 21 Feb 2023 07:22 )  Alb: 3.6 g/dL / Pro: 7.2 g/dL / ALK PHOS: 65 U/L / ALT: 59 U/L / AST: 32 U/L / GGT: x           MEDICATIONS  (STANDING):  acetaminophen     Tablet .. 975 milliGRAM(s) Oral every 6 hours  aspirin enteric coated 81 milliGRAM(s) Oral daily  carvedilol 12.5 milliGRAM(s) Oral every 12 hours  enoxaparin Injectable 40 milliGRAM(s) SubCutaneous <User Schedule>  gabapentin 300 milliGRAM(s) Oral three times a day  lidocaine   4% Patch 1 Patch Transdermal every 24 hours  lidocaine   4% Patch 1 Patch Transdermal daily  losartan 100 milliGRAM(s) Oral daily  melatonin 6 milliGRAM(s) Oral at bedtime  methocarbamol 1500 milliGRAM(s) Oral three times a day  multivitamin 1 Tablet(s) Oral daily  polyethylene glycol 3350 17 Gram(s) Oral two times a day  senna 2 Tablet(s) Oral at bedtime  simvastatin 40 milliGRAM(s) Oral at bedtime  tamsulosin 0.4 milliGRAM(s) Oral at bedtime    MEDICATIONS  (PRN):  lidocaine 2% Jelly 3 milliLiter(s) IntraUrethral every 6 hours PRN For straight catheterization  magnesium hydroxide Suspension 30 milliLiter(s) Oral every 12 hours PRN Constipation  mineral oil enema 133 milliLiter(s) Rectal daily PRN constipation  oxyCODONE    IR 5 milliGRAM(s) Oral every 4 hours PRN Moderate Pain (4 - 6)  oxyCODONE    IR 10 milliGRAM(s) Oral every 4 hours PRN Severe Pain (7 - 10)

## 2023-02-22 NOTE — PROGRESS NOTE ADULT - ASSESSMENT
57M with CAD s/p CABG, HTN, HLD, hx alcohol abuse, recently hospitalized for cauda equina syndrome requiring L2-L3 laminectomy/discectomy after injury while weight lifting, now in acute rehab    #Cauda Equina Syndrome s/p L2/L3 lami/discectomy with Ambulatory dysfunction   - PT/OT as per PMR team  - Pain control as needed  - subcutaneous fluid collection at L2-L3 - contained leak or seroma.  Neurosurgeon, Dr. Schaefer, who did not recommend urgent intervention- pt may need aspiration of fluid for analysis. outpatient follow up.    #Coronary Artery Disease   - Continue ASA, statin, coreg    #Essential HTN  - c/w Losartan, Coreg    #Urinary retention  - Likely in part neurogenic from cauda equina syndrome, constipation and decreased mobility  - Vergara removed and pt voiding   - Continue tamsulosin    DVT ppx: Lovenox  Diet: per primary  Pain: per primary

## 2023-02-22 NOTE — PROGRESS NOTE ADULT - ASSESSMENT
Mood is frustrated, behavior is cooperative. Patient is trying to redirect his thoughts away from negative (hurt, abandonment) and focus on positive aspects of his recovery and the future. He expresses gratitude for the care he's received. Reviewed adaptive coping with heightened stress. Support and encouragement are provided. Discharge to [facility] is pending. Plan: Neuropsychology remains available.

## 2023-02-22 NOTE — PROGRESS NOTE ADULT - ASSESSMENT
56 YO male with PMH of CAD, s/p CABG, HTN, HLD, ETOH abuse (no alcohol use in 2 years as per pt) who presented to [facility] on 1/24 with back pain and progressive lower extremity weakness. Examination with concern for cauda equina. He was admitted for cauda equina syndrome and he  underwent urgent L2-3 laminectomy/ discectomy on 1/25/23.   Patient now with gait Instability, ADL impairments and Functional impairments.    * Continue to monitor rehab progress * Await RLE AFO * Plan for W[facility]Y, await auth *     #Cauda Equina Syndrome  - s/p L2-3 laminectomy/ discectomy on 1/25/23.  - Comprehensive Rehab Program: PT/OT, 3hours daily and 5 days weekly  -- Gabapentin 300mg TID  - 2/14 overnight: fluctuance noted at surgical incision site- no erythema or drainage  -- CT LS revealed possible infection, liquified hematoma or seroma  -- (No fever, or s/s of infection at this time)   -- NSGY: Dr. Olive garcia- non-urgent. Follow up outpatient for possible aspiration/sampling/drainage  --* Await Neurology review, will update operating surgeon with CT and neuro recs afterwards, f/u labs with inflammatory markers tomorrow    # Foot drop  Orthotic eval 2/9--measured for Rt AFO    NEED FOR RIGHT FOOT ORTHOTIC DEVICE  Dx: Cauda Equina syndrome    Patient presents with Rt foot drop with inversion and lower extremity weakness due to Cauda Equina syndrome    He requires a right custom AFO for support and stability during ambulation and while participating in physical therapy  Manual Muscle Testing showed Right hip 3/5 Knee flexion 3/5 Knee extension 2/5 Ankle Dorsiflexion 1/5, Plantar flexion 3/5,   Left hip flexion 4/5, Knee extension 4+/5, 4+/5 knee flexion, 4-/5 ankle dorsiflexion, 4+/5, Plantar flexion 4+/5  He is unable to use pre-fabricated brace as use of the orthotist will be for longer than six months and because the foot must be controlled in more than one plane  He remains at risk for fall without a custom AFO    #HTN  - Losartan 100mg daily  - Carvedilol 12.5mg BID    #CAD  -  s/p CABG  - Resume ASA 2/7    #HLD  - Zocor 40mg daily    #Pain management  - Tylenol PRN, lidocaine, Oxycodone PRN, Robaxin  - Gabapentin increased to 200mg TID 2/6  - Swiss cheese boots in bed 2/6    #DVT ppx  - Lovenox    #Bowel Regimen  - Senna, miralax BID  - fleet enema PRN    #Bladder management  - Vergara Dcd 2/2- TOV  - Flomax  - Void OOB/sitting  - DC PVRs and BS 2/8    #Diet:  - Regular  - Supplements: MVI    #Skin:  - Skin  lumbar incision looks well healed. open to air currently  - Pressure injury/Skin: Turn Q2hrs while in bed, OOB to Chair, PT/OT    #Sleep:   - Maintain quiet hours and low stim environment.  - Melatonin 6mg nightly 2/7    #Mood  - NPSY following for emotional support and coping strategies    #Precaution  - Fall, Aspiration, spinal    IDT  2/16  TDD: 2/21 to home  Barriers: Lower body weakness.  Social Work: Lives in  with girlfriend and girlfriend's mother with 3 YANETH and 10 STI with 1 HR. 1st floor living if needed.   Pt plans on renting a hospital bed and will need popfly commode.  OT: Independent/supervision for ADLs. Min A for iADLS.   PT: Min A for transfers. Ambulated 75ft with RW with CG/min A. WC propel 100ft independently.   SLP: --N/A  Plan for family meeting 2/16.     Liaison with family 2/15--I called and spoke with patient's girlfriend, Ms Rosy Gonzalez to update on patient's treatment   As she recently told therapists, she stated that patient has multiple stairs at home, not 4 as patient reports.  She stated that there are few stairs to entrace, but a flight of stairs to basement where patient will be residing post DC  The ground floor area is not available for habitation at this point  A family meeting is being organized for tomorrow at 1pm to discuss issue regarding stairs and make an appropriate dc plan    --------------------------------------------------------  Outpatient Follow-up (Specialty/Name of physician):  Zack Schaefer (MD; PhD)  Neurosurgery  [address] [facility], [address]nd [place] [address]  Phone: [phone]  Fax: [phone]  -------------------------------------------------------- 58 YO male with PMH of CAD, s/p CABG, HTN, HLD, ETOH abuse (no alcohol use in 2 years as per pt) who presented to [facility] on 1/24 with back pain and progressive lower extremity weakness. Examination with concern for cauda equina. He was admitted for cauda equina syndrome and he  underwent urgent L2-3 laminectomy/ discectomy on 1/25/23.   Patient now with gait Instability, ADL impairments and Functional impairments.    * Continue to monitor rehab progress * Await RLE AFO  and WINDY, await auth/placement.     #Cauda Equina Syndrome  - s/p L2-3 laminectomy/ discectomy on 1/25/23.  - Comprehensive Rehab Program: PT/OT, 3hours daily and 5 days weekly  -- Gabapentin 300mg TID  --2/14 overnight: fluctuance noted at surgical incision site- no erythema or drainage  -- CT LS revealed possible infection, liquified hematoma or seroma  -- (No fever, or s/s of infection at this time)   -- NSGY: Dr. Olive garcia- non-urgent. Follow up outpatient for possible aspiration/sampling/drainage  --* Await Neurology review, will update operating surgeon with CT and neuro recs afterwards, f/u labs with inflammatory markers tomorrow    # Foot drop  Orthotic eval 2/9--measured for Rt AFO    NEED FOR RIGHT FOOT ORTHOTIC DEVICE  Dx: Cauda Equina syndrome    Patient presents with Rt foot drop with inversion and lower extremity weakness due to Cauda Equina syndrome    He requires a right custom AFO for support and stability during ambulation and while participating in physical therapy  Manual Muscle Testing showed Right hip 3/5 Knee flexion 3/5 Knee extension 2/5 Ankle Dorsiflexion 1/5, Plantar flexion 3/5,   Left hip flexion 4/5, Knee extension 4+/5, 4+/5 knee flexion, 4-/5 ankle dorsiflexion, 4+/5, Plantar flexion 4+/5  He is unable to use pre-fabricated brace as use of the orthotist will be for longer than six months and because the foot must be controlled in more than one plane  He remains at risk for fall without a custom AFO    #HTN  - Losartan 100mg daily  - Carvedilol 12.5mg BID    #CAD  -  s/p CABG  - Resume ASA 2/7    #HLD  - Zocor 40mg daily    #Pain management  - Tylenol PRN, lidocaine, Oxycodone PRN, Robaxin  - Gabapentin  200mg TID 2/6  - Swiss cheese boots in bed 2/6    #DVT ppx  - Lovenox    #Bowel Regimen  - Senna, miralax BID  - Fleet enema PRN    #Bladder management  - Vergara Dcd 2/2- TOV  - Flomax  - Void OOB/sitting  - DC PVRs and BS 2/8    #Diet:  - Regular  - Supplements: MVI    #Skin:  - Skin  lumbar incision, minimal swelling, no erythema    #Sleep:   - Maintain quiet hours and low stim environment.  - Melatonin 6mg nightly 2/7    #Mood  - NPSY following for emotional support and coping strategies    #Precaution  - Fall, Aspiration, spinal    IDT  2/16  TDD: 2/21 to home  Barriers: Lower body weakness.  Social Work: Lives in  with girlfriend and girlfriend's mother with 3 YANETH and 10 STI with 1 HR. 1st floor living if needed.   Pt plans on renting a hospital bed and will need popfly commode.  OT: Independent/supervision for ADLs. Min A for iADLS.   PT: Min A for transfers. Ambulated 75ft with RW with CG/min A. WC propel 100ft independently.   SLP: --N/A  Plan for family meeting 2/16.     Liaison with family 2/15--I called and spoke with patient's girlfriend, Ms Rosy Gonzalez to update on patient's treatment   As she recently told therapists, she stated that patient has multiple stairs at home, not 4 as patient reports.  She stated that there are few stairs to entrace, but a flight of stairs to basement where patient will be residing post DC  The ground floor area is not available for habitation at this point  A family meeting is being organized for tomorrow at 1pm to discuss issue regarding stairs and make an appropriate dc plan    --------------------------------------------------------  Outpatient Follow-up (Specialty/Name of physician):  Zcak Schaefer (MD; PhD)  Neurosurgery  [address] [place] [address], [address]nd [place] [address]  Phone: [phone]  Fax: [phone]  --------------------------------------------------------

## 2023-02-23 LAB
ALBUMIN SERPL ELPH-MCNC: 3.6 G/DL — SIGNIFICANT CHANGE UP (ref 3.3–5)
ALP SERPL-CCNC: 63 U/L — SIGNIFICANT CHANGE UP (ref 40–120)
ALT FLD-CCNC: 61 U/L — HIGH (ref 10–45)
ANION GAP SERPL CALC-SCNC: 8 MMOL/L — SIGNIFICANT CHANGE UP (ref 5–17)
AST SERPL-CCNC: 33 U/L — SIGNIFICANT CHANGE UP (ref 10–40)
BILIRUB SERPL-MCNC: 0.3 MG/DL — SIGNIFICANT CHANGE UP (ref 0.2–1.2)
BUN SERPL-MCNC: 20 MG/DL — SIGNIFICANT CHANGE UP (ref 7–23)
CALCIUM SERPL-MCNC: 9.5 MG/DL — SIGNIFICANT CHANGE UP (ref 8.4–10.5)
CHLORIDE SERPL-SCNC: 102 MMOL/L — SIGNIFICANT CHANGE UP (ref 96–108)
CO2 SERPL-SCNC: 31 MMOL/L — SIGNIFICANT CHANGE UP (ref 22–31)
CREAT SERPL-MCNC: 0.65 MG/DL — SIGNIFICANT CHANGE UP (ref 0.5–1.3)
EGFR: 110 ML/MIN/1.73M2 — SIGNIFICANT CHANGE UP
GLUCOSE SERPL-MCNC: 102 MG/DL — HIGH (ref 70–99)
HCT VFR BLD CALC: 36.6 % — LOW (ref 39–50)
HGB BLD-MCNC: 12.1 G/DL — LOW (ref 13–17)
MCHC RBC-ENTMCNC: 28.8 PG — SIGNIFICANT CHANGE UP (ref 27–34)
MCHC RBC-ENTMCNC: 33.1 GM/DL — SIGNIFICANT CHANGE UP (ref 32–36)
MCV RBC AUTO: 87.1 FL — SIGNIFICANT CHANGE UP (ref 80–100)
NRBC # BLD: 0 /100 WBCS — SIGNIFICANT CHANGE UP (ref 0–0)
PLATELET # BLD AUTO: 246 K/UL — SIGNIFICANT CHANGE UP (ref 150–400)
POTASSIUM SERPL-MCNC: 3.9 MMOL/L — SIGNIFICANT CHANGE UP (ref 3.5–5.3)
POTASSIUM SERPL-SCNC: 3.9 MMOL/L — SIGNIFICANT CHANGE UP (ref 3.5–5.3)
PROT SERPL-MCNC: 7.3 G/DL — SIGNIFICANT CHANGE UP (ref 6–8.3)
RBC # BLD: 4.2 M/UL — SIGNIFICANT CHANGE UP (ref 4.2–5.8)
RBC # FLD: 13.6 % — SIGNIFICANT CHANGE UP (ref 10.3–14.5)
SODIUM SERPL-SCNC: 141 MMOL/L — SIGNIFICANT CHANGE UP (ref 135–145)
WBC # BLD: 7.45 K/UL — SIGNIFICANT CHANGE UP (ref 3.8–10.5)
WBC # FLD AUTO: 7.45 K/UL — SIGNIFICANT CHANGE UP (ref 3.8–10.5)

## 2023-02-23 PROCEDURE — 99232 SBSQ HOSP IP/OBS MODERATE 35: CPT

## 2023-02-23 RX ORDER — OXYCODONE HYDROCHLORIDE 5 MG/1
10 TABLET ORAL EVERY 4 HOURS
Refills: 0 | Status: DISCONTINUED | OUTPATIENT
Start: 2023-02-23 | End: 2023-02-24

## 2023-02-23 RX ADMIN — Medication 975 MILLIGRAM(S): at 08:35

## 2023-02-23 RX ADMIN — Medication 975 MILLIGRAM(S): at 18:19

## 2023-02-23 RX ADMIN — METHOCARBAMOL 1500 MILLIGRAM(S): 500 TABLET, FILM COATED ORAL at 05:23

## 2023-02-23 RX ADMIN — TAMSULOSIN HYDROCHLORIDE 0.4 MILLIGRAM(S): 0.4 CAPSULE ORAL at 21:16

## 2023-02-23 RX ADMIN — Medication 81 MILLIGRAM(S): at 12:12

## 2023-02-23 RX ADMIN — OXYCODONE HYDROCHLORIDE 10 MILLIGRAM(S): 5 TABLET ORAL at 05:28

## 2023-02-23 RX ADMIN — CARVEDILOL PHOSPHATE 12.5 MILLIGRAM(S): 80 CAPSULE, EXTENDED RELEASE ORAL at 17:20

## 2023-02-23 RX ADMIN — Medication 1 TABLET(S): at 12:12

## 2023-02-23 RX ADMIN — CARVEDILOL PHOSPHATE 12.5 MILLIGRAM(S): 80 CAPSULE, EXTENDED RELEASE ORAL at 06:25

## 2023-02-23 RX ADMIN — LIDOCAINE 1 PATCH: 4 CREAM TOPICAL at 07:26

## 2023-02-23 RX ADMIN — LIDOCAINE 1 PATCH: 4 CREAM TOPICAL at 01:19

## 2023-02-23 RX ADMIN — ENOXAPARIN SODIUM 40 MILLIGRAM(S): 100 INJECTION SUBCUTANEOUS at 05:22

## 2023-02-23 RX ADMIN — Medication 975 MILLIGRAM(S): at 17:19

## 2023-02-23 RX ADMIN — LOSARTAN POTASSIUM 100 MILLIGRAM(S): 100 TABLET, FILM COATED ORAL at 06:25

## 2023-02-23 RX ADMIN — GABAPENTIN 300 MILLIGRAM(S): 400 CAPSULE ORAL at 13:34

## 2023-02-23 RX ADMIN — LIDOCAINE 1 PATCH: 4 CREAM TOPICAL at 19:03

## 2023-02-23 RX ADMIN — Medication 975 MILLIGRAM(S): at 07:35

## 2023-02-23 RX ADMIN — GABAPENTIN 300 MILLIGRAM(S): 400 CAPSULE ORAL at 21:16

## 2023-02-23 RX ADMIN — OXYCODONE HYDROCHLORIDE 10 MILLIGRAM(S): 5 TABLET ORAL at 21:17

## 2023-02-23 RX ADMIN — OXYCODONE HYDROCHLORIDE 10 MILLIGRAM(S): 5 TABLET ORAL at 06:28

## 2023-02-23 RX ADMIN — OXYCODONE HYDROCHLORIDE 10 MILLIGRAM(S): 5 TABLET ORAL at 22:17

## 2023-02-23 RX ADMIN — Medication 6 MILLIGRAM(S): at 21:16

## 2023-02-23 RX ADMIN — Medication 975 MILLIGRAM(S): at 13:12

## 2023-02-23 RX ADMIN — SIMVASTATIN 40 MILLIGRAM(S): 20 TABLET, FILM COATED ORAL at 21:16

## 2023-02-23 RX ADMIN — METHOCARBAMOL 1500 MILLIGRAM(S): 500 TABLET, FILM COATED ORAL at 13:34

## 2023-02-23 RX ADMIN — GABAPENTIN 300 MILLIGRAM(S): 400 CAPSULE ORAL at 05:22

## 2023-02-23 RX ADMIN — METHOCARBAMOL 1500 MILLIGRAM(S): 500 TABLET, FILM COATED ORAL at 21:16

## 2023-02-23 RX ADMIN — Medication 975 MILLIGRAM(S): at 12:12

## 2023-02-23 NOTE — PROGRESS NOTE ADULT - SUBJECTIVE AND OBJECTIVE BOX
Patient is a 57y old  Male who presents with a chief complaint of Paraparesis/Cauda Equina Syndrome (23 Feb 2023 10:25)      Patient seen and examined at bedside.  No overnight events  No complaints this morning    ALLERGIES:  No Known Allergies    MEDICATIONS  (STANDING):  acetaminophen     Tablet .. 975 milliGRAM(s) Oral every 6 hours  aspirin enteric coated 81 milliGRAM(s) Oral daily  carvedilol 12.5 milliGRAM(s) Oral every 12 hours  enoxaparin Injectable 40 milliGRAM(s) SubCutaneous <User Schedule>  gabapentin 300 milliGRAM(s) Oral three times a day  lidocaine   4% Patch 1 Patch Transdermal every 24 hours  lidocaine   4% Patch 1 Patch Transdermal daily  losartan 100 milliGRAM(s) Oral daily  melatonin 6 milliGRAM(s) Oral at bedtime  methocarbamol 1500 milliGRAM(s) Oral three times a day  multivitamin 1 Tablet(s) Oral daily  polyethylene glycol 3350 17 Gram(s) Oral two times a day  senna 2 Tablet(s) Oral at bedtime  simvastatin 40 milliGRAM(s) Oral at bedtime  tamsulosin 0.4 milliGRAM(s) Oral at bedtime    MEDICATIONS  (PRN):  lidocaine 2% Jelly 3 milliLiter(s) IntraUrethral every 6 hours PRN For straight catheterization  magnesium hydroxide Suspension 30 milliLiter(s) Oral every 12 hours PRN Constipation  mineral oil enema 133 milliLiter(s) Rectal daily PRN constipation  oxyCODONE    IR 10 milliGRAM(s) Oral every 4 hours PRN Severe Pain (7 - 10)    Vital Signs Last 24 Hrs  T(F): 97.5 (23 Feb 2023 07:33), Max: 98.3 (22 Feb 2023 19:51)  HR: 62 (23 Feb 2023 07:33) (61 - 77)  BP: 106/65 (23 Feb 2023 07:33) (106/65 - 144/89)  RR: 16 (23 Feb 2023 07:33) (16 - 16)  SpO2: 97% (23 Feb 2023 07:33) (97% - 100%)  I&O's Summary    22 Feb 2023 07:01  -  23 Feb 2023 07:00  --------------------------------------------------------  IN: 0 mL / OUT: 150 mL / NET: -150 mL    23 Feb 2023 07:01  -  23 Feb 2023 10:55  --------------------------------------------------------  IN: 0 mL / OUT: 100 mL / NET: -100 mL    PHYSICAL EXAM:  GENERAL: NAD  HENT:  Atraumatic, Normocephalic; No tonsillar erythema, exudates, or enlargement; Moist mucous membranes;   EYES: EOMI, PERRLA, conjunctiva and sclera clear, no lid-lag  NECK: Supple, No JVD, Normal thyroid  CHEST/LUNG: Clear to percussion bilaterally; No rales, rhonchi, wheezing, or rubs; normal respiratory effort, no intercostal retractions  HEART: Regular rate and rhythm; No murmurs, rubs, or gallops; No pitting edema  ABDOMEN: Soft, Nontender, Nondistended; Bowel sounds present; No HSM  MUSCULOSKELETAL/EXTREMITIES:  2+ Peripheral Pulses, No clubbing or digital cyanosis  PSYCH: Appropriate affect, Alert & Awake; Good judgement    LABS:                        12.1   7.45  )-----------( 246      ( 23 Feb 2023 07:30 )             36.6       02-23    141  |  102  |  20  ----------------------------<  102  3.9   |  31  |  0.65    Ca    9.5      23 Feb 2023 07:30    TPro  7.3  /  Alb  3.6  /  TBili  0.3  /  DBili  x   /  AST  33  /  ALT  61  /  AlkPhos  63  02-23     COVID-19 PCR: NotDetec (01-31-23 @ 00:00)  COVID-19 PCR: NotDetec (01-30-23 @ 02:16)    Care Discussed with Rehab Attending and Other Providers

## 2023-02-23 NOTE — PROGRESS NOTE ADULT - NS ATTEND AMEND GEN_ALL_CORE FT
Seen and examined   Note revised    No interval med complaints   Continue engagement in therapy  IDT details as noted    Clinically stable  Still has LE weakness  Due orthotist f/u today for AFO delivery  Got insurance authorization for his preferred W[facility]    Continue PT/OT  Await WINDY placement

## 2023-02-23 NOTE — PROGRESS NOTE ADULT - ASSESSMENT
58 YO male with PMH of CAD, s/p CABG, HTN, HLD, ETOH abuse (no alcohol use in 2 years as per pt) who presented to [facility] on 1/24 with back pain and progressive lower extremity weakness. Examination with concern for cauda equina. He was admitted for cauda equina syndrome and he  underwent urgent L2-3 laminectomy/ discectomy on 1/25/23.   Patient now with gait Instability, ADL impairments and Functional impairments.    * Continue to monitor rehab progress * Await RLE AFO  and WINDY, await auth/placement *    #Cauda Equina Syndrome  - s/p L2-3 laminectomy/ discectomy on 1/25/23.  - Comprehensive Rehab Program: PT/OT, 3hours daily and 5 days weekly  -- Gabapentin 300mg TID  --2/14 overnight: fluctuance noted at surgical incision site- no erythema or drainage  -- CT LS revealed possible infection, liquified hematoma or seroma  -- (No fever, or s/s of infection at this time)   -- NSGY: Dr. Olive garcia- non-urgent. Follow up outpatient for possible aspiration/sampling/drainage  --* Await Neurology review, will update operating surgeon with CT and neuro recs afterwards, f/u labs with inflammatory markers tomorrow    # Foot drop  Orthotic eval 2/9--measured for Rt AFO    NEED FOR RIGHT FOOT ORTHOTIC DEVICE  Dx: Cauda Equina syndrome    Patient presents with Rt foot drop with inversion and lower extremity weakness due to Cauda Equina syndrome    He requires a right custom AFO for support and stability during ambulation and while participating in physical therapy  Manual Muscle Testing showed Right hip 3/5 Knee flexion 3/5 Knee extension 2/5 Ankle Dorsiflexion 1/5, Plantar flexion 3/5,   Left hip flexion 4/5, Knee extension 4+/5, 4+/5 knee flexion, 4-/5 ankle dorsiflexion, 4+/5, Plantar flexion 4+/5  He is unable to use pre-fabricated brace as use of the orthotist will be for longer than six months and because the foot must be controlled in more than one plane  He remains at risk for fall without a custom AFO    #HTN  - Losartan 100mg daily  - Carvedilol 12.5mg BID    #CAD  -  s/p CABG  - Resume ASA 2/7    #HLD  - Zocor 40mg daily    #Pain management  - Tylenol PRN, lidocaine, Oxycodone PRN, Robaxin  - Gabapentin  200mg TID 2/6  - Swiss cheese boots in bed 2/6    #DVT ppx  - Lovenox    #Bowel Regimen  - Senna, miralax BID  - Fleet enema PRN    #Bladder management  - Vergara Dcd 2/2- TOV  - Flomax  - Void OOB/sitting  - DC PVRs and BS 2/8    #Diet:  - Regular  - Supplements: MVI    #Skin:  - Skin  lumbar incision, minimal swelling, no erythema    #Sleep:   - Maintain quiet hours and low stim environment.  - Melatonin 6mg nightly 2/7    #Mood  - NPSY following for emotional support and coping strategies    #Precaution  - Fall, Aspiration, spinal    IDT  2/16  TDD: 2/21 to home  Barriers: Lower body weakness.  Social Work: Lives in  with girlfriend and girlfriend's mother with 3 YANETH and 10 STI with 1 HR. 1st floor living if needed.   Pt plans on renting a hospital bed and will need popfly commode.  OT: Independent/supervision for ADLs. Min A for iADLS.   PT: Min A for transfers. Ambulated 75ft with RW with CG/min A. WC propel 100ft independently.   SLP: --N/A  Plan for family meeting 2/16.     Liaison with family 2/15--I called and spoke with patient's girlfriend, Ms Rosy Gonzalez to update on patient's treatment   As she recently told therapists, she stated that patient has multiple stairs at home, not 4 as patient reports.  She stated that there are few stairs to entrace, but a flight of stairs to basement where patient will be residing post DC  The ground floor area is not available for habitation at this point  A family meeting is being organized for tomorrow at 1pm to discuss issue regarding stairs and make an appropriate dc plan    --------------------------------------------------------  Outpatient Follow-up (Specialty/Name of physician):  Zack Schaefer (MD; PhD)  Neurosurgery  [address] [place] [address], [address]nd [place] [address]  Phone: [phone]  Fax: [phone]  -------------------------------------------------------- 58 YO male with PMH of CAD, s/p CABG, HTN, HLD, ETOH abuse (no alcohol use in 2 years as per pt) who presented to [facility] on 1/24 with back pain and progressive lower extremity weakness. Examination with concern for cauda equina. He was admitted for cauda equina syndrome and he  underwent urgent L2-3 laminectomy/ discectomy on 1/25/23.   Patient now with gait Instability, ADL impairments and Functional impairments.    * Continue to monitor rehab progress * Await RLE AFO  and W[facility], await auth/placement *    #Cauda Equina Syndrome  - s/p L2-3 laminectomy/ discectomy on 1/25/23.  - Comprehensive Rehab Program: PT/OT, 3hours daily and 5 days weekly  -- Gabapentin 300mg TID  --2/14 overnight: fluctuance noted at surgical incision site- no erythema or drainage  -- CT LS revealed possible infection, liquified hematoma or seroma  -- (No fever, or s/s of infection at this time)   -- NSGY: Dr. Olive garcia- non-urgent. Follow up outpatient for possible aspiration/sampling/drainage  --* Await Neurology review, will update operating surgeon with CT and neuro recs afterwards, f/u labs with inflammatory markers tomorrow    # Foot drop  Orthotic eval 2/9--measured for Rt AFO    NEED FOR RIGHT FOOT ORTHOTIC DEVICE  Dx: Cauda Equina syndrome    Patient presents with Rt foot drop with inversion and lower extremity weakness due to Cauda Equina syndrome    He requires a right custom AFO for support and stability during ambulation and while participating in physical therapy  Manual Muscle Testing showed Right hip 3/5 Knee flexion 3/5 Knee extension 2/5 Ankle Dorsiflexion 1/5, Plantar flexion 3/5,   Left hip flexion 4/5, Knee extension 4+/5, 4+/5 knee flexion, 4-/5 ankle dorsiflexion, 4+/5, Plantar flexion 4+/5  He is unable to use pre-fabricated brace as use of the orthotist will be for longer than six months and because the foot must be controlled in more than one plane  He remains at risk for fall without a custom AFO    #HTN  - Losartan 100mg daily  - Carvedilol 12.5mg BID    #CAD  -  s/p CABG  - Resume ASA 2/7    #HLD  - Zocor 40mg daily    #Pain management  - Tylenol PRN, lidocaine, Oxycodone PRN, Robaxin  - Gabapentin  200mg TID 2/6  - Swiss cheese boots in bed 2/6    #DVT ppx  - Lovenox    #Bowel Regimen  - Senna, miralax BID  - Fleet enema PRN    #Bladder management  - Vergara Dcd 2/2- TOV  - Flomax  - Void OOB/sitting  - DC PVRs and BS 2/8    #Diet:  - Regular  - Supplements: MVI    #Skin:  - Skin  lumbar incision, minimal swelling, no erythema    #Sleep:   - Maintain quiet hours and low stim environment.  - Melatonin 6mg nightly 2/7    #Mood  - NPSY following for emotional support and coping strategies    #Precaution  - Fall, Aspiration, spinal    IDT  2/23  TDD: 2/24 to [facility]  Barriers: Lower body weakness.  Social Work: Lives in  with girlfriend and girlfriend's mother with 3 YANETH and 10 STI with 1 HR. 1st floor living if needed.   Await WINDY auth.  OT: Supervision/mod I.  PT: CS for transfers. Ambulated 150 ft with RW. Negotiated 4 stairs with 1 HR with mod x1 A.  SLP: --N/A  Cleared for DC to [facility].     Liaison with family 2/15--I called and spoke with patient's girlfriend, Ms Rosy Gonzalez to update on patient's treatment   As she recently told therapists, she stated that patient has multiple stairs at home, not 4 as patient reports.  She stated that there are few stairs to entrace, but a flight of stairs to basement where patient will be residing post DC  The ground floor area is not available for habitation at this point  A family meeting is being organized for tomorrow at 1pm to discuss issue regarding stairs and make an appropriate dc plan    --------------------------------------------------------  Outpatient Follow-up (Specialty/Name of physician):  Zack Schaefer (MD; PhD)  Neurosurgery  [address] [facility], [address]nd [place] [address]  Phone: [phone]  Fax: [phone]  -------------------------------------------------------- 58 YO male with PMH of CAD, s/p CABG, HTN, HLD, ETOH abuse (no alcohol use in 2 years as per pt) who presented to [facility] on 1/24 with back pain and progressive lower extremity weakness. Examination with concern for cauda equina. He was admitted for cauda equina syndrome and he  underwent urgent L2-3 laminectomy/ discectomy on 1/25/23.   Patient now with gait Instability, ADL impairments and Functional impairments.    * Continue to monitor rehab progress * Await RLE AFO  and WINDY, placement *    #Cauda Equina Syndrome  - s/p L2-3 laminectomy/ discectomy on 1/25/23.  - Comprehensive Rehab Program: PT/OT, 3hours daily and 5 days weekly  -- Gabapentin 300mg TID  --2/14 overnight: fluctuance noted at surgical incision site- no erythema or drainage  -- CT LS revealed possible infection, liquified hematoma or seroma  -- (No fever, or s/s of infection at this time)   -- NSGY: Dr. Olive garcia- non-urgent. Follow up outpatient for possible aspiration/sampling/drainage  --* Await Neurology review, will update operating surgeon with CT and neuro recs afterwards, f/u labs with inflammatory markers tomorrow    # Foot drop  Orthotic eval 2/9--measured for Rt AFO    NEED FOR RIGHT FOOT ORTHOTIC DEVICE  Dx: Cauda Equina syndrome    Patient presents with Rt foot drop with inversion and lower extremity weakness due to Cauda Equina syndrome    He requires a right custom AFO for support and stability during ambulation and while participating in physical therapy  Manual Muscle Testing showed Right hip 3/5 Knee flexion 3/5 Knee extension 2/5 Ankle Dorsiflexion 1/5, Plantar flexion 3/5,   Left hip flexion 4/5, Knee extension 4+/5, 4+/5 knee flexion, 4-/5 ankle dorsiflexion, 4+/5, Plantar flexion 4+/5  He is unable to use pre-fabricated brace as use of the orthotist will be for longer than six months and because the foot must be controlled in more than one plane  He remains at risk for fall without a custom AFO    #HTN  - Losartan 100mg daily  - Carvedilol 12.5mg BID    #CAD  -  s/p CABG  - Resume ASA 2/7    #HLD  - Zocor 40mg daily    #Pain management  - Tylenol PRN, lidocaine, Oxycodone PRN, Robaxin  - Gabapentin  200mg TID 2/6  - Swiss cheese boots in bed 2/6    #DVT ppx  - Lovenox    #Bowel Regimen  - Senna, miralax BID  - Fleet enema PRN    #Bladder management  - Vergara Dcd 2/2- TOV  - Flomax  - Void OOB/sitting  - DC PVRs and BS 2/8    #Diet:  - Regular  - Supplements: MVI    #Skin:  - Skin  lumbar incision, minimal swelling, no erythema    #Sleep:   - Maintain quiet hours and low stim environment.  - Melatonin 6mg nightly 2/7    #Mood  - NPSY following for emotional support and coping strategies    #Precaution  - Fall, Aspiration, spinal    IDT  2/23  TDD: 2/24 to [facility]  Barriers: Lower body weakness.  Social Work: Lives in  with girlfriend and girlfriend's mother with 3 YANETH and 10 STI with 1 HR. 1st floor living if needed.   Await WINDY auth.  OT: Supervision/mod I.  PT: CS for transfers. Ambulated 150 ft with RW. Negotiated 4 stairs with 1 HR with mod x1 A.  SLP: --N/A  Cleared for DC to [facility].     Liaison with family 2/15--I called and spoke with patient's girlfriend, Ms Rosy Gonzalez to update on patient's treatment   As she recently told therapists, she stated that patient has multiple stairs at home, not 4 as patient reports.  She stated that there are few stairs to entrace, but a flight of stairs to basement where patient will be residing post DC  The ground floor area is not available for habitation at this point  A family meeting is being organized for tomorrow at 1pm to discuss issue regarding stairs and make an appropriate dc plan    --------------------------------------------------------  Outpatient Follow-up (Specialty/Name of physician):  Zack Schaefer (MD; PhD)  Neurosurgery  [address] [facility], [address]nd [place] [address]  Phone: [phone]  Fax: [phone]  --------------------------------------------------------

## 2023-02-23 NOTE — PROGRESS NOTE ADULT - ASSESSMENT
57M with CAD s/p CABG, HTN, HLD, hx alcohol abuse, recently hospitalized for cauda equina syndrome requiring L2-L3 laminectomy/discectomy after injury while weight lifting, now in acute rehab    #Cauda Equina Syndrome s/p L2/L3 lami/discectomy with Ambulatory dysfunction   - PT/OT as per PMR team  - Pain control as needed  - subcutaneous fluid collection at L2-L3 - contained leak or seroma.  Neurosurgeon, Dr. Schaefer, who did not recommend urgent intervention- pt may need aspiration of fluid for analysis. outpatient follow up.    #Coronary Artery Disease   - Continue ASA, statin, coreg    #Essential HTN  - c/w Losartan, Coreg    #Urinary retention  - Likely in part neurogenic from cauda equina syndrome, constipation and decreased mobility  - Vergara removed and pt voiding   - Continue tamsulosin    DVT ppx: Lovenox

## 2023-02-23 NOTE — PROGRESS NOTE ADULT - SUBJECTIVE AND OBJECTIVE BOX
CC: Cauda Equina    Subjective and objective  Patient seen and examined at bedside this AM with Dr. Herring.   Denies any medical issues overnight.  Admits to sleeping "okay"  Last BM on 2/22, per patient.  Eager to be DC to [facility].   No other complaints at this time.    Denies HA, dizziness, visual changes, CP, SOB, cough, nausea, abd pain, or urinary symptoms.      Therapy--observed ambulating with walker, engaging in therapy      Vital Signs Last 24 Hrs  T(C): 36.4 (23 Feb 2023 07:33), Max: 36.8 (22 Feb 2023 19:51)  T(F): 97.5 (23 Feb 2023 07:33), Max: 98.3 (22 Feb 2023 19:51)  HR: 62 (23 Feb 2023 07:33) (61 - 77)  BP: 106/65 (23 Feb 2023 07:33) (106/65 - 144/89)  BP(mean): --  RR: 16 (23 Feb 2023 07:33) (16 - 16)  SpO2: 97% (23 Feb 2023 07:33) (97% - 100%)    PHYSICAL EXAM:  Constitutional -Comfortable  HEENT - NAD  Neck - Supple, No limited ROM  Pulm - resp nonlabored  Cardio - warm and well perfused   Abdomen -  Soft, non tender  Extremities - No peripheral edema, No calf tenderness     Neurologic Exam:                 Cognition - AOx4  Motor -                      LEFT    UE - ShAB 5/5, EF 5/5, EE 5/5, WE 5/5,  WNL                    RIGHT UE - ShAB 5/5, EF 5/5, EE 5/5, WE 5/5,  WNL                    LEFT    LE - HF 3/5, KE 3+/5, DF 3+/5, PF 4/5 Strong hip extensors4+/5                    RIGHT LE - HF 3/5, KE 2+/5, DF 2/5, PF 4/5     Sensory - decrease sensation below the knee. R>L  Psychiatric - Mood stable, Affect WNL  Skin lumbar incision intact - healed, mild edema noted. No erythema or drainage or fluctuance            LAB                        12.1   7.45  )-----------( 246      ( 23 Feb 2023 07:30 )             36.6       MEDICATIONS  (STANDING):  acetaminophen     Tablet .. 975 milliGRAM(s) Oral every 6 hours  aspirin enteric coated 81 milliGRAM(s) Oral daily  carvedilol 12.5 milliGRAM(s) Oral every 12 hours  enoxaparin Injectable 40 milliGRAM(s) SubCutaneous <User Schedule>  gabapentin 300 milliGRAM(s) Oral three times a day  lidocaine   4% Patch 1 Patch Transdermal every 24 hours  lidocaine   4% Patch 1 Patch Transdermal daily  losartan 100 milliGRAM(s) Oral daily  melatonin 6 milliGRAM(s) Oral at bedtime  methocarbamol 1500 milliGRAM(s) Oral three times a day  multivitamin 1 Tablet(s) Oral daily  polyethylene glycol 3350 17 Gram(s) Oral two times a day  senna 2 Tablet(s) Oral at bedtime  simvastatin 40 milliGRAM(s) Oral at bedtime  tamsulosin 0.4 milliGRAM(s) Oral at bedtime    MEDICATIONS  (PRN):  lidocaine 2% Jelly 3 milliLiter(s) IntraUrethral every 6 hours PRN For straight catheterization  magnesium hydroxide Suspension 30 milliLiter(s) Oral every 12 hours PRN Constipation  mineral oil enema 133 milliLiter(s) Rectal daily PRN constipation  oxyCODONE    IR 5 milliGRAM(s) Oral every 4 hours PRN Moderate Pain (4 - 6)  oxyCODONE    IR 10 milliGRAM(s) Oral every 4 hours PRN Severe Pain (7 - 10) CC: Cauda Equina    Subjective and objective  Patient seen and examined at bedside this AM with Dr. Herring.   Denies any medical issues overnight.  Admits to sleeping "okay"  Last BM on 2/22, per patient.  Eager to be DC to [facility].   No other complaints at this time.    Denies HA, dizziness, visual changes, CP, SOB, cough, nausea, abd pain, or urinary symptoms.      Therapy--observed ambulating with walker, engaging in therapy      Vital Signs Last 24 Hrs  T(C): 36.4 (23 Feb 2023 07:33), Max: 36.8 (22 Feb 2023 19:51)  T(F): 97.5 (23 Feb 2023 07:33), Max: 98.3 (22 Feb 2023 19:51)  HR: 62 (23 Feb 2023 07:33) (61 - 77)  BP: 106/65 (23 Feb 2023 07:33) (106/65 - 144/89)  BP(mean): --  RR: 16 (23 Feb 2023 07:33) (16 - 16)  SpO2: 97% (23 Feb 2023 07:33) (97% - 100%)    PHYSICAL EXAM:  Constitutional -Comfortable  HEENT - NAD  Neck - Supple, No limited ROM  Pulm - resp nonlabored  Cardio - warm and well perfused   Abdomen -  Soft, non tender  Extremities - No peripheral edema, No calf tenderness     Neurologic Exam:                 Cognition - AOx4  Motor -                      LEFT    UE - ShAB 5/5, EF 5/5, EE 5/5, WE 5/5,  WNL                    RIGHT UE - ShAB 5/5, EF 5/5, EE 5/5, WE 5/5,  WNL                    LEFT    LE - HF 3/5, KE 3+/5, DF 3+/5, PF 4/5 Strong hip extensors4+/5                    RIGHT LE - HF 3/5, KE 2+/5, DF 2/5, PF 4/5     Sensory - decrease sensation below the knee. R>L  Psychiatric - Mood stable, Affect WNL  Skin lumbar incision intact - healed, mild edema noted. No erythema or drainage or fluctuance            LAB                        12.1   7.45  )-----------( 246      ( 23 Feb 2023 07:30 )             36.6     02-23    141  |  102  |  20  ----------------------------<  102<H>  3.9   |  31  |  0.65    Ca    9.5      23 Feb 2023 07:30    TPro  7.3  /  Alb  3.6  /  TBili  0.3  /  DBili  x   /  AST  33  /  ALT  61<H>  /  AlkPhos  63  02-23    LIVER FUNCTIONS - ( 23 Feb 2023 07:30 )  Alb: 3.6 g/dL / Pro: 7.3 g/dL / ALK PHOS: 63 U/L / ALT: 61 U/L / AST: 33 U/L / GGT: x           MEDICATIONS  (STANDING):  acetaminophen     Tablet .. 975 milliGRAM(s) Oral every 6 hours  aspirin enteric coated 81 milliGRAM(s) Oral daily  carvedilol 12.5 milliGRAM(s) Oral every 12 hours  enoxaparin Injectable 40 milliGRAM(s) SubCutaneous <User Schedule>  gabapentin 300 milliGRAM(s) Oral three times a day  lidocaine   4% Patch 1 Patch Transdermal every 24 hours  lidocaine   4% Patch 1 Patch Transdermal daily  losartan 100 milliGRAM(s) Oral daily  melatonin 6 milliGRAM(s) Oral at bedtime  methocarbamol 1500 milliGRAM(s) Oral three times a day  multivitamin 1 Tablet(s) Oral daily  polyethylene glycol 3350 17 Gram(s) Oral two times a day  senna 2 Tablet(s) Oral at bedtime  simvastatin 40 milliGRAM(s) Oral at bedtime  tamsulosin 0.4 milliGRAM(s) Oral at bedtime    MEDICATIONS  (PRN):  lidocaine 2% Jelly 3 milliLiter(s) IntraUrethral every 6 hours PRN For straight catheterization  magnesium hydroxide Suspension 30 milliLiter(s) Oral every 12 hours PRN Constipation  mineral oil enema 133 milliLiter(s) Rectal daily PRN constipation  oxyCODONE    IR 5 milliGRAM(s) Oral every 4 hours PRN Moderate Pain (4 - 6)  oxyCODONE    IR 10 milliGRAM(s) Oral every 4 hours PRN Severe Pain (7 - 10) CC: Cauda Equina    Subjective and objective  Patient seen and examined at bedside this AM with Dr. Herring.   Denies any medical issues overnight.  Admits to sleeping "okay"  Last BM on 2/22, per patient.  Eager to be DC to [facility].   No other complaints at this time.    Denies HA, dizziness, visual changes, CP, SOB, cough, nausea, abd pain, or urinary symptoms.      Therapy--observed ambulating with walker, engaging in therapy  Details as noted in IDT today    Vital Signs Last 24 Hrs  T(C): 36.4 (23 Feb 2023 07:33), Max: 36.8 (22 Feb 2023 19:51)  T(F): 97.5 (23 Feb 2023 07:33), Max: 98.3 (22 Feb 2023 19:51)  HR: 62 (23 Feb 2023 07:33) (61 - 77)  BP: 106/65 (23 Feb 2023 07:33) (106/65 - 144/89)  BP(mean): --  RR: 16 (23 Feb 2023 07:33) (16 - 16)  SpO2: 97% (23 Feb 2023 07:33) (97% - 100%)    PHYSICAL EXAM:  Constitutional -Comfortable  HEENT - NAD  Neck - Supple, No limited ROM  Pulm - resp nonlabored  Cardio - warm and well perfused   Abdomen -  Soft, non tender  Extremities - No peripheral edema, No calf tenderness     Neurologic Exam:                 Cognition - AOx4  Motor -                      LEFT    UE - ShAB 5/5, EF 5/5, EE 5/5, WE 5/5,  WNL                    RIGHT UE - ShAB 5/5, EF 5/5, EE 5/5, WE 5/5,  WNL                    LEFT    LE - HF 3/5, KE 3+/5, DF 3+/5, PF 4/5 Strong hip extensors4+/5                    RIGHT LE - HF 3/5, KE 2+/5, DF 2/5, PF 4/5     Sensory - decrease sensation below the knee. R>L  Psychiatric - Mood stable, Affect WNL  Skin lumbar incision intact - healed, mild edema noted. No erythema or drainage or fluctuance            LAB                        12.1   7.45  )-----------( 246      ( 23 Feb 2023 07:30 )             36.6     02-23    141  |  102  |  20  ----------------------------<  102<H>  3.9   |  31  |  0.65    Ca    9.5      23 Feb 2023 07:30    TPro  7.3  /  Alb  3.6  /  TBili  0.3  /  DBili  x   /  AST  33  /  ALT  61<H>  /  AlkPhos  63  02-23    LIVER FUNCTIONS - ( 23 Feb 2023 07:30 )  Alb: 3.6 g/dL / Pro: 7.3 g/dL / ALK PHOS: 63 U/L / ALT: 61 U/L / AST: 33 U/L / GGT: x           MEDICATIONS  (STANDING):  acetaminophen     Tablet .. 975 milliGRAM(s) Oral every 6 hours  aspirin enteric coated 81 milliGRAM(s) Oral daily  carvedilol 12.5 milliGRAM(s) Oral every 12 hours  enoxaparin Injectable 40 milliGRAM(s) SubCutaneous <User Schedule>  gabapentin 300 milliGRAM(s) Oral three times a day  lidocaine   4% Patch 1 Patch Transdermal every 24 hours  lidocaine   4% Patch 1 Patch Transdermal daily  losartan 100 milliGRAM(s) Oral daily  melatonin 6 milliGRAM(s) Oral at bedtime  methocarbamol 1500 milliGRAM(s) Oral three times a day  multivitamin 1 Tablet(s) Oral daily  polyethylene glycol 3350 17 Gram(s) Oral two times a day  senna 2 Tablet(s) Oral at bedtime  simvastatin 40 milliGRAM(s) Oral at bedtime  tamsulosin 0.4 milliGRAM(s) Oral at bedtime    MEDICATIONS  (PRN):  lidocaine 2% Jelly 3 milliLiter(s) IntraUrethral every 6 hours PRN For straight catheterization  magnesium hydroxide Suspension 30 milliLiter(s) Oral every 12 hours PRN Constipation  mineral oil enema 133 milliLiter(s) Rectal daily PRN constipation  oxyCODONE    IR 5 milliGRAM(s) Oral every 4 hours PRN Moderate Pain (4 - 6)  oxyCODONE    IR 10 milliGRAM(s) Oral every 4 hours PRN Severe Pain (7 - 10)

## 2023-02-24 ENCOUNTER — TRANSCRIPTION ENCOUNTER (OUTPATIENT)
Age: 58
End: 2023-02-24

## 2023-02-24 VITALS
OXYGEN SATURATION: 99 % | DIASTOLIC BLOOD PRESSURE: 85 MMHG | RESPIRATION RATE: 16 BRPM | TEMPERATURE: 98 F | SYSTOLIC BLOOD PRESSURE: 135 MMHG | HEART RATE: 67 BPM

## 2023-02-24 LAB — SARS-COV-2 RNA SPEC QL NAA+PROBE: SIGNIFICANT CHANGE UP

## 2023-02-24 PROCEDURE — 99238 HOSP IP/OBS DSCHRG MGMT 30/<: CPT

## 2023-02-24 RX ORDER — OXYCODONE HYDROCHLORIDE 5 MG/1
5 TABLET ORAL EVERY 4 HOURS
Refills: 0 | Status: DISCONTINUED | OUTPATIENT
Start: 2023-02-24 | End: 2023-02-24

## 2023-02-24 RX ADMIN — Medication 81 MILLIGRAM(S): at 11:59

## 2023-02-24 RX ADMIN — LOSARTAN POTASSIUM 100 MILLIGRAM(S): 100 TABLET, FILM COATED ORAL at 05:21

## 2023-02-24 RX ADMIN — Medication 975 MILLIGRAM(S): at 11:59

## 2023-02-24 RX ADMIN — OXYCODONE HYDROCHLORIDE 5 MILLIGRAM(S): 5 TABLET ORAL at 05:21

## 2023-02-24 RX ADMIN — LIDOCAINE 1 PATCH: 4 CREAM TOPICAL at 07:27

## 2023-02-24 RX ADMIN — METHOCARBAMOL 1500 MILLIGRAM(S): 500 TABLET, FILM COATED ORAL at 05:22

## 2023-02-24 RX ADMIN — ENOXAPARIN SODIUM 40 MILLIGRAM(S): 100 INJECTION SUBCUTANEOUS at 05:22

## 2023-02-24 RX ADMIN — GABAPENTIN 300 MILLIGRAM(S): 400 CAPSULE ORAL at 13:30

## 2023-02-24 RX ADMIN — GABAPENTIN 300 MILLIGRAM(S): 400 CAPSULE ORAL at 05:21

## 2023-02-24 RX ADMIN — OXYCODONE HYDROCHLORIDE 5 MILLIGRAM(S): 5 TABLET ORAL at 06:21

## 2023-02-24 RX ADMIN — METHOCARBAMOL 1500 MILLIGRAM(S): 500 TABLET, FILM COATED ORAL at 13:30

## 2023-02-24 RX ADMIN — OXYCODONE HYDROCHLORIDE 10 MILLIGRAM(S): 5 TABLET ORAL at 15:50

## 2023-02-24 RX ADMIN — Medication 1 TABLET(S): at 11:59

## 2023-02-24 RX ADMIN — OXYCODONE HYDROCHLORIDE 10 MILLIGRAM(S): 5 TABLET ORAL at 14:58

## 2023-02-24 RX ADMIN — LIDOCAINE 1 PATCH: 4 CREAM TOPICAL at 11:59

## 2023-02-24 RX ADMIN — CARVEDILOL PHOSPHATE 12.5 MILLIGRAM(S): 80 CAPSULE, EXTENDED RELEASE ORAL at 05:21

## 2023-02-24 NOTE — PROGRESS NOTE ADULT - NS ATTEND BILL GEN_ALL_CORE
Attending to bill
PA/NP to bill
Attending to bill

## 2023-02-24 NOTE — CHART NOTE - NSCHARTNOTESELECT_GEN_ALL_CORE
FAMILY MEETING/Event Note
Nutrition Services
Lumbar Fluctuance/Event Note
Nutrition Services
plan of care/Event Note
No

## 2023-02-24 NOTE — PROGRESS NOTE ADULT - REASON FOR ADMISSION
Paraparesis/Cauda Equina Syndrome

## 2023-02-24 NOTE — DISCHARGE NOTE NURSING/CASE MANAGEMENT/SOCIAL WORK - NSDCFUADDAPPT_GEN_ALL_CORE_FT
Please follow up with your PCP as soon as possible.    If you are in need of a PCP or a specialist in your area: contact the [facility] Physician Referral Service at [phone].

## 2023-02-24 NOTE — DISCHARGE NOTE NURSING/CASE MANAGEMENT/SOCIAL WORK - PATIENT PORTAL LINK FT
You can access the FollowMyHealth Patient Portal offered by [facility] by registering at the following website: [email or web address]. By joining [facility] [facility] [employer]’s FollowMyHealth portal, you will also be able to view your health information using other applications (apps) compatible with our system.

## 2023-02-24 NOTE — PROGRESS NOTE ADULT - NUTRITIONAL ASSESSMENT
Diet, DASH/TLC:   Sodium & Cholesterol Restricted  Supplement Feeding Modality:  Oral  Ensure Plus High Protein Cans or Servings Per Day:  1       Frequency:  Two Times a day (02-01-23 @ 09:54) [Active]

## 2023-02-24 NOTE — DISCHARGE NOTE NURSING/CASE MANAGEMENT/SOCIAL WORK - NSDCVIVACCINE_GEN_ALL_CORE_FT
Tdap; 18-May-2019 01:01; Glory Urrutia (NAKIA); [employer]; [id number] (Exp. Date: 26-Feb-2021); IntraMuscular; Deltoid Right.; 0.5 milliLiter(s); VIS (VIS Published: 09-May-2013, VIS Presented: 18-May-2019);

## 2023-02-24 NOTE — DISCHARGE NOTE NURSING/CASE MANAGEMENT/SOCIAL WORK - NSDCPEFALRISK_GEN_ALL_CORE
For information on Fall & Injury Prevention, visit: [email or web address] OR  [email or web address] OR  https://www.cdc.gov/steadi/patient.html

## 2023-02-24 NOTE — PROGRESS NOTE ADULT - NS ATTEND AMEND GEN_ALL_CORE FT
Seen and examined with NP and Med student  Notes reviewed    Clinically stable  Making progress as seen in therapy    Continue PT/OT  Await Rt foot AFO  DC planning

## 2023-02-24 NOTE — PROGRESS NOTE ADULT - NS ATTEND OPT1 GEN_ALL_CORE
I attest my time as attending is greater than 50% of the total combined time spent on qualifying patient care activities by the PA/NP and attending.
I independently performed the documented:
I attest my time as attending is greater than 50% of the total combined time spent on qualifying patient care activities by the PA/NP and attending.
I attest my time as attending is greater than 50% of the total combined time spent on qualifying patient care activities by the PA/NP and attending.
I independently performed the documented:
I independently performed the documented:
I attest my time as attending is greater than 50% of the total combined time spent on qualifying patient care activities by the PA/NP and attending.
I attest my time as attending is greater than 50% of the total combined time spent on qualifying patient care activities by the PA/NP and attending.
I independently performed the documented:
I attest my time as attending is greater than 50% of the total combined time spent on qualifying patient care activities by the PA/NP and attending.

## 2023-02-24 NOTE — PROGRESS NOTE ADULT - SUBJECTIVE AND OBJECTIVE BOX
CC: Cauda Equina    Subjective and objective  Patient seen and examined at bedside this AM.   Denies any medical issues overnight.  Admits to sleeping "okay"  Last BM on 2/23, per patient.  Eager to be DC to [facility].   No other complaints at this time.    Denies HA, dizziness, visual changes, CP, SOB, cough, nausea, abd pain, or urinary symptoms.      Therapy--observed ambulating with walker, engaging in therapy  Details as noted in IDT today    Vital Signs Last 24 Hrs  T(C): 36.5 (24 Feb 2023 07:42), Max: 37 (23 Feb 2023 17:18)  T(F): 97.7 (24 Feb 2023 07:42), Max: 98.6 (23 Feb 2023 17:18)  HR: 60 (24 Feb 2023 07:42) (60 - 83)  BP: 115/75 (24 Feb 2023 07:42) (105/66 - 138/88)  BP(mean): --  RR: 16 (24 Feb 2023 07:42) (16 - 16)  SpO2: 98% (24 Feb 2023 07:42) (97% - 98%)      PHYSICAL EXAM:  Constitutional -Comfortable  HEENT - NAD  Neck - Supple, No limited ROM  Pulm - resp nonlabored  Cardio - warm and well perfused   Abdomen -  Soft, non tender  Extremities - No peripheral edema, No calf tenderness     Neurologic Exam:                 Cognition - AOx4  Motor -                      LEFT    UE - ShAB 5/5, EF 5/5, EE 5/5, WE 5/5,  WNL                    RIGHT UE - ShAB 5/5, EF 5/5, EE 5/5, WE 5/5,  WNL                    LEFT    LE - HF 3/5, KE 3+/5, DF 3+/5, PF 4/5 Strong hip extensors4+/5                    RIGHT LE - HF 3/5, KE 2+/5, DF 2/5, PF 4/5     Sensory - decrease sensation below the knee. R>L  Psychiatric - Mood stable, Affect WNL  Skin lumbar incision intact - healed, mild edema noted. No erythema or drainage or fluctuance            LAB                        12.1   7.45  )-----------( 246      ( 23 Feb 2023 07:30 )             36.6     02-23    141  |  102  |  20  ----------------------------<  102<H>  3.9   |  31  |  0.65    Ca    9.5      23 Feb 2023 07:30    TPro  7.3  /  Alb  3.6  /  TBili  0.3  /  DBili  x   /  AST  33  /  ALT  61<H>  /  AlkPhos  63  02-23    LIVER FUNCTIONS - ( 23 Feb 2023 07:30 )  Alb: 3.6 g/dL / Pro: 7.3 g/dL / ALK PHOS: 63 U/L / ALT: 61 U/L / AST: 33 U/L / GGT: x           MEDICATIONS  (STANDING):  acetaminophen     Tablet .. 975 milliGRAM(s) Oral every 6 hours  aspirin enteric coated 81 milliGRAM(s) Oral daily  carvedilol 12.5 milliGRAM(s) Oral every 12 hours  enoxaparin Injectable 40 milliGRAM(s) SubCutaneous <User Schedule>  gabapentin 300 milliGRAM(s) Oral three times a day  lidocaine   4% Patch 1 Patch Transdermal every 24 hours  lidocaine   4% Patch 1 Patch Transdermal daily  losartan 100 milliGRAM(s) Oral daily  melatonin 6 milliGRAM(s) Oral at bedtime  methocarbamol 1500 milliGRAM(s) Oral three times a day  multivitamin 1 Tablet(s) Oral daily  polyethylene glycol 3350 17 Gram(s) Oral two times a day  senna 2 Tablet(s) Oral at bedtime  simvastatin 40 milliGRAM(s) Oral at bedtime  tamsulosin 0.4 milliGRAM(s) Oral at bedtime    MEDICATIONS  (PRN):  lidocaine 2% Jelly 3 milliLiter(s) IntraUrethral every 6 hours PRN For straight catheterization  magnesium hydroxide Suspension 30 milliLiter(s) Oral every 12 hours PRN Constipation  mineral oil enema 133 milliLiter(s) Rectal daily PRN constipation  oxyCODONE    IR 5 milliGRAM(s) Oral every 4 hours PRN Moderate Pain (4 - 6)  oxyCODONE    IR 10 milliGRAM(s) Oral every 4 hours PRN Severe Pain (7 - 10) CC: Cauda Equina    Subjective and objective  Patient seen and examined at bedside this AM.   Denies any medical issues overnight.  Admits to sleeping "okay"  Last BM on 2/23, per patient.  Eager to be DC to [facility].   No other complaints at this time.    Denies HA, dizziness, visual changes, CP, SOB, cough, nausea, abd pain, or urinary symptoms.      Therapy--observed ambulating with walker, engaging in therapy  ambulating increasing distance as observed in therapy today     Vital Signs Last 24 Hrs  T(C): 36.5 (24 Feb 2023 07:42), Max: 37 (23 Feb 2023 17:18)  T(F): 97.7 (24 Feb 2023 07:42), Max: 98.6 (23 Feb 2023 17:18)  HR: 60 (24 Feb 2023 07:42) (60 - 83)  BP: 115/75 (24 Feb 2023 07:42) (105/66 - 138/88)  BP(mean): --  RR: 16 (24 Feb 2023 07:42) (16 - 16)  SpO2: 98% (24 Feb 2023 07:42) (97% - 98%)      PHYSICAL EXAM:  Constitutional -Comfortable  HEENT - NAD  Neck - Supple, No limited ROM  Pulm - resp nonlabored  Cardio - warm and well perfused   Abdomen -  Soft, non tender  Extremities - No peripheral edema, No calf tenderness     Neurologic Exam:                 Cognition - AOx4  Motor -                      LEFT    UE - ShAB 5/5, EF 5/5, EE 5/5, WE 5/5,  WNL                    RIGHT UE - ShAB 5/5, EF 5/5, EE 5/5, WE 5/5,  WNL                    LEFT    LE - HF 3/5, KE 3+/5, DF 3+/5, PF 4/5 Strong hip extensors4+/5                    RIGHT LE - HF 3/5, KE 2+/5, DF 2/5, PF 4/5     Sensory - decrease sensation below the knee. R>L  Psychiatric - Mood stable, Affect WNL  Skin lumbar incision intact - healed, mild edema noted. No erythema or drainage or fluctuance            LAB                        12.1   7.45  )-----------( 246      ( 23 Feb 2023 07:30 )             36.6     02-23    141  |  102  |  20  ----------------------------<  102<H>  3.9   |  31  |  0.65    Ca    9.5      23 Feb 2023 07:30    TPro  7.3  /  Alb  3.6  /  TBili  0.3  /  DBili  x   /  AST  33  /  ALT  61<H>  /  AlkPhos  63  02-23    LIVER FUNCTIONS - ( 23 Feb 2023 07:30 )  Alb: 3.6 g/dL / Pro: 7.3 g/dL / ALK PHOS: 63 U/L / ALT: 61 U/L / AST: 33 U/L / GGT: x           MEDICATIONS  (STANDING):  acetaminophen     Tablet .. 975 milliGRAM(s) Oral every 6 hours  aspirin enteric coated 81 milliGRAM(s) Oral daily  carvedilol 12.5 milliGRAM(s) Oral every 12 hours  enoxaparin Injectable 40 milliGRAM(s) SubCutaneous <User Schedule>  gabapentin 300 milliGRAM(s) Oral three times a day  lidocaine   4% Patch 1 Patch Transdermal every 24 hours  lidocaine   4% Patch 1 Patch Transdermal daily  losartan 100 milliGRAM(s) Oral daily  melatonin 6 milliGRAM(s) Oral at bedtime  methocarbamol 1500 milliGRAM(s) Oral three times a day  multivitamin 1 Tablet(s) Oral daily  polyethylene glycol 3350 17 Gram(s) Oral two times a day  senna 2 Tablet(s) Oral at bedtime  simvastatin 40 milliGRAM(s) Oral at bedtime  tamsulosin 0.4 milliGRAM(s) Oral at bedtime    MEDICATIONS  (PRN):  lidocaine 2% Jelly 3 milliLiter(s) IntraUrethral every 6 hours PRN For straight catheterization  magnesium hydroxide Suspension 30 milliLiter(s) Oral every 12 hours PRN Constipation  mineral oil enema 133 milliLiter(s) Rectal daily PRN constipation  oxyCODONE    IR 5 milliGRAM(s) Oral every 4 hours PRN Moderate Pain (4 - 6)  oxyCODONE    IR 10 milliGRAM(s) Oral every 4 hours PRN Severe Pain (7 - 10)

## 2023-02-24 NOTE — CHART NOTE - NSCHARTNOTEFT_GEN_A_CORE
Nutrition Follow Up Note  Hospital Course   (Per Electronic Medical Record)    Source:  Patient [X]  Medical Record [X]      Diet: Diet, DASH/TLC:   Sodium & Cholesterol Restricted  Supplement Feeding Modality:  Oral  Ensure Plus High Protein Cans or Servings Per Day:  1       Frequency:  Two Times a day (02-01-23 @ 09:54) [Active]    At this time patient w/ adequate appetite/intake consuming % of meals. No issues w/ chewing and swallowing. Denies N/V/C/D, last BM 2/23 Per nursing flowsheets.    Enteral/Parenteral Nutrition: Not Applicable    Current Weight: 164.2lb on 2/22    Pertinent Medications: MEDICATIONS  (STANDING):  acetaminophen     Tablet .. 975 milliGRAM(s) Oral every 6 hours  aspirin enteric coated 81 milliGRAM(s) Oral daily  carvedilol 12.5 milliGRAM(s) Oral every 12 hours  enoxaparin Injectable 40 milliGRAM(s) SubCutaneous <User Schedule>  gabapentin 300 milliGRAM(s) Oral three times a day  lidocaine   4% Patch 1 Patch Transdermal every 24 hours  lidocaine   4% Patch 1 Patch Transdermal daily  losartan 100 milliGRAM(s) Oral daily  melatonin 6 milliGRAM(s) Oral at bedtime  methocarbamol 1500 milliGRAM(s) Oral three times a day  multivitamin 1 Tablet(s) Oral daily  polyethylene glycol 3350 17 Gram(s) Oral two times a day  senna 2 Tablet(s) Oral at bedtime  simvastatin 40 milliGRAM(s) Oral at bedtime  tamsulosin 0.4 milliGRAM(s) Oral at bedtime    MEDICATIONS  (PRN):  lidocaine 2% Jelly 3 milliLiter(s) IntraUrethral every 6 hours PRN For straight catheterization  magnesium hydroxide Suspension 30 milliLiter(s) Oral every 12 hours PRN Constipation  mineral oil enema 133 milliLiter(s) Rectal daily PRN constipation  oxyCODONE    IR 10 milliGRAM(s) Oral every 4 hours PRN Severe Pain (7 - 10)  oxyCODONE    IR 5 milliGRAM(s) Oral every 4 hours PRN Moderate Pain (4 - 6)      Pertinent Labs:  02-23 Na141 mmol/L Glu 102 mg/dL<H> K+ 3.9 mmol/L Cr  0.65 mg/dL BUN 20 mg/dL 02-23 Alb 3.6 g/dL      Skin: No pressure injury per nursing flowsheets    Edema: No edema noted per nursing flowsheet    Last Bowel Movement: on 2/23 Per nursing flowsheets     Estimated Needs:   [X] No Change Since Previous Assessment    Previous Nutrition Diagnosis:   No Active Nutrition Dx @ This Present Time    New Nutrition Diagnosis: [X] Not Applicable      Interventions:   1. Recommend Continue Nutrition Plan of Care.    Monitoring & Evaluation:   [X] Weights   [X] PO Intake   [X] Skin Integrity   [X] Follow Up (Per Protocol)  [X] Tolerance to Diet Prescription   [X] Other: Labs    Registered Dietitian/Nutritionist Remains Available.  Alexandra Diaz RD, MS, CDN    Phone# [phone]

## 2023-02-24 NOTE — PROGRESS NOTE ADULT - ASSESSMENT
56 YO male with PMH of CAD, s/p CABG, HTN, HLD, ETOH abuse (no alcohol use in 2 years as per pt) who presented to [facility] on 1/24 with back pain and progressive lower extremity weakness. Examination with concern for cauda equina. He was admitted for cauda equina syndrome and he  underwent urgent L2-3 laminectomy/ discectomy on 1/25/23.   Patient now with gait Instability, ADL impairments and Functional impairments.    * Await WINDY auth/placement *    #Cauda Equina Syndrome  - s/p L2-3 laminectomy/ discectomy on 1/25/23.  - Comprehensive Rehab Program: PT/OT, 3hours daily and 5 days weekly  -- Gabapentin 300mg TID  --2/14 overnight: fluctuance noted at surgical incision site- no erythema or drainage  -- CT LS revealed possible infection, liquified hematoma or seroma  -- (No fever, or s/s of infection at this time)   -- NSGY: Dr. Schaefer recs- non-urgent. Follow up outpatient for possible aspiration/sampling/drainage  --* Await Neurology review, will update operating surgeon with CT and neuro recs afterwards, f/u labs with inflammatory markers tomorrow    # Foot drop  Orthotic eval 2/9--measured for Rt AFO    NEED FOR RIGHT FOOT ORTHOTIC DEVICE  Dx: Cauda Equina syndrome    Patient presents with Rt foot drop with inversion and lower extremity weakness due to Cauda Equina syndrome    He requires a right custom AFO for support and stability during ambulation and while participating in physical therapy  Manual Muscle Testing showed Right hip 3/5 Knee flexion 3/5 Knee extension 2/5 Ankle Dorsiflexion 1/5, Plantar flexion 3/5,   Left hip flexion 4/5, Knee extension 4+/5, 4+/5 knee flexion, 4-/5 ankle dorsiflexion, 4+/5, Plantar flexion 4+/5  He is unable to use pre-fabricated brace as use of the orthotist will be for longer than six months and because the foot must be controlled in more than one plane  He remains at risk for fall without a custom AFO    #HTN  - Losartan 100mg daily  - Carvedilol 12.5mg BID    #CAD  -  s/p CABG  - Resume ASA 2/7    #HLD  - Zocor 40mg daily    #Pain management  - Tylenol PRN, lidocaine, Oxycodone PRN, Robaxin  - Gabapentin  200mg TID 2/6  - Swiss cheese boots in bed 2/6    #DVT ppx  - Lovenox    #Bowel Regimen  - Senna, miralax BID  - Fleet enema PRN    #Bladder management  - Vergara Dcd 2/2- TOV  - Flomax  - Void OOB/sitting  - DC PVRs and BS 2/8    #Diet:  - Regular  - Supplements: MVI    #Skin:  - Skin  lumbar incision, minimal swelling, no erythema    #Sleep:   - Maintain quiet hours and low stim environment.  - Melatonin 6mg nightly 2/7    #Mood  - NPSY following for emotional support and coping strategies    #Precaution  - Fall, Aspiration, spinal    IDT  2/23  TDD: 2/24 to [facility]  Barriers: Lower body weakness.  Social Work: Lives in  with girlfriend and girlfriend's mother with 3 YANETH and 10 STI with 1 HR. 1st floor living if needed.   Await WINDY auth.  OT: Supervision/mod I.  PT: CS for transfers. Ambulated 150 ft with RW. Negotiated 4 stairs with 1 HR with mod x1 A.  SLP: --N/A  Cleared for DC to [facility].     Liaison with family 2/15--I called and spoke with patient's girlfriend, Ms Rosy Gonzalez to update on patient's treatment   As she recently told therapists, she stated that patient has multiple stairs at home, not 4 as patient reports.  She stated that there are few stairs to entrace, but a flight of stairs to basement where patient will be residing post DC  The ground floor area is not available for habitation at this point  A family meeting is being organized for tomorrow at 1pm to discuss issue regarding stairs and make an appropriate dc plan    --------------------------------------------------------  Outpatient Follow-up (Specialty/Name of physician):  Zack Schaefer (MD; PhD)  Neurosurgery  [address] [facility], [address]nd [place] [address]  Phone: [phone]  Fax: [phone]  --------------------------------------------------------

## 2023-02-24 NOTE — PROGRESS NOTE ADULT - PROVIDER SPECIALTY LIST ADULT
Hospitalist
Neuropsychology
Rehab Medicine
Hospitalist
Neuropsychology
Neuropsychology
Physiatry
Physiatry
Rehab Medicine
Hospitalist
Neuropsychology
Neuropsychology
Rehab Medicine
Hospitalist
Rehab Medicine

## 2023-02-25 PROCEDURE — 97112 NEUROMUSCULAR REEDUCATION: CPT

## 2023-02-25 PROCEDURE — 97530 THERAPEUTIC ACTIVITIES: CPT

## 2023-02-25 PROCEDURE — 80053 COMPREHEN METABOLIC PANEL: CPT

## 2023-02-25 PROCEDURE — 36415 COLL VENOUS BLD VENIPUNCTURE: CPT

## 2023-02-25 PROCEDURE — 97535 SELF CARE MNGMENT TRAINING: CPT

## 2023-02-25 PROCEDURE — 85025 COMPLETE CBC W/AUTO DIFF WBC: CPT

## 2023-02-25 PROCEDURE — 85652 RBC SED RATE AUTOMATED: CPT

## 2023-02-25 PROCEDURE — 97167 OT EVAL HIGH COMPLEX 60 MIN: CPT

## 2023-02-25 PROCEDURE — 87635 SARS-COV-2 COVID-19 AMP PRB: CPT

## 2023-02-25 PROCEDURE — 97116 GAIT TRAINING THERAPY: CPT

## 2023-02-25 PROCEDURE — 86140 C-REACTIVE PROTEIN: CPT

## 2023-02-25 PROCEDURE — 97110 THERAPEUTIC EXERCISES: CPT

## 2023-02-25 PROCEDURE — 72132 CT LUMBAR SPINE W/DYE: CPT

## 2023-02-25 PROCEDURE — 81001 URINALYSIS AUTO W/SCOPE: CPT

## 2023-02-25 PROCEDURE — 97163 PT EVAL HIGH COMPLEX 45 MIN: CPT

## 2023-02-25 PROCEDURE — 85027 COMPLETE CBC AUTOMATED: CPT

## 2023-03-08 ENCOUNTER — APPOINTMENT (OUTPATIENT)
Dept: NEUROSURGERY | Facility: CLINIC | Age: 58
End: 2023-03-08
Payer: MEDICARE

## 2023-03-08 VITALS
OXYGEN SATURATION: 97 % | BODY MASS INDEX: 25.01 KG/M2 | SYSTOLIC BLOOD PRESSURE: 136 MMHG | WEIGHT: 165 LBS | TEMPERATURE: 97.1 F | HEIGHT: 68 IN | DIASTOLIC BLOOD PRESSURE: 89 MMHG | HEART RATE: 78 BPM

## 2023-03-08 PROCEDURE — 99024 POSTOP FOLLOW-UP VISIT: CPT

## 2023-03-09 NOTE — CONSULT LETTER
[Dear  ___] : Dear ~PUMA, [Courtesy Letter:] : I had the pleasure of seeing your patient, [unfilled], in my office today. [Sincerely,] : Sincerely, [FreeTextEntry2] : ADRIAN Kurtz\par [facility]\par [address],\[place] [address]\par  [FreeTextEntry1] : Mr. Dawn is a very pleasant 57-year-old male patient who was seen in our office today in follow-up.  The patient underwent an emergent laminectomy and discectomy for paraplegia and cauda equina syndrome approximately 6 weeks ago.\par \par I am happy to report that the patient is currently doing well and has recently been discharged from his rehab hospital.  The patient currently still complains of numbness in the lower extremities bilaterally slightly worse on the right.  The patient has had significant improvement in lower extremity strength since his surgery.  Prior to his operation, the patient was noted to have 0/5 strength in the legs bilaterally with 2/5 strength in the hips bilaterally.  The patient was also noted to have urinary retention.  At this time, the patient's urinary symptoms resolved and the patient is now walking independently but with the help of a walker for longer distances.\par \par On examination, the patient is alert, oriented, pliant with the exam.  The patient now demonstrates at least 4/5 strength in the lower extremities bilaterally but he is still quite tentative with his motions.  The patient demonstrates I am unable to obtain reflexes in the lower extremities bilaterally.  The patient continues to ambulate with the help of a walker, but is able to take several steps on his own without it. The patient's incision is clean, dry, and intact.  There is no evidence of swelling or fluid collection underneath.\par \par The patient's most recent imaging includes a CT scan of the lumbar spine dated February 14, 2023.  These images suggested the presence of a seroma.  There is additionally evidence of a L2/3 laminectomy.\par \par Taken together, I am gratified to the patient doing well following his surgical intervention.  At this time, I have recommended formal physical therapy to continue working with his gait and balance training as well as his strength training in the lower extremities.  I informed the patient that some of his residual symptoms may be permanent, but it is still quite early in the convalescent period.  I counseled the patient that it can take several months for the patient's residual numbness to resolve completely.  The patient will be following up with us in approximately 6 weeks to reevaluate his progress and I look forward to seeing the patient back at that time.  As a side note, there were some concerns about the patient's incision at his rehabilitation hospital which prompted the patient's CT scan on February 14, 2023.  However, the patient's incision appears now completely healed without any evidence of an underlying fluid collection. [FreeTextEntry3] : Zack Schaefer MD, PhD, CS, FAANS Attending Neurosurgeon  of Neurosurgery [facility] at [facility] at [address], [place] [address] Office: [phone] Fax: [phone]

## 2023-04-19 NOTE — PATIENT PROFILE ADULT - NSPROGENOTHERPROVIDER_GEN_A_NUR
Flu symptoms yesterday, fever, body aches, nasal congestion.  Now with chest pain today- per mom is on amoxicillin for sinus symptoms also
none

## 2023-04-28 NOTE — ED PROVIDER NOTE - NS ED MD DISPO DISCHARGE
Psych eval - depressed and  hopeless   Pt looking for a place to stay   Denies being suicidal        Triage Assessment     Row Name 04/28/23 1224       Triage Assessment (Adult)    Airway WDL WDL       Respiratory WDL    Respiratory WDL WDL       Cardiac WDL    Cardiac WDL WDL       Cognitive/Neuro/Behavioral WDL    Cognitive/Neuro/Behavioral WDL WDL              
Home

## 2023-05-11 ENCOUNTER — APPOINTMENT (OUTPATIENT)
Dept: NEUROSURGERY | Facility: CLINIC | Age: 58
End: 2023-05-11

## 2023-05-17 NOTE — DISCHARGE NOTE PROVIDER - NPI NUMBER (FOR SYSADMIN USE ONLY) :
[[id number]]
66 yo  F w/ DM, HLD, HTN, OA, hypothyroidism, and D+C procedure presents for 3 days of LUQ/left lower chest/left flank pain,  vaginal pain/burning, and acute on chronic urinary frequency. Physical exam is remarkable for +SLR with no TTP or CVA tenderness. Concern for kidney stone vs UTI vs atypical ACS vs ovarian/uterine pathology. Plan for CT abdomen/pelvis w/ IV contrast, CXR, labs, pain control, normal saline, trop, and EKG., Dispo pending results.

## 2023-05-24 NOTE — H&P ADULT - NEUROLOGICAL
details… Dupixent Pregnancy And Lactation Text: This medication likely crosses the placenta but the risk for the fetus is uncertain. This medication is excreted in breast milk. detailed exam

## 2023-06-11 NOTE — PATIENT PROFILE ADULT - EQUIPMENT CURRENTLY USED AT HOME

## 2023-06-15 ENCOUNTER — APPOINTMENT (OUTPATIENT)
Dept: NEUROSURGERY | Facility: CLINIC | Age: 58
End: 2023-06-15
Payer: MEDICARE

## 2023-06-15 VITALS — HEART RATE: 58 BPM | DIASTOLIC BLOOD PRESSURE: 107 MMHG | OXYGEN SATURATION: 99 % | SYSTOLIC BLOOD PRESSURE: 187 MMHG

## 2023-06-15 DIAGNOSIS — K59.09 OTHER CONSTIPATION: ICD-10-CM

## 2023-06-15 DIAGNOSIS — Z98.890 OTHER SPECIFIED POSTPROCEDURAL STATES: ICD-10-CM

## 2023-06-15 PROCEDURE — 99215 OFFICE O/P EST HI 40 MIN: CPT

## 2023-06-16 PROBLEM — Z98.890 S/P LUMBAR MICRODISCECTOMY: Status: ACTIVE | Noted: 2023-03-08

## 2023-06-16 NOTE — CONSULT LETTER
[Dear  ___] : Dear  [unfilled], [Courtesy Letter:] : I had the pleasure of seeing your patient, [unfilled], in my office today. [Sincerely,] : Sincerely, [FreeTextEntry2] : ADRIAN Kurtz [facility] [address], [place] [address]    [FreeTextEntry1] : Mr. Dawn is a very pleasant 58-year-old male patient who was seen in our office today in follow-up.  The patient underwent a laminectomy and microdiscectomy on January 25, 2023.\par \par Briefly, the patient suffered a sudden and severe onset of neurologic dysfunction in January 2023.  The patient effectively lost urinary function and became almost completely paraplegic at that time.  The patient was subsequently diagnosed with a disc herniation on the background of a congenitally narrow spinal canal causing cauda equina syndrome at L2/3.  The patient was taken urgently to the operating room and ultimately did well following his procedure.  At today's visit, the patient continues to demonstrate improvement with regards to motor function.  The patient denies any urinary dysfunction but does complain of constipation.  The patient states that he is currently not on any narcotic medications nor gabapentin.  The patient states that his right leg is 70% normal whereas his left leg is 90% normal.  The patient continues to have pain and numbness around the knee bilaterally.  The patient believes that this numbness worsened slightly when he stopped taking his gabapentin.  The patient also complains of intermittent but severe back spasms which are self-limiting.\par \par On examination, the patient is alert, oriented, and compliant with the exam.  The patient is no longer using any assistive devices to ambulate.  The patient demonstrates 5/5 strength in the lower extremities bilaterally.  The patient demonstrates 2+ reflexes in the lower extremities bilaterally.  The patient remains slightly stiff in the lower extremities in terms of range of motion.\par \par Plan no new imaging to review today.\par \par Taken together, I am gratified to see the patient doing well following his surgical intervention.  At this time, the patient has made an excellent recovery and is continuing to improve.  With respect to muscle spasms, I have recommended continuing physical therapy with specific emphasis on stretching exercises as well as core strengthening and postural training.  I have also provided a prescription for massage therapy for symptomatic relief.  With respect to the patient's persistent numbness in the lower extremities, I did explain that this may be permanent.  However, I have advised restarting the gabapentin to see if this provides any relief.  The patient has also been referred to a GI specialist for his ongoing constipation.  At this time, the patient will be following up with us on an as-needed basis. [FreeTextEntry3] : Zack Schaefer MD, PhD, CS, FAANS Attending Neurosurgeon  of Neurosurgery [facility] at [facility] at [address], [place] [address] Office: [phone] Fax: [phone]

## 2023-07-27 NOTE — PATIENT PROFILE ADULT - NSASFUNCLEVELADLTOILET_GEN_A_NUR
July 27, 2023     Patient: Katerina Cox  YOB: 2004  Date of Visit: 7/27/2023      To Whom it May Concern:    Veronicaivanna Byrd is under my professional care. Michael Cotton was seen in my office on 7/27/2023. Michael Cotton may return to work on 8/14/23 . If you have any questions or concerns, please don't hesitate to call. Sincerely,          Sean Kenny MD        CC: Soraya Lemus 0 = independent

## 2023-11-02 ENCOUNTER — EMERGENCY (EMERGENCY)
Facility: HOSPITAL | Age: 58
LOS: 0 days | Discharge: LEFT BEFORE DISCHARGE | End: 2023-11-02
Attending: EMERGENCY MEDICINE
Payer: MEDICARE

## 2023-11-02 VITALS
SYSTOLIC BLOOD PRESSURE: 155 MMHG | OXYGEN SATURATION: 100 % | RESPIRATION RATE: 20 BRPM | HEIGHT: 68 IN | WEIGHT: 160.06 LBS | HEART RATE: 82 BPM | DIASTOLIC BLOOD PRESSURE: 93 MMHG | TEMPERATURE: 98 F

## 2023-11-02 DIAGNOSIS — Z53.29 PROCEDURE AND TREATMENT NOT CARRIED OUT BECAUSE OF PATIENT'S DECISION FOR OTHER REASONS: ICD-10-CM

## 2023-11-02 DIAGNOSIS — R06.02 SHORTNESS OF BREATH: ICD-10-CM

## 2023-11-02 DIAGNOSIS — I10 ESSENTIAL (PRIMARY) HYPERTENSION: ICD-10-CM

## 2023-11-02 DIAGNOSIS — Z95.1 PRESENCE OF AORTOCORONARY BYPASS GRAFT: Chronic | ICD-10-CM

## 2023-11-02 DIAGNOSIS — Z86.59 PERSONAL HISTORY OF OTHER MENTAL AND BEHAVIORAL DISORDERS: ICD-10-CM

## 2023-11-02 DIAGNOSIS — E78.5 HYPERLIPIDEMIA, UNSPECIFIED: ICD-10-CM

## 2023-11-02 DIAGNOSIS — Z79.82 LONG TERM (CURRENT) USE OF ASPIRIN: ICD-10-CM

## 2023-11-02 DIAGNOSIS — R07.9 CHEST PAIN, UNSPECIFIED: ICD-10-CM

## 2023-11-02 DIAGNOSIS — I25.10 ATHEROSCLEROTIC HEART DISEASE OF NATIVE CORONARY ARTERY WITHOUT ANGINA PECTORIS: ICD-10-CM

## 2023-11-02 DIAGNOSIS — Z95.1 PRESENCE OF AORTOCORONARY BYPASS GRAFT: ICD-10-CM

## 2023-11-02 LAB
ALBUMIN SERPL ELPH-MCNC: 4 G/DL — SIGNIFICANT CHANGE UP (ref 3.3–5)
ALBUMIN SERPL ELPH-MCNC: 4 G/DL — SIGNIFICANT CHANGE UP (ref 3.3–5)
ALP SERPL-CCNC: 69 U/L — SIGNIFICANT CHANGE UP (ref 40–120)
ALP SERPL-CCNC: 69 U/L — SIGNIFICANT CHANGE UP (ref 40–120)
ALT FLD-CCNC: 34 U/L — SIGNIFICANT CHANGE UP (ref 12–78)
ALT FLD-CCNC: 34 U/L — SIGNIFICANT CHANGE UP (ref 12–78)
AMPHET UR-MCNC: NEGATIVE — SIGNIFICANT CHANGE UP
AMPHET UR-MCNC: NEGATIVE — SIGNIFICANT CHANGE UP
ANION GAP SERPL CALC-SCNC: 8 MMOL/L — SIGNIFICANT CHANGE UP (ref 5–17)
ANION GAP SERPL CALC-SCNC: 8 MMOL/L — SIGNIFICANT CHANGE UP (ref 5–17)
APPEARANCE UR: CLEAR — SIGNIFICANT CHANGE UP
APPEARANCE UR: CLEAR — SIGNIFICANT CHANGE UP
AST SERPL-CCNC: 30 U/L — SIGNIFICANT CHANGE UP (ref 15–37)
AST SERPL-CCNC: 30 U/L — SIGNIFICANT CHANGE UP (ref 15–37)
BARBITURATES UR SCN-MCNC: NEGATIVE — SIGNIFICANT CHANGE UP
BARBITURATES UR SCN-MCNC: NEGATIVE — SIGNIFICANT CHANGE UP
BASOPHILS # BLD AUTO: 0.06 K/UL — SIGNIFICANT CHANGE UP (ref 0–0.2)
BASOPHILS # BLD AUTO: 0.06 K/UL — SIGNIFICANT CHANGE UP (ref 0–0.2)
BASOPHILS NFR BLD AUTO: 1.1 % — SIGNIFICANT CHANGE UP (ref 0–2)
BASOPHILS NFR BLD AUTO: 1.1 % — SIGNIFICANT CHANGE UP (ref 0–2)
BENZODIAZ UR-MCNC: NEGATIVE — SIGNIFICANT CHANGE UP
BENZODIAZ UR-MCNC: NEGATIVE — SIGNIFICANT CHANGE UP
BILIRUB SERPL-MCNC: 0.3 MG/DL — SIGNIFICANT CHANGE UP (ref 0.2–1.2)
BILIRUB SERPL-MCNC: 0.3 MG/DL — SIGNIFICANT CHANGE UP (ref 0.2–1.2)
BILIRUB UR-MCNC: NEGATIVE — SIGNIFICANT CHANGE UP
BILIRUB UR-MCNC: NEGATIVE — SIGNIFICANT CHANGE UP
BUN SERPL-MCNC: 6 MG/DL — LOW (ref 7–23)
BUN SERPL-MCNC: 6 MG/DL — LOW (ref 7–23)
CALCIUM SERPL-MCNC: 8.5 MG/DL — SIGNIFICANT CHANGE UP (ref 8.5–10.1)
CALCIUM SERPL-MCNC: 8.5 MG/DL — SIGNIFICANT CHANGE UP (ref 8.5–10.1)
CHLORIDE SERPL-SCNC: 105 MMOL/L — SIGNIFICANT CHANGE UP (ref 96–108)
CHLORIDE SERPL-SCNC: 105 MMOL/L — SIGNIFICANT CHANGE UP (ref 96–108)
CO2 SERPL-SCNC: 28 MMOL/L — SIGNIFICANT CHANGE UP (ref 22–31)
CO2 SERPL-SCNC: 28 MMOL/L — SIGNIFICANT CHANGE UP (ref 22–31)
COCAINE METAB.OTHER UR-MCNC: NEGATIVE — SIGNIFICANT CHANGE UP
COCAINE METAB.OTHER UR-MCNC: NEGATIVE — SIGNIFICANT CHANGE UP
COLOR SPEC: YELLOW — SIGNIFICANT CHANGE UP
COLOR SPEC: YELLOW — SIGNIFICANT CHANGE UP
CREAT SERPL-MCNC: 0.77 MG/DL — SIGNIFICANT CHANGE UP (ref 0.5–1.3)
CREAT SERPL-MCNC: 0.77 MG/DL — SIGNIFICANT CHANGE UP (ref 0.5–1.3)
DIFF PNL FLD: NEGATIVE — SIGNIFICANT CHANGE UP
DIFF PNL FLD: NEGATIVE — SIGNIFICANT CHANGE UP
EGFR: 104 ML/MIN/1.73M2 — SIGNIFICANT CHANGE UP
EGFR: 104 ML/MIN/1.73M2 — SIGNIFICANT CHANGE UP
EOSINOPHIL # BLD AUTO: 0.07 K/UL — SIGNIFICANT CHANGE UP (ref 0–0.5)
EOSINOPHIL # BLD AUTO: 0.07 K/UL — SIGNIFICANT CHANGE UP (ref 0–0.5)
EOSINOPHIL NFR BLD AUTO: 1.3 % — SIGNIFICANT CHANGE UP (ref 0–6)
EOSINOPHIL NFR BLD AUTO: 1.3 % — SIGNIFICANT CHANGE UP (ref 0–6)
ETHANOL SERPL-MCNC: 200 MG/DL — HIGH (ref 0–10)
ETHANOL SERPL-MCNC: 200 MG/DL — HIGH (ref 0–10)
GLUCOSE SERPL-MCNC: 104 MG/DL — HIGH (ref 70–99)
GLUCOSE SERPL-MCNC: 104 MG/DL — HIGH (ref 70–99)
GLUCOSE UR QL: NEGATIVE MG/DL — SIGNIFICANT CHANGE UP
GLUCOSE UR QL: NEGATIVE MG/DL — SIGNIFICANT CHANGE UP
HCT VFR BLD CALC: 40.2 % — SIGNIFICANT CHANGE UP (ref 39–50)
HCT VFR BLD CALC: 40.2 % — SIGNIFICANT CHANGE UP (ref 39–50)
HGB BLD-MCNC: 13.5 G/DL — SIGNIFICANT CHANGE UP (ref 13–17)
HGB BLD-MCNC: 13.5 G/DL — SIGNIFICANT CHANGE UP (ref 13–17)
IMM GRANULOCYTES NFR BLD AUTO: 0.2 % — SIGNIFICANT CHANGE UP (ref 0–0.9)
IMM GRANULOCYTES NFR BLD AUTO: 0.2 % — SIGNIFICANT CHANGE UP (ref 0–0.9)
KETONES UR-MCNC: NEGATIVE MG/DL — SIGNIFICANT CHANGE UP
KETONES UR-MCNC: NEGATIVE MG/DL — SIGNIFICANT CHANGE UP
LEUKOCYTE ESTERASE UR-ACNC: NEGATIVE — SIGNIFICANT CHANGE UP
LEUKOCYTE ESTERASE UR-ACNC: NEGATIVE — SIGNIFICANT CHANGE UP
LYMPHOCYTES # BLD AUTO: 2.57 K/UL — SIGNIFICANT CHANGE UP (ref 1–3.3)
LYMPHOCYTES # BLD AUTO: 2.57 K/UL — SIGNIFICANT CHANGE UP (ref 1–3.3)
LYMPHOCYTES # BLD AUTO: 49.2 % — HIGH (ref 13–44)
LYMPHOCYTES # BLD AUTO: 49.2 % — HIGH (ref 13–44)
MAGNESIUM SERPL-MCNC: 2.2 MG/DL — SIGNIFICANT CHANGE UP (ref 1.6–2.6)
MAGNESIUM SERPL-MCNC: 2.2 MG/DL — SIGNIFICANT CHANGE UP (ref 1.6–2.6)
MCHC RBC-ENTMCNC: 29.4 PG — SIGNIFICANT CHANGE UP (ref 27–34)
MCHC RBC-ENTMCNC: 29.4 PG — SIGNIFICANT CHANGE UP (ref 27–34)
MCHC RBC-ENTMCNC: 33.6 GM/DL — SIGNIFICANT CHANGE UP (ref 32–36)
MCHC RBC-ENTMCNC: 33.6 GM/DL — SIGNIFICANT CHANGE UP (ref 32–36)
MCV RBC AUTO: 87.6 FL — SIGNIFICANT CHANGE UP (ref 80–100)
MCV RBC AUTO: 87.6 FL — SIGNIFICANT CHANGE UP (ref 80–100)
METHADONE UR-MCNC: NEGATIVE — SIGNIFICANT CHANGE UP
METHADONE UR-MCNC: NEGATIVE — SIGNIFICANT CHANGE UP
MONOCYTES # BLD AUTO: 0.54 K/UL — SIGNIFICANT CHANGE UP (ref 0–0.9)
MONOCYTES # BLD AUTO: 0.54 K/UL — SIGNIFICANT CHANGE UP (ref 0–0.9)
MONOCYTES NFR BLD AUTO: 10.3 % — SIGNIFICANT CHANGE UP (ref 2–14)
MONOCYTES NFR BLD AUTO: 10.3 % — SIGNIFICANT CHANGE UP (ref 2–14)
NEUTROPHILS # BLD AUTO: 1.97 K/UL — SIGNIFICANT CHANGE UP (ref 1.8–7.4)
NEUTROPHILS # BLD AUTO: 1.97 K/UL — SIGNIFICANT CHANGE UP (ref 1.8–7.4)
NEUTROPHILS NFR BLD AUTO: 37.9 % — LOW (ref 43–77)
NEUTROPHILS NFR BLD AUTO: 37.9 % — LOW (ref 43–77)
NITRITE UR-MCNC: NEGATIVE — SIGNIFICANT CHANGE UP
NITRITE UR-MCNC: NEGATIVE — SIGNIFICANT CHANGE UP
OPIATES UR-MCNC: NEGATIVE — SIGNIFICANT CHANGE UP
OPIATES UR-MCNC: NEGATIVE — SIGNIFICANT CHANGE UP
PCP SPEC-MCNC: SIGNIFICANT CHANGE UP
PCP SPEC-MCNC: SIGNIFICANT CHANGE UP
PCP UR-MCNC: NEGATIVE — SIGNIFICANT CHANGE UP
PCP UR-MCNC: NEGATIVE — SIGNIFICANT CHANGE UP
PH UR: 6 — SIGNIFICANT CHANGE UP (ref 5–8)
PH UR: 6 — SIGNIFICANT CHANGE UP (ref 5–8)
PLATELET # BLD AUTO: 220 K/UL — SIGNIFICANT CHANGE UP (ref 150–400)
PLATELET # BLD AUTO: 220 K/UL — SIGNIFICANT CHANGE UP (ref 150–400)
POTASSIUM SERPL-MCNC: 4 MMOL/L — SIGNIFICANT CHANGE UP (ref 3.5–5.3)
POTASSIUM SERPL-MCNC: 4 MMOL/L — SIGNIFICANT CHANGE UP (ref 3.5–5.3)
POTASSIUM SERPL-SCNC: 4 MMOL/L — SIGNIFICANT CHANGE UP (ref 3.5–5.3)
POTASSIUM SERPL-SCNC: 4 MMOL/L — SIGNIFICANT CHANGE UP (ref 3.5–5.3)
PROT SERPL-MCNC: 7.9 GM/DL — SIGNIFICANT CHANGE UP (ref 6–8.3)
PROT SERPL-MCNC: 7.9 GM/DL — SIGNIFICANT CHANGE UP (ref 6–8.3)
PROT UR-MCNC: NEGATIVE MG/DL — SIGNIFICANT CHANGE UP
PROT UR-MCNC: NEGATIVE MG/DL — SIGNIFICANT CHANGE UP
RBC # BLD: 4.59 M/UL — SIGNIFICANT CHANGE UP (ref 4.2–5.8)
RBC # BLD: 4.59 M/UL — SIGNIFICANT CHANGE UP (ref 4.2–5.8)
RBC # FLD: 15.1 % — HIGH (ref 10.3–14.5)
RBC # FLD: 15.1 % — HIGH (ref 10.3–14.5)
SODIUM SERPL-SCNC: 141 MMOL/L — SIGNIFICANT CHANGE UP (ref 135–145)
SODIUM SERPL-SCNC: 141 MMOL/L — SIGNIFICANT CHANGE UP (ref 135–145)
SP GR SPEC: <1.005 — LOW (ref 1–1.03)
SP GR SPEC: <1.005 — LOW (ref 1–1.03)
THC UR QL: POSITIVE — SIGNIFICANT CHANGE UP
THC UR QL: POSITIVE — SIGNIFICANT CHANGE UP
TROPONIN I, HIGH SENSITIVITY RESULT: 10.12 NG/L — SIGNIFICANT CHANGE UP
TROPONIN I, HIGH SENSITIVITY RESULT: 10.12 NG/L — SIGNIFICANT CHANGE UP
UROBILINOGEN FLD QL: 0.2 MG/DL — SIGNIFICANT CHANGE UP (ref 0.2–1)
UROBILINOGEN FLD QL: 0.2 MG/DL — SIGNIFICANT CHANGE UP (ref 0.2–1)
WBC # BLD: 5.22 K/UL — SIGNIFICANT CHANGE UP (ref 3.8–10.5)
WBC # BLD: 5.22 K/UL — SIGNIFICANT CHANGE UP (ref 3.8–10.5)
WBC # FLD AUTO: 5.22 K/UL — SIGNIFICANT CHANGE UP (ref 3.8–10.5)
WBC # FLD AUTO: 5.22 K/UL — SIGNIFICANT CHANGE UP (ref 3.8–10.5)

## 2023-11-02 PROCEDURE — 71045 X-RAY EXAM CHEST 1 VIEW: CPT

## 2023-11-02 PROCEDURE — 71045 X-RAY EXAM CHEST 1 VIEW: CPT | Mod: 26

## 2023-11-02 PROCEDURE — 36415 COLL VENOUS BLD VENIPUNCTURE: CPT

## 2023-11-02 PROCEDURE — 81003 URINALYSIS AUTO W/O SCOPE: CPT

## 2023-11-02 PROCEDURE — 83735 ASSAY OF MAGNESIUM: CPT

## 2023-11-02 PROCEDURE — 84484 ASSAY OF TROPONIN QUANT: CPT

## 2023-11-02 PROCEDURE — 93010 ELECTROCARDIOGRAM REPORT: CPT

## 2023-11-02 PROCEDURE — 99285 EMERGENCY DEPT VISIT HI MDM: CPT

## 2023-11-02 PROCEDURE — 80307 DRUG TEST PRSMV CHEM ANLYZR: CPT

## 2023-11-02 PROCEDURE — 93005 ELECTROCARDIOGRAM TRACING: CPT

## 2023-11-02 PROCEDURE — 80053 COMPREHEN METABOLIC PANEL: CPT

## 2023-11-02 PROCEDURE — 85025 COMPLETE CBC W/AUTO DIFF WBC: CPT

## 2023-11-02 PROCEDURE — 99285 EMERGENCY DEPT VISIT HI MDM: CPT | Mod: 25

## 2023-11-02 NOTE — ED ADULT TRIAGE NOTE - CHIEF COMPLAINT QUOTE
Pt arrives to ED complaining of chest pain after being pulled over for DUI. Pt currently under arrest. Hx HTN, HLD.

## 2023-11-02 NOTE — ED PROVIDER NOTE - PATIENT PORTAL LINK FT
You can access the FollowMyHealth Patient Portal offered by [facility] by registering at the following website: [email or web address]. By joining [employer]’s FollowMyHealth portal, you will also be able to view your health information using other applications (apps) compatible with our system.

## 2023-11-02 NOTE — ED ADULT NURSE NOTE - OBJECTIVE STATEMENT
57 y/o alert male presents to ED for c/c of chest pain s/p arrest for suspicion of DUI, Pt reports chest pain "x couple days" describes pain as "stinging", Pt pointing to pain in epigastric area, rates pain, EMS administered 4 baby ASA PTA, EKG performed at bedside, cardiac rhythm NSR, HR 78bpm, SCPD at bedside with pt

## 2023-11-02 NOTE — ED PROVIDER NOTE - PHYSICAL EXAMINATION
Gen:  Well appearing in NAD, + AOB  Head:  NC/AT  HEENT: pupils perrl,no pharyngeal erythema, uvula midline  Cardiac: S1S2, RRR  Abd: Soft, non tender  Resp: No distress, CTA   musculoskeletal:: no deformities, no swelling, strength +5/+5  Skin: warm and dry as visualized, no rashes  Neuro: RINCON, aao x 4  Psych:alert, cooperative, appropriate mood and affect for situation

## 2023-11-02 NOTE — ED ADULT NURSE NOTE - PRIMARY CARE PROVIDER
see md note
Routine care of . Please follow up with your pediatrician in 1-2days.   Please make sure to feed your  every 3 hours or sooner as baby demands. Breast milk is preferable, either through breastfeeding or via pumping of breast milk. If you do not have enough breast milk please supplement with formula. Please seek immediate medical attention is your baby seems to not be feeding well or has persistent vomiting. If baby appears yellow or jaundiced please consult with your pediatrician. You must follow up with your pediatrician in 1-2 days. If your baby has a fever of 100.4F or more you must seek medical care in an emergency room immediately. Please call [facility] or your pediatrician if you should have any other questions or concerns.

## 2023-11-02 NOTE — ED ADULT NURSE NOTE - NSFALLUNIVINTERV_ED_ALL_ED
Bed/Stretcher in lowest position, wheels locked, appropriate side rails in place/Call bell, personal items and telephone in reach/Instruct patient to call for assistance before getting out of bed/chair/stretcher/Non-slip footwear applied when patient is off stretcher/[place] to call system/Physically safe environment - no spills, clutter or unnecessary equipment/Purposeful proactive rounding/Room/bathroom lighting operational, light cord in reach

## 2023-11-02 NOTE — ED PROVIDER NOTE - PROGRESS NOTE DETAILS
pt wants to so anna Rea DO pt wants to so ama. The patient is clinically sober. The patient is alert and oriented x 3, is clear and coherent in conversation and has a normal gait and shows no signs of acute intoxication. The patient is safe for discharge.   YUSEF Rea DO

## 2023-11-02 NOTE — ED PROVIDER NOTE - CLINICAL SUMMARY MEDICAL DECISION MAKING FREE TEXT BOX
59 y/o male under arrest for DUI presents for chest pain and shortness of breath. Will obtain labs including troponin, Utox, EKG, CXR, and reassess for disposition.

## 2023-11-02 NOTE — ED PROVIDER NOTE - OBJECTIVE STATEMENT
59 y/o male with PMHx of CAD s/p CABG, HTN, HLD, and alcohol abuse BIBPD to the ED c/o chest pain. Pt was driving vehicle pulled over by PD, when asked to blow into breathalyzer pt developed chest pain and shortness of breath. Pt currently under arrest by PD for DUI.

## 2023-12-07 ENCOUNTER — APPOINTMENT (OUTPATIENT)
Dept: NEUROSURGERY | Facility: CLINIC | Age: 58
End: 2023-12-07
Payer: MEDICARE

## 2023-12-07 VITALS
HEART RATE: 64 BPM | HEIGHT: 68 IN | WEIGHT: 165 LBS | DIASTOLIC BLOOD PRESSURE: 91 MMHG | OXYGEN SATURATION: 99 % | BODY MASS INDEX: 25.01 KG/M2 | SYSTOLIC BLOOD PRESSURE: 142 MMHG

## 2023-12-07 DIAGNOSIS — M54.16 RADICULOPATHY, LUMBAR REGION: ICD-10-CM

## 2023-12-07 DIAGNOSIS — R20.2 PARESTHESIA OF SKIN: ICD-10-CM

## 2023-12-07 PROCEDURE — 99214 OFFICE O/P EST MOD 30 MIN: CPT

## 2024-04-18 ENCOUNTER — APPOINTMENT (OUTPATIENT)
Dept: INTERNAL MEDICINE | Facility: CLINIC | Age: 59
End: 2024-04-18
Payer: MEDICARE

## 2024-04-18 ENCOUNTER — NON-APPOINTMENT (OUTPATIENT)
Age: 59
End: 2024-04-18

## 2024-04-18 VITALS
WEIGHT: 155 LBS | OXYGEN SATURATION: 98 % | SYSTOLIC BLOOD PRESSURE: 120 MMHG | DIASTOLIC BLOOD PRESSURE: 70 MMHG | TEMPERATURE: 98.3 F | HEART RATE: 57 BPM | HEIGHT: 71 IN | BODY MASS INDEX: 21.7 KG/M2 | RESPIRATION RATE: 16 BRPM

## 2024-04-18 DIAGNOSIS — Z87.891 PERSONAL HISTORY OF NICOTINE DEPENDENCE: ICD-10-CM

## 2024-04-18 DIAGNOSIS — I10 ESSENTIAL (PRIMARY) HYPERTENSION: ICD-10-CM

## 2024-04-18 DIAGNOSIS — N52.9 MALE ERECTILE DYSFUNCTION, UNSPECIFIED: ICD-10-CM

## 2024-04-18 DIAGNOSIS — Z00.00 ENCOUNTER FOR GENERAL ADULT MEDICAL EXAMINATION W/OUT ABNORMAL FINDINGS: ICD-10-CM

## 2024-04-18 DIAGNOSIS — Z87.898 PERSONAL HISTORY OF OTHER SPECIFIED CONDITIONS: ICD-10-CM

## 2024-04-18 DIAGNOSIS — D64.9 ANEMIA, UNSPECIFIED: ICD-10-CM

## 2024-04-18 DIAGNOSIS — R31.0 GROSS HEMATURIA: ICD-10-CM

## 2024-04-18 PROCEDURE — 99386 PREV VISIT NEW AGE 40-64: CPT

## 2024-04-18 PROCEDURE — G0444 DEPRESSION SCREEN ANNUAL: CPT | Mod: 59

## 2024-04-18 PROCEDURE — 93000 ELECTROCARDIOGRAM COMPLETE: CPT | Mod: 59

## 2024-04-18 RX ORDER — MULTIVITAMIN
TABLET ORAL
Refills: 0 | Status: DISCONTINUED | COMMUNITY
End: 2024-04-18

## 2024-04-18 RX ORDER — METHYLPREDNISOLONE 4 MG/1
4 TABLET ORAL
Qty: 1 | Refills: 0 | Status: DISCONTINUED | COMMUNITY
Start: 2023-12-07 | End: 2024-04-18

## 2024-04-18 RX ORDER — MELOXICAM 7.5 MG/1
7.5 TABLET ORAL
Qty: 30 | Refills: 3 | Status: DISCONTINUED | COMMUNITY
Start: 2020-12-30 | End: 2024-04-18

## 2024-04-18 RX ORDER — FAMOTIDINE 20 MG/1
20 TABLET, FILM COATED ORAL TWICE DAILY
Refills: 0 | Status: DISCONTINUED | COMMUNITY
End: 2024-04-18

## 2024-04-18 RX ORDER — GABAPENTIN 300 MG/1
300 CAPSULE ORAL 3 TIMES DAILY
Qty: 90 | Refills: 2 | Status: DISCONTINUED | COMMUNITY
Start: 2023-06-15 | End: 2024-04-18

## 2024-04-18 RX ORDER — FOLIC ACID 1 MG/1
1 TABLET ORAL
Refills: 0 | Status: DISCONTINUED | COMMUNITY
End: 2024-04-18

## 2024-04-18 NOTE — REVIEW OF SYSTEMS
[Recent Change In Weight] : ~T recent weight change [Constipation] : constipation [Heartburn] : heartburn [Melena] : no melena [Dysuria] : no dysuria [Hematuria] : hematuria [Impotence] : impotence [Joint Pain] : no joint pain [Back Pain] : back pain [Itching] : no itching [Skin Rash] : no skin rash [Headache] : no headache [Dizziness] : no dizziness [Unsteady Walk] : no ataxia [Easy Bleeding] : no easy bleeding [Easy Bruising] : no easy bruising [Swollen Glands] : no swollen glands [Negative] : Respiratory

## 2024-04-18 NOTE — HISTORY OF PRESENT ILLNESS
[FreeTextEntry1] : Establish Care [de-identified] : 59M w/ PMHx of CAD s/p CABG, HTN, HLD, GERD, Chronic constipation, EtOH abuse, smoker, and recent discectomy for cauda equina syndrome with Dr. Schaefer. Pt is a former patient of Dr. Cullen, last seen .   Pt reports he quit smoking in , but he told the medical assistant he stopped several weeks ago. The exam room smells of cigarette smoke.   Pt reports he stopped drinking EtOH 23 but told the medical assistant he is actively drinking. He has been hospitalized in the [past for EtOH withdrawal./  He reports stopping marijuana 11 days ago. He denies any other illicit drug use.   He has a history of CAD s/p 2 vessel CABG in , no active chest pain or palpitation. He has not had a stress test in many years. He has a f/u appointment with Dr. Mari at the end of the month. BP elevated today 140/90, pt reports drinking 2 cups of coffee daily. He is on Coreg 12.5mg BID and Amlodipine 10mg daily for HTN. He has HLD but does not recall the name of the statin he is taking.   Pt reports one episode of gross hematuria several weeks ago with no other occurrences. He suffers recently from erectile dysfunction.  Pt reports last colonoscopy > 7 years ago, he suffers from chronic constipation. He denies any blood in the stool. He reports a 50-pound weight loss over the last several years and no appetite. He does not follow any special diet.   Pt underwent a discectomy with Dr. Schaefer for cauda equina syndrome, he was discharged from  to [facility] for rehab. He reports since surgery decrease sense of sensation in RLE.   The patient does not perform any formal exercise but walks daily to and from work at [employer].   Family Hx includes early cardiac death, DM, and Brain Cancer. Both parents  at an early age.   The pt is UTD on dental and ophthalmology exams. He has never seen a dermatologist.

## 2024-04-18 NOTE — PLAN
[FreeTextEntry1] : 1. Comprehensive blood work. 2. CT Chest for lung cancer screening. 3. GI referral for colon cancer screening colonoscopy and chronic constipation, Dr. Hawk. 4. Urology referral for gross hematuria and ED, Dr. Yadav. 5. Annual dental ophthalmology and dermatology exams.  6. Mediterranean diet. Low sodium diet. 7. Close f/u with cardiology for BP management and stress testing.  8. F/U in 3 months.

## 2024-04-18 NOTE — PHYSICAL EXAM
[No Acute Distress] : no acute distress [Normal Sclera/Conjunctiva] : normal sclera/conjunctiva [Normal Outer Ear/Nose] : the outer ears and nose were normal in appearance [Normal TMs] : both tympanic membranes were normal [No JVD] : no jugular venous distention [No Lymphadenopathy] : no lymphadenopathy [Supple] : supple [No Respiratory Distress] : no respiratory distress  [No Accessory Muscle Use] : no accessory muscle use [Clear to Auscultation] : lungs were clear to auscultation bilaterally [Regular Rhythm] : with a regular rhythm [Normal S1, S2] : normal S1 and S2 [No Edema] : there was no peripheral edema [No Extremity Clubbing/Cyanosis] : no extremity clubbing/cyanosis [Soft] : abdomen soft [Non Tender] : non-tender [Non-distended] : non-distended [No Masses] : no abdominal mass palpated [No HSM] : no HSM [Normal Bowel Sounds] : normal bowel sounds [Normal Anterior Cervical Nodes] : no anterior cervical lymphadenopathy [No CVA Tenderness] : no CVA  tenderness [No Spinal Tenderness] : no spinal tenderness [No Joint Swelling] : no joint swelling [Grossly Normal Strength/Tone] : grossly normal strength/tone [No Rash] : no rash [Coordination Grossly Intact] : coordination grossly intact [No Focal Deficits] : no focal deficits [Normal Gait] : normal gait [Normal Affect] : the affect was normal [Alert and Oriented x3] : oriented to person, place, and time [Normal Insight/Judgement] : insight and judgment were intact [de-identified] : [place]

## 2024-04-18 NOTE — HEALTH RISK ASSESSMENT
[2 - 3 times a week (3 pts)] : 2 - 3  times a week (3 points) [5 or 6 (2 pts)] : 5 or 6 (2  points) [Weekly (3 pts)] : Weekly (3 points) [0] : 2) Feeling down, depressed, or hopeless: Not at all (0) [Patient reported colonoscopy was normal] : Patient reported colonoscopy was normal [20 or more] : 20 or more [Former] : Former [< 15 Years] : < 15 Years [Fair] :  ~his/her~ mood as fair [Intercurrent ED visits] : went to ED [Yes] : In the past 12 months have you used drugs other than those required for medical reasons? Yes [PHQ-9 Negative - No further assessment needed] : PHQ-9 Negative - No further assessment needed [de-identified] : THC [HIV test declined] : HIV test declined [Hepatitis C test declined] : Hepatitis C test declined [Change in mental status noted] : No change in mental status noted [With Significant Other] : lives with significant other [ColonoscopyDate] : 7 yr

## 2024-05-15 RX ORDER — CARVEDILOL 6.25 MG/1
6.25 TABLET, FILM COATED ORAL TWICE DAILY
Qty: 60 | Refills: 3 | Status: ACTIVE | COMMUNITY
Start: 1900-01-01 | End: 1900-01-01

## 2024-05-20 ENCOUNTER — RX RENEWAL (OUTPATIENT)
Age: 59
End: 2024-05-20

## 2024-05-23 ENCOUNTER — RX RENEWAL (OUTPATIENT)
Age: 59
End: 2024-05-23

## 2024-05-23 RX ORDER — TRAZODONE HYDROCHLORIDE 100 MG/1
100 TABLET ORAL
Qty: 30 | Refills: 3 | Status: ACTIVE | COMMUNITY
Start: 2024-04-19 | End: 1900-01-01

## 2024-05-31 ENCOUNTER — APPOINTMENT (OUTPATIENT)
Age: 59
End: 2024-05-31
Payer: MEDICARE

## 2024-05-31 VITALS
WEIGHT: 165 LBS | BODY MASS INDEX: 23.1 KG/M2 | DIASTOLIC BLOOD PRESSURE: 90 MMHG | SYSTOLIC BLOOD PRESSURE: 138 MMHG | HEIGHT: 71 IN

## 2024-05-31 DIAGNOSIS — R14.3 FLATULENCE: ICD-10-CM

## 2024-05-31 DIAGNOSIS — K59.00 CONSTIPATION, UNSPECIFIED: ICD-10-CM

## 2024-05-31 DIAGNOSIS — R14.1 GAS PAIN: ICD-10-CM

## 2024-05-31 DIAGNOSIS — R14.0 ABDOMINAL DISTENSION (GASEOUS): ICD-10-CM

## 2024-05-31 DIAGNOSIS — R10.9 UNSPECIFIED ABDOMINAL PAIN: ICD-10-CM

## 2024-05-31 DIAGNOSIS — Z12.11 ENCOUNTER FOR SCREENING FOR MALIGNANT NEOPLASM OF COLON: ICD-10-CM

## 2024-05-31 PROCEDURE — 99204 OFFICE O/P NEW MOD 45 MIN: CPT

## 2024-05-31 NOTE — PHYSICAL EXAM
[Alert] : alert [Normal Voice/Communication] : normal voice/communication [Healthy Appearing] : healthy appearing [Sclera] : the sclera and conjunctiva were normal [Normal Lips/Gums] : the lips and gums were normal [Hearing Threshold Finger Rub Not [place]] : hearing was normal [Normal Appearance] : the appearance of the neck was normal [No Respiratory Distress] : no respiratory distress [Auscultation Breath Sounds / Voice Sounds] : lungs were clear to auscultation bilaterally [Heart Rate And Rhythm] : heart rate was normal and rhythm regular [Normal S1, S2] : normal S1 and S2 [Bowel Sounds] : normal bowel sounds [Abdomen Tenderness] : non-tender [Abdomen Soft] : soft [Abnormal Walk] : normal gait [Normal Color / Pigmentation] : normal skin color and pigmentation [Oriented To Time, Place, And Person] : oriented to person, place, and time

## 2024-05-31 NOTE — ASSESSMENT
Spoke to mom. Scheduled video visit with Dr. Bruner - 5/23 at 9am. Understands instructions for video visit.  GEGE 5.18   [FreeTextEntry1] : 59-year-old male presents to the office for constipation. Pt reports battling constipation for the last several weeks. Pt reports bowel movements approx. every 4 days. Pt also reports associated cramping, bloating, and gas.  Previous colonoscopy in 2015.  Plan:  Constipation: Will have pt begin regimen of fiber and miralax daily. Pt instructed miralax can be taken up to three times a day for severe constipation. Pt to have follow up in two weeks for effectiveness of regimen. If ineffective will consider linzess vs trulance.  Cramping/bloating/gas: Likely due to constipation, to resolve once constipation improves, if it does not recommend imaging.  Colonoscopy: With recent change in bowel movements and previous colonoscopy 9 years ago recommend repeat for further evaluation.  Risks versus benefits as well as instructions given. Pt agrees to planned procedure. Miralax OTC prep instructions given. All questions answered.

## 2024-05-31 NOTE — ADDENDUM
[FreeTextEntry1] : I, Dr. Hawk, personally performed the evaluation and management (E/M) services for this new patient. That E/M includes conducting the initial examination, assessing all conditions, and establishing the plan of care. Today, my NPP, [insert the name], was here to observe my evaluation and management services for this patient to be followed going forward.

## 2024-05-31 NOTE — HISTORY OF PRESENT ILLNESS
[FreeTextEntry1] : Trever Joyce is a 59-year-old male with PMHx of perianal fistula, Smoker, CAD s/p CABG in 2012, HTN, HLD, GERD, and discectomy presents to the office for constipation. Pt reports battling constipation for the last several weeks. Pt had seen PCP who advised use of prune juice until visit with GI. Pt reports bowel movements approx. every 4 days. Pt also reports associated cramping, bloating, and gas.  Previous colonoscopy in 2015. Denies FMH of CRC. Denies significant straining or overt bleeding such as melena or hematochezia. Denies upper GI symptoms such as GERD, nausea, vomiting, or dysphagia. Denies unintentional weight loss.

## 2024-06-19 ENCOUNTER — APPOINTMENT (OUTPATIENT)
Age: 59
End: 2024-06-19

## 2024-06-27 NOTE — ED ADULT NURSE NOTE - NSSEPSISSUSPECTED_ED_A_ED
It was a pleasure taking care of you today and appreciate your seeing us at our [facility].     Today's plan is as follows:  1) I would proceed with a CT of the abdomen /pelvis at [facility] and then a virtual phone visit with me to discuss the results - call Robert at below number to set up the follow up visit  2) make sure you hydrate well starting now in preparation for your CT scan      Please call the office with any questions at [phone].   You can speak to our secretaries or our clinical nurses for specific questions.   For [facility] specific questions, you can also call [phone] or email at [email or web address]  If you need coordinating your appointments and testing you can do these at the  or by calling my office shortly after your visit.         No

## 2024-09-16 ENCOUNTER — RX RENEWAL (OUTPATIENT)
Age: 59
End: 2024-09-16

## 2024-10-31 ENCOUNTER — APPOINTMENT (OUTPATIENT)
Dept: NEUROSURGERY | Facility: CLINIC | Age: 59
End: 2024-10-31

## 2024-11-14 ENCOUNTER — APPOINTMENT (OUTPATIENT)
Dept: GASTROENTEROLOGY | Facility: AMBULATORY MEDICAL SERVICES | Age: 59
End: 2024-11-14
Payer: COMMERCIAL

## 2024-11-14 ENCOUNTER — RESULT REVIEW (OUTPATIENT)
Age: 59
End: 2024-11-14

## 2024-11-14 PROCEDURE — 45380 COLONOSCOPY AND BIOPSY: CPT

## 2024-12-09 ENCOUNTER — RX RENEWAL (OUTPATIENT)
Age: 59
End: 2024-12-09

## 2025-01-13 ENCOUNTER — RX RENEWAL (OUTPATIENT)
Age: 60
End: 2025-01-13

## 2025-06-09 NOTE — ED ADULT NURSE NOTE - NSIMPLEMENTINTERV_GEN_ALL_ED
Peripheral IV  Date/Time: 6/9/2025 12:17 PM      Placement  Needle size: 20 G  Laterality: left  Location: hand  Site prep: alcohol  Attempts: 1         Implemented All Universal Safety Interventions:  [place] to call system. Call bell, personal items and telephone within reach. Instruct patient to call for assistance. Room bathroom lighting operational. Non-slip footwear when patient is off stretcher. Physically safe environment: no spills, clutter or unnecessary equipment. Stretcher in lowest position, wheels locked, appropriate side rails in place.
